# Patient Record
Sex: MALE | Race: WHITE | Employment: OTHER | URBAN - METROPOLITAN AREA
[De-identification: names, ages, dates, MRNs, and addresses within clinical notes are randomized per-mention and may not be internally consistent; named-entity substitution may affect disease eponyms.]

---

## 2019-04-22 DIAGNOSIS — M25.521 RIGHT ELBOW PAIN: Primary | ICD-10-CM

## 2019-12-17 ENCOUNTER — ANESTHESIA (OUTPATIENT)
Dept: ENDOSCOPY | Age: 59
End: 2019-12-17
Payer: COMMERCIAL

## 2019-12-17 ENCOUNTER — HOSPITAL ENCOUNTER (OUTPATIENT)
Age: 59
Setting detail: OUTPATIENT SURGERY
Discharge: HOME | End: 2019-12-17
Attending: SURGERY | Admitting: SURGERY
Payer: COMMERCIAL

## 2019-12-17 ENCOUNTER — ANESTHESIA EVENT (OUTPATIENT)
Dept: ENDOSCOPY | Age: 59
End: 2019-12-17
Payer: COMMERCIAL

## 2019-12-17 VITALS — OXYGEN SATURATION: 95 % | SYSTOLIC BLOOD PRESSURE: 147 MMHG | DIASTOLIC BLOOD PRESSURE: 82 MMHG

## 2019-12-17 VITALS
HEIGHT: 69 IN | RESPIRATION RATE: 12 BRPM | SYSTOLIC BLOOD PRESSURE: 165 MMHG | OXYGEN SATURATION: 94 % | DIASTOLIC BLOOD PRESSURE: 102 MMHG | WEIGHT: 197 LBS | HEART RATE: 86 BPM | TEMPERATURE: 98.9 F | BODY MASS INDEX: 29.18 KG/M2

## 2019-12-17 DIAGNOSIS — K21.00 GASTROESOPHAGEAL REFLUX DISEASE WITH ESOPHAGITIS: ICD-10-CM

## 2019-12-17 DIAGNOSIS — K31.84 GASTROPARESIS: Primary | ICD-10-CM

## 2019-12-17 PROCEDURE — 2709999900 HC NON-CHARGEABLE SUPPLY: Performed by: SURGERY

## 2019-12-17 PROCEDURE — 2500000003 HC RX 250 WO HCPCS: Performed by: NURSE ANESTHETIST, CERTIFIED REGISTERED

## 2019-12-17 PROCEDURE — 6370000000 HC RX 637 (ALT 250 FOR IP): Performed by: NURSE ANESTHETIST, CERTIFIED REGISTERED

## 2019-12-17 PROCEDURE — 6360000002 HC RX W HCPCS: Performed by: NURSE ANESTHETIST, CERTIFIED REGISTERED

## 2019-12-17 PROCEDURE — 7100000000 HC PACU RECOVERY - FIRST 15 MIN: Performed by: SURGERY

## 2019-12-17 PROCEDURE — 88305 TISSUE EXAM BY PATHOLOGIST: CPT

## 2019-12-17 PROCEDURE — 88312 SPECIAL STAINS GROUP 1: CPT

## 2019-12-17 PROCEDURE — 2580000003 HC RX 258: Performed by: SURGERY

## 2019-12-17 PROCEDURE — 3609012400 HC EGD TRANSORAL BIOPSY SINGLE/MULTIPLE: Performed by: SURGERY

## 2019-12-17 PROCEDURE — 3700000001 HC ADD 15 MINUTES (ANESTHESIA): Performed by: SURGERY

## 2019-12-17 PROCEDURE — 7100000001 HC PACU RECOVERY - ADDTL 15 MIN: Performed by: SURGERY

## 2019-12-17 PROCEDURE — 3700000000 HC ANESTHESIA ATTENDED CARE: Performed by: SURGERY

## 2019-12-17 PROCEDURE — 6370000000 HC RX 637 (ALT 250 FOR IP): Performed by: SURGERY

## 2019-12-17 PROCEDURE — 2500000003 HC RX 250 WO HCPCS

## 2019-12-17 RX ORDER — LIDOCAINE HYDROCHLORIDE 10 MG/ML
INJECTION, SOLUTION EPIDURAL; INFILTRATION; INTRACAUDAL; PERINEURAL PRN
Status: DISCONTINUED | OUTPATIENT
Start: 2019-12-17 | End: 2019-12-17 | Stop reason: SDUPTHER

## 2019-12-17 RX ORDER — LIDOCAINE HYDROCHLORIDE 20 MG/ML
15 SOLUTION OROPHARYNGEAL ONCE
Status: COMPLETED | OUTPATIENT
Start: 2019-12-17 | End: 2019-12-17

## 2019-12-17 RX ORDER — METOPROLOL SUCCINATE 50 MG/1
50 TABLET, EXTENDED RELEASE ORAL DAILY
Status: ON HOLD | COMMUNITY
End: 2021-10-16 | Stop reason: HOSPADM

## 2019-12-17 RX ORDER — VALSARTAN 320 MG/1
320 TABLET ORAL DAILY
COMMUNITY
End: 2020-02-23 | Stop reason: SDUPTHER

## 2019-12-17 RX ORDER — PROPOFOL 10 MG/ML
INJECTION, EMULSION INTRAVENOUS PRN
Status: DISCONTINUED | OUTPATIENT
Start: 2019-12-17 | End: 2019-12-17 | Stop reason: SDUPTHER

## 2019-12-17 RX ORDER — DIPHENHYDRAMINE HYDROCHLORIDE 50 MG/ML
12.5 INJECTION INTRAMUSCULAR; INTRAVENOUS
Status: DISCONTINUED | OUTPATIENT
Start: 2019-12-17 | End: 2019-12-17 | Stop reason: HOSPADM

## 2019-12-17 RX ORDER — ONDANSETRON 2 MG/ML
4 INJECTION INTRAMUSCULAR; INTRAVENOUS
Status: DISCONTINUED | OUTPATIENT
Start: 2019-12-17 | End: 2019-12-17 | Stop reason: HOSPADM

## 2019-12-17 RX ORDER — OXYCODONE HYDROCHLORIDE AND ACETAMINOPHEN 5; 325 MG/1; MG/1
1 TABLET ORAL PRN
Status: DISCONTINUED | OUTPATIENT
Start: 2019-12-17 | End: 2019-12-17 | Stop reason: HOSPADM

## 2019-12-17 RX ORDER — OXYCODONE HYDROCHLORIDE AND ACETAMINOPHEN 5; 325 MG/1; MG/1
2 TABLET ORAL PRN
Status: DISCONTINUED | OUTPATIENT
Start: 2019-12-17 | End: 2019-12-17 | Stop reason: HOSPADM

## 2019-12-17 RX ORDER — SUCRALFATE 1 G/1
1 TABLET ORAL 4 TIMES DAILY
Qty: 240 TABLET | Refills: 0 | OUTPATIENT
Start: 2019-12-17 | End: 2021-03-10 | Stop reason: ALTCHOICE

## 2019-12-17 RX ORDER — SODIUM CHLORIDE, SODIUM LACTATE, POTASSIUM CHLORIDE, CALCIUM CHLORIDE 600; 310; 30; 20 MG/100ML; MG/100ML; MG/100ML; MG/100ML
INJECTION, SOLUTION INTRAVENOUS CONTINUOUS
Status: DISCONTINUED | OUTPATIENT
Start: 2019-12-17 | End: 2019-12-17 | Stop reason: HOSPADM

## 2019-12-17 RX ORDER — LABETALOL 20 MG/4 ML (5 MG/ML) INTRAVENOUS SYRINGE
5 EVERY 10 MIN PRN
Status: DISCONTINUED | OUTPATIENT
Start: 2019-12-17 | End: 2019-12-17 | Stop reason: HOSPADM

## 2019-12-17 RX ORDER — ONDANSETRON 2 MG/ML
4 INJECTION INTRAMUSCULAR; INTRAVENOUS ONCE
Status: DISCONTINUED | OUTPATIENT
Start: 2019-12-17 | End: 2019-12-17 | Stop reason: HOSPADM

## 2019-12-17 RX ORDER — OXYMETAZOLINE HYDROCHLORIDE 0.05 G/100ML
SPRAY NASAL PRN
Status: DISCONTINUED | OUTPATIENT
Start: 2019-12-17 | End: 2019-12-17 | Stop reason: SDUPTHER

## 2019-12-17 RX ORDER — ESOMEPRAZOLE MAGNESIUM 40 MG/1
40 CAPSULE, DELAYED RELEASE ORAL 2 TIMES DAILY
Qty: 120 CAPSULE | Refills: 0 | OUTPATIENT
Start: 2019-12-17 | End: 2021-07-23

## 2019-12-17 RX ADMIN — LIDOCAINE HYDROCHLORIDE 50 MG: 10 INJECTION, SOLUTION EPIDURAL; INFILTRATION; INTRACAUDAL at 07:35

## 2019-12-17 RX ADMIN — LIDOCAINE HYDROCHLORIDE 15 ML: 20 SOLUTION ORAL; TOPICAL at 06:49

## 2019-12-17 RX ADMIN — SODIUM CHLORIDE, POTASSIUM CHLORIDE, SODIUM LACTATE AND CALCIUM CHLORIDE: 600; 310; 30; 20 INJECTION, SOLUTION INTRAVENOUS at 06:49

## 2019-12-17 RX ADMIN — OXYMETAZOLINE HYDROCHLORIDE 2 SPRAY: 5 SPRAY NASAL at 07:20

## 2019-12-17 RX ADMIN — PROPOFOL 220 MG: 10 INJECTION, EMULSION INTRAVENOUS at 07:35

## 2019-12-17 RX ADMIN — FAMOTIDINE 20 MG: 10 INJECTION, SOLUTION INTRAVENOUS at 06:56

## 2019-12-17 ASSESSMENT — PULMONARY FUNCTION TESTS
PIF_VALUE: 0
PIF_VALUE: 1
PIF_VALUE: 0
PIF_VALUE: 1
PIF_VALUE: 0

## 2019-12-17 ASSESSMENT — PAIN - FUNCTIONAL ASSESSMENT: PAIN_FUNCTIONAL_ASSESSMENT: 0-10

## 2019-12-18 LAB — SURGICAL PATHOLOGY REPORT: NORMAL

## 2020-02-23 ENCOUNTER — OFFICE VISIT (OUTPATIENT)
Dept: FAMILY MEDICINE CLINIC | Age: 60
End: 2020-02-23
Payer: COMMERCIAL

## 2020-02-23 VITALS — DIASTOLIC BLOOD PRESSURE: 95 MMHG | HEART RATE: 77 BPM | TEMPERATURE: 98.6 F | SYSTOLIC BLOOD PRESSURE: 149 MMHG

## 2020-02-23 PROCEDURE — 99214 OFFICE O/P EST MOD 30 MIN: CPT | Performed by: PHYSICIAN ASSISTANT

## 2020-02-23 RX ORDER — AZITHROMYCIN 250 MG/1
TABLET, FILM COATED ORAL
Qty: 8 TABLET | Refills: 0 | Status: SHIPPED | OUTPATIENT
Start: 2020-02-23 | End: 2020-02-23

## 2020-02-23 RX ORDER — VALSARTAN 320 MG/1
320 TABLET ORAL DAILY
Qty: 14 TABLET | Refills: 0 | Status: SHIPPED | OUTPATIENT
Start: 2020-02-23 | End: 2021-03-10 | Stop reason: ALTCHOICE

## 2020-02-23 RX ORDER — AZITHROMYCIN 250 MG/1
TABLET, FILM COATED ORAL
Qty: 8 TABLET | Refills: 0 | Status: SHIPPED | OUTPATIENT
Start: 2020-02-23 | End: 2020-03-01

## 2020-02-23 ASSESSMENT — ENCOUNTER SYMPTOMS
DIARRHEA: 0
WHEEZING: 1
CONSTIPATION: 0
COUGH: 1
VOMITING: 0
NAUSEA: 0
SHORTNESS OF BREATH: 1
SORE THROAT: 0
SINUS COMPLAINT: 1
RHINORRHEA: 0
SINUS PRESSURE: 1

## 2020-02-23 NOTE — PROGRESS NOTES
[address] [facility] WALK-IN  [facility] [address] [place] [address]  Dept: [phone]  Dept Fax: [phone]    Tano Sanders    Date of patient's visit: 2/23/2020  Patient's Name:  Nikolai Pinedo YOB: 1960            Patient Care Team:  Matthew Saravia as PCP - General (Family Medicine)  ================================================================    REASON FOR VISIT/CHIEF COMPLAINT:  Cough (started a couple hours ago, hurts in chest when coughing. ) and Other (drainage, stuffy/runny nose - sinus pressure/pain, ears plugged)      HISTORY OF PRESENTING ILLNESS:  Nikolai Pinedo is a 63 y.o. is seen at the walk-in clinic today for sinus infection. History was obtained from: patient. Cough   This is a new problem. The current episode started in the past 7 days. The cough is productive of sputum (green). Associated symptoms include chest pain (d/t coughing), nasal congestion, shortness of breath and wheezing. Pertinent negatives include no chills, fever, rhinorrhea or sore throat. Sinus Problem   This is a new problem. The current episode started in the past 7 days. There has been no fever. Associated symptoms include congestion (nasal and chest), coughing, shortness of breath and sinus pressure. Pertinent negatives include no chills or sore throat. Past treatments include spray decongestants. The treatment provided mild relief. Patient states \"Z-Isiah in the past that improved symptoms\". Discussed sinusitis, medications in depth with patient, instructed patient continue take OTC nasal spray and decongestant as needed, informed patient he will be prescribed antibiotic, instructed patient to come back into the walk-in clinic or follow-up with PCP if symptoms do not improve or resolve after finishing antibiotic, patient voiced understanding. Patient blood pressure elevated in office, \"out of medication for a while\".   Discussed elevated blood pressure and risk in depth with patient, informed patient he will be prescribed 14-day supply, instructed patient to follow-up with PCP for any additional refills, patient voiced understanding. Vital signs stable in office. Labs reviewed. Health maintenance reviewed, instructed patient to follow-up with PCP for any health maintenance concerns. Medications reviewed, prescribed Z-Isiah 250 mg for 7 days, refill valsartan 320 mg. HPI    Patient Active Problem List   Diagnosis    Heartburn       Health Maintenance Due   Topic Date Due    Potassium monitoring  1960    Creatinine monitoring  1960    Hepatitis C screen  1960    DTaP/Tdap/Td vaccine (1 - Tdap) 08/10/1972    HIV screen  08/10/1976    Lipid screen  08/10/2001    Diabetes screen  08/10/2001    Shingles Vaccine (1 of 2) 08/10/2011    Colon cancer screen colonoscopy  08/10/2011    Flu vaccine (1) 09/01/2019       Allergies   Allergen Reactions    Pcn [Penicillins]          Current Outpatient Medications   Medication Sig Dispense Refill    valsartan (DIOVAN) 320 MG tablet Take 1 tablet by mouth daily for 14 days 14 tablet 0    azithromycin (ZITHROMAX) 250 MG tablet 500mg on day 1 followed by 250mg on days 2 - 7 8 tablet 0    metoprolol succinate (TOPROL XL) 50 MG extended release tablet Take 50 mg by mouth daily      esomeprazole (NEXIUM) 40 MG delayed release capsule Take 1 capsule by mouth 2 times daily 120 capsule 0    sucralfate (CARAFATE) 1 GM tablet Take 1 tablet by mouth 4 times daily 240 tablet 0    ibuprofen (ADVIL;MOTRIN) 600 MG tablet Take 600 mg by mouth every 6 hours as needed.  lidocaine (LIDODERM) 5 % Place 1 patch onto the skin every 24 hours. 12 hours on, 12 hours off. 20 patch 0     No current facility-administered medications for this visit.         Social History     Tobacco Use    Smoking status: Former Smoker     Packs/day: 1.00     Years: 20.00     Pack years: 20.00 Types: Cigarettes     Last attempt to quit: 3/15/2008     Years since quittin.9    Smokeless tobacco: Never Used   Substance Use Topics    Alcohol use: Yes     Comment: socially    Drug use: No       Family History   Problem Relation Age of Onset    Cancer Father         prostrate    Diabetes Father     Cancer Paternal Uncle         colon        REVIEW OFSYSTEMS:  Review of Systems   Constitutional: Negative for chills and fever. HENT: Positive for congestion (nasal and chest) and sinus pressure. Negative for rhinorrhea and sore throat. Respiratory: Positive for cough, shortness of breath and wheezing. Cardiovascular: Positive for chest pain (d/t coughing). Gastrointestinal: Negative for constipation, diarrhea, nausea and vomiting. Genitourinary: Negative for dysuria, frequency and urgency. PHYSICAL EXAM:  Vitals:    20 1446 20 1510   BP: (!) 153/93 (!) 149/95   Site: Left Upper Arm Right Upper Arm   Position: Sitting Sitting   Cuff Size: Large Adult Large Adult   Pulse: 77 77   Temp: 98.6 °F (37 °C)      BP Readings from Last 3 Encounters:   20 (!) 149/95   19 (!) 165/102   19 (!) 147/82        Physical Exam  Vitals signs reviewed. Constitutional:       General: He is not in acute distress. Appearance: Normal appearance. He is well-developed, well-groomed and overweight. He is not ill-appearing or toxic-appearing. HENT:      Head: Normocephalic and atraumatic. Right Ear: External ear normal. There is impacted cerumen. Left Ear: External ear normal. There is impacted cerumen. Nose: Nasal tenderness, congestion and rhinorrhea present. No mucosal edema. Right Sinus: Maxillary sinus tenderness and frontal sinus tenderness present. Left Sinus: No maxillary sinus tenderness or frontal sinus tenderness. Mouth/Throat:      Lips: Pink. Mouth: Mucous membranes are moist.      Pharynx: Uvula midline. No oropharyngeal exudate.

## 2020-02-23 NOTE — PATIENT INSTRUCTIONS
· Continue use nasal spray and Mucinex as needed for congestion  · Follow up with PCP for blood pressure medication

## 2020-03-22 ENCOUNTER — OFFICE VISIT (OUTPATIENT)
Dept: FAMILY MEDICINE CLINIC | Age: 60
End: 2020-03-22
Payer: COMMERCIAL

## 2020-03-22 VITALS — DIASTOLIC BLOOD PRESSURE: 88 MMHG | HEART RATE: 86 BPM | TEMPERATURE: 98.9 F | SYSTOLIC BLOOD PRESSURE: 140 MMHG

## 2020-03-22 PROCEDURE — 69210 REMOVE IMPACTED EAR WAX UNI: CPT | Performed by: FAMILY MEDICINE

## 2020-03-22 PROCEDURE — 99213 OFFICE O/P EST LOW 20 MIN: CPT | Performed by: FAMILY MEDICINE

## 2020-03-22 ASSESSMENT — ENCOUNTER SYMPTOMS
SORE THROAT: 0
ABDOMINAL PAIN: 0
SINUS PAIN: 0
WHEEZING: 0
RHINORRHEA: 0
NAUSEA: 0
COUGH: 0
DIARRHEA: 0
SHORTNESS OF BREATH: 0
VOMITING: 0
SINUS PRESSURE: 0

## 2020-03-22 NOTE — PROGRESS NOTES
[address] WALK-IN  [address]  [address] [facility]  Dept: [phone]  Dept Fax: [phone]    Maricarmen Bland is a 63 y.o. male who presents today for his medical conditions/complaintsas noted below. Maricarmen Bland is c/o of Other (ears plugged, started yesterday)        HPI:     Ear Fullness    There is pain in both ears. This is a new problem. The current episode started yesterday. The problem occurs constantly. The problem has been unchanged. There has been no fever. The pain is mild (pressure). Associated symptoms include hearing loss. Pertinent negatives include no abdominal pain, coughing, diarrhea, ear discharge, rash, rhinorrhea, sore throat or vomiting. Treatments tried: OTC ear drops. The treatment provided no relief. There is no history of a chronic ear infection or a tympanostomy tube.    NO sick contacts    Past Medical History:   Diagnosis Date    Heartburn     Hypertension     Sleep apnea     Past medical history reviewed and pertinent positives/negatives in the HPI    Past Surgical History:   Procedure Laterality Date    SHOULDER SURGERY      SPINE SURGERY      cervical and lumbar    UPPER GASTROINTESTINAL ENDOSCOPY N/A 2019    EGD BIOPSY performed by Gerald Sifuentes [facility] at [facility]       Family History   Problem Relation Age of Onset    Cancer Father         prostrate    Diabetes Father     Cancer Paternal Uncle         colon       Social History     Tobacco Use    Smoking status: Former Smoker     Packs/day: 1.00     Years: 20.00     Pack years: 20.00     Types: Cigarettes     Last attempt to quit: 3/15/2008     Years since quittin.0    Smokeless tobacco: Never Used   Substance Use Topics    Alcohol use: Yes     Comment: socially      Current Outpatient Medications   Medication Sig Dispense Refill    neomycin-polymyxin-hydrocortisone (CORTISPORIN) 3.5-65940-2 otic solution Place 4 drops into the right ear 3

## 2020-03-22 NOTE — PATIENT INSTRUCTIONS
loss of hearing.    Watch closely for changes in your health, and be sure to contact your doctor if:    · You have pain or reduced hearing after 1 week of home treatment.     · You have any new symptoms, such as nausea or balance problems. Where can you learn more? Go to [email or web address]://[email or web address] org and sign in to your [employer] account. Enter Q779 in the [facility] box to learn more about \"Earwax Blockage: Care Instructions. \"     If you do not have an account, please click on the \"Sign Up Now\" link. Current as of: June 26, 2019Content Version: 12.4  © 4694-3381 [employer], Incorporated. Care instructions adapted under license by [facility] ([facility]). If you have questions about a medical condition or this instruction, always ask your healthcare professional. [address] any warranty or liability for your use of this information. 1.) Irrigation of both ears done today. Use prescription ear drop as prescribed for irritation of the right ear. May use debrox ear drops, olive oil or 50/50 mix of water and hydrogen peroxide weekly as needed to decrease wax in ears. May use q-tips to clean the outside of the ear. Never put q-tips inside the ear canal  2. )If symptoms worsen or do not improve please follow-up with PCP or return to clinic

## 2020-10-16 ENCOUNTER — HOSPITAL ENCOUNTER (OUTPATIENT)
Age: 60
Setting detail: SPECIMEN
Discharge: HOME | End: 2020-10-16
Payer: COMMERCIAL

## 2020-10-16 LAB
CHOLESTEROL/HDL RATIO: 6.4
CHOLESTEROL: 172 MG/DL
ESTIMATED AVERAGE GLUCOSE: 108 MG/DL
GLUCOSE BLD-MCNC: 103 MG/DL (ref 70–99)
HBA1C MFR BLD: 5.4 % (ref 4–6)
HDLC SERPL-MCNC: 27 MG/DL
LDL CHOLESTEROL: 115 MG/DL (ref 0–130)
PROSTATE SPECIFIC ANTIGEN: 3.53 UG/L
TRIGL SERPL-MCNC: 148 MG/DL
VLDLC SERPL CALC-MCNC: ABNORMAL MG/DL (ref 1–30)

## 2021-01-18 ENCOUNTER — HOSPITAL ENCOUNTER (OUTPATIENT)
Dept: GENERAL RADIOLOGY | Age: 61
Discharge: HOME | End: 2021-01-20
Payer: COMMERCIAL

## 2021-01-18 ENCOUNTER — HOSPITAL ENCOUNTER (OUTPATIENT)
Age: 61
Discharge: HOME | End: 2021-01-20
Payer: COMMERCIAL

## 2021-01-18 DIAGNOSIS — S59.901A INJURY OF RIGHT ELBOW, INITIAL ENCOUNTER: ICD-10-CM

## 2021-01-18 PROCEDURE — 73080 X-RAY EXAM OF ELBOW: CPT

## 2021-01-26 ENCOUNTER — OFFICE VISIT (OUTPATIENT)
Dept: ORTHOPEDIC SURGERY | Age: 61
End: 2021-01-26
Payer: COMMERCIAL

## 2021-01-26 DIAGNOSIS — M17.11 PRIMARY OSTEOARTHRITIS OF RIGHT KNEE: ICD-10-CM

## 2021-01-26 DIAGNOSIS — M70.21 OLECRANON BURSITIS OF RIGHT ELBOW: Primary | ICD-10-CM

## 2021-01-26 DIAGNOSIS — S46.311A RUPTURE OF RIGHT TRICEPS TENDON, INITIAL ENCOUNTER: ICD-10-CM

## 2021-01-26 PROCEDURE — 99203 OFFICE O/P NEW LOW 30 MIN: CPT | Performed by: PHYSICIAN ASSISTANT

## 2021-01-26 PROCEDURE — 20610 DRAIN/INJ JOINT/BURSA W/O US: CPT | Performed by: PHYSICIAN ASSISTANT

## 2021-01-26 RX ORDER — LIDOCAINE HYDROCHLORIDE 10 MG/ML
4 INJECTION, SOLUTION INFILTRATION; PERINEURAL ONCE
Status: DISCONTINUED | OUTPATIENT
Start: 2021-01-26 | End: 2021-02-23 | Stop reason: HOSPADM

## 2021-01-26 RX ORDER — LIDOCAINE HYDROCHLORIDE 10 MG/ML
2 INJECTION, SOLUTION INFILTRATION; PERINEURAL ONCE
Status: COMPLETED | OUTPATIENT
Start: 2021-01-26 | End: 2021-01-26

## 2021-01-26 RX ORDER — LIDOCAINE HYDROCHLORIDE 10 MG/ML
4 INJECTION, SOLUTION INFILTRATION; PERINEURAL ONCE
Status: COMPLETED | OUTPATIENT
Start: 2021-01-26 | End: 2021-01-26

## 2021-01-26 RX ORDER — BETAMETHASONE SODIUM PHOSPHATE AND BETAMETHASONE ACETATE 3; 3 MG/ML; MG/ML
12 INJECTION, SUSPENSION INTRA-ARTICULAR; INTRALESIONAL; INTRAMUSCULAR; SOFT TISSUE ONCE
Status: COMPLETED | OUTPATIENT
Start: 2021-01-26 | End: 2021-01-26

## 2021-01-26 RX ADMIN — LIDOCAINE HYDROCHLORIDE 2 ML: 10 INJECTION, SOLUTION INFILTRATION; PERINEURAL at 14:17

## 2021-01-26 RX ADMIN — BETAMETHASONE SODIUM PHOSPHATE AND BETAMETHASONE ACETATE 12 MG: 3; 3 INJECTION, SUSPENSION INTRA-ARTICULAR; INTRALESIONAL; INTRAMUSCULAR; SOFT TISSUE at 14:14

## 2021-01-26 RX ADMIN — LIDOCAINE HYDROCHLORIDE 4 ML: 10 INJECTION, SOLUTION INFILTRATION; PERINEURAL at 14:15

## 2021-01-26 NOTE — PROGRESS NOTES
[facility], [address] [place] [address], [address] [place] [address], [address] [place] [address]           Dept Phone: [phone]           Dept Fax:  [phone] [facility], [place]          Dept Phone: [phone]           Dept Fax:  [phone]      Chief Compliant:  Chief Complaint   Patient presents with    New Patient     right elbow and knee pain         History of Present Illness: This is a 62 y.o. male who presents to the clinic today for evaluation of right elbow and right knee pain. Right Elbow Pain and swelling:  Patient states he was skiing in [place] last week and on his final day of the trip on 1/14/2021 he fell in which he landed directly on his right elbow. Patient had x-rays on 1/18/2021 which demonstrated moderate degenerative changes and soft tissue swelling however no evidence of acute fracture was seen. He was then given a referral for orthopedics for further evaluation. Patient with significant swelling to the posterior and medial elbow. Pain is aggravated by terminal extension and pushing off albeit mildly improved since last week. Patient states he continues have significant swelling however this is mildly improved as well. He denies any obvious deformity, joint warmth, redness, fever or chills. Patient is an avid weightlifter and states he has not been able to return to the gym since this injury due to the pain with lifting as well as tricep extension. Right knee pain, chronic:  States he has chronic pain to this right knee and has had it for approximately 4 years. Patient underwent a right knee arthroscopy partial medial meniscectomy in November 2017 by Dr. Kimmie Agrawal. Patient states unfortunately has continued to have pain of this right knee off and on over the last 3 years.   Pain is most severe to the anterior and medial aspect of the right knee and is aggravated by activity. Pain does seem to be relieved by rest.  Patient has not had any recent injections or physical therapy on this right knee. Patient denies any trauma of this right knee states he had pain far before the fall and does not believe he hit this knee during the fall while skiing. Past History:    Current Outpatient Medications:     valsartan (DIOVAN) 320 MG tablet, Take 1 tablet by mouth daily for 14 days, Disp: 14 tablet, Rfl: 0    metoprolol succinate (TOPROL XL) 50 MG extended release tablet, Take 50 mg by mouth daily, Disp: , Rfl:     esomeprazole (NEXIUM) 40 MG delayed release capsule, Take 1 capsule by mouth 2 times daily, Disp: 120 capsule, Rfl: 0    sucralfate (CARAFATE) 1 GM tablet, Take 1 tablet by mouth 4 times daily, Disp: 240 tablet, Rfl: 0    ibuprofen (ADVIL;MOTRIN) 600 MG tablet, Take 600 mg by mouth every 6 hours as needed. , Disp: , Rfl:     lidocaine (LIDODERM) 5 %, Place 1 patch onto the skin every 24 hours.  12 hours on, 12 hours off., Disp: 20 patch, Rfl: 0  Allergies   Allergen Reactions    Pcn [Penicillins]      Social History     Socioeconomic History    Marital status: Single     Spouse name: Not on file    Number of children: Not on file    Years of education: Not on file    Highest education level: Not on file   Occupational History    Not on file   Social Needs    Financial resource strain: Not on file    Food insecurity     Worry: Not on file     Inability: Not on file    Transportation needs     Medical: Not on file     Non-medical: Not on file   Tobacco Use    Smoking status: Former Smoker     Packs/day: 1.00     Years: 20.00     Pack years: 20.00     Types: Cigarettes     Quit date: 3/15/2008     Years since quittin.8    Smokeless tobacco: Never Used   Substance and Sexual Activity    Alcohol use: Yes     Comment: socially    Drug use: No    Sexual activity: Not on file   Lifestyle    Physical activity     Days per week: Not on file     Minutes per session: Not on file    Stress: Not on file   Relationships    Social connections     Talks on phone: Not on file     Gets together: Not on file     Attends Mu-ism service: Not on file     Active member of club or organization: Not on file     Attends meetings of clubs or organizations: Not on file     Relationship status: Not on file    Intimate partner violence     Fear of current or ex partner: Not on file     Emotionally abused: Not on file     Physically abused: Not on file     Forced sexual activity: Not on file   Other Topics Concern    Not on file   Social History Narrative    Not on file     Past Medical History:   Diagnosis Date    Heartburn     Hypertension     Sleep apnea      Past Surgical History:   Procedure Laterality Date    SHOULDER SURGERY      SPINE SURGERY      cervical and lumbar    UPPER GASTROINTESTINAL ENDOSCOPY N/A 12/17/2019    EGD BIOPSY performed by Cyril Nguyen MD at [facility]     Family History   Problem Relation Age of Onset    Cancer Father         prostrate    Diabetes Father     Cancer Paternal Uncle         colon        Review of Systems   Constitutional: Negative for fever, chills, sweats. Eyes: Negative for changes in vision, or pain. HENT: Negative for ear ache, epistaxis, or sore throat. Respiratory/Cardio: Negative for Chest pain, palpitations, SOB, or cough. Gastrointestinal: Negative for abdominal pain, N/V/D. Genitourinary: Negative for dysuria, frequency, urgency, or hematuria. Neurological: Negative for headache, numbness, or weakness. Integumentary: Negative for rash, itching, laceration, or abrasion. Musculoskeletal: Positive for New Patient (right elbow and knee pain )       Physical Exam:  Constitutional: Patient is oriented to person, place, and time. Patient appears well-developed and well nourished.    HENT: Negative otherwise noted  Head: Normocephalic and Atraumatic  Nose: Normal  Eyes: Conjunctivae and EOM are normal  Neck: Normal range of motion Neck supple. Respiratory/Cardio: Effort normal. No respiratory distress. Musculoskeletal:    right Elbow    Tenderness:  Patient with severe tenderness to the distal triceps tendon. There is palpable swelling/mass to the triceps tendon that is TTP. No tenderness to the medial lateral epicondyle. No tenderness to the olecranon itself. Swelling:  Significant swelling of the olecranon bursa. Moderate swelling over the proximal forearm as well as the medial epicondyle. Range of Motion:      Flexion: 120     Extension: 10     Pronation: 70     Supination: 70        Strength      Elbow Extension 3/5     Elbow Flexion 5/5      5/5     Pronation 5/5     Supination 5/5       Varus Stress Test Negative   Valgus Stress Test Negative   Tinels Cubital Tunnel Negative     Right Knee:     Skin: warm and dry, no rash or erythema  Vasculature: 2+ pedal pulses bilaterally  Neuro: Sensation grossly intact to light touch diffusely  Alignment: Normal  Tenderness: Mild tenderness to the medial joint line. ROM: (Degrees)       A P       Extension  0        Flexion   120 125       Crepitation  Yes       Muscle strength:         Flexion   5      Extension  5      SLR   5        Extensor lag   n          Special testing:  y    Pain with deep knee flexion     n    Patellar grind       n    Patellar apprehension      y    Patellar glide         n    Lachman       n    Anterior drawer      n    Pivot shift       n    Posterior drawer      n    Dial test       n    Posterolateral drawer      n    Posterior Sag       n    MCL        n    LCL          mild    Medial joint line tenderness     n    Lateral joint line tenderness     n    Appley's             Neurological: Patient is alert and oriented to person, place, and time. Normal strenght. No sensory deficit.   Skin: Skin is warm and dry  Psychiatric: Behavior is normal. Thought content normal.  Nursing note and vitals reviewed. Labs and Imaging:     XR taken today:  No results found. Previous Imagin2021 x-ray right elbow  FINDINGS:   The joint spaces are maintained.  Normal alignment.  Somewhat prominent   degenerative changes at the articulating margins of the distal humerus and   olecranon process.  There is narrowing of the space between the lateral   epicondyle and the radial head.       No evidence of fracture or cortical irregularity.  No evidence to suggest   joint effusion.           Impression   Degenerative changes in the bones of the right elbow.  No acute process is   identified. 2017 Xr right knee  Findings:  No fracture, dislocation, or destructive osseous process is observed. No acute process. There is lateral subluxation of patella with narrowing of the lateral patellofemoral joint. Impression:   No acute abnormality. Discussed possibility of updating x-rays of this right knee today however patient declined. Orders Placed This Encounter   Procedures    MRI ELBOW RIGHT WO CONTRAST     Standing Status:   Future     Standing Expiration Date:   2022 - DRAIN/INJECT LARGE JOINT BURSA       Assessment and Plan:  1. Olecranon bursitis of right elbow    2. Rupture of right triceps tendon, initial encounter    3. Primary osteoarthritis of right knee          PLAN:  This is a 62 y.o. male who presents to the clinic today for evaluation of right olecranon bursitis and chronic right knee pain. Right olecranon bursitis  1. Patient with evidence of significant olecranon bursitis. Due to pain with extension as well as palpable swelling and tenderness to the triceps tendon there is question of partial tearing of the triceps tendon as well  2. Underwent aspiration of olecranon bursa as outlined below. Compression wrap is placed over the elbow after the procedure and instructed wear this for 24 hours.   Patient is also educated on lifestyle modifications avoid including prolonged flexion and prolonged pressure on this elbow. 3.  Due to concern over triceps tearing recommend an MRI of the right elbow to rule out triceps rupture  4. RTC after MRI    Chronic right knee pain  1. Celestone injection given as outlined below  2. RTC PRN        An informed verbal consent for the procedure was obtained and risks including, but not limited to: allergy to medications, injection, bleeding, stiffness of joint, recurrence of symptoms, loss of function, swelling, drainage, irrigation, need for surgery and pseudo-septic inflammation, were explained to the patient. Also, discussed was the potential for further injections, irrigation and debridement and surgery. Alternate means of treatment have also been discussed with the patient. Procedure Note: Right knee Celestone Injection   Following an appropriate discussion with the patient regarding the risks and benefits of the procedure he consented to proceed. his right knee was prepped using betadine solution and alcohol swab. Using aseptic technique and through a lateral joint line approach, his right knee was injected superficially with 4 cc of 1% lidocaine without epinephrine and subsequently with 2 cc of 6 mg/mL Celestone into the right knee. A band aid was applied to the injection site. he tolerated the injection with no immediate adverse reactions. Procedure Note: Right Olecranon bursa aspiration without Injection   Following an appropriate discussion with the patient regarding the risks and benefits of the procedure he consented to proceed. his right elbow was prepped using betadine solution and alcohol swab. Using aseptic technique and through a posterior approach, his right elbow was injected superficially with 2 cc of 1% lidocaine without epinephrine and subsequently an 18-gauge needle was inserted and approximately 45 cc of bloody aspirate is obtained. This is nonpurulent in appearance.   A band aid was applied to the injection site. he tolerated the injection with no immediate adverse reactions. Please note that this chart was generated using voice recognition Dragon dictation software. Although every effort was made to ensure the accuracy of this automated transcription, some errors in transcription may have occurred.

## 2021-02-01 ENCOUNTER — OFFICE VISIT (OUTPATIENT)
Dept: GASTROENTEROLOGY | Age: 61
End: 2021-02-01
Payer: COMMERCIAL

## 2021-02-01 ENCOUNTER — TELEPHONE (OUTPATIENT)
Dept: GASTROENTEROLOGY | Age: 61
End: 2021-02-01

## 2021-02-01 VITALS
BODY MASS INDEX: 29.62 KG/M2 | SYSTOLIC BLOOD PRESSURE: 132 MMHG | HEART RATE: 83 BPM | RESPIRATION RATE: 18 BRPM | DIASTOLIC BLOOD PRESSURE: 85 MMHG | WEIGHT: 200 LBS | HEIGHT: 69 IN

## 2021-02-01 DIAGNOSIS — R10.11 RUQ PAIN: ICD-10-CM

## 2021-02-01 DIAGNOSIS — Z12.11 SCREEN FOR COLON CANCER: ICD-10-CM

## 2021-02-01 DIAGNOSIS — R12 HEARTBURN: Primary | ICD-10-CM

## 2021-02-01 PROCEDURE — APPSS45 APP SPLIT SHARED TIME 31-45 MINUTES: Performed by: NURSE PRACTITIONER

## 2021-02-01 PROCEDURE — 99214 OFFICE O/P EST MOD 30 MIN: CPT | Performed by: INTERNAL MEDICINE

## 2021-02-01 RX ORDER — PANTOPRAZOLE SODIUM 40 MG/1
40 TABLET, DELAYED RELEASE ORAL
Qty: 90 TABLET | Refills: 1 | Status: SHIPPED | OUTPATIENT
Start: 2021-02-01 | End: 2021-07-23

## 2021-02-01 RX ORDER — SODIUM, POTASSIUM,MAG SULFATES 17.5-3.13G
SOLUTION, RECONSTITUTED, ORAL ORAL
Qty: 1 BOTTLE | Refills: 0 | Status: ON HOLD | OUTPATIENT
Start: 2021-02-01 | End: 2021-07-13

## 2021-02-01 ASSESSMENT — ENCOUNTER SYMPTOMS
ABDOMINAL DISTENTION: 1
VOMITING: 1
RESPIRATORY NEGATIVE: 1
RECTAL PAIN: 0
ANAL BLEEDING: 0
CONSTIPATION: 0
BLOOD IN STOOL: 0
DIARRHEA: 0
NAUSEA: 1
EYES NEGATIVE: 1
ALLERGIC/IMMUNOLOGIC NEGATIVE: 1
SORE THROAT: 1
ABDOMINAL PAIN: 1

## 2021-02-01 NOTE — PROGRESS NOTES
Reason for Referral:   [address]  [address],  [address]      HISTORY OF PRESENT ILLNESS:   Chief Complaint   Patient presents with    New Patient     Patient referred for heartburn. He notes lately it has been so bad he has to make himself throw up to feel better. Patient on Nexium and carafate. Patient notes having a previous gallbladder attack. When he vomits it's all bile. He notes a greenish yellow color. Patient notes he gets very nauseous. Patient notes occasional abd pain.  Other     Patient notes sometimes his stool is very hard and dark in color. Patient notes having a colon/egd with Dr. Lolita Hernandez long time ago. New patient being seen for persistent heartburns. Patient states that he has been having persistent heartburns since he was 24. Reports frequent nighttime awakenings d/t heartburns. Has nausea with occasional vomiting. Typically bilious. Denies snoring. No extra esophageal manifestations of GERD. Denies epigastric pains  Has associated bloating. He had an EGD in 2019 with Dr Lolita Hernandez that revealed sliding hiatal hernia, reflux esophagitis possible Nair's esophagus. Also noted to have large amount of retained food in the stomach. No gastric outlet obstruction seen. Biopsies of the EG junction revealed acute and chronic inflammation, acute esophagitis. No intestinal metaplasia or dysplasia was identified. Following this patient was to have upper GI as an outpatient however this was not done. Patient also reports intermittent right upper quadrant pain. No recent episodes in the last 2 to 3 months. Patient states that that pain typically comes and goes lasting on average 1-2 hours at a time. Has occasional constipation. Denies any melena, hematochezia. No significant abdominal pain, cramping. Smokes cigar nearly daily. Does not smoke cigarettes. No weight loss.     Has HTN  No lung, heart issues. Denies NSAID use. No current blood thinners. Past Medical,Family, and Social History reviewed and does contribute to the patient presentingcondition. Patient's PMH/PSH,SH,PSYCH Hx, MEDs, ALLERGIES, and ROS were all reviewed and updated in the appropriate sections. PAST MEDICAL HISTORY:  Past Medical History:   Diagnosis Date    Heartburn     Hypertension     Sleep apnea        Past Surgical History:   Procedure Laterality Date    SHOULDER SURGERY      SPINE SURGERY      cervical and lumbar    UPPER GASTROINTESTINAL ENDOSCOPY N/A 12/17/2019    EGD BIOPSY performed by Shilpa Hartman MD at [address]:    Current Outpatient Medications:     pantoprazole (PROTONIX) 40 MG tablet, Take 1 tablet by mouth every morning (before breakfast), Disp: 90 tablet, Rfl: 1    metoprolol succinate (TOPROL XL) 50 MG extended release tablet, Take 50 mg by mouth daily, Disp: , Rfl:     ibuprofen (ADVIL;MOTRIN) 600 MG tablet, Take 600 mg by mouth every 6 hours as needed. , Disp: , Rfl:     lidocaine (LIDODERM) 5 %, Place 1 patch onto the skin every 24 hours.  12 hours on, 12 hours off., Disp: 20 patch, Rfl: 0    Na Sulfate-K Sulfate-Mg Sulf 17.5-3.13-1.6 GM/177ML SOLN, Please follow instructions given by office, Disp: 1 Bottle, Rfl: 0    valsartan (DIOVAN) 320 MG tablet, Take 1 tablet by mouth daily for 14 days, Disp: 14 tablet, Rfl: 0    esomeprazole (NEXIUM) 40 MG delayed release capsule, Take 1 capsule by mouth 2 times daily, Disp: 120 capsule, Rfl: 0    sucralfate (CARAFATE) 1 GM tablet, Take 1 tablet by mouth 4 times daily, Disp: 240 tablet, Rfl: 0    ALLERGIES:   Allergies   Allergen Reactions    Pcn [Penicillins]        FAMILY HISTORY:       Problem Relation Age of Onset    Cancer Father         prostrate    Diabetes Father     Cancer Paternal Uncle         colon         SOCIAL HISTORY:   Social History     Socioeconomic History    Marital status: Single     Spouse name: Not on file    Number of children: Not on file    Years of education: Not on file    Highest education level: Not on file   Occupational History    Not on file   Social Needs    Financial resource strain: Not on file    Food insecurity     Worry: Not on file     Inability: Not on file    Transportation needs     Medical: Not on file     Non-medical: Not on file   Tobacco Use    Smoking status: Former Smoker     Packs/day: 1.00     Years: 20.00     Pack years: 20.00     Types: Cigarettes     Quit date: 3/15/2008     Years since quittin.8    Smokeless tobacco: Never Used   Substance and Sexual Activity    Alcohol use: Yes     Comment: socially    Drug use: No    Sexual activity: Not on file   Lifestyle    Physical activity     Days per week: Not on file     Minutes per session: Not on file    Stress: Not on file   Relationships    Social connections     Talks on phone: Not on file     Gets together: Not on file     Attends Muslim service: Not on file     Active member of club or organization: Not on file     Attends meetings of clubs or organizations: Not on file     Relationship status: Not on file    Intimate partner violence     Fear of current or ex partner: Not on file     Emotionally abused: Not on file     Physically abused: Not on file     Forced sexual activity: Not on file   Other Topics Concern    Not on file   Social History Narrative    Not on file       REVIEW OF SYSTEMS:       Review of Systems   Constitutional: Positive for appetite change and fatigue. HENT: Positive for sore throat. Eyes: Negative. Respiratory: Negative. Cardiovascular: Negative. Gastrointestinal: Positive for abdominal distention, abdominal pain, nausea and vomiting. Negative for anal bleeding, blood in stool, constipation, diarrhea and rectal pain. Endocrine: Negative. Genitourinary: Negative. Musculoskeletal: Negative. Skin: Negative. Allergic/Immunologic: Negative. Neurological: Negative. Hematological: Negative. Psychiatric/Behavioral: Negative. PHYSICAL EXAMINATION: Vital signs reviewed per the nursing documentation. /85   Pulse 83   Resp 18   Ht 5' 9\" (1.753 m)   Wt 200 lb (90.7 kg)   BMI 29.53 kg/m²   Body mass index is 29.53 kg/m². Physical Exam  Vitals signs and nursing note reviewed. Constitutional:       General: He is not in acute distress. Appearance: He is well-developed. HENT:      Head: Normocephalic and atraumatic. Mouth/Throat:      Pharynx: No oropharyngeal exudate. Eyes:      General: No scleral icterus. Conjunctiva/sclera: Conjunctivae normal.      Pupils: Pupils are equal, round, and reactive to light. Neck:      Musculoskeletal: Normal range of motion and neck supple. Thyroid: No thyromegaly. Trachea: No tracheal deviation. Cardiovascular:      Rate and Rhythm: Normal rate and regular rhythm. Heart sounds: Normal heart sounds. No murmur. Pulmonary:      Effort: Pulmonary effort is normal. No respiratory distress. Breath sounds: Normal breath sounds. No wheezing or rales. Abdominal:      General: Bowel sounds are normal. There is no distension. Palpations: Abdomen is soft. There is no hepatomegaly or mass. Tenderness: There is no abdominal tenderness. There is no guarding or rebound. Hernia: No hernia is present. Lymphadenopathy:      Cervical: No cervical adenopathy. Skin:     General: Skin is warm and dry. Findings: No erythema or rash. Neurological:      Mental Status: He is alert and oriented to person, place, and time. Cranial Nerves: No cranial nerve deficit. Psychiatric:         Thought Content:  Thought content normal.         LABORATORY DATA: Reviewed  No results found for: WBC, HGB, HCT, MCV, PLT, NA, K, CL, CO2, BUN, CREATININE, LABPROT, LABALBU, GGT, BILITOT, ALKPHOS, AST, ALT, INR      No results found for: RBC, HGB, MCV, MCH, MCHC, RDW, MPV, BASOPCT, LYMPHSABS, MONOSABS, NEUTROABS, EOSABS, BASOSABS      DIAGNOSTIC TESTING:     Xr Elbow Right (min 3 Views)    Result Date: 1/18/2021  EXAMINATION: THREE XRAY VIEWS OF THE RIGHT ELBOW 1/18/2021 3:25 pm COMPARISON: None. HISTORY: ORDERING SYSTEM PROVIDED HISTORY: Injury of right elbow, initial encounter TECHNOLOGIST PROVIDED HISTORY: Reason for Exam: C/o right elbow pain distal humerus and proximal radius/ulna. Sts skiing injury x 4 days ago, weakness to right arm. Acuity: Acute Type of Exam: Initial Mechanism of Injury: skiing injury x 4 days ago Relevant Medical/Surgical History: denies FINDINGS: The joint spaces are maintained. Normal alignment. Somewhat prominent degenerative changes at the articulating margins of the distal humerus and olecranon process. There is narrowing of the space between the lateral epicondyle and the radial head. No evidence of fracture or cortical irregularity. No evidence to suggest joint effusion. Degenerative changes in the bones of the right elbow. No acute process is identified. Assessment:  1. Heartburn    2. Screen for colon cancer    3. RUQ pain        Plan: At present patient appears stable. Will plan on EGD to evaluate LES, HH  Will also need screening colonoscopy at that time. Also encourage RUQ US to rule out gallbladder pathology. Patient has significant heartburns. To stop smoking, start protonix daily, follow antireflux measures. The Endoscopic procedure was explained to the patient in detail  The prep and NPO were explained  All the Risks, Benefits, and Alternatives were explained  Risk of Bleeding, Perforation and Cardio Respiratory risks were explained  his questions were answered  The procedure has been scheduled with the  in the office  Patient was asked to give us a call for any questions  The patient has verbalized understanding and agreement to this plan.      The patient was counseled at length about the risks of tyrel Covid-19 during their perioperative period and any recovery window from their procedure. The patient was made aware that tyrel Covid-19  may worsen their prognosis for recovering from their procedure  and lend to a higher morbidity and/or mortality risk. All material risks, benefits, and reasonable alternatives including postponing the procedure were discussed. The patient does wish to proceed with the procedure at this time. Spent 30 minutes providing patient education and counseling. Thank you for allowing me to participate in the care of Mr. Jerri Barger. For any further questions please do not hesitate to contact me. Note is dictated utilizing voice recognition software. Unfortunately this leads to occasional typographical errors. Please contact our office if you have any questions. I have reviewed and agree with the MA/AGNESN ROS. Above history discussed with the patient and examined. Patient does have chronic heartburn. In the past patient had abnormal EGD findings. Discussed with the patient regarding antireflux measures and to continue PPI therapy. She may need EGD and this is arranged. Also will obtain ultrasound of the gallbladder.                 Hank Pastrana MD, willian [facility]  Board Certified in Gastroenterology and [address] [place] [address] Gastroenterology  Office #: [phone]

## 2021-02-01 NOTE — TELEPHONE ENCOUNTER
Patient called office to verify location of appointment and advised at the [facility] office. Writer thanked and call ended.

## 2021-02-04 ENCOUNTER — TELEPHONE (OUTPATIENT)
Dept: GASTROENTEROLOGY | Age: 61
End: 2021-02-04

## 2021-02-04 ENCOUNTER — HOSPITAL ENCOUNTER (OUTPATIENT)
Dept: LAB | Age: 61
Setting detail: SPECIMEN
Discharge: HOME | End: 2021-02-04
Payer: COMMERCIAL

## 2021-02-04 DIAGNOSIS — Z01.818 PREOP TESTING: Primary | ICD-10-CM

## 2021-02-04 NOTE — TELEPHONE ENCOUNTER
Lou Chilango called the office to cancel his procedure on Monday 2/8/21 states he is selling his house and the deal is supposed to go through today so he will be packing all weekend and won't be able to prep. Pt will call once things settle down.   [place] notified

## 2021-02-04 NOTE — TELEPHONE ENCOUNTER
Called and spoke to pt to confirm covid test today and 2/8/21 procedure. Pt confirmed both dates and times.

## 2021-02-08 ENCOUNTER — HOSPITAL ENCOUNTER (OUTPATIENT)
Dept: MRI IMAGING | Facility: CLINIC | Age: 61
Discharge: HOME | End: 2021-02-10
Payer: COMMERCIAL

## 2021-02-08 DIAGNOSIS — S46.311A RUPTURE OF RIGHT TRICEPS TENDON, INITIAL ENCOUNTER: ICD-10-CM

## 2021-02-08 PROCEDURE — 73221 MRI JOINT UPR EXTREM W/O DYE: CPT

## 2021-02-10 ENCOUNTER — OFFICE VISIT (OUTPATIENT)
Dept: ORTHOPEDIC SURGERY | Age: 61
End: 2021-02-10
Payer: COMMERCIAL

## 2021-02-10 VITALS — RESPIRATION RATE: 16 BRPM | HEIGHT: 70 IN | WEIGHT: 183 LBS | BODY MASS INDEX: 26.2 KG/M2

## 2021-02-10 DIAGNOSIS — S46.311A RUPTURE OF RIGHT TRICEPS TENDON, INITIAL ENCOUNTER: Primary | ICD-10-CM

## 2021-02-10 PROCEDURE — 99214 OFFICE O/P EST MOD 30 MIN: CPT | Performed by: ORTHOPAEDIC SURGERY

## 2021-02-10 NOTE — LETTER
2/10/2021    Luis Manuel Albright  [address]  [facility],  [facility] [address] [facility] [address] [place] [address]    RE: Abhinav Scruggs    Dear Dr. Rosario Schultz,    Thank you for allowing me to participate in the care of Mr. Marsha Colbert. I had the opportunity to evaluate the patient on 2/10/2021. Attached you will find my evaluation and recommendations. Thanks again for the confidence you have expressed in me by allowing my participation in the care of your patient. I will keep you apprised of further developments in the patients treatment course as it progresses. If I can be of further assistance in any fashion, please feel free to contact me at your convenience.     Sincerely,        Karli Desai  Shoulder and Elbow Surgery

## 2021-02-11 ENCOUNTER — TELEPHONE (OUTPATIENT)
Dept: ORTHOPEDIC SURGERY | Age: 61
End: 2021-02-11

## 2021-02-11 ENCOUNTER — HOSPITAL ENCOUNTER (OUTPATIENT)
Dept: PREADMISSION TESTING | Age: 61
Discharge: HOME | End: 2021-02-15
Payer: COMMERCIAL

## 2021-02-11 VITALS
HEART RATE: 85 BPM | TEMPERATURE: 97.5 F | SYSTOLIC BLOOD PRESSURE: 142 MMHG | OXYGEN SATURATION: 100 % | RESPIRATION RATE: 16 BRPM | DIASTOLIC BLOOD PRESSURE: 98 MMHG | WEIGHT: 199 LBS | BODY MASS INDEX: 28.55 KG/M2

## 2021-02-11 DIAGNOSIS — S46.311A RUPTURE OF RIGHT TRICEPS TENDON, INITIAL ENCOUNTER: ICD-10-CM

## 2021-02-11 LAB
ANION GAP SERPL CALCULATED.3IONS-SCNC: 12 MMOL/L (ref 9–17)
BUN BLDV-MCNC: 17 MG/DL (ref 6–20)
BUN/CREAT BLD: NORMAL (ref 9–20)
CALCIUM SERPL-MCNC: 9 MG/DL (ref 8.6–10.4)
CHLORIDE BLD-SCNC: 103 MMOL/L (ref 98–107)
CO2: 26 MMOL/L (ref 20–31)
CREAT SERPL-MCNC: 0.82 MG/DL (ref 0.7–1.2)
EKG ATRIAL RATE: 79 BPM
EKG P AXIS: 10 DEGREES
EKG P-R INTERVAL: 192 MS
EKG Q-T INTERVAL: 348 MS
EKG QRS DURATION: 104 MS
EKG QTC CALCULATION (BAZETT): 399 MS
EKG R AXIS: 35 DEGREES
EKG T AXIS: 53 DEGREES
EKG VENTRICULAR RATE: 79 BPM
GFR AFRICAN AMERICAN: >60 ML/MIN
GFR NON-AFRICAN AMERICAN: >60 ML/MIN
GFR SERPL CREATININE-BSD FRML MDRD: NORMAL ML/MIN/{1.73_M2}
GFR SERPL CREATININE-BSD FRML MDRD: NORMAL ML/MIN/{1.73_M2}
GLUCOSE BLD-MCNC: 90 MG/DL (ref 70–99)
HCT VFR BLD CALC: 54 % (ref 40.7–50.3)
HEMOGLOBIN: 17.8 G/DL (ref 13–17)
MCH RBC QN AUTO: 31.9 PG (ref 25.2–33.5)
MCHC RBC AUTO-ENTMCNC: 33 G/DL (ref 28.4–34.8)
MCV RBC AUTO: 96.8 FL (ref 82.6–102.9)
NRBC AUTOMATED: 0 PER 100 WBC
PDW BLD-RTO: 12.4 % (ref 11.8–14.4)
PLATELET # BLD: 220 K/UL (ref 138–453)
PMV BLD AUTO: 9.7 FL (ref 8.1–13.5)
POTASSIUM SERPL-SCNC: 4.5 MMOL/L (ref 3.7–5.3)
RBC # BLD: 5.58 M/UL (ref 4.21–5.77)
SODIUM BLD-SCNC: 141 MMOL/L (ref 135–144)
WBC # BLD: 5.2 K/UL (ref 3.5–11.3)

## 2021-02-11 PROCEDURE — 85027 COMPLETE CBC AUTOMATED: CPT

## 2021-02-11 PROCEDURE — 36415 COLL VENOUS BLD VENIPUNCTURE: CPT

## 2021-02-11 PROCEDURE — 93005 ELECTROCARDIOGRAM TRACING: CPT | Performed by: ANESTHESIOLOGY

## 2021-02-11 PROCEDURE — 80048 BASIC METABOLIC PNL TOTAL CA: CPT

## 2021-02-11 NOTE — TELEPHONE ENCOUNTER
Attempted to call patient with MRI results. No answer unable to leave message. ----- Message from Reinaldo Avitia sent at 2/8/2021  4:10 PM EST -----  Tearing of triceps tendon. Large amount of swelling. Please schedule follow up with Dr. Redmond Severin to discuss treatment options. Please have patient avoid any lifting over a few pounds with that right arm until evaluated.

## 2021-02-11 NOTE — PROGRESS NOTES
Orthopedic Elbow Encounter Note     Chief complaint: Right elbow pain    HPI: Alicia Sterling is a 62 y.o. right-hand dominant male who presents for evaluation of his right elbow. Out a month ago on 1/14/2021 he indicates that he was skiing and fell hitting his right elbow as he did. He felt a painful snap in the shoulder with swelling. He was seen by Laura Nelson PA-C who drained his elbow and ordered an MRI study which demonstrated him to have triceps tendon rupture. Consequently he was referred to my clinic for further evaluation and treatment. At this time he states that he really is not in any significant pain but does describe some weakness. Also denies having any associated numbness or tingling. Previous treatment:    NSAIDs: None    Injections: None    Physical therapy: No    Surgeries: None    Review of Systems:     Constitution: no fever or chills   Pain level: 0/10  Musculoskeletal: As noted in the HPI   Neurologic: no neurologic symptoms    Past Medical Hx, Past Surgical Hx, Medications, Allergies, Social Hx: These were all reviewed. Please refer to Electronic Medical Records for details. Physical Exam:     Resp 16   Ht 5' 10\" (1.778 m)   Wt 183 lb (83 kg)   BMI 26.26 kg/m²    General Appearance: alert, well appearing, and in no distress  Mental Status: alert, oriented to person, place, and time  Gait: normal    Elbows:    Skin: warm and dry, no rash or erythema. Moderate swelling over the posterior aspect of the right elbow. Palpable defect in the right triceps tendon.   Vasculature: 2+ radial pulses bilaterally  Sensation: Intact to light touch in radial, ulnar, and median nerve distributions bilaterally    ROM: (Degrees)    Right   A  Left   A     Flexion   120  Flexion   130   Extension  0  Extension  0    Pronation  70  Pronaton  70   Supination  70  Supination  70     Crepitation  No  Crepitation  No    Muscle

## 2021-02-12 ENCOUNTER — HOSPITAL ENCOUNTER (OUTPATIENT)
Dept: LAB | Age: 61
Setting detail: SPECIMEN
Discharge: HOME | End: 2021-02-12
Payer: COMMERCIAL

## 2021-02-12 DIAGNOSIS — Z01.818 PREOP TESTING: Primary | ICD-10-CM

## 2021-02-12 PROCEDURE — U0005 INFEC AGEN DETEC AMPLI PROBE: HCPCS

## 2021-02-12 PROCEDURE — U0003 INFECTIOUS AGENT DETECTION BY NUCLEIC ACID (DNA OR RNA); SEVERE ACUTE RESPIRATORY SYNDROME CORONAVIRUS 2 (SARS-COV-2) (CORONAVIRUS DISEASE [COVID-19]), AMPLIFIED PROBE TECHNIQUE, MAKING USE OF HIGH THROUGHPUT TECHNOLOGIES AS DESCRIBED BY CMS-2020-01-R: HCPCS

## 2021-02-13 LAB
SARS-COV-2, RAPID: NORMAL
SARS-COV-2: NORMAL
SARS-COV-2: NOT DETECTED
SOURCE: NORMAL

## 2021-02-15 RX ORDER — SODIUM CHLORIDE 0.9 % (FLUSH) 0.9 %
10 SYRINGE (ML) INJECTION PRN
Status: CANCELLED | OUTPATIENT
Start: 2021-02-15

## 2021-02-15 RX ORDER — SODIUM CHLORIDE 0.9 % (FLUSH) 0.9 %
10 SYRINGE (ML) INJECTION EVERY 12 HOURS SCHEDULED
Status: CANCELLED | OUTPATIENT
Start: 2021-02-15

## 2021-02-15 RX ORDER — ACETAMINOPHEN 325 MG/1
1000 TABLET ORAL ONCE
Status: CANCELLED | OUTPATIENT
Start: 2021-02-15 | End: 2021-02-15

## 2021-02-19 ENCOUNTER — HOSPITAL ENCOUNTER (OUTPATIENT)
Dept: LAB | Age: 61
Setting detail: SPECIMEN
Discharge: HOME | End: 2021-02-19
Payer: COMMERCIAL

## 2021-02-19 DIAGNOSIS — Z01.818 PRE-OP TESTING: Primary | ICD-10-CM

## 2021-02-19 PROCEDURE — U0005 INFEC AGEN DETEC AMPLI PROBE: HCPCS

## 2021-02-19 PROCEDURE — U0003 INFECTIOUS AGENT DETECTION BY NUCLEIC ACID (DNA OR RNA); SEVERE ACUTE RESPIRATORY SYNDROME CORONAVIRUS 2 (SARS-COV-2) (CORONAVIRUS DISEASE [COVID-19]), AMPLIFIED PROBE TECHNIQUE, MAKING USE OF HIGH THROUGHPUT TECHNOLOGIES AS DESCRIBED BY CMS-2020-01-R: HCPCS

## 2021-02-20 LAB
SARS-COV-2: NORMAL
SARS-COV-2: NOT DETECTED
SOURCE: NORMAL

## 2021-02-22 ENCOUNTER — ANESTHESIA EVENT (OUTPATIENT)
Dept: OPERATING ROOM | Age: 61
End: 2021-02-22
Payer: COMMERCIAL

## 2021-02-23 ENCOUNTER — ANESTHESIA (OUTPATIENT)
Dept: OPERATING ROOM | Age: 61
End: 2021-02-23
Payer: COMMERCIAL

## 2021-02-23 ENCOUNTER — HOSPITAL ENCOUNTER (OUTPATIENT)
Age: 61
Setting detail: OUTPATIENT SURGERY
Discharge: HOME | End: 2021-02-23
Attending: ORTHOPAEDIC SURGERY | Admitting: ORTHOPAEDIC SURGERY
Payer: COMMERCIAL

## 2021-02-23 VITALS
BODY MASS INDEX: 28.2 KG/M2 | HEART RATE: 77 BPM | DIASTOLIC BLOOD PRESSURE: 83 MMHG | TEMPERATURE: 97.1 F | OXYGEN SATURATION: 95 % | WEIGHT: 197 LBS | HEIGHT: 70 IN | SYSTOLIC BLOOD PRESSURE: 133 MMHG | RESPIRATION RATE: 20 BRPM

## 2021-02-23 VITALS — DIASTOLIC BLOOD PRESSURE: 68 MMHG | SYSTOLIC BLOOD PRESSURE: 119 MMHG | TEMPERATURE: 96.6 F | OXYGEN SATURATION: 95 %

## 2021-02-23 DIAGNOSIS — S46.311A RUPTURE OF RIGHT TRICEPS TENDON, INITIAL ENCOUNTER: Primary | ICD-10-CM

## 2021-02-23 PROCEDURE — 7100000001 HC PACU RECOVERY - ADDTL 15 MIN: Performed by: ORTHOPAEDIC SURGERY

## 2021-02-23 PROCEDURE — 6370000000 HC RX 637 (ALT 250 FOR IP)

## 2021-02-23 PROCEDURE — 2500000003 HC RX 250 WO HCPCS: Performed by: ORTHOPAEDIC SURGERY

## 2021-02-23 PROCEDURE — 7100000011 HC PHASE II RECOVERY - ADDTL 15 MIN: Performed by: ORTHOPAEDIC SURGERY

## 2021-02-23 PROCEDURE — 6360000002 HC RX W HCPCS

## 2021-02-23 PROCEDURE — 6360000002 HC RX W HCPCS: Performed by: NURSE ANESTHETIST, CERTIFIED REGISTERED

## 2021-02-23 PROCEDURE — 2500000003 HC RX 250 WO HCPCS: Performed by: NURSE ANESTHETIST, CERTIFIED REGISTERED

## 2021-02-23 PROCEDURE — 3600000014 HC SURGERY LEVEL 4 ADDTL 15MIN: Performed by: ORTHOPAEDIC SURGERY

## 2021-02-23 PROCEDURE — 3700000000 HC ANESTHESIA ATTENDED CARE: Performed by: ORTHOPAEDIC SURGERY

## 2021-02-23 PROCEDURE — 2709999900 HC NON-CHARGEABLE SUPPLY: Performed by: ORTHOPAEDIC SURGERY

## 2021-02-23 PROCEDURE — 7100000000 HC PACU RECOVERY - FIRST 15 MIN: Performed by: ORTHOPAEDIC SURGERY

## 2021-02-23 PROCEDURE — 88304 TISSUE EXAM BY PATHOLOGIST: CPT

## 2021-02-23 PROCEDURE — 24342 REPAIR OF RUPTURED TENDON: CPT | Performed by: ORTHOPAEDIC SURGERY

## 2021-02-23 PROCEDURE — 2580000003 HC RX 258: Performed by: ANESTHESIOLOGY

## 2021-02-23 PROCEDURE — 6360000002 HC RX W HCPCS: Performed by: ORTHOPAEDIC SURGERY

## 2021-02-23 PROCEDURE — 7100000010 HC PHASE II RECOVERY - FIRST 15 MIN: Performed by: ORTHOPAEDIC SURGERY

## 2021-02-23 PROCEDURE — 3700000001 HC ADD 15 MINUTES (ANESTHESIA): Performed by: ORTHOPAEDIC SURGERY

## 2021-02-23 PROCEDURE — 3600000004 HC SURGERY LEVEL 4 BASE: Performed by: ORTHOPAEDIC SURGERY

## 2021-02-23 PROCEDURE — 2720000010 HC SURG SUPPLY STERILE: Performed by: ORTHOPAEDIC SURGERY

## 2021-02-23 RX ORDER — PROPOFOL 10 MG/ML
INJECTION, EMULSION INTRAVENOUS PRN
Status: DISCONTINUED | OUTPATIENT
Start: 2021-02-23 | End: 2021-02-23 | Stop reason: SDUPTHER

## 2021-02-23 RX ORDER — FENTANYL CITRATE 50 UG/ML
INJECTION, SOLUTION INTRAMUSCULAR; INTRAVENOUS PRN
Status: DISCONTINUED | OUTPATIENT
Start: 2021-02-23 | End: 2021-02-23 | Stop reason: SDUPTHER

## 2021-02-23 RX ORDER — BUPIVACAINE HYDROCHLORIDE AND EPINEPHRINE 5; 5 MG/ML; UG/ML
INJECTION, SOLUTION EPIDURAL; INTRACAUDAL; PERINEURAL PRN
Status: DISCONTINUED | OUTPATIENT
Start: 2021-02-23 | End: 2021-02-23 | Stop reason: ALTCHOICE

## 2021-02-23 RX ORDER — ACETAMINOPHEN 500 MG
1000 TABLET ORAL ONCE
Status: COMPLETED | OUTPATIENT
Start: 2021-02-23 | End: 2021-02-23

## 2021-02-23 RX ORDER — SODIUM CHLORIDE 0.9 % (FLUSH) 0.9 %
10 SYRINGE (ML) INJECTION PRN
Status: DISCONTINUED | OUTPATIENT
Start: 2021-02-23 | End: 2021-02-23 | Stop reason: HOSPADM

## 2021-02-23 RX ORDER — DEXAMETHASONE SODIUM PHOSPHATE 10 MG/ML
10 INJECTION, SOLUTION INTRAMUSCULAR; INTRAVENOUS ONCE
Status: DISCONTINUED | OUTPATIENT
Start: 2021-02-23 | End: 2021-02-23 | Stop reason: HOSPADM

## 2021-02-23 RX ORDER — HYDRALAZINE HYDROCHLORIDE 20 MG/ML
5 INJECTION INTRAMUSCULAR; INTRAVENOUS EVERY 10 MIN PRN
Status: DISCONTINUED | OUTPATIENT
Start: 2021-02-23 | End: 2021-02-23 | Stop reason: HOSPADM

## 2021-02-23 RX ORDER — MIDAZOLAM HYDROCHLORIDE 1 MG/ML
INJECTION INTRAMUSCULAR; INTRAVENOUS PRN
Status: DISCONTINUED | OUTPATIENT
Start: 2021-02-23 | End: 2021-02-23 | Stop reason: SDUPTHER

## 2021-02-23 RX ORDER — ONDANSETRON 2 MG/ML
4 INJECTION INTRAMUSCULAR; INTRAVENOUS
Status: DISCONTINUED | OUTPATIENT
Start: 2021-02-23 | End: 2021-02-23 | Stop reason: HOSPADM

## 2021-02-23 RX ORDER — ONDANSETRON 2 MG/ML
INJECTION INTRAMUSCULAR; INTRAVENOUS PRN
Status: DISCONTINUED | OUTPATIENT
Start: 2021-02-23 | End: 2021-02-23 | Stop reason: SDUPTHER

## 2021-02-23 RX ORDER — HYDROCODONE BITARTRATE AND ACETAMINOPHEN 5; 325 MG/1; MG/1
TABLET ORAL
Status: COMPLETED
Start: 2021-02-23 | End: 2021-02-23

## 2021-02-23 RX ORDER — MEPERIDINE HYDROCHLORIDE 50 MG/ML
12.5 INJECTION INTRAMUSCULAR; INTRAVENOUS; SUBCUTANEOUS EVERY 5 MIN PRN
Status: DISCONTINUED | OUTPATIENT
Start: 2021-02-23 | End: 2021-02-23 | Stop reason: HOSPADM

## 2021-02-23 RX ORDER — LIDOCAINE HYDROCHLORIDE 10 MG/ML
INJECTION, SOLUTION INFILTRATION; PERINEURAL PRN
Status: DISCONTINUED | OUTPATIENT
Start: 2021-02-23 | End: 2021-02-23 | Stop reason: SDUPTHER

## 2021-02-23 RX ORDER — HYDROCODONE BITARTRATE AND ACETAMINOPHEN 5; 325 MG/1; MG/1
1 TABLET ORAL PRN
Status: COMPLETED | OUTPATIENT
Start: 2021-02-23 | End: 2021-02-23

## 2021-02-23 RX ORDER — ROCURONIUM BROMIDE 10 MG/ML
INJECTION, SOLUTION INTRAVENOUS PRN
Status: DISCONTINUED | OUTPATIENT
Start: 2021-02-23 | End: 2021-02-23 | Stop reason: SDUPTHER

## 2021-02-23 RX ORDER — SODIUM CHLORIDE 0.9 % (FLUSH) 0.9 %
10 SYRINGE (ML) INJECTION EVERY 12 HOURS SCHEDULED
Status: DISCONTINUED | OUTPATIENT
Start: 2021-02-23 | End: 2021-02-23 | Stop reason: HOSPADM

## 2021-02-23 RX ORDER — NEOSTIGMINE METHYLSULFATE 5 MG/5 ML
SYRINGE (ML) INTRAVENOUS PRN
Status: DISCONTINUED | OUTPATIENT
Start: 2021-02-23 | End: 2021-02-23 | Stop reason: SDUPTHER

## 2021-02-23 RX ORDER — SODIUM CHLORIDE 0.9 % (FLUSH) 0.9 %
10 SYRINGE (ML) INJECTION EVERY 12 HOURS SCHEDULED
Status: DISCONTINUED | OUTPATIENT
Start: 2021-02-23 | End: 2021-02-23 | Stop reason: SDUPTHER

## 2021-02-23 RX ORDER — GLYCOPYRROLATE 1 MG/5 ML
SYRINGE (ML) INTRAVENOUS PRN
Status: DISCONTINUED | OUTPATIENT
Start: 2021-02-23 | End: 2021-02-23 | Stop reason: SDUPTHER

## 2021-02-23 RX ORDER — LIDOCAINE HYDROCHLORIDE 10 MG/ML
1 INJECTION, SOLUTION EPIDURAL; INFILTRATION; INTRACAUDAL; PERINEURAL
Status: DISCONTINUED | OUTPATIENT
Start: 2021-02-23 | End: 2021-02-23 | Stop reason: HOSPADM

## 2021-02-23 RX ORDER — PROMETHAZINE HYDROCHLORIDE 25 MG/ML
6.25 INJECTION, SOLUTION INTRAMUSCULAR; INTRAVENOUS
Status: DISCONTINUED | OUTPATIENT
Start: 2021-02-23 | End: 2021-02-23 | Stop reason: HOSPADM

## 2021-02-23 RX ORDER — HYDROCODONE BITARTRATE AND ACETAMINOPHEN 5; 325 MG/1; MG/1
2 TABLET ORAL PRN
Status: COMPLETED | OUTPATIENT
Start: 2021-02-23 | End: 2021-02-23

## 2021-02-23 RX ORDER — DIPHENHYDRAMINE HYDROCHLORIDE 50 MG/ML
12.5 INJECTION INTRAMUSCULAR; INTRAVENOUS
Status: DISCONTINUED | OUTPATIENT
Start: 2021-02-23 | End: 2021-02-23 | Stop reason: HOSPADM

## 2021-02-23 RX ORDER — SODIUM CHLORIDE 0.9 % (FLUSH) 0.9 %
10 SYRINGE (ML) INJECTION PRN
Status: DISCONTINUED | OUTPATIENT
Start: 2021-02-23 | End: 2021-02-23 | Stop reason: SDUPTHER

## 2021-02-23 RX ORDER — DEXAMETHASONE SODIUM PHOSPHATE 10 MG/ML
INJECTION, SOLUTION INTRAMUSCULAR; INTRAVENOUS PRN
Status: DISCONTINUED | OUTPATIENT
Start: 2021-02-23 | End: 2021-02-23 | Stop reason: SDUPTHER

## 2021-02-23 RX ORDER — FENTANYL CITRATE 50 UG/ML
25 INJECTION, SOLUTION INTRAMUSCULAR; INTRAVENOUS EVERY 5 MIN PRN
Status: DISCONTINUED | OUTPATIENT
Start: 2021-02-23 | End: 2021-02-23 | Stop reason: HOSPADM

## 2021-02-23 RX ORDER — BUPIVACAINE HYDROCHLORIDE AND EPINEPHRINE 5; 5 MG/ML; UG/ML
INJECTION, SOLUTION PERINEURAL
Status: DISCONTINUED
Start: 2021-02-23 | End: 2021-02-23 | Stop reason: HOSPADM

## 2021-02-23 RX ORDER — KETOROLAC TROMETHAMINE 30 MG/ML
INJECTION, SOLUTION INTRAMUSCULAR; INTRAVENOUS PRN
Status: DISCONTINUED | OUTPATIENT
Start: 2021-02-23 | End: 2021-02-23 | Stop reason: SDUPTHER

## 2021-02-23 RX ORDER — ACETAMINOPHEN 500 MG
TABLET ORAL
Status: COMPLETED
Start: 2021-02-23 | End: 2021-02-23

## 2021-02-23 RX ORDER — SODIUM CHLORIDE, SODIUM LACTATE, POTASSIUM CHLORIDE, CALCIUM CHLORIDE 600; 310; 30; 20 MG/100ML; MG/100ML; MG/100ML; MG/100ML
INJECTION, SOLUTION INTRAVENOUS CONTINUOUS
Status: DISCONTINUED | OUTPATIENT
Start: 2021-02-23 | End: 2021-02-23 | Stop reason: HOSPADM

## 2021-02-23 RX ORDER — MORPHINE SULFATE 1 MG/ML
1 INJECTION, SOLUTION EPIDURAL; INTRATHECAL; INTRAVENOUS EVERY 5 MIN PRN
Status: DISCONTINUED | OUTPATIENT
Start: 2021-02-23 | End: 2021-02-23 | Stop reason: HOSPADM

## 2021-02-23 RX ORDER — HYDROCODONE BITARTRATE AND ACETAMINOPHEN 5; 325 MG/1; MG/1
1 TABLET ORAL EVERY 4 HOURS PRN
Qty: 42 TABLET | Refills: 0 | Status: SHIPPED | OUTPATIENT
Start: 2021-02-23 | End: 2021-03-02

## 2021-02-23 RX ADMIN — FENTANYL CITRATE 50 MCG: 50 INJECTION, SOLUTION INTRAMUSCULAR; INTRAVENOUS at 10:49

## 2021-02-23 RX ADMIN — SODIUM CHLORIDE, POTASSIUM CHLORIDE, SODIUM LACTATE AND CALCIUM CHLORIDE: 600; 310; 30; 20 INJECTION, SOLUTION INTRAVENOUS at 10:09

## 2021-02-23 RX ADMIN — SODIUM CHLORIDE, POTASSIUM CHLORIDE, SODIUM LACTATE AND CALCIUM CHLORIDE: 600; 310; 30; 20 INJECTION, SOLUTION INTRAVENOUS at 08:24

## 2021-02-23 RX ADMIN — Medication 0.2 MG: at 10:46

## 2021-02-23 RX ADMIN — ONDANSETRON 4 MG: 2 INJECTION INTRAMUSCULAR; INTRAVENOUS at 09:28

## 2021-02-23 RX ADMIN — ROCURONIUM BROMIDE 50 MG: 10 INJECTION INTRAVENOUS at 08:29

## 2021-02-23 RX ADMIN — Medication 1 MG: at 10:46

## 2021-02-23 RX ADMIN — Medication 0.5 MG: at 11:07

## 2021-02-23 RX ADMIN — KETOROLAC TROMETHAMINE 30 MG: 30 INJECTION, SOLUTION INTRAMUSCULAR at 10:08

## 2021-02-23 RX ADMIN — Medication 1000 MG: at 08:09

## 2021-02-23 RX ADMIN — HYDROCODONE BITARTRATE AND ACETAMINOPHEN 2 TABLET: 5; 325 TABLET ORAL at 11:21

## 2021-02-23 RX ADMIN — MIDAZOLAM HYDROCHLORIDE 2 MG: 1 INJECTION, SOLUTION INTRAMUSCULAR; INTRAVENOUS at 08:28

## 2021-02-23 RX ADMIN — Medication 2 MG: at 10:26

## 2021-02-23 RX ADMIN — LIDOCAINE HYDROCHLORIDE 50 MG: 10 INJECTION, SOLUTION INFILTRATION; PERINEURAL at 08:29

## 2021-02-23 RX ADMIN — PROPOFOL 200 MG: 10 INJECTION, EMULSION INTRAVENOUS at 08:29

## 2021-02-23 RX ADMIN — HYDROMORPHONE HYDROCHLORIDE 0.5 MG: 1 INJECTION, SOLUTION INTRAMUSCULAR; INTRAVENOUS; SUBCUTANEOUS at 11:07

## 2021-02-23 RX ADMIN — CEFAZOLIN 2 G: 10 INJECTION, POWDER, FOR SOLUTION INTRAVENOUS at 08:39

## 2021-02-23 RX ADMIN — FENTANYL CITRATE 100 MCG: 50 INJECTION, SOLUTION INTRAMUSCULAR; INTRAVENOUS at 08:31

## 2021-02-23 RX ADMIN — DEXAMETHASONE SODIUM PHOSPHATE 10 MG: 10 INJECTION, SOLUTION INTRAMUSCULAR; INTRAVENOUS at 08:47

## 2021-02-23 RX ADMIN — FENTANYL CITRATE 100 MCG: 50 INJECTION, SOLUTION INTRAMUSCULAR; INTRAVENOUS at 08:29

## 2021-02-23 RX ADMIN — ACETAMINOPHEN 1000 MG: 500 TABLET ORAL at 08:09

## 2021-02-23 RX ADMIN — Medication 0.4 MG: at 10:28

## 2021-02-23 ASSESSMENT — PULMONARY FUNCTION TESTS
PIF_VALUE: 1
PIF_VALUE: 23
PIF_VALUE: 23
PIF_VALUE: 24
PIF_VALUE: 22
PIF_VALUE: 0
PIF_VALUE: 21
PIF_VALUE: 18
PIF_VALUE: 1
PIF_VALUE: 21
PIF_VALUE: 23
PIF_VALUE: 22
PIF_VALUE: 21
PIF_VALUE: 24
PIF_VALUE: 20
PIF_VALUE: 23
PIF_VALUE: 21
PIF_VALUE: 23
PIF_VALUE: 22
PIF_VALUE: 23
PIF_VALUE: 16
PIF_VALUE: 20
PIF_VALUE: 23
PIF_VALUE: 21
PIF_VALUE: 23
PIF_VALUE: 23
PIF_VALUE: 19
PIF_VALUE: 20
PIF_VALUE: 20
PIF_VALUE: 23
PIF_VALUE: 18
PIF_VALUE: 22
PIF_VALUE: 20
PIF_VALUE: 21
PIF_VALUE: 23
PIF_VALUE: 23
PIF_VALUE: 24
PIF_VALUE: 23
PIF_VALUE: 6
PIF_VALUE: 24
PIF_VALUE: 19
PIF_VALUE: 16
PIF_VALUE: 23
PIF_VALUE: 20
PIF_VALUE: 24
PIF_VALUE: 18
PIF_VALUE: 0
PIF_VALUE: 33
PIF_VALUE: 23
PIF_VALUE: 20
PIF_VALUE: 22
PIF_VALUE: 21
PIF_VALUE: 23
PIF_VALUE: 20
PIF_VALUE: 21
PIF_VALUE: 18
PIF_VALUE: 21
PIF_VALUE: 23
PIF_VALUE: 20
PIF_VALUE: 23
PIF_VALUE: 0
PIF_VALUE: 2
PIF_VALUE: 24
PIF_VALUE: 24
PIF_VALUE: 18
PIF_VALUE: 23
PIF_VALUE: 23
PIF_VALUE: 24
PIF_VALUE: 21
PIF_VALUE: 24
PIF_VALUE: 20
PIF_VALUE: 21
PIF_VALUE: 23
PIF_VALUE: 24
PIF_VALUE: 21
PIF_VALUE: 21

## 2021-02-23 ASSESSMENT — PAIN - FUNCTIONAL ASSESSMENT: PAIN_FUNCTIONAL_ASSESSMENT: 0-10

## 2021-02-23 ASSESSMENT — PAIN SCALES - GENERAL
PAINLEVEL_OUTOF10: 10
PAINLEVEL_OUTOF10: 10
PAINLEVEL_OUTOF10: 8

## 2021-02-23 NOTE — ANESTHESIA PRE PROCEDURE
Department of Anesthesiology  Preprocedure Note       Name:  Micaela Sales   Age:  62 y.o.  :  1960                                          MRN:  [id number]         Date:  2021      Surgeon: Abhishek Pacheco):  Ashu Blackburn MD    Procedure: Procedure(s):  TRICEPS TENDON REPAIR - RIGHT ELBOW WITH ARTHREX AND BIOMET ACHILLES ALLOGRAFT    Medications prior to admission:   Prior to Admission medications    Medication Sig Start Date End Date Taking? Authorizing Provider   pantoprazole (PROTONIX) 40 MG tablet Take 1 tablet by mouth every morning (before breakfast) 21  Yes RU Cid - NP   metoprolol succinate (TOPROL XL) 50 MG extended release tablet Take 50 mg by mouth daily   Yes Historical Provider, MD   ibuprofen (ADVIL;MOTRIN) 600 MG tablet Take 600 mg by mouth every 6 hours as needed.    Yes Historical Provider, MD   Na Sulfate-K Sulfate-Mg Sulf 17.5-3.13-1.6 GM/177ML SOLN Please follow instructions given by office 21   Lake Wallace MD   valsartan (DIOVAN) 320 MG tablet Take 1 tablet by mouth daily for 14 days 20  Rekha Gibson PA-C   esomeprazole (NEXIUM) 40 MG delayed release capsule Take 1 capsule by mouth 2 times daily 19  Virgie Cornelius MD   sucralfate (CARAFATE) 1 GM tablet Take 1 tablet by mouth 4 times daily 19  Virgie Cornelius MD       Current medications:    Current Facility-Administered Medications   Medication Dose Route Frequency Provider Last Rate Last Admin    lactated ringers infusion   Intravenous Continuous Carito Patel MD        sodium chloride flush 0.9 % injection 10 mL  10 mL Intravenous 2 times per day Carito Patel MD        sodium chloride flush 0.9 % injection 10 mL  10 mL Intravenous PRN Carito Patel MD        lidocaine PF 1 % injection 1 mL  1 mL Intradermal Once PRN Carito Patel MD        acetaminophen (TYLENOL) tablet 1,000 mg  1,000 mg Oral Once MD Jc Willis ceFAZolin (ANCEF) 2000 mg in dextrose 5 % 50 mL IVPB  2,000 mg Intravenous On Call to Gerarda Lesches, MD        dexamethasone (PF) (DECADRON) injection 10 mg  10 mg Intravenous Once Veneda Done, MD        sodium chloride flush 0.9 % injection 10 mL  10 mL Intravenous 2 times per day Veneda Done, MD        sodium chloride flush 0.9 % injection 10 mL  10 mL Intravenous PRN Veneda Done, MD           Allergies:     Allergies   Allergen Reactions    Pcn [Penicillins] Hives       Problem List:    Patient Active Problem List   Diagnosis Code    Heartburn R12    Rupture of right triceps tendon S46.311A       Past Medical History:        Diagnosis Date    Heartburn     Hypertension     Sleep apnea        Past Surgical History:        Procedure Laterality Date    APPENDECTOMY      BACK SURGERY      cervical/ lumbar    CARDIAC CATHETERIZATION      [facility]/ no stents    COLONOSCOPY      ENDOSCOPY, COLON, DIAGNOSTIC      HERNIA REPAIR      SHOULDER SURGERY      SPINE SURGERY      cervical and lumbar    UPPER GASTROINTESTINAL ENDOSCOPY N/A 2019    EGD BIOPSY performed by Criselda Thakur MD at [address] [place] [address] History:    Social History     Tobacco Use    Smoking status: Light Tobacco Smoker     Packs/day: 1.00     Years: 20.00     Pack years: 20.00     Types: Cigars     Last attempt to quit: 3/15/2008     Years since quittin.9    Smokeless tobacco: Never Used    Tobacco comment: no cigarettes for 20 years   Substance Use Topics    Alcohol use: Yes     Comment: socially                                Ready to quit: Not Answered  Counseling given: Not Answered  Comment: no cigarettes for 20 years      Vital Signs (Current):   Vitals:    21 0742   BP: (!) 136/96   Pulse: 73   Resp: 18   Temp: 96.4 °F (35.8 °C)   TempSrc: Temporal   SpO2: 96%   Weight: 197 lb (89.4 kg)   Height: 5' 10\" (1.778 m)                                              BP Readings from Last 3 Encounters: change,           Endo/Other:                      ROS comment: RIGHT ELBOW TRICEPS RUPTURE Abdominal:       Abdomen: soft. Vascular: negative vascular ROS. Anesthesia Plan      general     ASA 2       Induction: intravenous. MIPS: Postoperative opioids intended and Prophylactic antiemetics administered. Anesthetic plan and risks discussed with patient. Plan discussed with CRNA.                   Drew Artis MD   2/23/2021

## 2021-02-23 NOTE — H&P
father. Review of Systems   History obtained from the patient. REVIEW OF SYSTEMS:   Constitution: negative for fever, chills, weight loss or malaise   Musculoskeletal: As noted in the HPI   Neurologic: As noted in the HPI    Physical Exam    General Appearance: alert, well appearing, and in no distress  Mental Status: alert, oriented to person, place, and time  Right elbow with posterior swelling. Skin intact. 2+ radial pulse    Heart: RRR  Lungs: CTA bilaterally    Assessment and Plan  Marcella Manzo is a 62 y.o. old male with a right triceps tendon rupture. The surgical site was confirmed by the patient and me. The risks, benefits, expected outcome, and alternative to the recommended procedure have been discussed with the patient. Patient understands and wants to proceed with the procedure.

## 2021-02-23 NOTE — OP NOTE
OPERATIVE REPORT    Date of Surgery: 2/23/2021    Pre-operative Diagnosis: Right Triceps Tendon Rupture    Post-operative Diagnosis: Right Triceps Tendon Rupture    Procedure: Right Triceps tendon repair    Surgeon(s): Dixon Lindsay MD    Assistant(s): Kusum Keith DO (PGY 4)    Anesthesia: General    Fluids: See anesthesia record    Urine output: See anesthesia record    Estimated blood loss: 50 mL    Findings: Tear of the lateral head of the triceps with significant retraction    Specimen: Olecranon bursa    Tourniquet time: 55 minutes    Implants: Arthrex #5 FiberWire    Surgical Indications:  Kimberly Sierra is a 62 y.o. old male who presented with with an acute rupture of his right triceps tendon following a fall skiing. Following a discussion with the patient regarding both non-operative and operative treatment options, they consented to proceed with right triceps tendon repair. He came to this decision after demonstrating an understanding of our discussion regarding details of the procedure, risks and benefits, expected outcome, and postoperative course. Operative technique: Following appropriate identification of the patient and his operative extremity, consent was reviewed with the patient and His operative extremity was signed. He was wheeled to the operating room where he finished a course of pre-operative antibiotic prophylaxis by way of 2 g of IV Ancef. The anesthesia service administered a general anesthetic and secured his airway using an endotracheal tube. All bony prominences were appropriately padded and the patient was secured to the operative table in a lateral decubitus position. An axillary roll was placed. The patient's operative extremity was prepped and draped in a standard sterile fashion and a time out was performed during which the correct patient, operative extremity, and procedure were verified.      The patient's right upper extremity was exsanguinated using an Esmarch and the pneumatic tourniquet inflated up to 250 mmHg. A curvilinear incision over the posterior aspect of the patient's elbow was made around the olecranon. Sharp dissection was carried through skin and the olecranon bursa was encountered where there was a fair amount of straw-colored fluid. The bursa itself had a mottled appearance. This was excised and sent to pathology. The triceps tear was identified. It was an incomplete tear of the triceps as only the lateral head insertion appeared to be involved. The tendon end was freshened up resecting all devitalized tissue. The tendon was able to be reapproximated to the olecranon with the elbow in full extension. Grasping [place] stitches were placed in the end of the tendon using #5 FiberWire's x2. The olecranon was exposed and the bone freshened up using a rongeur to a bleeding surface. 2 oblique tunnels and straight longitudinal tunnel through the olecranon was drilled using a 2-0 drill. The sutures on either end of the tendon were passed through the oblique tunnels and the sutures in the center of the tendon was passed through the central longitudinal  hole. With the triceps tendon reduced to the surface of the olecranon by holding the elbow in extension tension was placed through the sutures which were then tied over the posterior cortex of the olecranon. The knots were buried. I then performed a side-to-side repair of the rent on either side of the tendon proximally using #2 FiberWire. The repair was stable with the elbow bent to approximately 30 degrees. The tourniquet was deflated after a total time of 55 minutes and hemostasis achieved using electrocautery. The surgical site was irrigated with copious amounts of sterile saline and the incision infiltrated with approximately 20 cc of 0.5% Marcaine with epinephrine. Layered closure of the incision was performed using 3-0 Vicryl and 3-0 Prolene.   There was a buttonhole adjacent to the incision distally created while the olecranon bursa was being excised and this was repaired with a simple side-to-side stitch. Sterile dressings were then applied using Steri-Strips, 4 x 4 gauze, and Kerlix. The patient's elbow was held in extension and a volar well-padded splint was applied. The patient was awoken, extubated, transferred to his bed and wheeled to recovery in stable condition.     Complications: None    Post-operative Condition: Stable

## 2021-02-24 LAB — SURGICAL PATHOLOGY REPORT: NORMAL

## 2021-02-24 NOTE — PROGRESS NOTES
CLINICAL PHARMACY NOTE: MEDS TO [address] Select Patient?: No  Total # of Prescriptions Filled: 1   The following medications were delivered to the patient:  · Norco 5-325  Total # of Interventions Completed: 0  Time Spent (min): 0    Additional Documentation:

## 2021-03-05 ENCOUNTER — TELEPHONE (OUTPATIENT)
Dept: ORTHOPEDIC SURGERY | Age: 61
End: 2021-03-05

## 2021-03-05 NOTE — TELEPHONE ENCOUNTER
Spoke with patient about patient medication refill policy due to it being Friday. Patient has a follow up appointment Monday 3/8/21. He will speak to  then.

## 2021-03-08 ENCOUNTER — OFFICE VISIT (OUTPATIENT)
Dept: ORTHOPEDIC SURGERY | Age: 61
End: 2021-03-08

## 2021-03-08 VITALS — RESPIRATION RATE: 16 BRPM | WEIGHT: 189.3 LBS | TEMPERATURE: 97.6 F | HEIGHT: 70 IN | BODY MASS INDEX: 27.1 KG/M2

## 2021-03-08 DIAGNOSIS — S46.311D RUPTURE OF RIGHT TRICEPS TENDON, SUBSEQUENT ENCOUNTER: Primary | ICD-10-CM

## 2021-03-08 PROCEDURE — 99024 POSTOP FOLLOW-UP VISIT: CPT | Performed by: ORTHOPAEDIC SURGERY

## 2021-03-10 ENCOUNTER — HOSPITAL ENCOUNTER (OUTPATIENT)
Age: 61
Discharge: HOME | End: 2021-03-10
Payer: COMMERCIAL

## 2021-03-10 ENCOUNTER — TELEPHONE (OUTPATIENT)
Dept: ONCOLOGY | Age: 61
End: 2021-03-10

## 2021-03-10 ENCOUNTER — INITIAL CONSULT (OUTPATIENT)
Dept: ONCOLOGY | Age: 61
End: 2021-03-10
Payer: COMMERCIAL

## 2021-03-10 VITALS
DIASTOLIC BLOOD PRESSURE: 86 MMHG | BODY MASS INDEX: 29.11 KG/M2 | WEIGHT: 203.3 LBS | SYSTOLIC BLOOD PRESSURE: 145 MMHG | TEMPERATURE: 97.5 F | HEART RATE: 79 BPM | HEIGHT: 70 IN

## 2021-03-10 DIAGNOSIS — R79.89 ABNORMAL CBC: ICD-10-CM

## 2021-03-10 DIAGNOSIS — R39.89 URINE TROUBLES: ICD-10-CM

## 2021-03-10 DIAGNOSIS — D75.1 POLYCYTHEMIA: Primary | ICD-10-CM

## 2021-03-10 DIAGNOSIS — D75.1 POLYCYTHEMIA: ICD-10-CM

## 2021-03-10 DIAGNOSIS — F17.200 TOBACCO DEPENDENCE: ICD-10-CM

## 2021-03-10 LAB
ABSOLUTE EOS #: 0.16 K/UL (ref 0–0.44)
ABSOLUTE IMMATURE GRANULOCYTE: <0.03 K/UL (ref 0–0.3)
ABSOLUTE LYMPH #: 0.85 K/UL (ref 1.1–3.7)
ABSOLUTE MONO #: 0.65 K/UL (ref 0.1–1.2)
ABSOLUTE RETIC #: 0.1 M/UL (ref 0.03–0.08)
BASOPHILS # BLD: 1 % (ref 0–2)
BASOPHILS ABSOLUTE: 0.07 K/UL (ref 0–0.2)
DIFFERENTIAL TYPE: ABNORMAL
EOSINOPHILS RELATIVE PERCENT: 2 % (ref 1–4)
HCT VFR BLD CALC: 54.7 % (ref 40.7–50.3)
HEMOGLOBIN: 17.9 G/DL (ref 13–17)
IMMATURE GRANULOCYTES: 0 %
IMMATURE RETIC FRACT: 18.1 % (ref 2.7–18.3)
LYMPHOCYTES # BLD: 12 % (ref 24–43)
MCH RBC QN AUTO: 31.9 PG (ref 25.2–33.5)
MCHC RBC AUTO-ENTMCNC: 32.7 G/DL (ref 28.4–34.8)
MCV RBC AUTO: 97.3 FL (ref 82.6–102.9)
MONOCYTES # BLD: 9 % (ref 3–12)
NRBC AUTOMATED: 0 PER 100 WBC
PDW BLD-RTO: 13.8 % (ref 11.8–14.4)
PLATELET # BLD: ABNORMAL K/UL (ref 138–453)
PLATELET ESTIMATE: ABNORMAL
PLATELET, FLUORESCENCE: 292 K/UL (ref 138–453)
PLATELET, IMMATURE FRACTION: 1.4 % (ref 1.1–10.3)
PMV BLD AUTO: ABNORMAL FL (ref 8.1–13.5)
PROSTATE SPECIFIC ANTIGEN: 4.16 UG/L
RBC # BLD: 5.62 M/UL (ref 4.21–5.77)
RBC # BLD: ABNORMAL 10*6/UL
RETIC %: 1.7 % (ref 0.5–1.9)
RETIC HEMOGLOBIN: 36.7 PG (ref 28.2–35.7)
SEG NEUTROPHILS: 75 % (ref 36–65)
SEGMENTED NEUTROPHILS ABSOLUTE COUNT: 5.3 K/UL (ref 1.5–8.1)
TESTOSTERONE TOTAL: >1500 NG/DL (ref 220–1000)
WBC # BLD: 7.1 K/UL (ref 3.5–11.3)
WBC # BLD: ABNORMAL 10*3/UL

## 2021-03-10 PROCEDURE — 85025 COMPLETE CBC W/AUTO DIFF WBC: CPT

## 2021-03-10 PROCEDURE — 81270 JAK2 GENE: CPT

## 2021-03-10 PROCEDURE — 85055 RETICULATED PLATELET ASSAY: CPT

## 2021-03-10 PROCEDURE — 99204 OFFICE O/P NEW MOD 45 MIN: CPT | Performed by: INTERNAL MEDICINE

## 2021-03-10 PROCEDURE — 85045 AUTOMATED RETICULOCYTE COUNT: CPT

## 2021-03-10 PROCEDURE — 82668 ASSAY OF ERYTHROPOIETIN: CPT

## 2021-03-10 PROCEDURE — 99202 OFFICE O/P NEW SF 15 MIN: CPT | Performed by: INTERNAL MEDICINE

## 2021-03-10 PROCEDURE — 84153 ASSAY OF PSA TOTAL: CPT

## 2021-03-10 PROCEDURE — 84403 ASSAY OF TOTAL TESTOSTERONE: CPT

## 2021-03-10 PROCEDURE — 36415 COLL VENOUS BLD VENIPUNCTURE: CPT

## 2021-03-10 NOTE — PROGRESS NOTES
Jesica Fang                                                                                                                  3/10/2021  MRN:   [id number]  YOB: 1960  PCP:                           Krzysztof Morgan  Referring Physician: No ref. provider found  Treating Physician Name: Aly Chaudhari MD      Reason for consultation:  Chief Complaint   Patient presents with    Consultation     Abnormal CBC   Patient presents to the clinic for further evaluation of elevated hemoglobin    Current problems:  Polycythemia   Elevated HCT    Active and recent treatments:  Work-up in progress    Summary of Case/History:    Jesica Fang a 62 y.o.male is a patient with polycythemia. He reports he underwent routine lab work which showed elevated hemoglobin with elevated HCT. He has history of low EF, he has undergone 2 cardiac cath procedures and both were negative. He is very active with regular vigorous exercise, he does self medicate with testosterone supplements. He had recent surgery on right arm from ski injury and has some residual pain as it continues to heal. He smokes cigars periodically and plans to stop. Patient denies any headaches. Denies any itching. Denies any vision changes. Denies fatigue. Denies inability to concentrate.     Past Medical History:   Past Medical History:   Diagnosis Date    Heartburn     Hypertension     Sleep apnea        Past Surgical History:     Past Surgical History:   Procedure Laterality Date    APPENDECTOMY      ARM SURGERY Right 02/23/2021    TRICEPS TENDON REPAIR - RIGHT ELBOW WITH ARTHREX AND BIOMET ACHILLES ALLOGRAFT    BACK SURGERY      cervical/ lumbar    [facility]    COLONOSCOPY      ENDOSCOPY, COLON, DIAGNOSTIC      HERNIA REPAIR      MUSCLE REPAIR Right 2/23/2021    TRICEPS TENDON REPAIR - RIGHT ELBOW performed by Dana Mckeon MD at [address]  cervical and lumbar    UPPER GASTROINTESTINAL ENDOSCOPY N/A 2019    EGD BIOPSY performed by Kaylee Hernandez MD at [facility]       Patient Family Social History:     Social History     Socioeconomic History    Marital status: Single     Spouse name: None    Number of children: None    Years of education: None    Highest education level: None   Occupational History    None   Social Needs    Financial resource strain: None    Food insecurity     Worry: None     Inability: None    Transportation needs     Medical: None     Non-medical: None   Tobacco Use    Smoking status: Light Tobacco Smoker     Packs/day: 1.00     Years: 20.00     Pack years: 20.00     Types: Cigars     Last attempt to quit: 3/15/2008     Years since quittin.9    Smokeless tobacco: Never Used    Tobacco comment: no cigarettes for 20 years   Substance and Sexual Activity    Alcohol use: Yes     Comment: socially    Drug use: Yes     Types: Marijuana     Comment: sometimes nightly/ oral or smokes    Sexual activity: None   Lifestyle    Physical activity     Days per week: None     Minutes per session: None    Stress: None   Relationships    Social connections     Talks on phone: None     Gets together: None     Attends Sikh service: None     Active member of club or organization: None     Attends meetings of clubs or organizations: None     Relationship status: None    Intimate partner violence     Fear of current or ex partner: None     Emotionally abused: None     Physically abused: None     Forced sexual activity: None   Other Topics Concern    None   Social History Narrative    None     Family History   Problem Relation Age of Onset    Cancer Father         prostrate    Diabetes Father     Cancer Paternal Uncle         colon     Current Medications:     Current Outpatient Medications   Medication Sig Dispense Refill    pantoprazole (PROTONIX) 40 MG tablet Take 1 tablet by mouth every morning (before breakfast) 90 tablet 1    metoprolol succinate (TOPROL XL) 50 MG extended release tablet Take 50 mg by mouth daily      esomeprazole (NEXIUM) 40 MG delayed release capsule Take 1 capsule by mouth 2 times daily 120 capsule 0    Na Sulfate-K Sulfate-Mg Sulf 17.5-3.13-1.6 GM/177ML SOLN Please follow instructions given by office (Patient not taking: Reported on 3/10/2021) 1 Bottle 0     No current facility-administered medications for this visit. Allergies:   Pcn [penicillins]    Review of Systems:    Constitutional: No fever or chills. No night sweats, no weight loss   Eyes: No eye discharge, double vision, or eye pain   HEENT: negative for sore mouth, sore throat, hoarseness and voice change   Respiratory: negative for cough , sputum, dyspnea, wheezing, hemoptysis, chest pain   Cardiovascular: negative for chest pain, dyspnea, palpitations, orthopnea, PND   Gastrointestinal: negative for nausea, vomiting, diarrhea, constipation, abdominal pain, Dysphagia, hematemesis and hematochezia   Genitourinary: negative for frequency, dysuria, nocturia, urinary incontinence, and hematuria   Integument: negative for rash, skin lesions, bruises.    Hematologic/Lymphatic: negative for easy bruising, bleeding, lymphadenopathy, or petechiae   Endocrine: negative for heat or cold intolerance,weight changes, change in bowel habits and hair loss   Musculoskeletal: negative for myalgias, arthralgias, pain, joint swelling,and bone pain   Neurological: negative for headaches, dizziness, seizures, weakness, numbness        Physical Exam:    Vitals: BP (!) 145/86   Pulse 79   Temp 97.5 °F (36.4 °C) (Oral)   Ht 5' 10\" (1.778 m)   Wt 203 lb 4.8 oz (92.2 kg)   BMI 29.17 kg/m²   General appearance - well appearing, no in pain or distress  Mental status - AAO X3  Eyes - pupils equal and reactive, extraocular eye movements intact  Mouth - mucous membranes moist, pharynx normal without lesions  Neck - supple, no significant adenopathy  Lymphatics - no palpable lymphadenopathy, no hepatosplenomegaly  Chest - clear to auscultation, no wheezes, rales or rhonchi, symmetric air entry  Heart - normal rate, regular rhythm, normal S1, S2, no murmurs  Abdomen - soft, nontender, nondistended, no masses or organomegaly  Neurological - alert, oriented, normal speech, no focal findings or movement disorder noted  Extremities - peripheral pulses normal, no pedal edema, no clubbing or cyanosis  Skin - normal coloration and turgor, no rashes, no suspicious skin lesions noted       DATA:        Lab Results   Component Value Date    WBC 5.2 02/11/2021    HGB 17.8 (H) 02/11/2021    HCT 54.0 (H) 02/11/2021    MCV 96.8 02/11/2021     02/11/2021     No results found for: IRON, TIBC, FERRITIN        Chemistry        Component Value Date/Time     02/11/2021 1055    K 4.5 02/11/2021 1055     02/11/2021 1055    CO2 26 02/11/2021 1055    BUN 17 02/11/2021 1055    CREATININE 0.82 02/11/2021 1055        Component Value Date/Time    CALCIUM 9.0 02/11/2021 1055          -- Diagnosis --   SOFT TISSUE, RIGHT OLECRANON BURSA, REPAIR:             -  SKELETAL MUSCLE AND SYNOVIAL TISSUE WITH GRANULATION   TISSUE, RECENT HEMORRHAGE AND FIBRIN DEPOSITION, CONSISTENT WITH   HISTORY OF TRAUMA. Impression:  Polycythemia   Hypertension  Self-medication with testosterone  PTSD  Tobacco use      Plan: We reivewed his recent lab work which shows polycythemia and elevated hematocrit. I also reviewed records from outside facility. Patient has elevated hematocrit and hemoglobin going as far back as 2017. We discussed differential diagnosis of polycythemia which includes primary bone marrow disorder as well as secondary etiologies such as smoking diuretics high altitude kidney tumors COPD as well as anabolic steroids.   Based on patient presentation and history I suspect patient elevated hematocrit and hemoglobin is secondary to use of testosterone supplements. However primary bone marrow disorder cannot be completely ruled out. I will proceed with further testing for myeloproliferative disorder with JAK2 testing as well as erythropoietin level. I will also check testosterone level. Patient was also counseled on tobacco cessation. We also discussed the role of phlebotomy for polycythemia. Discussed that with secondary polycythemia symptoms of hyperviscosity usually start at a hematocrit around 60. Patient is going to [place] towards the end of the month and I will see him back once he is back. Patient had multiple questions which answered to the best my ability. Lindsey King          I spent more than minutes 60 examining, evaluating, reviewing data, counseling the patient and coordinating care. Greater than 50% of time was spent face-to-face with the patient this note is created with the assistance of a speech recognition program.  While intending to generate a document that actually reflects the content of the visit, the document can still have some errors including those of syntax and sound a like substitutions which may escape proof reading. It such instances, actual meaning can be extrapolated by contextual diversion.

## 2021-03-10 NOTE — TELEPHONE ENCOUNTER
AVS from 3/10/21     rv in 5-6  weeks   Labs today     rv scheduled for 4/14/21 @ 8:15am  Labs drawn today    Pt was given AVS and appt schedule

## 2021-03-11 LAB — SURGICAL PATHOLOGY REPORT: NORMAL

## 2021-03-12 LAB
ERYTHROPOIETIN: 20 MU/ML (ref 4–27)
PATHOLOGIST REVIEW: NORMAL

## 2021-03-16 LAB — V617F MUTATION, QNT: 0 %

## 2021-03-16 NOTE — PROGRESS NOTES
Procedure: Right triceps tendon rupture  Date of procedure: 2/23/2021    HPI: Mr. Orlin Esparza is a 69-year-old approximately 2 weeks status post the aforementioned procedure. Indicates that he is doing well having minimal pain which he rates as a 1-2/10. He comes in today not wearing his splint indicating that he took off the splint because it was pinching him at the level of the wrist.  Its not clear at this time as to when this was done. He denies having any fevers, chills, sweats or any constitutional symptoms. Physical examination:  Evaluation of patient's right elbow and upper extremity demonstrates his incision to be clean, dry, intact without wound dehiscence or drainage. Moderate swelling over the posterior aspect of the elbow. No induration. Sensation is grossly intact light touch in all dermatomes and he has a 2+ radial pulse with brisk cap refill in his fingers. He can actively flex and extend, abduct and adductor all of his fingers. Impression and plan: Mr. Orlin Esparza is a 69-year-old male approximately 2 weeks status post a right triceps tendon rupture repair. This was a partial thickness triceps tendon rupture. As noted above he did take his splint off early. I did have a discussion with Mr. Orlin Esparza today educating him about the importance of limiting his activity to protect the repair site and avoid rerupture. To this and he was placed in a hinged elbow brace allowing for a few degrees of flexion and instructed him on how to gradually increase the amount of flexion allowed on a weekly basis so that in 4 weeks time he is at about 90 degrees of flexion. His sutures were taken out and Steri-Strips and a clean dressing were applied. He may now get his incision wet in the shower but continue on with daily dressing changes. I will see him back for reevaluation in a month but he was encouraged to return or call earlier with questions under concerns.

## 2021-04-14 ENCOUNTER — VIRTUAL VISIT (OUTPATIENT)
Dept: ONCOLOGY | Age: 61
End: 2021-04-14
Payer: COMMERCIAL

## 2021-04-14 DIAGNOSIS — F17.200 TOBACCO DEPENDENCE: ICD-10-CM

## 2021-04-14 DIAGNOSIS — R79.89 ELEVATED TESTOSTERONE LEVEL: ICD-10-CM

## 2021-04-14 DIAGNOSIS — R39.89 URINE TROUBLES: ICD-10-CM

## 2021-04-14 DIAGNOSIS — D75.1 POLYCYTHEMIA: Primary | ICD-10-CM

## 2021-04-14 PROCEDURE — 99214 OFFICE O/P EST MOD 30 MIN: CPT | Performed by: INTERNAL MEDICINE

## 2021-04-14 PROCEDURE — VIRTUALHLTH VIRTUAL HEALTH SAME DAY: Performed by: INTERNAL MEDICINE

## 2021-04-14 NOTE — PROGRESS NOTES
Brigitte Brothers                                                                                                                  4/14/2021  MRN:   [id number]  YOB: 1960  PCP:                           Yisel Almeida  Referring Physician: No ref. provider found  Treating Physician Name: Bibiana Baca MD      Reason for visit:  Chief Complaint   Patient presents with    Follow-up     review status of disease    Discuss Labs   Reviewed results of lab work-up    Current problems:  Polycythemia   Elevated testosterone, iatrogenic  Tobacco dependence    Active and recent treatments:  Polycythemia, secondary    Summary of Case/History:    Brigitte Brothers a 62 y.o.male is a patient with polycythemia. He reports he underwent routine lab work which showed elevated hemoglobin with elevated HCT. He has history of low EF, he has undergone 2 cardiac cath procedures and both were negative. He is very active with regular vigorous exercise, he does self medicate with testosterone supplements. He had recent surgery on right arm from ski injury and has some residual pain as it continues to heal. He smokes cigars periodically and plans to stop. Patient denies any headaches. Denies any itching. Denies any vision changes. Denies fatigue. Denies inability to concentrate. Interim History:    Patient presents to clinic via virtual visit due to ongoing coronavirus pandemic. Patient denies any new complaint or interval event. He is still recovering from his arm injury. Denies hospitalization ER visit. Red cell continues to be elevated. Testosterone level came back significantly elevated. Patient is having difficulty urinating and is planning to undergo Evaluation by urology. During this visit patient's allergy, social, medical, surgical history and medications were reviewed and updated.     Past Medical History:   Past Medical History:   Diagnosis Date    Heartburn     Hypertension     Sleep apnea Past Surgical History:     Past Surgical History:   Procedure Laterality Date    APPENDECTOMY      ARM SURGERY Right 2021    TRICEPS TENDON REPAIR - RIGHT ELBOW WITH ARTHREX AND BIOMET ACHILLES ALLOGRAFT    BACK SURGERY      cervical/ lumbar    CARDIAC [facility]HETERIZATION  2017    [facility]/ no stents    COLONOSCOPY      ENDOSCOPY, COLON, DIAGNOSTIC      HERNIA REPAIR      MUSCLE REPAIR Right 2021    TRICEPS TENDON REPAIR - RIGHT ELBOW performed by Patricio Castro MD at [address] [place] [address]      cervical and lumbar    UPPER GASTROINTESTINAL ENDOSCOPY N/A 2019    EGD BIOPSY performed by Manish Schulz MD at [facility]       Patient Family Social History:     Social History     Socioeconomic History    Marital status: Single     Spouse name: Not on file    Number of children: Not on file    Years of education: Not on file    Highest education level: Not on file   Occupational History    Not on file   Social Needs    Financial resource strain: Not on file    Food insecurity     Worry: Not on file     Inability: Not on file    Transportation needs     Medical: Not on file     Non-medical: Not on file   Tobacco Use    Smoking status: Light Tobacco Smoker     Packs/day: 1.00     Years: 20.00     Pack years: 20.00     Types: Cigars     Last attempt to quit: 3/15/2008     Years since quittin.0    Smokeless tobacco: Never Used    Tobacco comment: no cigarettes for 20 years   Substance and Sexual Activity    Alcohol use: Yes     Comment: socially    Drug use: Yes     Types: Marijuana     Comment: sometimes nightly/ oral or smokes    Sexual activity: Not on file   Lifestyle    Physical activity     Days per week: Not on file     Minutes per session: Not on file    Stress: Not on file   Relationships    Social connections     Talks on phone: Not on file     Gets together: Not on file     Attends Hinduism service: Not on file     Active member of club or organization: Not on file     Attends meetings of clubs or organizations: Not on file     Relationship status: Not on file    Intimate partner violence     Fear of current or ex partner: Not on file     Emotionally abused: Not on file     Physically abused: Not on file     Forced sexual activity: Not on file   Other Topics Concern    Not on file   Social History Narrative    Not on file     Family History   Problem Relation Age of Onset    Cancer Father         prostrate    Diabetes Father     Cancer Paternal Uncle         colon     Current Medications:     Current Outpatient Medications   Medication Sig Dispense Refill    pantoprazole (PROTONIX) 40 MG tablet Take 1 tablet by mouth every morning (before breakfast) 90 tablet 1    Na Sulfate-K Sulfate-Mg Sulf 17.5-3.13-1.6 GM/177ML SOLN Please follow instructions given by office 1 Bottle 0    metoprolol succinate (TOPROL XL) 50 MG extended release tablet Take 50 mg by mouth daily      esomeprazole (NEXIUM) 40 MG delayed release capsule Take 1 capsule by mouth 2 times daily 120 capsule 0     No current facility-administered medications for this visit. Allergies:   Pcn [penicillins]    Review of Systems:    Constitutional: No fever or chills. No night sweats, no weight loss   Eyes: No eye discharge, double vision, or eye pain   HEENT: negative for sore mouth, sore throat, hoarseness and voice change   Respiratory: negative for cough , sputum, dyspnea, wheezing, hemoptysis, chest pain   Cardiovascular: negative for chest pain, dyspnea, palpitations, orthopnea, PND   Gastrointestinal: negative for nausea, vomiting, diarrhea, constipation, abdominal pain, Dysphagia, hematemesis and hematochezia   Genitourinary: negative for frequency, dysuria, nocturia, urinary incontinence, and hematuria   Integument: negative for rash, skin lesions, bruises.    Hematologic/Lymphatic: negative for easy bruising, bleeding, lymphadenopathy, or petechiae Endocrine: negative for heat or cold intolerance,weight changes, change in bowel habits and hair loss   Musculoskeletal: negative for myalgias, arthralgias, pain, joint swelling,and bone pain   Neurological: negative for headaches, dizziness, seizures, weakness, numbness          PHYSICAL EXAMINATION:    Vital Signs: (As obtained by patient/caregiver or practitioner observation)    Blood pressure-  Heart rate-    Respiratory rate-    Temperature-  Pulse oximetry-     Constitutional: [x] Appears well-developed and well-nourished [x] No apparent distress      [] Abnormal-   Mental status  [x] Alert and awake  [x] Oriented to person/place/time [x]Able to follow commands      Eyes:  EOM    [x]  Normal  [] Abnormal-  Sclera  [x]  Normal  [] Abnormal -         Discharge [x]  None visible  [] Abnormal -    HENT:   [x] Normocephalic, atraumatic. [] Abnormal   [x] Mouth/Throat: Mucous membranes are moist.     External Ears [x] Normal  [] Abnormal-     Neck: [x] No visualized mass     Pulmonary/Chest: [x] Respiratory effort normal.  [x] No visualized signs of difficulty breathing or respiratory distress        [] Abnormal-      Musculoskeletal:   [x] Normal gait with no signs of ataxia         [x] Normal range of motion of neck        [] Abnormal-       Neurological:        [x] No Facial Asymmetry (Cranial nerve 7 motor function) (limited exam to video visit)          [x] No gaze palsy        [] Abnormal-         Skin:        [x] No significant exanthematous lesions or discoloration noted on facial skin         [] Abnormal-            Psychiatric:       [x] Normal Affect [x] No Hallucinations        [] Abnormal-     Other pertinent observable physical exam findings-     Due to this being a TeleHealth encounter, evaluation of the following organ systems is limited: Vitals/Constitutional/EENT/Resp/CV/GI//MS/Neuro/Skin/Heme-Lymph-Imm.         DATA:        Lab Results   Component Value Date    WBC 7.1 03/10/2021    HGB 17.9 (H) 03/10/2021    HCT 54.7 (H) 03/10/2021    MCV 97.3 03/10/2021    PLT See Reflexed IPF Result 03/10/2021     No results found for: IRON, TIBC, FERRITIN        Chemistry        Component Value Date/Time     02/11/2021 1055    K 4.5 02/11/2021 1055     02/11/2021 1055    CO2 26 02/11/2021 1055    BUN 17 02/11/2021 1055    CREATININE 0.82 02/11/2021 1055        Component Value Date/Time    CALCIUM 9.0 02/11/2021 1055          -- Diagnosis --   SOFT TISSUE, RIGHT OLECRANON BURSA, REPAIR:             -  SKELETAL MUSCLE AND SYNOVIAL TISSUE WITH GRANULATION   TISSUE, RECENT HEMORRHAGE AND FIBRIN DEPOSITION, CONSISTENT WITH   HISTORY OF TRAUMA. EXAMINATION:   THREE XRAY VIEWS OF THE RIGHT ELBOW       1/18/2021 3:25 pm       COMPARISON:   None.       HISTORY:   ORDERING SYSTEM PROVIDED HISTORY: Injury of right elbow, initial encounter   TECHNOLOGIST PROVIDED HISTORY:   Reason for Exam: C/o right elbow pain distal humerus and proximal   radius/ulna. Sts skiing injury x 4 days ago, weakness to right arm. Acuity: Acute   Type of Exam: Initial   Mechanism of Injury: skiing injury x 4 days ago   Relevant Medical/Surgical History: denies       FINDINGS:   The joint spaces are maintained.  Normal alignment.  Somewhat prominent   degenerative changes at the articulating margins of the distal humerus and   olecranon process.  There is narrowing of the space between the lateral   epicondyle and the radial head.       No evidence of fracture or cortical irregularity.  No evidence to suggest   joint effusion.           Impression   Degenerative changes in the bones of the right elbow.  No acute process is   identified.                   Impression:  Polycythemia, secondary  Hypertension  Elevated testosterone, iatrogenic  PTSD  Tobacco use      Plan:  Personally reviewed results of lab work-up and other relevant clinical data  Patient JAK2 mutation is negative. Erythropoietin is within range.   I believe patient's polycythemia secondary nature as opposed to primary bone marrow disorder  Patient testosterone is significantly elevated above 1500. I believe this is iatrogenic as patient is self treating with testosterone. I reviewed the results with the patient and encouraged him to decrease the dose  Patient counseled tobacco cessation  Patient encouraged to follow-up with urologist for management of BPH. PSA is elevated. Patient planning to undergo biopsy  Discussed that with secondary polycythemia symptoms of hyperviscosity usually start at a hematocrit around 60. We will recheck CBC in 6 months. Patient had multiple questions which answered to the best my ability. Sofia Faust          This note is created with the assistance of a speech recognition program.  While intending to generate a document that actually reflects the content of the visit, the document can still have some errors including those of syntax and sound a like substitutions which may escape proof reading. It such instances, actual meaning can be extrapolated by contextual diversion.

## 2021-04-15 ENCOUNTER — TELEPHONE (OUTPATIENT)
Dept: ONCOLOGY | Age: 61
End: 2021-04-15

## 2021-04-15 NOTE — TELEPHONE ENCOUNTER
AVS from 4/14/21     rv in 6 months with labs prior     rv scheduled for 10/13/21 @ 4pm as a virtual visit  Pt will have labs ( cbc,testosterone) one week prior    Mailed pt lab orders, AVS and appt schedule

## 2021-04-29 ENCOUNTER — TELEPHONE (OUTPATIENT)
Dept: GASTROENTEROLOGY | Age: 61
End: 2021-04-29

## 2021-04-29 NOTE — TELEPHONE ENCOUNTER
Pt LVM to r/s colon/egd that was previously cancelled. Pt can be reached at [phone]. His [employer] phone number is [phone].

## 2021-05-03 ENCOUNTER — HOSPITAL ENCOUNTER (OUTPATIENT)
Dept: ULTRASOUND IMAGING | Age: 61
Discharge: HOME | End: 2021-05-05
Payer: COMMERCIAL

## 2021-05-03 DIAGNOSIS — R10.11 RUQ PAIN: ICD-10-CM

## 2021-05-03 PROCEDURE — 76705 ECHO EXAM OF ABDOMEN: CPT

## 2021-05-20 ENCOUNTER — HOSPITAL ENCOUNTER (EMERGENCY)
Age: 61
Discharge: HOME | End: 2021-05-20
Attending: EMERGENCY MEDICINE
Payer: COMMERCIAL

## 2021-05-20 VITALS
HEIGHT: 70 IN | DIASTOLIC BLOOD PRESSURE: 80 MMHG | SYSTOLIC BLOOD PRESSURE: 130 MMHG | TEMPERATURE: 98.1 F | WEIGHT: 195 LBS | HEART RATE: 84 BPM | OXYGEN SATURATION: 97 % | BODY MASS INDEX: 27.92 KG/M2 | RESPIRATION RATE: 16 BRPM

## 2021-05-20 DIAGNOSIS — T14.8XXA INFECTION OF WOUND HEMATOMA: Primary | ICD-10-CM

## 2021-05-20 DIAGNOSIS — L08.9 INFECTION OF WOUND HEMATOMA: Primary | ICD-10-CM

## 2021-05-20 LAB
ABSOLUTE EOS #: 0.2 K/UL (ref 0–0.4)
ABSOLUTE IMMATURE GRANULOCYTE: ABNORMAL K/UL (ref 0–0.3)
ABSOLUTE LYMPH #: 0.9 K/UL (ref 1–4.8)
ABSOLUTE MONO #: 0.6 K/UL (ref 0.1–1.2)
BASOPHILS # BLD: 1 % (ref 0–2)
BASOPHILS ABSOLUTE: 0.1 K/UL (ref 0–0.2)
DIFFERENTIAL TYPE: ABNORMAL
EOSINOPHILS RELATIVE PERCENT: 2 % (ref 1–4)
HCT VFR BLD CALC: 54.5 % (ref 41–53)
HEMOGLOBIN: 18.2 G/DL (ref 13.5–17.5)
IMMATURE GRANULOCYTES: ABNORMAL %
LYMPHOCYTES # BLD: 12 % (ref 24–44)
MCH RBC QN AUTO: 31.4 PG (ref 26–34)
MCHC RBC AUTO-ENTMCNC: 33.5 G/DL (ref 31–37)
MCV RBC AUTO: 94 FL (ref 80–100)
MONOCYTES # BLD: 8 % (ref 2–11)
NRBC AUTOMATED: ABNORMAL PER 100 WBC
PDW BLD-RTO: 13.8 % (ref 12.5–15.4)
PLATELET # BLD: 292 K/UL (ref 140–450)
PLATELET ESTIMATE: ABNORMAL
PMV BLD AUTO: 7.3 FL (ref 6–12)
RBC # BLD: 5.8 M/UL (ref 4.5–5.9)
RBC # BLD: ABNORMAL 10*6/UL
SEG NEUTROPHILS: 77 % (ref 36–66)
SEGMENTED NEUTROPHILS ABSOLUTE COUNT: 6.2 K/UL (ref 1.8–7.7)
WBC # BLD: 8.1 K/UL (ref 3.5–11)
WBC # BLD: ABNORMAL 10*3/UL

## 2021-05-20 PROCEDURE — 99283 EMERGENCY DEPT VISIT LOW MDM: CPT

## 2021-05-20 PROCEDURE — 6370000000 HC RX 637 (ALT 250 FOR IP): Performed by: PHYSICIAN ASSISTANT

## 2021-05-20 PROCEDURE — 85025 COMPLETE CBC W/AUTO DIFF WBC: CPT

## 2021-05-20 PROCEDURE — 36415 COLL VENOUS BLD VENIPUNCTURE: CPT

## 2021-05-20 RX ORDER — GINSENG 100 MG
CAPSULE ORAL ONCE
Status: COMPLETED | OUTPATIENT
Start: 2021-05-20 | End: 2021-05-20

## 2021-05-20 RX ORDER — DOXYCYCLINE 100 MG/1
100 TABLET ORAL 2 TIMES DAILY
Qty: 13 TABLET | Refills: 0 | Status: SHIPPED | OUTPATIENT
Start: 2021-05-20 | End: 2021-05-27

## 2021-05-20 RX ORDER — DOXYCYCLINE HYCLATE 100 MG
100 TABLET ORAL ONCE
Status: COMPLETED | OUTPATIENT
Start: 2021-05-20 | End: 2021-05-20

## 2021-05-20 RX ADMIN — DOXYCYCLINE HYCLATE 100 MG: 100 TABLET, COATED ORAL at 15:58

## 2021-05-20 RX ADMIN — BACITRACIN: 500 OINTMENT TOPICAL at 15:58

## 2021-05-20 NOTE — ED PROVIDER NOTES
Attending Supervisory Note/Shared Visit   I have personally performed a face to face diagnostic evaluation on this patient. I have reviewed the mid-levels findings and agree.         (Please note that portions of this note were completed with a voice recognition program.  Efforts were made to edit the dictations but occasionally words are mis-transcribed.)    Sharla Weems MD  Attending Emergency Physician        Sharla Weems MD  05/20/21 3044

## 2021-05-20 NOTE — ED NOTES
Patient provided with discharge instructions, prescriptions, and follow up information. Verbalized understanding. IV discontinued and dry dressing in place. A&OX3. Steady gait noted at discharge. Wheelchair declined by patient.       Arvin Albright RN  05/20/21 9241

## 2021-05-20 NOTE — ED PROVIDER NOTES
[facility] ED  [address] [facility] [address] [place] [address]  Phone: [phone]  Fax: Vanesa Chen 9160      Pt Name: Clemente Dhillon  MRN: [id number]  [place] 1960  Date of evaluation: 5/20/21      CHIEF COMPLAINT:  Chief Complaint   Patient presents with    Arm Pain     surgery 6 weeks ago for tendon repair, drainage and swelling the last 3 days       HISTORY OF PRESENT ILLNESS    Clemente Dhillon is a 62 y.o. male who presents for a  WOUND EVALUATION:    Location/Symptom:   Drainage and swelling at site of tricep tendon repair 3 months ago. Timing/Onset:   3 months  Context/Setting:   Patient here for evaluation of persistent drainage from right posterior arm/elbow where he had had surgery 3 months ago. Patient states he said purulence drainage from this area since surgery with Dr. Ethan Eisenmenger. Patient had one postop follow-up at 4 weeks and he states he has not uncommon appearing the next week or so. Patient states it is odorous discharge. He states he has had some feeling of some recent fatigue but otherwise no fever no chills no myalgias. Patient denies any focal weakness or true numbness of this right arm. Patient states that he is \"not been a good patient\" alluding to the fact that he has been more active during this healing timeframe than he was should have been. He has no other complaints. Nursing Notes were reviewed. REVIEW OF SYSTEMS       Constitutional:   Per HPI  Eyes: No visual changes. Neck: No neck pain. Respiratory: Denies recent shortness of breath. Cardiac:  Denies recent chest pain. GI:  No nausea. No vomiting. Denies abdominal pain/diarrhea. Musculoskeletal: Denies focal weakness. Neurologic:  Denies headache or focal weakness. Skin:   Per HPI    Negative in 10 essential Systems except as mentioned above and in the HPI.       PAST MEDICAL HISTORY   PMH:  has a past medical history of Heartburn, Hypertension, and Sleep apnea.  Surgical History:  has a past surgical history that includes shoulder surgery; Spine surgery; Upper gastrointestinal endoscopy (N/A, 12/17/2019); Cardiac catheterization (2017); hernia repair; Colonoscopy; Appendectomy; back surgery; Endoscopy, colon, diagnostic; Arm Surgery (Right, 02/23/2021); and Muscle Repair (Right, 2/23/2021). Social History:  reports that he has been smoking cigars. He has a 20.00 pack-year smoking history. He has never used smokeless tobacco. He reports current alcohol use. He reports current drug use. Drug: Marijuana. Family History: None  Psychiatric History: None    Allergies:is allergic to pcn [penicillins]. PHYSICAL EXAM     INITIAL VITALS: /80   Pulse 84   Temp 98.1 °F (36.7 °C) (Oral)   Resp 16   Ht 5' 10\" (1.778 m)   Wt 88.5 kg (195 lb)   SpO2 97%   BMI 27.98 kg/m²     Constitutional:  Well developed   Eyes:  Pupils equal   HENT:  Atraumatic, external ears normal, nose normal  Respiratory:  Clear to auscultation bilaterally with good air exchange, no W/R/R  Cardiovascular:  RRR with normal S1 and S2  Musculoskeletal:    Full ROM of bilat UE/LE, affected area  Integument:   Well-healed surgical scar of the left posterior elbow extending to distal humerus and proximal forearm areas. Inferior area of this surgical incision is open with some fluctuance and weeping of venous blood. Proximal to this is another area of fluctuance but some underlying complexion of hematoma. I am unable to express any purulence or significant blood from this lower area. Patient has full range of motion of this elbow. There is no warmth/erythema to this area indicative of significant infection.   Neurologic:  Alert & oriented x 3, no focal deficits noted       DIAGNOSTIC RESULTS     EKG: All EKG's are interpreted by the Emergency Department Physician who either signs or Co-signs this chart in the absence of a cardiologist.  Not indicated    RADIOLOGY:   Reviewed the radiologist:  No orders to display     Not indicated      LABS:  Labs Reviewed   CBC WITH AUTO DIFFERENTIAL - Abnormal; Notable for the following components:       Result Value    Hemoglobin 18.2 (*)     Hematocrit 54.5 (*)     Seg Neutrophils 77 (*)     Lymphocytes 12 (*)     Absolute Lymph # 0.90 (*)     All other components within normal limits     Not indicated    EMERGENCY DEPARTMENT COURSE:     1507  Patient is 3 months postop from a tricep tendon repair. Patient states he has had persistence of purulent drainage from this area since that time. On my exam it looks as though this is likely some sort of traumatic hematoma. 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 I will get some basic labs and likely will try to aspirate to assess the fluid    Procedure Note:  Cleaned area with Betadine. 18g needle aspiration of both sites of fluctuance. Aspirated 1-2mm of blood with some mild purulence from proximal area, able to decompress well. Distal site was able to aspirate/explore with 18g needs at site of expression. Primarily blood with some thickened purulence able to be expressed but very small amount. Cleaned around areas again with Betadyne. Nurse to dressed with gauze/bacitracin. 1557  Doxy here and for home. Directed him to f/u with [address] [place] [address] office for re-evaluation as soon as able. CBC wnl. This appears to be some slightly infected hematoma, or reactionary hematoma overlying mild infection. All localized and more hematoma than abscess. He very well could have been too active and stressed area causing hematoma. This is appropriate for outpt f/u. Pt directed to keep area clean and protected as hematoma thinned out overlying surgical scar. Orders Placed This Encounter   Medications    doxycycline hyclate (VIBRA-TABS) tablet 100 mg     Order Specific Question:   Antimicrobial Indications     Answer:    Other     Order Specific Question: Other Abx Indication     Answer:   surgical site abscess    doxycycline monohydrate (ADOXA) 100 MG tablet     Sig: Take 1 tablet by mouth 2 times daily for 7 days     Dispense:  13 tablet     Refill:  0    bacitracin ointment       CONSULTS:  None      FINAL IMPRESSION      1. Infection of wound hematoma          DISPOSITION/PLAN:  DISPOSITION Decision To Discharge 05/20/2021 03:49:06 PM        PATIENT REFERRED TO:  Douglas Emery MD  [address] [place] [id number] [phone]    Schedule an appointment as soon as possible for a visit   for re-evaluation of your symptoms    [facility] ED  [address] [place] [address]  [phone]  Go to   increased redness/swelling/bleeding/pain or pus.       DISCHARGE MEDICATIONS:  New Prescriptions    DOXYCYCLINE MONOHYDRATE (ADOXA) 100 MG TABLET    Take 1 tablet by mouth 2 times daily for 7 days       (Please note that portions of this note were completed with a voice recognition program.  Efforts were made to edit the dictations but occasionally words are mis-transcribed.)    NATHANIEL Louie PA-C  05/20/21 1600

## 2021-06-10 ENCOUNTER — OFFICE VISIT (OUTPATIENT)
Dept: ORTHOPEDIC SURGERY | Age: 61
End: 2021-06-10
Payer: COMMERCIAL

## 2021-06-10 VITALS — WEIGHT: 195 LBS | HEIGHT: 70 IN | BODY MASS INDEX: 27.92 KG/M2 | RESPIRATION RATE: 18 BRPM

## 2021-06-10 DIAGNOSIS — M25.561 CHRONIC PAIN OF RIGHT KNEE: Primary | ICD-10-CM

## 2021-06-10 DIAGNOSIS — G89.29 CHRONIC PAIN OF RIGHT KNEE: Primary | ICD-10-CM

## 2021-06-10 DIAGNOSIS — T81.89XA DRAINING POSTOPERATIVE WOUND, INITIAL ENCOUNTER: ICD-10-CM

## 2021-06-10 PROCEDURE — 20610 DRAIN/INJ JOINT/BURSA W/O US: CPT | Performed by: PHYSICIAN ASSISTANT

## 2021-06-10 PROCEDURE — 99214 OFFICE O/P EST MOD 30 MIN: CPT | Performed by: PHYSICIAN ASSISTANT

## 2021-06-10 RX ORDER — DOXYCYCLINE HYCLATE 100 MG
100 TABLET ORAL 2 TIMES DAILY
Qty: 14 TABLET | Refills: 0 | Status: SHIPPED | OUTPATIENT
Start: 2021-06-10 | End: 2021-06-17

## 2021-06-10 RX ORDER — LIDOCAINE HYDROCHLORIDE 10 MG/ML
4 INJECTION, SOLUTION INFILTRATION; PERINEURAL ONCE
Status: COMPLETED | OUTPATIENT
Start: 2021-06-10 | End: 2021-06-10

## 2021-06-10 RX ORDER — TRIAMCINOLONE ACETONIDE 40 MG/ML
40 INJECTION, SUSPENSION INTRA-ARTICULAR; INTRAMUSCULAR ONCE
Status: COMPLETED | OUTPATIENT
Start: 2021-06-10 | End: 2021-06-10

## 2021-06-10 RX ADMIN — LIDOCAINE HYDROCHLORIDE 4 ML: 10 INJECTION, SOLUTION INFILTRATION; PERINEURAL at 10:53

## 2021-06-10 RX ADMIN — TRIAMCINOLONE ACETONIDE 40 MG: 40 INJECTION, SUSPENSION INTRA-ARTICULAR; INTRAMUSCULAR at 10:54

## 2021-06-10 RX ADMIN — LIDOCAINE HYDROCHLORIDE 4 ML: 10 INJECTION, SOLUTION INFILTRATION; PERINEURAL at 10:52

## 2021-06-10 NOTE — PROGRESS NOTES
[facility]56 [facility], [facility], [address] [place] [address]               [place], Griselda Reyes 82.           Dept Phone: [phone]           Dept Fax:  [phone] [facility]           Nancy Wasserman          Dept Phone: [phone]           Dept Fax:  [phone]      Chief Compliant:  Chief Complaint   Patient presents with    Knee Pain     right        History of Present Illness:  Titus Lyle returns today. This is a 62 y.o. male who presents to the clinic today for follow up of right knee pain. Patient was eval by myself in January. Right knee pain underwent a corticosteroid injection as he is found to have some mild degenerative changes. Patient reports significant relief of pain with a corticosteroid injection lasting about 2 to 3 months. Over the last several months his pain however has gradual return associated with significant swelling. Pain is aggravated by prolonged walking weightbearing. Patient is an avid weightlifter and it is aggravated by certain exercise that require repetitive knee flexion. No new injury or trauma. No knee joint warmth, redness, fever or chills. Right elbow.:  Patient is status post right triceps repair by Dr. Geovanna Cleaning. Unfortunately he has noticed drainage coming from the right elbow operative incision. He denies any injury or trauma however it is noted that at last postoperative visit patient discontinued the brace on his own and he missed a postop appointment in April with Dr. Geovanna Cleaning. Patient noticed this drainage so was evaluated in the ED on 5/20/2021 where the wound was assessed and patient was started on doxycycline. He states he has completed the antibiotic at this time and notes that he did have improvement however since completing that approximately 6 days ago some of the draining has worsened.       Review of Systems Constitutional: Negative for fever, chills, sweats, recent illness, or recent injury. Neurological: Negative for headaches, numbness, or weakness. Integumentary: Negative for rash, itching, ecchymosis, abrasions, or laceration. Musculoskeletal: Positive for Knee Pain (right)       Physical Exam:  Constitutional: Patient is oriented to person, place, and time. Patient appears well-developed and well nourished. Musculoskeletal:    Right Knee:     Skin: warm and dry, no rash or erythema  Vasculature: 2+ pedal pulses bilaterally  Neuro: Sensation grossly intact to light touch diffusely  Alignment: Normal  Tenderness: Medial joint line. No tenderness to quad/patellar tendon, pes anserine bursa or posterior knee. Effusion: large    ROM: (Degrees)       A P       Extension  -5        Flexion   115        Crepitation  Yes       Muscle strength:         Flexion   5      Extension  5      SLR   5        Extensor lag   y          Special testing:  y    Pain with deep knee flexion     y    Patellar grind       n    Patellar apprehension      n    Patellar glide         n    Lachman       n    Anterior drawer      n    Pivot shift       n    Posterior drawer      n    Dial test       n    Posterolateral drawer      n    Posterior Sag       n    MCL        n    LCL          y    Medial joint line tenderness     n    Lateral joint line tenderness     n    Appley's           Right elbow:  Posterior incision demonstrates pinpoint areas with small amount of nonpurulent drainage 1 is located at the most proximal portion of the wound and one is in the mid wound these both measure less than 5 mm in diameter. There is no surrounding erythema of the elbow. Patient is able to tolerate gentle range of motion. He denies any paresthesias. Neurological: Patient is alert and oriented to person, place, and time. Normal strenght. No sensory deficit.   Skin: Skin is warm and dry  Psychiatric: Behavior is normal. Thought content discussed with the patient. Administrations This Visit     lidocaine 1 % injection 4 mL     Admin Date  06/10/2021  10:52 Action  Given Dose  4 mL Route  Intra-articular Site  Knee Right Administered By  Alejandro Mata LPN    Ordering Provider: NIEVES Ferreira    . [facility]: [id number]    Lot#: [id number]    : Eleni Ruano    Patient Supplied?: No           Admin Date  06/10/2021  10:53 Action  Given Dose  4 mL Route  Intra-articular Site  Knee Right Administered By  Alejandro Mata LPN    Ordering Provider: Reinaldo Ferreira    . [facility] 47: [id number]    Lot#: [id number]    : HIKMA    Patient Supplied?: No          triamcinolone acetonide (KENALOG-40) injection 40 mg     Admin Date  06/10/2021  10:54 Action  Given Dose  40 mg Route  Intra-articular Site  Knee Right Administered By  Alejandro Mata LPN    Ordering Provider: NIEVES Ferreira    . [facility] 47: [id number]    Lot#: [id number]    : [employer] [employer] (PRIMARY CARE)    Patient Supplied?: No                Following an appropriate discussion with the patient regarding the risks and benefits of the procedure he consented to proceed. his right knee was prepped using betadine solution and alcohol swab. Using aseptic technique and through a lateral joint line approach, his right knee was injected superficially with 4 cc of 1% lidocaine without epinephrine and subsequently with 1 cc of 40 mg/Ml Kenalog into the knee joint. A band aid was applied to the injection site. he tolerated the injection with no immediate adverse reactions. Please note that this chart was generated using voice recognition Dragon dictation software. Although every effort was made to ensure the accuracy of this automated transcription, some errors in transcription may have occurred.

## 2021-06-16 ENCOUNTER — OFFICE VISIT (OUTPATIENT)
Dept: ORTHOPEDIC SURGERY | Age: 61
End: 2021-06-16
Payer: COMMERCIAL

## 2021-06-16 VITALS — BODY MASS INDEX: 28.63 KG/M2 | WEIGHT: 200 LBS | HEIGHT: 70 IN

## 2021-06-16 DIAGNOSIS — L02.413 ABSCESS OF RIGHT ELBOW: Primary | ICD-10-CM

## 2021-06-16 PROCEDURE — 99213 OFFICE O/P EST LOW 20 MIN: CPT | Performed by: ORTHOPAEDIC SURGERY

## 2021-06-16 RX ORDER — CEPHALEXIN 500 MG/1
500 CAPSULE ORAL 4 TIMES DAILY
Qty: 40 CAPSULE | Refills: 0 | Status: SHIPPED | OUTPATIENT
Start: 2021-06-16 | End: 2021-06-26

## 2021-06-16 NOTE — PROGRESS NOTES
Procedure: Right triceps tendon rupture  Date of procedure: 2/23/2021    HPI / Chief Norma Huizar is a 62 y.o. male who is close to 4 months status post the aforementioned procedure. He was seen for his 2-week postop visit and then he failed to show up for the 6-week visit and has not followed up ever since. He indicates that he has been dealing with some drainage from his surgical site for a couple of months now. He went to the emergency department approximately 3 weeks ago and was then seen by Shelly Rodarte PA-C on 6/10/2021. At that time he was placed on a 7-day course of doxycycline which he states that he is still taking. He denies having any fevers, chills, sweats or any constitutional symptoms. He does have some intermittent pain over the posterior aspect of the elbow. Past Medical History  Rajeev Mendez  has a past medical history of Heartburn, Hypertension, and Sleep apnea. Past Surgical History  Rajeev Mendez  has a past surgical history that includes shoulder surgery; Spine surgery; Upper gastrointestinal endoscopy (N/A, 12/17/2019); Cardiac catheterization (2017); hernia repair; Colonoscopy; Appendectomy; back surgery; Endoscopy, colon, diagnostic; Arm Surgery (Right, 02/23/2021); and Muscle Repair (Right, 2/23/2021).     Current Medications  Current Outpatient Medications   Medication Sig Dispense Refill    cephALEXin (KEFLEX) 500 MG capsule Take 1 capsule by mouth 4 times daily for 10 days 40 capsule 0    doxycycline hyclate (VIBRA-TABS) 100 MG tablet Take 1 tablet by mouth 2 times daily for 7 days 14 tablet 0    pantoprazole (PROTONIX) 40 MG tablet Take 1 tablet by mouth every morning (before breakfast) 90 tablet 1    Na Sulfate-K Sulfate-Mg Sulf 17.5-3.13-1.6 GM/177ML SOLN Please follow instructions given by office 1 Bottle 0    metoprolol succinate (TOPROL XL) 50 MG extended release tablet Take 50 mg by mouth daily      esomeprazole (NEXIUM) 40 MG delayed release capsule Take 1 capsule by mouth 2 times daily 120 capsule 0     No current facility-administered medications for this visit. Allergies  Allergies have been reviewed. Luis Carlos Romero is allergic to pcn [penicillins]. Social History  Elder RUIZ  reports that he has been smoking cigars. He has a 20.00 pack-year smoking history. He has never used smokeless tobacco. He reports current alcohol use. He reports current drug use. Drug: Marijuana. Family History  Elder RUIZ's family history includes Cancer in his father and paternal uncle; Diabetes in his father. Review of Systems   History obtained from the patient. REVIEW OF SYSTEMS:   Constitution: negative for fever, chills, weight loss or malaise   Musculoskeletal: As noted in the HPI   Neurologic: As noted in the HPI    Physical Exam  Ht 5' 10\" (1.778 m)   Wt 200 lb (90.7 kg)   BMI 28.70 kg/m²    General Appearance: alert, well appearing, and in no distress  Mental Status: alert, oriented to person, place, and time  Evaluation of the patient's right elbow and upper extremity demonstrates 2 punctate areas along the middle and proximal end of his posterior elbow incision that is not healed with some purulent drainage. There is mild surrounding erythema. No areas of fluctuance or induration noted. He has full elbow extension with flexion to 135 degrees as well as pronation of 80 degrees and supination of 75 degrees. Sensation is grossly intact light touch in all dermatomes and he has a 2+ radial pulse with brisk capillary refill in his fingers. Assessment and Plan  Tonya Morataya is a 62 y.o. old male with what appears to be a right elbow surgical site infection. I had a discussion with the patient today regarding this. I did emphasize the need for recommended follow-up in order to monitor appropriate progress in healing postsurgically. At this time were going to proceed with an additional work-up by way of CBC with differential, ESR and CRP.   I also recommended and ordered an MRI study of his elbow to assess for the extent of his infection. Finally he was placed on a 10-day course of Keflex to begin right away. I like to see him back as soon as he has the MRI done to review and discuss the results and proceed with additional treatment recommendations which may include surgery. This note is created with the assistance of a speech recognition program.  While intending to generate adocument that actually reflects the content of the visit, the document can still have some errors including those of syntax and sound a like substitutions which may escape proof reading. It such instances, actual meaningcan be extrapolated by contextual diversion.

## 2021-06-22 ENCOUNTER — HOSPITAL ENCOUNTER (OUTPATIENT)
Age: 61
Setting detail: SPECIMEN
Discharge: HOME | End: 2021-06-22
Payer: COMMERCIAL

## 2021-06-22 DIAGNOSIS — L02.413 ABSCESS OF RIGHT ELBOW: ICD-10-CM

## 2021-06-22 LAB
ABSOLUTE EOS #: 0.1 K/UL (ref 0–0.44)
ABSOLUTE IMMATURE GRANULOCYTE: <0.03 K/UL (ref 0–0.3)
ABSOLUTE LYMPH #: 1.03 K/UL (ref 1.1–3.7)
ABSOLUTE MONO #: 0.56 K/UL (ref 0.1–1.2)
BASOPHILS # BLD: 1 % (ref 0–2)
BASOPHILS ABSOLUTE: 0.05 K/UL (ref 0–0.2)
C-REACTIVE PROTEIN: <3 MG/L (ref 0–5)
DIFFERENTIAL TYPE: ABNORMAL
EOSINOPHILS RELATIVE PERCENT: 2 % (ref 1–4)
ESTIMATED AVERAGE GLUCOSE: 117 MG/DL
HBA1C MFR BLD: 5.7 % (ref 4–6)
HCT VFR BLD CALC: 52.3 % (ref 40.7–50.3)
HEMOGLOBIN: 17.6 G/DL (ref 13–17)
IMMATURE GRANULOCYTES: 0 %
LYMPHOCYTES # BLD: 20 % (ref 24–43)
MCH RBC QN AUTO: 31 PG (ref 25.2–33.5)
MCHC RBC AUTO-ENTMCNC: 33.7 G/DL (ref 28.4–34.8)
MCV RBC AUTO: 92.2 FL (ref 82.6–102.9)
MONOCYTES # BLD: 11 % (ref 3–12)
NRBC AUTOMATED: 0 PER 100 WBC
PDW BLD-RTO: 12.7 % (ref 11.8–14.4)
PLATELET # BLD: 274 K/UL (ref 138–453)
PLATELET ESTIMATE: ABNORMAL
PMV BLD AUTO: 9.7 FL (ref 8.1–13.5)
RBC # BLD: 5.67 M/UL (ref 4.21–5.77)
RBC # BLD: ABNORMAL 10*6/UL
SEDIMENTATION RATE, ERYTHROCYTE: 4 MM (ref 0–20)
SEG NEUTROPHILS: 66 % (ref 36–65)
SEGMENTED NEUTROPHILS ABSOLUTE COUNT: 3.33 K/UL (ref 1.5–8.1)
WBC # BLD: 5.1 K/UL (ref 3.5–11.3)
WBC # BLD: ABNORMAL 10*3/UL

## 2021-06-28 ENCOUNTER — HOSPITAL ENCOUNTER (OUTPATIENT)
Dept: MRI IMAGING | Facility: CLINIC | Age: 61
Discharge: HOME | End: 2021-06-30
Payer: COMMERCIAL

## 2021-06-28 ENCOUNTER — TELEPHONE (OUTPATIENT)
Dept: ORTHOPEDIC SURGERY | Age: 61
End: 2021-06-28

## 2021-06-28 DIAGNOSIS — L02.413 ABSCESS OF RIGHT ELBOW: ICD-10-CM

## 2021-06-28 PROCEDURE — 73221 MRI JOINT UPR EXTREM W/O DYE: CPT

## 2021-06-28 NOTE — TELEPHONE ENCOUNTER
Ramy aragon called to verify that we have received patients right elbow MRI. I told them that we do have access to the prelim. Results.

## 2021-07-07 ENCOUNTER — TELEPHONE (OUTPATIENT)
Dept: ORTHOPEDIC SURGERY | Age: 61
End: 2021-07-07

## 2021-07-07 NOTE — TELEPHONE ENCOUNTER
Patient calling for MRI Right Elbow:    Impression   1. Susceptibility artifact along the distal triceps tendon which could be   related to soft tissue gas versus prior triceps tendon repair.  Surgical   defect in the olecranon from prior triceps tendon repair.  Marrow edema in   the olecranon process which is nonspecific.  Differential considerations   include stress related marrow edema versus infection/osteomyelitis depending   upon the clinical setting.  Nonspecific fluid in the olecranon bursa which   could represent phlegmonous change/cellulitis. 2. Mild ulnohumeral chondromalacia.  Severe radiohumeral chondromalacia. Degenerative changes of the radiohumeral and ulnohumeral joints as detailed   above. 3. Small joint effusion. 4. Intermediate grade partial tearing at the origin of the common flexor   tendon. 5. Intermediate grade partial tearing at the origin of the common extensor   tendon. I looked at last progress note and Dr. Yumiko Sheikh wanted the patient seen as soon as MRI was completed. Checking with Dr. Yumiko Sheikh and clinical staff to see when to bring patient in.

## 2021-07-09 ENCOUNTER — OFFICE VISIT (OUTPATIENT)
Dept: ORTHOPEDIC SURGERY | Age: 61
End: 2021-07-09
Payer: COMMERCIAL

## 2021-07-09 ENCOUNTER — HOSPITAL ENCOUNTER (OUTPATIENT)
Dept: LAB | Age: 61
Setting detail: SPECIMEN
Discharge: HOME | End: 2021-07-09
Payer: COMMERCIAL

## 2021-07-09 VITALS — WEIGHT: 200 LBS | BODY MASS INDEX: 28.63 KG/M2 | HEIGHT: 70 IN

## 2021-07-09 DIAGNOSIS — L02.413 ABSCESS OF RIGHT ELBOW: Primary | ICD-10-CM

## 2021-07-09 PROCEDURE — U0005 INFEC AGEN DETEC AMPLI PROBE: HCPCS

## 2021-07-09 PROCEDURE — 99213 OFFICE O/P EST LOW 20 MIN: CPT | Performed by: ORTHOPAEDIC SURGERY

## 2021-07-09 PROCEDURE — U0003 INFECTIOUS AGENT DETECTION BY NUCLEIC ACID (DNA OR RNA); SEVERE ACUTE RESPIRATORY SYNDROME CORONAVIRUS 2 (SARS-COV-2) (CORONAVIRUS DISEASE [COVID-19]), AMPLIFIED PROBE TECHNIQUE, MAKING USE OF HIGH THROUGHPUT TECHNOLOGIES AS DESCRIBED BY CMS-2020-01-R: HCPCS

## 2021-07-09 NOTE — PROGRESS NOTES
HPI: Mr. Juanito Guzmán is a 64-year-old here today to review the results of his right elbow MRI. This was completed on 6/28/2021. I did review the images with the patient today and it demonstrates some edema in the subcutaneous tissues posteriorly associated with a small fluid collection just deep to this and posterior to the triceps. There is increased signal on T2-weighted images within the olecranon. Its not clear as to whether or not this represents postsurgical changes given his recent surgery or the development of osteomyelitis. I also reviewed his lab work from 6/22/2021. ESR was within normal limits at 4 and CRP was also within normal limits at less than 3. White blood cells were within normal limits at 5.1. I had a discussion with the patient today with regards to all of his results. Certainly the MRI findings and clinical presentation is concerning for the presence of a small abscess. He does have punctate openings along his incisional scar where he is draining from. Again his lab work is all normal however clinically I would say he has an infection here and the concern is he may be at the beginning of an osteomyelitis involving the olecranon. I had a discussion with the patient today regarding this. At this time I would recommend formal irrigation and debridement of the site obtaining intraoperative cultures from soft tissue and bone with the initiation of antibiotics  under the consultation of the infectious disease service. We discussed in detail what surgery would entail in terms of the procedure, risks and benefits of surgery, expected outcome risk of persistent infection and postoperative recovery course. Risks as discussed included but are not limited to, pain, stiffness, neurovascular injury, wound healing problems, and risk of anesthesia. He demonstrated good understanding of our discussion and would like to proceed. All questions were answered. We will schedule him for surgery in timely fashion.  I have spent 25 minutes face-to-face with the patient more than 50% of this time was spent counseling and coordinating care as outlined above.

## 2021-07-10 LAB
SARS-COV-2: NORMAL
SARS-COV-2: NOT DETECTED
SOURCE: NORMAL

## 2021-07-12 ENCOUNTER — HOSPITAL ENCOUNTER (OUTPATIENT)
Dept: PREADMISSION TESTING | Age: 61
Setting detail: SURGERY ADMIT
Discharge: HOME | DRG: 857 | End: 2021-07-16
Payer: COMMERCIAL

## 2021-07-12 ENCOUNTER — ANESTHESIA EVENT (OUTPATIENT)
Dept: OPERATING ROOM | Age: 61
DRG: 857 | End: 2021-07-12
Payer: COMMERCIAL

## 2021-07-12 VITALS — WEIGHT: 195 LBS | BODY MASS INDEX: 27.92 KG/M2 | HEIGHT: 70 IN

## 2021-07-12 RX ORDER — SODIUM CHLORIDE 9 MG/ML
25 INJECTION, SOLUTION INTRAVENOUS PRN
Status: CANCELLED | OUTPATIENT
Start: 2021-07-12

## 2021-07-12 RX ORDER — SODIUM CHLORIDE 0.9 % (FLUSH) 0.9 %
10 SYRINGE (ML) INJECTION EVERY 12 HOURS SCHEDULED
Status: CANCELLED | OUTPATIENT
Start: 2021-07-12

## 2021-07-12 RX ORDER — SODIUM CHLORIDE 0.9 % (FLUSH) 0.9 %
10 SYRINGE (ML) INJECTION PRN
Status: CANCELLED | OUTPATIENT
Start: 2021-07-12

## 2021-07-12 RX ORDER — ACETAMINOPHEN 325 MG/1
1000 TABLET ORAL ONCE
Status: CANCELLED | OUTPATIENT
Start: 2021-07-12 | End: 2021-07-12

## 2021-07-12 NOTE — PROGRESS NOTES
Pre-op Instructions For Patient Being Admitted After Surgery    Medication Instructions:  · Please stop herbs and any supplements now (includes vitamins and minerals). · Please contact your surgeon and prescribing physician for pre-op instructions for any blood thinners. · If you have inhalers/aerosol treatments at home, please use them the morning of your surgery and bring the inhalers with you to the hospital.    · Please take the following medications the morning of your surgery with a sip of water:    Metoprolol, esopmeprazole, pantoprazole    Surgery Instructions:  1. After midnight before surgery:  Do not eat or drink anything, including water, mints, gum, and hard candy. You may brush your teeth without swallowing. No smoking, chewing tobacco, or street drugs. 2. Please shower or bathe before surgery. If you were given Surgical Scrub Chlorhexidine Gluconate Liquid (CHG), please shower the night before and the morning of your surgery following the detailed instructions you received during your pre-admission visit. (Patient relates Dr. Jesus Rob gave patient surgical scrub in office)    3. Please do not wear any cologne, lotion, powder, deodorant, jewelry, piercings, perfume, makeup, nail polish, hair accessories, or hair spray on the day of surgery. Wear loose comfortable clothing. 4. Leave your valuables at home. Bring a storage case for any glasses/contacts. The Day of Surgery:  · Arrive at [facility] Surgery Entrance at the time directed by your surgeon and check in at the desk. · If you have a living will or healthcare power of , please bring a copy. · You will be taken to the pre-op holding area where you will be prepared for surgery. A physical assessment will be performed by a nurse practitioner or house officer.   Your IV will be started and you will meet your anesthesiologist.    · When you go to surgery, your family will be directed to the surgical waiting room, where the doctor should speak with them after your surgery. · You will be in the recovery room after surgery and taken to your room when you are stable. · Please leave your suitcase in the car. Have your family member take it to your room after you are out of recovery. · If you use a Bi-PAP or C-PAP machine, please bring it with you and leave it in the car until you are in your room. Above instructions reviewed with patient via phone during PAT phone interview. Patient verbalizes understanding of above.

## 2021-07-13 ENCOUNTER — HOSPITAL ENCOUNTER (INPATIENT)
Age: 61
LOS: 2 days | Discharge: HOME | DRG: 857 | End: 2021-07-15
Attending: ORTHOPAEDIC SURGERY | Admitting: ORTHOPAEDIC SURGERY
Payer: COMMERCIAL

## 2021-07-13 ENCOUNTER — ANESTHESIA (OUTPATIENT)
Dept: OPERATING ROOM | Age: 61
DRG: 857 | End: 2021-07-13
Payer: COMMERCIAL

## 2021-07-13 VITALS — SYSTOLIC BLOOD PRESSURE: 117 MMHG | TEMPERATURE: 96.6 F | DIASTOLIC BLOOD PRESSURE: 74 MMHG | OXYGEN SATURATION: 98 %

## 2021-07-13 DIAGNOSIS — L02.413 ABSCESS OF RIGHT ELBOW: Primary | ICD-10-CM

## 2021-07-13 LAB
CREAT SERPL-MCNC: 0.73 MG/DL (ref 0.7–1.2)
GFR AFRICAN AMERICAN: >60 ML/MIN
GFR NON-AFRICAN AMERICAN: >60 ML/MIN
GFR SERPL CREATININE-BSD FRML MDRD: NORMAL ML/MIN/{1.73_M2}
GFR SERPL CREATININE-BSD FRML MDRD: NORMAL ML/MIN/{1.73_M2}

## 2021-07-13 PROCEDURE — 87076 CULTURE ANAEROBE IDENT EACH: CPT

## 2021-07-13 PROCEDURE — 36415 COLL VENOUS BLD VENIPUNCTURE: CPT

## 2021-07-13 PROCEDURE — 82565 ASSAY OF CREATININE: CPT

## 2021-07-13 PROCEDURE — 0J9G0ZZ DRAINAGE OF RIGHT LOWER ARM SUBCUTANEOUS TISSUE AND FASCIA, OPEN APPROACH: ICD-10-PCS | Performed by: ANESTHESIOLOGY

## 2021-07-13 PROCEDURE — 6370000000 HC RX 637 (ALT 250 FOR IP): Performed by: ORTHOPAEDIC SURGERY

## 2021-07-13 PROCEDURE — 3600000002 HC SURGERY LEVEL 2 BASE: Performed by: ORTHOPAEDIC SURGERY

## 2021-07-13 PROCEDURE — 3600000012 HC SURGERY LEVEL 2 ADDTL 15MIN: Performed by: ORTHOPAEDIC SURGERY

## 2021-07-13 PROCEDURE — 3700000001 HC ADD 15 MINUTES (ANESTHESIA): Performed by: ORTHOPAEDIC SURGERY

## 2021-07-13 PROCEDURE — 2500000003 HC RX 250 WO HCPCS

## 2021-07-13 PROCEDURE — 23930 I&D UPR A/E DP ABSC/HMTMA: CPT | Performed by: ORTHOPAEDIC SURGERY

## 2021-07-13 PROCEDURE — 7100000000 HC PACU RECOVERY - FIRST 15 MIN: Performed by: ORTHOPAEDIC SURGERY

## 2021-07-13 PROCEDURE — 87070 CULTURE OTHR SPECIMN AEROBIC: CPT

## 2021-07-13 PROCEDURE — 6360000002 HC RX W HCPCS: Performed by: ORTHOPAEDIC SURGERY

## 2021-07-13 PROCEDURE — 6360000002 HC RX W HCPCS

## 2021-07-13 PROCEDURE — 0KB70ZZ EXCISION OF RIGHT UPPER ARM MUSCLE, OPEN APPROACH: ICD-10-PCS | Performed by: ANESTHESIOLOGY

## 2021-07-13 PROCEDURE — 2709999900 HC NON-CHARGEABLE SUPPLY: Performed by: ORTHOPAEDIC SURGERY

## 2021-07-13 PROCEDURE — 3700000000 HC ANESTHESIA ATTENDED CARE: Performed by: ORTHOPAEDIC SURGERY

## 2021-07-13 PROCEDURE — 87075 CULTR BACTERIA EXCEPT BLOOD: CPT

## 2021-07-13 PROCEDURE — 87205 SMEAR GRAM STAIN: CPT

## 2021-07-13 PROCEDURE — 1200000000 HC SEMI PRIVATE

## 2021-07-13 PROCEDURE — 87176 TISSUE HOMOGENIZATION CULTR: CPT

## 2021-07-13 PROCEDURE — 0PBK0ZX EXCISION OF RIGHT ULNA, OPEN APPROACH, DIAGNOSTIC: ICD-10-PCS | Performed by: ANESTHESIOLOGY

## 2021-07-13 PROCEDURE — 6360000002 HC RX W HCPCS: Performed by: ANESTHESIOLOGY

## 2021-07-13 PROCEDURE — 2580000003 HC RX 258: Performed by: ANESTHESIOLOGY

## 2021-07-13 PROCEDURE — 2580000003 HC RX 258: Performed by: ORTHOPAEDIC SURGERY

## 2021-07-13 PROCEDURE — 7100000001 HC PACU RECOVERY - ADDTL 15 MIN: Performed by: ORTHOPAEDIC SURGERY

## 2021-07-13 RX ORDER — FENTANYL CITRATE 50 UG/ML
INJECTION, SOLUTION INTRAMUSCULAR; INTRAVENOUS PRN
Status: DISCONTINUED | OUTPATIENT
Start: 2021-07-13 | End: 2021-07-13 | Stop reason: SDUPTHER

## 2021-07-13 RX ORDER — LABETALOL HYDROCHLORIDE 5 MG/ML
5 INJECTION, SOLUTION INTRAVENOUS EVERY 10 MIN PRN
Status: DISCONTINUED | OUTPATIENT
Start: 2021-07-13 | End: 2021-07-13

## 2021-07-13 RX ORDER — PROPOFOL 10 MG/ML
INJECTION, EMULSION INTRAVENOUS PRN
Status: DISCONTINUED | OUTPATIENT
Start: 2021-07-13 | End: 2021-07-13 | Stop reason: SDUPTHER

## 2021-07-13 RX ORDER — ACETAMINOPHEN 500 MG
1000 TABLET ORAL EVERY 6 HOURS
Status: DISCONTINUED | OUTPATIENT
Start: 2021-07-13 | End: 2021-07-15 | Stop reason: HOSPADM

## 2021-07-13 RX ORDER — SODIUM CHLORIDE 0.9 % (FLUSH) 0.9 %
5-40 SYRINGE (ML) INJECTION PRN
Status: DISCONTINUED | OUTPATIENT
Start: 2021-07-13 | End: 2021-07-15 | Stop reason: HOSPADM

## 2021-07-13 RX ORDER — POLYETHYLENE GLYCOL 3350 17 G/17G
17 POWDER, FOR SOLUTION ORAL DAILY PRN
Status: DISCONTINUED | OUTPATIENT
Start: 2021-07-13 | End: 2021-07-15 | Stop reason: HOSPADM

## 2021-07-13 RX ORDER — OXYCODONE HYDROCHLORIDE 5 MG/1
5 TABLET ORAL EVERY 4 HOURS PRN
Status: DISCONTINUED | OUTPATIENT
Start: 2021-07-13 | End: 2021-07-15 | Stop reason: HOSPADM

## 2021-07-13 RX ORDER — SODIUM CHLORIDE 0.9 % (FLUSH) 0.9 %
10 SYRINGE (ML) INJECTION PRN
Status: DISCONTINUED | OUTPATIENT
Start: 2021-07-13 | End: 2021-07-13

## 2021-07-13 RX ORDER — DEXAMETHASONE SODIUM PHOSPHATE 10 MG/ML
10 INJECTION, SOLUTION INTRAMUSCULAR; INTRAVENOUS ONCE
Status: COMPLETED | OUTPATIENT
Start: 2021-07-13 | End: 2021-07-13

## 2021-07-13 RX ORDER — IRBESARTAN 300 MG/1
300 TABLET ORAL NIGHTLY
Status: ON HOLD | COMMUNITY
End: 2021-10-16 | Stop reason: HOSPADM

## 2021-07-13 RX ORDER — SODIUM CHLORIDE 0.9 % (FLUSH) 0.9 %
10 SYRINGE (ML) INJECTION EVERY 12 HOURS SCHEDULED
Status: DISCONTINUED | OUTPATIENT
Start: 2021-07-13 | End: 2021-07-13

## 2021-07-13 RX ORDER — ONDANSETRON 2 MG/ML
INJECTION INTRAMUSCULAR; INTRAVENOUS PRN
Status: DISCONTINUED | OUTPATIENT
Start: 2021-07-13 | End: 2021-07-13 | Stop reason: SDUPTHER

## 2021-07-13 RX ORDER — BUSPIRONE HYDROCHLORIDE 10 MG/1
10 TABLET ORAL 3 TIMES DAILY
COMMUNITY
End: 2021-07-23

## 2021-07-13 RX ORDER — ONDANSETRON 4 MG/1
4 TABLET, ORALLY DISINTEGRATING ORAL EVERY 8 HOURS PRN
Status: DISCONTINUED | OUTPATIENT
Start: 2021-07-13 | End: 2021-07-15 | Stop reason: HOSPADM

## 2021-07-13 RX ORDER — OXYCODONE HYDROCHLORIDE AND ACETAMINOPHEN 5; 325 MG/1; MG/1
2 TABLET ORAL PRN
Status: DISCONTINUED | OUTPATIENT
Start: 2021-07-13 | End: 2021-07-13

## 2021-07-13 RX ORDER — METOPROLOL SUCCINATE 50 MG/1
50 TABLET, EXTENDED RELEASE ORAL DAILY
Status: DISCONTINUED | OUTPATIENT
Start: 2021-07-14 | End: 2021-07-15 | Stop reason: HOSPADM

## 2021-07-13 RX ORDER — PANTOPRAZOLE SODIUM 40 MG/1
40 TABLET, DELAYED RELEASE ORAL
Status: DISCONTINUED | OUTPATIENT
Start: 2021-07-13 | End: 2021-07-15 | Stop reason: HOSPADM

## 2021-07-13 RX ORDER — MIDAZOLAM HYDROCHLORIDE 1 MG/ML
INJECTION INTRAMUSCULAR; INTRAVENOUS PRN
Status: DISCONTINUED | OUTPATIENT
Start: 2021-07-13 | End: 2021-07-13 | Stop reason: SDUPTHER

## 2021-07-13 RX ORDER — OXYCODONE HYDROCHLORIDE AND ACETAMINOPHEN 5; 325 MG/1; MG/1
1 TABLET ORAL PRN
Status: DISCONTINUED | OUTPATIENT
Start: 2021-07-13 | End: 2021-07-13

## 2021-07-13 RX ORDER — LOSARTAN POTASSIUM 100 MG/1
100 TABLET ORAL DAILY
Status: DISCONTINUED | OUTPATIENT
Start: 2021-07-13 | End: 2021-07-15 | Stop reason: HOSPADM

## 2021-07-13 RX ORDER — ONDANSETRON 2 MG/ML
4 INJECTION INTRAMUSCULAR; INTRAVENOUS EVERY 6 HOURS PRN
Status: DISCONTINUED | OUTPATIENT
Start: 2021-07-13 | End: 2021-07-15 | Stop reason: HOSPADM

## 2021-07-13 RX ORDER — KETOROLAC TROMETHAMINE 30 MG/ML
INJECTION, SOLUTION INTRAMUSCULAR; INTRAVENOUS PRN
Status: DISCONTINUED | OUTPATIENT
Start: 2021-07-13 | End: 2021-07-13 | Stop reason: SDUPTHER

## 2021-07-13 RX ORDER — KETOROLAC TROMETHAMINE 30 MG/ML
30 INJECTION, SOLUTION INTRAMUSCULAR; INTRAVENOUS EVERY 8 HOURS
Status: DISPENSED | OUTPATIENT
Start: 2021-07-13 | End: 2021-07-14

## 2021-07-13 RX ORDER — LIDOCAINE HYDROCHLORIDE 10 MG/ML
1 INJECTION, SOLUTION EPIDURAL; INFILTRATION; INTRACAUDAL; PERINEURAL
Status: DISCONTINUED | OUTPATIENT
Start: 2021-07-13 | End: 2021-07-13

## 2021-07-13 RX ORDER — DIPHENHYDRAMINE HYDROCHLORIDE 50 MG/ML
12.5 INJECTION INTRAMUSCULAR; INTRAVENOUS
Status: DISCONTINUED | OUTPATIENT
Start: 2021-07-13 | End: 2021-07-13

## 2021-07-13 RX ORDER — LIDOCAINE HYDROCHLORIDE 10 MG/ML
INJECTION, SOLUTION EPIDURAL; INFILTRATION; INTRACAUDAL; PERINEURAL PRN
Status: DISCONTINUED | OUTPATIENT
Start: 2021-07-13 | End: 2021-07-13 | Stop reason: SDUPTHER

## 2021-07-13 RX ORDER — SODIUM CHLORIDE, SODIUM LACTATE, POTASSIUM CHLORIDE, CALCIUM CHLORIDE 600; 310; 30; 20 MG/100ML; MG/100ML; MG/100ML; MG/100ML
INJECTION, SOLUTION INTRAVENOUS CONTINUOUS
Status: DISCONTINUED | OUTPATIENT
Start: 2021-07-13 | End: 2021-07-13

## 2021-07-13 RX ORDER — ACETAMINOPHEN 500 MG
1000 TABLET ORAL ONCE
Status: COMPLETED | OUTPATIENT
Start: 2021-07-13 | End: 2021-07-13

## 2021-07-13 RX ORDER — CEFEPIME HYDROCHLORIDE 1 G/1
1000 INJECTION, POWDER, FOR SOLUTION INTRAMUSCULAR; INTRAVENOUS EVERY 12 HOURS
Status: DISCONTINUED | OUTPATIENT
Start: 2021-07-13 | End: 2021-07-13 | Stop reason: CLARIF

## 2021-07-13 RX ORDER — SODIUM CHLORIDE 9 MG/ML
INJECTION, SOLUTION INTRAVENOUS CONTINUOUS
Status: DISCONTINUED | OUTPATIENT
Start: 2021-07-13 | End: 2021-07-15 | Stop reason: HOSPADM

## 2021-07-13 RX ORDER — SODIUM CHLORIDE 0.9 % (FLUSH) 0.9 %
5-40 SYRINGE (ML) INJECTION EVERY 12 HOURS SCHEDULED
Status: DISCONTINUED | OUTPATIENT
Start: 2021-07-13 | End: 2021-07-15 | Stop reason: HOSPADM

## 2021-07-13 RX ORDER — ONDANSETRON 2 MG/ML
4 INJECTION INTRAMUSCULAR; INTRAVENOUS
Status: DISCONTINUED | OUTPATIENT
Start: 2021-07-13 | End: 2021-07-13

## 2021-07-13 RX ORDER — SODIUM CHLORIDE 9 MG/ML
25 INJECTION, SOLUTION INTRAVENOUS PRN
Status: DISCONTINUED | OUTPATIENT
Start: 2021-07-13 | End: 2021-07-13

## 2021-07-13 RX ORDER — SODIUM CHLORIDE 9 MG/ML
25 INJECTION, SOLUTION INTRAVENOUS PRN
Status: DISCONTINUED | OUTPATIENT
Start: 2021-07-13 | End: 2021-07-15 | Stop reason: HOSPADM

## 2021-07-13 RX ADMIN — SODIUM CHLORIDE: 9 INJECTION, SOLUTION INTRAVENOUS at 15:39

## 2021-07-13 RX ADMIN — VANCOMYCIN HYDROCHLORIDE 2250 MG: 1.25 INJECTION, POWDER, LYOPHILIZED, FOR SOLUTION INTRAVENOUS at 15:32

## 2021-07-13 RX ADMIN — FENTANYL CITRATE 50 MCG: 50 INJECTION, SOLUTION INTRAMUSCULAR; INTRAVENOUS at 10:34

## 2021-07-13 RX ADMIN — PROPOFOL 150 MG: 10 INJECTION, EMULSION INTRAVENOUS at 10:34

## 2021-07-13 RX ADMIN — ACETAMINOPHEN 1000 MG: 500 TABLET ORAL at 09:34

## 2021-07-13 RX ADMIN — OXYCODONE HYDROCHLORIDE 5 MG: 5 TABLET ORAL at 15:48

## 2021-07-13 RX ADMIN — ACETAMINOPHEN 1000 MG: 500 TABLET, FILM COATED ORAL at 15:47

## 2021-07-13 RX ADMIN — PANTOPRAZOLE SODIUM 40 MG: 40 TABLET, DELAYED RELEASE ORAL at 15:48

## 2021-07-13 RX ADMIN — ONDANSETRON 4 MG: 2 INJECTION, SOLUTION INTRAMUSCULAR; INTRAVENOUS at 10:48

## 2021-07-13 RX ADMIN — SODIUM CHLORIDE, POTASSIUM CHLORIDE, SODIUM LACTATE AND CALCIUM CHLORIDE: 600; 310; 30; 20 INJECTION, SOLUTION INTRAVENOUS at 09:30

## 2021-07-13 RX ADMIN — CEFEPIME HYDROCHLORIDE 1000 MG: 1 INJECTION, POWDER, FOR SOLUTION INTRAMUSCULAR; INTRAVENOUS at 19:55

## 2021-07-13 RX ADMIN — FENTANYL CITRATE 25 MCG: 50 INJECTION, SOLUTION INTRAMUSCULAR; INTRAVENOUS at 10:59

## 2021-07-13 RX ADMIN — HYDROMORPHONE HYDROCHLORIDE 0.5 MG: 1 INJECTION, SOLUTION INTRAMUSCULAR; INTRAVENOUS; SUBCUTANEOUS at 12:10

## 2021-07-13 RX ADMIN — CEFAZOLIN SODIUM 2000 MG: 10 INJECTION, POWDER, FOR SOLUTION INTRAVENOUS at 11:13

## 2021-07-13 RX ADMIN — OXYCODONE HYDROCHLORIDE 5 MG: 5 TABLET ORAL at 22:16

## 2021-07-13 RX ADMIN — KETOROLAC TROMETHAMINE 30 MG: 30 INJECTION, SOLUTION INTRAMUSCULAR; INTRAVENOUS at 11:31

## 2021-07-13 RX ADMIN — MIDAZOLAM 2 MG: 1 INJECTION INTRAMUSCULAR; INTRAVENOUS at 10:29

## 2021-07-13 RX ADMIN — SODIUM CHLORIDE, PRESERVATIVE FREE 10 ML: 5 INJECTION INTRAVENOUS at 19:55

## 2021-07-13 RX ADMIN — FENTANYL CITRATE 25 MCG: 50 INJECTION, SOLUTION INTRAMUSCULAR; INTRAVENOUS at 11:02

## 2021-07-13 RX ADMIN — DEXAMETHASONE SODIUM PHOSPHATE 10 MG: 10 INJECTION, SOLUTION INTRAMUSCULAR; INTRAVENOUS at 10:42

## 2021-07-13 RX ADMIN — LIDOCAINE HYDROCHLORIDE 50 MG: 10 INJECTION, SOLUTION EPIDURAL; INFILTRATION; INTRACAUDAL; PERINEURAL at 10:34

## 2021-07-13 RX ADMIN — HYDROMORPHONE HYDROCHLORIDE 0.5 MG: 1 INJECTION, SOLUTION INTRAMUSCULAR; INTRAVENOUS; SUBCUTANEOUS at 12:28

## 2021-07-13 ASSESSMENT — PULMONARY FUNCTION TESTS
PIF_VALUE: 0
PIF_VALUE: 3
PIF_VALUE: 2
PIF_VALUE: 3
PIF_VALUE: 4
PIF_VALUE: 2
PIF_VALUE: 4
PIF_VALUE: 2
PIF_VALUE: 3
PIF_VALUE: 2
PIF_VALUE: 1
PIF_VALUE: 2
PIF_VALUE: 2
PIF_VALUE: 1
PIF_VALUE: 4
PIF_VALUE: 2
PIF_VALUE: 3
PIF_VALUE: 3
PIF_VALUE: 2
PIF_VALUE: 3
PIF_VALUE: 2
PIF_VALUE: 3
PIF_VALUE: 23
PIF_VALUE: 3
PIF_VALUE: 4
PIF_VALUE: 4
PIF_VALUE: 2
PIF_VALUE: 3
PIF_VALUE: 2
PIF_VALUE: 3
PIF_VALUE: 2
PIF_VALUE: 1
PIF_VALUE: 2
PIF_VALUE: 1
PIF_VALUE: 3
PIF_VALUE: 3
PIF_VALUE: 2
PIF_VALUE: 3
PIF_VALUE: 3
PIF_VALUE: 2
PIF_VALUE: 13
PIF_VALUE: 3
PIF_VALUE: 4
PIF_VALUE: 2
PIF_VALUE: 3
PIF_VALUE: 2
PIF_VALUE: 2
PIF_VALUE: 3
PIF_VALUE: 1
PIF_VALUE: 1
PIF_VALUE: 2
PIF_VALUE: 2
PIF_VALUE: 3
PIF_VALUE: 2
PIF_VALUE: 3
PIF_VALUE: 2
PIF_VALUE: 4
PIF_VALUE: 2
PIF_VALUE: 3
PIF_VALUE: 2
PIF_VALUE: 3
PIF_VALUE: 2
PIF_VALUE: 3
PIF_VALUE: 2
PIF_VALUE: 2
PIF_VALUE: 3
PIF_VALUE: 3

## 2021-07-13 ASSESSMENT — PAIN SCALES - GENERAL
PAINLEVEL_OUTOF10: 3
PAINLEVEL_OUTOF10: 0
PAINLEVEL_OUTOF10: 7
PAINLEVEL_OUTOF10: 8
PAINLEVEL_OUTOF10: 8
PAINLEVEL_OUTOF10: 10
PAINLEVEL_OUTOF10: 0

## 2021-07-13 ASSESSMENT — PAIN DESCRIPTION - PAIN TYPE
TYPE: SURGICAL PAIN
TYPE: SURGICAL PAIN

## 2021-07-13 ASSESSMENT — PAIN DESCRIPTION - LOCATION
LOCATION: ELBOW
LOCATION: ELBOW

## 2021-07-13 ASSESSMENT — PAIN DESCRIPTION - ORIENTATION
ORIENTATION: RIGHT
ORIENTATION: RIGHT

## 2021-07-13 ASSESSMENT — LIFESTYLE VARIABLES: SMOKING_STATUS: 1

## 2021-07-13 ASSESSMENT — PAIN - FUNCTIONAL ASSESSMENT: PAIN_FUNCTIONAL_ASSESSMENT: 0-10

## 2021-07-13 NOTE — OP NOTE
OPERATIVE REPORT    Date of Surgery: 7/13/2021    Pre-operative Diagnosis: Right elbow abscess    Post-operative Diagnosis: Right elbow abscess    Procedure: Right elbow irrigation and debridement    Surgeon(s): Tiana Jhaveri MD    Assistant(s): Ping Fernandez DO (PGY 3)    Anesthesia: General    Fluids: See anesthesia record    Urine output: See anesthesia record    Estimated blood loss: Minimal    Findings: Small amount of subcutaneous purulence with mucinoid appearing tissue tracking along FiberWire suture within the triceps muscle    Specimen: Triceps swab cultures and bone biopsies from the olecranon for cultures      Implants: None    Surgical Indications:  Cheikh Pelaez is a 62 y.o. old male who presented months after an open right triceps tendon repair with some drainage from his incision. An MRI showed some edema and a small fluid collection concerning for the presence of an abscess with bone edema representing postsurgical changes versus osteomyelitis. Following a discussion with the patient regarding both non-operative and operative treatment options, they consented to proceed with a right elbow irrigation and debridement. He came to this decision after demonstrating an understanding of our discussion regarding details of the procedure, risks and benefits, expected outcome, and postoperative course. Operative technique: Following appropriate identification of the patient and his operative extremity, consent was reviewed with the patient and His operative extremity was signed. He was wheeled to the operating room where the anesthesia service administered a general anesthetic and secured his airway using an LMA. All bony prominences were appropriately padded and the patient was secured to the operative table in a supine position.   A well-padded pneumatic tourniquet was applied to the proximal aspect of the patient's right upper extremity the patient's operative extremity was prepped and draped in a standard sterile fashion and a time out was performed during which the correct patient, operative extremity, and procedure were verified. The pneumatic tourniquet was inflated up to 250 mmHg. Using his previous incisional scar sharp incision was made and carried through the subcutaneous tissues. I ellipsed the draining sections of the incisional scar proximally. Very minimal amounts of purulence were noted at the site subcutaneously. I followed the path of those draining wounds and encountered some FiberWire suture was then the triceps muscle. I made a split within the triceps muscle proximally and extended the split distally to the insertion at the olecranon. Swab cultures were obtained when mucinoid type tissue was encountered. I removed the FiberWire sutures and thoroughly debrided the mucinoid tissue. No pockets of fluid were encountered. Following the split down to the triceps incision on the olecranon there was a small void or defect in the bone at the tip of the olecranon with some mucinoid tissue encountered here. I thoroughly debrided this and took some bone samples and sent them off for cultures as well. Once I had obtained my cultures 2 g of IV Ancef was administered. I thoroughly debriding the surgical site and proceeded to irrigate with copious amounts of sterile saline. The tourniquet was then deflated and hemostasis achieved using electrocautery. The split in the triceps was loosely reapproximated using 3-0 PDS sutures. The patient's incision was then closed using interrupted 3-0 nylon sutures. Sterile dressings were applied using Xeroform, 4 x 4 gauze and Tegaderm. An Ace bandage was applied and was placed in a sling. He was awoken, extubated, transferred to his bed, and wheeled to recovery in stable condition.     Complications: None    Post-operative Condition: Stable

## 2021-07-13 NOTE — H&P
HISTORY and Celi [address] [id number]       NAME:  Mirtha Anton  MRN: [id number]   YOB: 1960   Date: 7/13/2021   Age: 62 y.o. Gender: male     H&P Update Note    H&P from 06/16/2021 reviewed and updated. Patient examined. INTERVAL HISTORY:     Patient is feeling well today, denies any fever/chills, chest pain, shortness of breath. No interval changes. Patient will be having:  ELBOW INCISION AND DRAINAGE - Right  Right arm with ace bandage. Clean, dry, no drainage noted. No interval changes to past medical history, social history, family history. Review of systems as stated above and otherwise negative. patient reports that his pain has been more positional and to the touch.    patient has hx of PONV. Patient has been NPO since midnight. No blood thinners in the past 7 days. PHYSICAL EXAM:     Vitals :   See vital signs in RN flow sheet. Patient is alert and oriented, in no distress. Heart rate and rhythm are regular. Lungs clear to auscultation bilaterally. Abdomen is soft, non tender. No pedal edema. No interval changes. I concur with the findings. DAYSI AMADOR, APRN - CNP on 7/13/2021 at 8:49 AM    Office Visit  6/16/2021  Carroll Jacob MD  Orthopedic Surgery  Abscess of right elbow  Dx  Elbow Pain ; Referred by Wendie Hollingsworth  Reason for Visit   Progress Notes  Celio Skaggs MD (Physician) [facility]  Expand AllCollapse All  []Expand All by Default  Procedure: Right triceps tendon rupture  Date of procedure: 2/23/2021     HPI / Chief Wilbert Story is a 62 y.o. male who is close to 4 months status post the aforementioned procedure. He was seen for his 2-week postop visit and then he failed to show up for the 6-week visit and has not followed up ever since. He indicates that he has been dealing with some drainage from his surgical site for a couple of months now.   He went to the emergency department approximately 3 weeks ago and was then seen by Merced Santana PA-C on 6/10/2021. At that time he was placed on a 7-day course of doxycycline which he states that he is still taking. He denies having any fevers, chills, sweats or any constitutional symptoms. He does have some intermittent pain over the posterior aspect of the elbow.     Past Medical History  To Ferreira  has a past medical history of Heartburn, Hypertension, and Sleep apnea.     Past Surgical History  To Ferreira  has a past surgical history that includes shoulder surgery; Spine surgery; Upper gastrointestinal endoscopy (N/A, 12/17/2019); Cardiac catheterization (2017); hernia repair; Colonoscopy; Appendectomy; back surgery; Endoscopy, colon, diagnostic; Arm Surgery (Right, 02/23/2021); and Muscle Repair (Right, 2/23/2021).    Current Medications  Current Facility-Administered Medications          Current Outpatient Medications   Medication Sig Dispense Refill    cephALEXin (KEFLEX) 500 MG capsule Take 1 capsule by mouth 4 times daily for 10 days 40 capsule 0    doxycycline hyclate (VIBRA-TABS) 100 MG tablet Take 1 tablet by mouth 2 times daily for 7 days 14 tablet 0    pantoprazole (PROTONIX) 40 MG tablet Take 1 tablet by mouth every morning (before breakfast) 90 tablet 1    Na Sulfate-K Sulfate-Mg Sulf 17.5-3.13-1.6 GM/177ML SOLN Please follow instructions given by office 1 Bottle 0    metoprolol succinate (TOPROL XL) 50 MG extended release tablet Take 50 mg by mouth daily        esomeprazole (NEXIUM) 40 MG delayed release capsule Take 1 capsule by mouth 2 times daily 120 capsule 0      No current facility-administered medications for this visit.            Allergies  Allergies have been reviewed. To Ferreira is allergic to pcn [penicillins].     Social History  To Ferreira  reports that he has been smoking cigars. He has a 20.00 pack-year smoking history. He has never used smokeless tobacco. He reports current alcohol use.  He reports current drug use. Drug: Marijuana.     Family History  Varinder RUIZ's family history includes Cancer in his father and paternal uncle; Diabetes in his father.        Review of Systems   History obtained from the patient. REVIEW OF SYSTEMS:   Constitution: negative for fever, chills, weight loss or malaise   Musculoskeletal: As noted in the HPI   Neurologic: As noted in the HPI     Physical Exam  Ht 5' 10\" (1.778 m)   Wt 200 lb (90.7 kg)   BMI 28.70 kg/m²    General Appearance: alert, well appearing, and in no distress  Mental Status: alert, oriented to person, place, and time  Evaluation of the patient's right elbow and upper extremity demonstrates 2 punctate areas along the middle and proximal end of his posterior elbow incision that is not healed with some purulent drainage. There is mild surrounding erythema. No areas of fluctuance or induration noted. He has full elbow extension with flexion to 135 degrees as well as pronation of 80 degrees and supination of 75 degrees. Sensation is grossly intact light touch in all dermatomes and he has a 2+ radial pulse with brisk capillary refill in his fingers.     Assessment and Plan  Karyn Lr is a 62 y.o. old male with what appears to be a right elbow surgical site infection. I had a discussion with the patient today regarding this. I did emphasize the need for recommended follow-up in order to monitor appropriate progress in healing postsurgically. At this time were going to proceed with an additional work-up by way of CBC with differential, ESR and CRP. I also recommended and ordered an MRI study of his elbow to assess for the extent of his infection. Finally he was placed on a 10-day course of Keflex to begin right away.   I like to see him back as soon as he has the MRI done to review and discuss the results and proceed with additional treatment recommendations which may include surgery.     This note is created with the assistance of a speech recognition program.  While intending to generate adocument that actually reflects the content of the visit, the document can still have some errors including those of syntax and sound a like substitutions which may escape proof reading.  It such instances, actual meaningcan be extrapolated by contextual diversion.         Instructions     Patient declined After Visit Summary  Additional Documentation    Vitals:  Ht 5' 10\" (1.778 m)   Wt 200 lb (90.7 kg)   BMI 28.70 kg/m²   BSA 2.12 m²   Pain Sc  10 - Worst pain ever   Flowsheets:  Calorie Assessment      Encounter Info:  Billing Info,   Allergies,   Detailed Report,   History,   Medications,   Questionnaires      Travel Screening   Screenings since 06/15/21 0000  07/12/21 1413      In the last month, have you been in contact with someone who was confirmed or suspected to have [address] / COVID-19? No / Codey Aquino RN   Have you had a COVID-19 viral test in the last 14 days? Yes - Negative result Waldemar Rodas RN   Do you have any of the following new or worsening symptoms? None of these Waldemar Rodas RN   Have you traveled internationally or domestically in the last month? Yes Waldemar Rodas RN          07/09/21 0756      In the last month, have you been in contact with someone who was confirmed or suspected to have Coronavirus / COVID-19? No / Geraldine Hernandez   Have you had a COVID-19 viral test in the last 14 days? No Bethany Chuck   Do you have any of the following new or worsening symptoms? None of these Bethany Chuck   Have you traveled internationally or domestically in the last month? No Bethany Woods          06/16/21 1142      In the last month, have you been in contact with someone who was confirmed or suspected to have Coronavirus / COVID-19? No / Geraldine Hernandez   Have you had a COVID-19 viral test in the last 14 days? No Bethany Chuck   Do you have any of the following new or worsening symptoms?   None of these [facility] Edu June   Have you traveled internationally or domestically in the last month? No Lydia Aggarwal   Travel History   Travel since 06/13/21   No documented travel since 06/13/21   Chart Review Routing History    No routing history on file.   Encounter Status    Signed by Briseida Vieyra MD on 6/16/21 at 13:33   BestPractice Advisories    Click to view BestPractice Advisory history   Encounter Messages    Read Composed From To  Subject   N 6/11/2021  6:09 AM Mychart, Processor Alice Close  Upcoming appointment at [address] [place] [address] on 6/16/21   N 6/10/2021 10:43 AM Mychart, Processor Alice Close  Appointment Scheduled   Orders Placed       CBC With Auto Differential     Sedimentation Rate     C-reactive protein     MRI ELBOW RIGHT WO CONTRAST     All Encounter Results   Outpatient Medications at End of Encounter as of 6/16/2021    cephALEXin (KEFLEX) 500 MG capsule (Taking) Take 1 capsule by mouth 4 times daily for 10 days   doxycycline hyclate (VIBRA-TABS) 100 MG tablet Take 1 tablet by mouth 2 times daily for 7 days   pantoprazole (PROTONIX) 40 MG tablet Take 1 tablet by mouth every morning (before breakfast)   Na Sulfate-K Sulfate-Mg Sulf 17.5-3.13-1.6 GM/177ML SOLN Please follow instructions given by office   metoprolol succinate (TOPROL XL) 50 MG extended release tablet Take 50 mg by mouth daily   esomeprazole (NEXIUM) 40 MG delayed release capsule Take 1 capsule by mouth 2 times daily   Medication Changes       Cephalexin 500 mg Oral 4 TIMES DAILY     Medication List   Visit Diagnoses       Abscess of right elbow     Problem List

## 2021-07-13 NOTE — ANESTHESIA POSTPROCEDURE EVALUATION
Department of Anesthesiology  Postprocedure Note    Patient: Colette Ramsey  MRN: [id number]  YOB: 1960  Date of evaluation: 7/13/2021  Time:  1:40 PM     Procedure Summary     Date: 07/13/21 Room / Location: [address] [place] [address] Eduardo Seth 01 / [facility]: KRISTY HOOD    Anesthesia Start: 0344 Anesthesia Stop: 5476    Procedure: RIGHT ELBOW IRRIGATION AND DEBRIDEMENT (Right Elbow) Diagnosis: (RIGHT ELBOW ABSCESS)    Surgeons: Sulaiman Wall MD Responsible Provider: Germaine Mosqueda MD    Anesthesia Type: general ASA Status: 2          Anesthesia Type: general    Vivien Phase I: Vivien Score: 9    Vivien Phase II:      Last vitals: Reviewed and per EMR flowsheets.        Anesthesia Post Evaluation    Comments: POST- ANESTHESIA EVALUATION       Pt Name: Colette Ramsey  MRN: [id number]  YOB: 1960  Date of evaluation: 7/13/2021  Time:  1:04 PM      /85   Pulse 69   Temp 97.7 °F (36.5 °C)   Resp 16   Ht 5' 10\" (1.778 m)   Wt 195 lb (88.5 kg)   SpO2 94%   BMI 27.98 kg/m²      Consciousness Level  Awake  Cardiopulmonary Status  Stable  Pain Adequately Treated YES  Nausea / Vomiting  NO  Adequate Hydration  YES  Anesthesia Related Complications NONE      Electronically signed by Germaine Mosqueda MD on 7/13/2021 at 1:40 PM

## 2021-07-13 NOTE — BRIEF OP NOTE
Brief Postoperative Note      Patient: Trudi Green  YOB: 1960  MRN: [id number]    Date of Procedure: 7/13/2021    Pre-Op Diagnosis: RIGHT ELBOW ABSCESS    Post-Op Diagnosis: Same       Procedure(s):  ELBOW INCISION AND DRAINAGE    Surgeon(s):  Clare Garcia MD    Assistant:  Resident: Luzmaria Tao DO    Anesthesia: General    Estimated Blood Loss (mL): Minimal    Complications: None    Specimens:   ID Type Source Tests Collected by Time Destination   1 : RIGHT ELBOW WOUND CULTURE Swab Arm CULTURE, ANAEROBIC AND AEROBIC Clare Garcia MD 7/13/2021 1106    2 : RIGHT ELBOW TRICEP TISSUE FOR CULTURE Tissue Arm CULTURE, TISSUE Clare Garcia MD 7/13/2021 1133    3 : RIGHT ELBOW OLECRANON BONE FOR CULTURE Bone Arm CULTURE, ANAEROBIC AND AEROBIC Clare Garcia MD 7/13/2021 1134        Implants:  * No implants in log *      Drains: * No LDAs found *    Findings: See operative report    Electronically signed by Clare Garcia MD on 7/13/2021 at 11:42 AM

## 2021-07-13 NOTE — PROGRESS NOTES
Pharmacy Note  Vancomycin Consult    Nguyen Andrew is a 62 y.o. male started on Vancomycin for infected elbo; consult received from Dr. Sarah Appiah to manage therapy. Also receiving the following antibiotics: cefepime. Patient Active Problem List   Diagnosis    Heartburn    Rupture of right triceps tendon    Abscess of right elbow     Allergies:  Pcn [penicillins]     Temp max: 98 F    No results for input(s): BUN, CREATININE, WBC in the last 72 hours. Intake/Output Summary (Last 24 hours) at 7/13/2021 1349  Last data filed at 7/13/2021 1300  Gross per 24 hour   Intake 780 ml   Output 25 ml   Net 755 ml     Culture Date      Source                       Results  See micro    Ht Readings from Last 1 Encounters:   07/13/21 5' 10\" (1.778 m)        Wt Readings from Last 1 Encounters:   07/13/21 195 lb (88.5 kg)       Body mass index is 27.98 kg/m². CrCl cannot be calculated (Patient's most recent lab result is older than the maximum 10 days allowed. ). using most recent srcr of  ~0.85, crcl =~ 96. Goal Trough Level: 15-20 mcg/mL    Assessment/Plan:  Will initiate Vancomycin with a one time loading dose of 2250 mg x 1, followed by 1250 mg IV every 12 hours. Timing of trough level will be determined based on culture results, renal function, and clinical response. Thank you for the consult. Will continue to follow. Rebecca Barger. Ph.  7/13/2021  1:51 PM

## 2021-07-13 NOTE — ANESTHESIA PRE PROCEDURE
Department of Anesthesiology  Preprocedure Note       Name:  Hector Elliott   Age:  62 y.o.  :  1960                                          MRN:  [id number]         Date:  2021      Surgeon: Julian Vaughn):  Maryhelen Bosworth, MD    Procedure: Procedure(s):  ELBOW INCISION AND DRAINAGE    Medications prior to admission:   Prior to Admission medications    Medication Sig Start Date End Date Taking?  Authorizing Provider   irbesartan (AVAPRO) 300 MG tablet Take 300 mg by mouth nightly   Yes Historical Provider, MD   busPIRone (BUSPAR) 10 MG tablet Take 10 mg by mouth 3 times daily   Yes Historical Provider, MD   pantoprazole (PROTONIX) 40 MG tablet Take 1 tablet by mouth every morning (before breakfast) 21  Yes Erskin Must, APRN - NP   metoprolol succinate (TOPROL XL) 50 MG extended release tablet Take 50 mg by mouth daily    Historical Provider, MD   esomeprazole (NEXIUM) 40 MG delayed release capsule Take 1 capsule by mouth 2 times daily 19  Vin Stockton MD       Current medications:    Current Facility-Administered Medications   Medication Dose Route Frequency Provider Last Rate Last Admin    lactated ringers infusion   Intravenous Continuous Mariposa Puga  mL/hr at 21 0930 New Bag at 21 0930    sodium chloride flush 0.9 % injection 10 mL  10 mL Intravenous 2 times per day Mariposa Puga MD        sodium chloride flush 0.9 % injection 10 mL  10 mL Intravenous PRN Mariposa Puga MD        0.9 % sodium chloride infusion  25 mL Intravenous PRN Mariposa Puga MD        lidocaine PF 1 % injection 1 mL  1 mL Intradermal Once PRN Mariposa Puga MD        0.9 % sodium chloride infusion  25 mL Intravenous PRN Maryhelen Bosworth, MD        ceFAZolin (ANCEF) 2000 mg in dextrose 5 % 50 mL IVPB  2,000 mg Intravenous On Call to Bunny Reyes MD        dexamethasone (PF) (DECADRON) injection 10 mg  10 mg Intravenous Once Maryhelen Bosworth, MD        sodium chloride flush 0.9 % injection 10 mL  10 mL Intravenous 2 times per day Vitor Estes MD        sodium chloride flush 0.9 % injection 10 mL  10 mL Intravenous PRN Vitor Estes MD           Allergies:     Allergies   Allergen Reactions    Pcn [Penicillins] Hives       Problem List:    Patient Active Problem List   Diagnosis Code    Heartburn R12    Rupture of right triceps tendon S46.311A       Past Medical History:        Diagnosis Date    Abscess of bursa of right elbow     Heartburn     Hypertension     PONV (postoperative nausea and vomiting)     Sleep apnea     no machine       Past Surgical History:        Procedure Laterality Date    APPENDECTOMY      ARM SURGERY Right 2021    TRICEPS TENDON REPAIR - RIGHT ELBOW WITH ARTHREX AND BIOMET ACHILLES ALLOGRAFT    BACK SURGERY      cervical/ lumbar    [facility]AC [facility]IZATION  2017    [facility]/ no stents    COLONOSCOPY      ENDOSCOPY, COLON, DIAGNOSTIC      HERNIA REPAIR      MUSCLE REPAIR Right 2021    TRICEPS TENDON REPAIR - RIGHT ELBOW performed [facility] Vitor Estes MD at [facility]      cervical and lumbar    UPPER GASTROINTESTINAL ENDOSCOPY N/A 2019    EGD BIOPSY performed by Sudha Medeiros MD at [address] [place] [place] [address] History:    Social History     Tobacco Use    Smoking status: Light Tobacco Smoker     Packs/day: 1.00     Years: 20.00     Pack years: 20.00     Types: Cigars     Last attempt to quit: 3/15/2008     Years since quittin.3    Smokeless tobacco: Never Used    Tobacco comment: no cigarettes for 20 years   Substance Use Topics    Alcohol use: Yes     Comment: socially                                Ready to quit: Not Answered  Counseling given: Not Answered  Comment: no cigarettes for 20 years      Vital Signs (Current):   Vitals:    21 0904   BP: (!) 141/88   Pulse: 69   Resp: 16   Temp: 97.3 °F (36.3 °C)   TempSrc: Infrared   SpO2: 97%   Weight: 195 lb (88.5 kg)   Height: 5' 10\" (1.778 m)                                              BP Readings from Last 3 Encounters:   07/13/21 (!) 141/88   05/20/21 130/80   03/10/21 (!) 145/86       NPO Status: Time of last liquid consumption: 2359                        Time of last solid consumption: 2359                        Date of last liquid consumption: 07/12/21                        Date of last solid food consumption: 07/12/21    BMI:   Wt Readings from Last 3 Encounters:   07/13/21 195 lb (88.5 kg)   07/12/21 195 lb (88.5 kg)   07/09/21 200 lb (90.7 kg)     Body mass index is 27.98 kg/m². CBC:   Lab Results   Component Value Date    WBC 5.1 06/22/2021    RBC 5.67 06/22/2021    HGB 17.6 06/22/2021    HCT 52.3 06/22/2021    MCV 92.2 06/22/2021    RDW 12.7 06/22/2021     06/22/2021       CMP:   Lab Results   Component Value Date     02/11/2021    K 4.5 02/11/2021     02/11/2021    CO2 26 02/11/2021    BUN 17 02/11/2021    CREATININE 0.82 02/11/2021    GFRAA >60 02/11/2021    LABGLOM >60 02/11/2021    GLUCOSE 90 02/11/2021    CALCIUM 9.0 02/11/2021       POC Tests: No results for input(s): POCGLU, POCNA, POCK, POCCL, POCBUN, POCHEMO, POCHCT in the last 72 hours. Coags: No results found for: PROTIME, INR, APTT    HCG (If Applicable): No results found for: PREGTESTUR, PREGSERUM, HCG, HCGQUANT     ABGs: No results found for: PHART, PO2ART, QAE0NFI, EOR7WDC, BEART, G4BCKTDF     Type & Screen (If Applicable):  No results found for: LABABO, LABRH    Drug/Infectious Status (If Applicable):  No results found for: HIV, HEPCAB    COVID-19 Screening (If Applicable):   Lab Results   Component Value Date    COVID19 Not Detected 07/09/2021           Anesthesia Evaluation  Patient summary reviewed and Nursing notes reviewed   history of anesthetic complications: PONV.   Airway: Mallampati: II  TM distance: >3 FB   Neck ROM: full  Mouth opening: > = 3 FB Dental: normal exam         Pulmonary:normal exam    (+) sleep apnea:  current smoker                           Cardiovascular:    (+) hypertension:,         Rhythm: regular  Rate: normal           Beta Blocker:  Dose within 24 Hrs         Neuro/Psych:   Negative Neuro/Psych ROS              GI/Hepatic/Renal:   (+) GERD:,           Endo/Other:                      ROS comment: Rupture of right triceps tendon s/p surgery - now with abscess Abdominal:             Vascular: negative vascular ROS. Other Findings:             Anesthesia Plan      general     ASA 2       Induction: intravenous. MIPS: Postoperative opioids intended and Prophylactic antiemetics administered. Anesthetic plan and risks discussed with patient. Plan discussed with CRNA.                   Shahana Vail MD   7/13/2021

## 2021-07-14 LAB
CREAT SERPL-MCNC: 0.92 MG/DL (ref 0.7–1.2)
GFR AFRICAN AMERICAN: >60 ML/MIN
GFR NON-AFRICAN AMERICAN: >60 ML/MIN
GFR SERPL CREATININE-BSD FRML MDRD: NORMAL ML/MIN/{1.73_M2}
GFR SERPL CREATININE-BSD FRML MDRD: NORMAL ML/MIN/{1.73_M2}

## 2021-07-14 PROCEDURE — 82565 ASSAY OF CREATININE: CPT

## 2021-07-14 PROCEDURE — 1200000000 HC SEMI PRIVATE

## 2021-07-14 PROCEDURE — 6370000000 HC RX 637 (ALT 250 FOR IP): Performed by: ORTHOPAEDIC SURGERY

## 2021-07-14 PROCEDURE — 2580000003 HC RX 258: Performed by: ORTHOPAEDIC SURGERY

## 2021-07-14 PROCEDURE — 99254 IP/OBS CNSLTJ NEW/EST MOD 60: CPT | Performed by: INTERNAL MEDICINE

## 2021-07-14 PROCEDURE — 6360000002 HC RX W HCPCS: Performed by: ORTHOPAEDIC SURGERY

## 2021-07-14 PROCEDURE — 36415 COLL VENOUS BLD VENIPUNCTURE: CPT

## 2021-07-14 RX ORDER — ZOLPIDEM TARTRATE 5 MG/1
5 TABLET ORAL NIGHTLY PRN
Status: DISCONTINUED | OUTPATIENT
Start: 2021-07-14 | End: 2021-07-15 | Stop reason: HOSPADM

## 2021-07-14 RX ADMIN — LOSARTAN POTASSIUM 100 MG: 100 TABLET, FILM COATED ORAL at 13:23

## 2021-07-14 RX ADMIN — ACETAMINOPHEN 1000 MG: 500 TABLET, FILM COATED ORAL at 02:07

## 2021-07-14 RX ADMIN — ZOLPIDEM TARTRATE 5 MG: 5 TABLET ORAL at 21:22

## 2021-07-14 RX ADMIN — ACETAMINOPHEN 1000 MG: 500 TABLET, FILM COATED ORAL at 08:03

## 2021-07-14 RX ADMIN — CEFEPIME HYDROCHLORIDE 1000 MG: 1 INJECTION, POWDER, FOR SOLUTION INTRAMUSCULAR; INTRAVENOUS at 08:03

## 2021-07-14 RX ADMIN — OXYCODONE HYDROCHLORIDE 5 MG: 5 TABLET ORAL at 15:23

## 2021-07-14 RX ADMIN — OXYCODONE HYDROCHLORIDE 5 MG: 5 TABLET ORAL at 21:22

## 2021-07-14 RX ADMIN — ACETAMINOPHEN 1000 MG: 500 TABLET, FILM COATED ORAL at 13:28

## 2021-07-14 RX ADMIN — OXYCODONE HYDROCHLORIDE 5 MG: 5 TABLET ORAL at 02:36

## 2021-07-14 RX ADMIN — VANCOMYCIN HYDROCHLORIDE 1250 MG: 1.25 INJECTION, POWDER, LYOPHILIZED, FOR SOLUTION INTRAVENOUS at 04:22

## 2021-07-14 RX ADMIN — METOPROLOL SUCCINATE 50 MG: 50 TABLET, EXTENDED RELEASE ORAL at 08:03

## 2021-07-14 RX ADMIN — PANTOPRAZOLE SODIUM 40 MG: 40 TABLET, DELAYED RELEASE ORAL at 15:23

## 2021-07-14 RX ADMIN — CEFEPIME HYDROCHLORIDE 1000 MG: 1 INJECTION, POWDER, FOR SOLUTION INTRAMUSCULAR; INTRAVENOUS at 21:22

## 2021-07-14 RX ADMIN — PANTOPRAZOLE SODIUM 40 MG: 40 TABLET, DELAYED RELEASE ORAL at 06:44

## 2021-07-14 RX ADMIN — KETOROLAC TROMETHAMINE 30 MG: 30 INJECTION, SOLUTION INTRAMUSCULAR at 06:44

## 2021-07-14 RX ADMIN — ACETAMINOPHEN 1000 MG: 500 TABLET, FILM COATED ORAL at 21:22

## 2021-07-14 RX ADMIN — VANCOMYCIN HYDROCHLORIDE 1250 MG: 1.25 INJECTION, POWDER, LYOPHILIZED, FOR SOLUTION INTRAVENOUS at 16:20

## 2021-07-14 ASSESSMENT — PAIN SCALES - GENERAL
PAINLEVEL_OUTOF10: 8
PAINLEVEL_OUTOF10: 8
PAINLEVEL_OUTOF10: 5
PAINLEVEL_OUTOF10: 5
PAINLEVEL_OUTOF10: 2
PAINLEVEL_OUTOF10: 7
PAINLEVEL_OUTOF10: 8
PAINLEVEL_OUTOF10: 8
PAINLEVEL_OUTOF10: 4
PAINLEVEL_OUTOF10: 3
PAINLEVEL_OUTOF10: 8
PAINLEVEL_OUTOF10: 8

## 2021-07-14 ASSESSMENT — PAIN DESCRIPTION - ORIENTATION: ORIENTATION: RIGHT

## 2021-07-14 ASSESSMENT — PAIN DESCRIPTION - LOCATION: LOCATION: ELBOW

## 2021-07-14 ASSESSMENT — PAIN DESCRIPTION - PAIN TYPE: TYPE: SURGICAL PAIN

## 2021-07-14 NOTE — CARE COORDINATION
[facility] Encounter Date/Time: 2021 [place] Account: [de-identified]    MRN: [id number]    Patient: Megan Ireland    Contact Serial #: [id number]      ENCOUNTER          Patient Class: I Private Enc? No Unit RM BD: [address]    Hospital Service: Med/Surg   Encounter DX: Abscess of right elbow [*   ADM Provider: Nolvia Ramírez MD   Procedure: SD DRAIN SKIN ABSCESS SI*   ATT Provider: Nolvia Ramírez MD   REF Provider:        Admission DX: Abscess of right elbow and DX codes: L02.413      PATIENT  Name: Megan Ireland : 1960 (62 yrs)   Address: [facility] Sex: Male   City: [address] [place] [address]         Marital Status: Single   Employer: SELF EMPLOYED         Judaism: Other   Primary Care Provider: Christa Gonzalez         Primary Phone: [phone]   EMERGENCY CONTACT   Contact Name Legal Guardian? Relationship to Patient Home Phone Work Phone   1. Elaine Kumar      Other  Child [phone]              GUARANTOR            Guarantor: Megan Ireland     : 1960   Address: [address] [place] [address] Sex: [place]     [place] [address]     Relation to Patient: Self       Home Phone: [phone]   Guarantor ID: [id number]       Work Phone: [phone]   Guarantor Employer: SELF EMPLOYED         Status: SELF EMPL*      COVERAGE        PRIMARY INSURANCE   Payor: [employer] Plan: [employer] JERMAIEN - EXC*   Payor Address[facility] [place] [address]       Group Number: [id number] Insurance Type: INDEMNITY   Subscriber Name: Marilyn Pal : 1960   Subscriber ID: [id number] Pat.  Rel. to Sub: Self   SECONDARY INSURANCE   Payor:   Plan:     Payor Address:  ,           Group Number:   Insurance Type:     Subscriber Name:   Subscriber :

## 2021-07-14 NOTE — CARE COORDINATION
CASE MANAGEMENT NOTE:    Admission Date:  7/13/2021 Hector Elliott is a 62 y.o.  male    Admitted for : Abscess of right elbow [L02.413]    Met with:  Patient    PCP:  Dr[address] Caretha Goldmann:  [address]      Is patient alert and oriented at time of discussion:  Yes    Current Residence/ Living Arrangements:  independently at home             Current Services PTA:  No    Does patient go to outpatient dialysis: No  If yes, location and chair time: N/A    Is patient agreeable to VNS: Yes    Freedom of choice provided:  Yes    List of [address] provided: Yes    VNS chosen:  No - Wants to talk with GF Kristal in regards to this and then will give us choice    DME:  none    Home Oxygen: No    Nebulizer: No    CPAP/BIPAP: No    Supplier: N/A    Potential Assistance Needed: Yes    SNF needed: No    Freedom of choice and list provided: NA    Pharmacy:  [facility]       Does Patient want to use MEDS to BEDS? No    Is patient currently receiving oral anticoagulation therapy? No    Is the Patient an [facility] with Readmission Risk Score greater than 14%? No  If yes, pt needs a follow up appointment made within 7 days. Family Members/Caregivers that pt would like involved in their care:    Yes    If yes, list name here:  [address]    Transportation Provider:  Patient             Discharge Plan:  7/14: [employer] - From 2-story home with [place]. States he is independent and drives. DME - none. Open to VNS if needs dressing changes/IV antibiotics. Referral sent to Sherri. ID consult - Currently on IV vanco and IV cefepime. POD #1 right elbow I&D - Had tendon repair months ago.  MAURIZIO NEEDS SIGNED/COMPLETED. Marie Jules                 Electronically signed by: Valerie Aguilera RN on 7/14/2021 at 2:09 PM

## 2021-07-14 NOTE — PLAN OF CARE
Problem: Pain:  Goal: Pain level will decrease  Description: Pain level will decrease  7/14/2021 1642 by Kamron Bryan RN  Outcome: Ongoing  Note: Pt medicated with pain medication prn. Assessed all pain characteristics including level, type, location, frequency, and onset. Non-pharmacologic interventions offered to pt as well. Pt states pain is tolerable at this time. Will continue to monitor.     7/14/2021 0428 by Sue Espinosa RN  Outcome: Ongoing  Goal: Control of acute pain  Description: Control of acute pain  7/14/2021 0428 by Sue Espinosa RN  Outcome: Ongoing  Goal: Control of chronic pain  Description: Control of chronic pain  7/14/2021 0428 by Sue Espinosa RN  Outcome: Ongoing     Problem: Skin Integrity:  Goal: Demonstration of wound healing without infection will improve  Description: Demonstration of wound healing without infection will improve  7/14/2021 1642 by Kamron Bryan RN  Outcome: Ongoing  7/14/2021 0428 by Sue Espinosa RN  Outcome: Ongoing  Goal: Complications related to intravenous access or infusion will be avoided or minimized  Description: Complications related to intravenous access or infusion will be avoided or minimized  7/14/2021 0428 by Sue Espinosa RN  Outcome: Ongoing

## 2021-07-14 NOTE — FLOWSHEET NOTE
Patient admitted to room 2043 per cart from PACU on a cart. Patient SO at bedside. Orders reviewed with the patient and SO.  patient encouraged to keep the ace wrap on and the sling in place.

## 2021-07-14 NOTE — FLOWSHEET NOTE
Patient had a book about [facility] on his bed, which prompted much discussion of patient's reyes and reyes practices. He appreciated the opportunity for discussion and spiritual support.      07/14/21 1503   Encounter Summary   Services provided to: Patient   Referral/Consult From: [address] [place] [address] Family members;Friends/neighbors;Spouse   Continue Visiting   (7-14-21)   Complexity of Encounter Moderate   Length of Encounter 30 minutes   Spiritual Assessment Completed Yes   Spiritual/Buddhism   Type Spiritual support   Assessment Calm; Approachable   Intervention Active listening;Explored feelings, thoughts, concerns;Explored coping resources; Scripture;Sustaining presence/ Ministry of presence; Discussed belief system/Zoroastrianism practices/reyes;Discussed relationship with God;Discussed meaning/purpose;Discussed illness/injury and it's impact   Outcome Expressed gratitude;Engaged in conversation;Expressed feelings/needs/concerns;Coping;Receptive

## 2021-07-14 NOTE — DISCHARGE INSTR - COC
Continuity of Care Form    Patient Name: Yaima Marie   :  1960  MRN:  [id number]    Admit date:  2021  Discharge date:  ***    Code Status Order: Full Code   Advance Directives:   Advance Care Flowsheet Documentation     Date/Time Healthcare Directive Type of Healthcare Directive Copy in [address] Agent's Name Healthcare Agent's Phone Number    [phone]   declined information at this time   Antonio            Admitting Physician:  Mike Jaquez MD  PCP: Anderson Corado    Discharging Nurse: [facility] Unit/Room#: 3820/7466-27  Discharging Unit Phone Number: ***    Emergency Contact:   Extended Emergency Contact Information  Primary Emergency Contact: [facility] Phone: [phone]  Mobile Phone: [phone]  Relation: Other  Secondary Emergency Contact: Luz Chinrahul, [address] [place] [address] Phone: [phone]  Relation: Child    Past Surgical History:  Past Surgical History:   Procedure Laterality Date    APPENDECTOMY      ARM SURGERY Right 2021    TRICEPS TENDON REPAIR - RIGHT ELBOW WITH [address] AND BIOMET ACHILLES ALLOGRAFT    ARM SURGERY Right 2021    RIGHT ELBOW IRRIGATION AND DEBRIDEMENT performed by Mike Jaquez MD at [facility]      cervical/ lumbar    C[facility]IAC [facility]TERIZATION  2017    [facility]/ no stents    COLONOSCOPY      ENDOSCOPY, COLON, DIAGNOSTIC      HERNIA REPAIR      MUSCLE REPAIR Right 2021    TRICEPS TENDON REPAIR - RIGHT ELBOW performed by Mike Jaquez MD at [address]       cervical and lumbar    UPPER GASTROINTESTINAL ENDOSCOPY N/A 2019    EGD BIOPSY performed by Celia Silva MD at [facility]       Immunization History: There is no immunization history on file for this patient.     Active Problems:  Patient Active Problem List   Diagnosis Code    Heartburn R12    Rupture of right triceps tendon S46.311A    Abscess of right elbow L02.413       Isolation/Infection:   Isolation          No Isolation        Patient Infection Status     Infection Onset Added Last Indicated Last Indicated By Review Planned Expiration Resolved Resolved By    None active    Resolved    COVID-19 Rule Out 21 COVID-19 (Ordered)   07/10/21 Rule-Out Test Resulted    COVID-19 Rule Out 21 COVID-19 (Ordered)   21 Rule-Out Test Resulted    COVID-19 Rule Out 21 COVID-19 (Ordered)   21 Rule-Out Test Resulted          Nurse Assessment:  Last Vital Signs: /87   Pulse 78   Temp 98.2 °F (36.8 °C) (Oral)   Resp 16   Ht 5' 10\" (1.778 m)   Wt 195 lb (88.5 kg)   SpO2 96%   BMI 27.98 kg/m²     Last documented pain score (0-10 scale): Pain Level: 5  Last Weight:   Wt Readings from Last 1 Encounters:   21 195 lb (88.5 kg)     Mental Status:  {IP PT MENTAL STATUS:}    IV Access:  { MAURIZIO IV ACCESS:578415042}    Nursing Mobility/ADLs:  Walking   {P DME FRFY:932686624}  Transfer  {P DME ZHYX:732254694}  Bathing  {CHP DME LPSK:202235128}  Dressing  {P DME XGHV:754546374}  Toileting  {[facility] DME SKZL:391907625}  Feeding  {[facility] DME BHUR:793871596}  Med Admin  {P DME ONIV:274955950}  Med Delivery   { MAURIZIO MED Delivery:949898445}    Wound Care Documentation and Therapy:        Elimination:  Continence:   · Bowel: {YES / PD:25847}  · Bladder: {YES / YA:17629}  Urinary Catheter: {Urinary Catheter:887869038}   Colostomy/Ileostomy/Ileal Conduit: {YES / D}       Date of Last BM: ***    Intake/Output Summary (Last 24 hours) at 2021 1624  Last data filed at 2021 0711  Gross per 24 hour   Intake 900 ml   Output 600 ml   Net 300 ml     I/O last 3 completed shifts:   In: 900 [I.V.:900]  Out: 600 [Urine:600]    Safety Concerns:     [address] Safety Concerns:269240071}    Impairments/Disabilities:      [address] Impairments/Disabilities:233212675}    Nutrition Therapy:  Current Nutrition Therapy:   [address] MAURIZIO Diet List:576647976}    Routes of Feeding: {CHP DME Other Feedings:440441969}  Liquids: {Slp liquid thickness:48854}  Daily Fluid Restriction: {CHP DME Yes amt example:971014612}  Last Modified Barium Swallow with Video (Video Swallowing Test): {Done Not Done KLLI:789751689}    Treatments at the Time of Hospital Discharge:   Respiratory Treatments: ***  Oxygen Therapy:  {Therapy; copd oxygen:81426}  Ventilator:    { CC Vent AEBV:337598390}    Rehab Therapies: N/A  Weight Bearing Status/Restrictions: [address] REYES Weight Bearin}  Other Medical Equipment (for information only, NOT a DME order):  {EQUIPMENT:451112974}  Other Treatments: Skilled Nursing Assessment, Medication education and Monitoring    Patient's personal belongings (please select all that are sent with patient):  {CHP DME Belongings:816188848}    RN SIGNATURE:  {Esignature:473685074}    CASE MANAGEMENT/SOCIAL WORK SECTION    Inpatient Status Date: 2021    Readmission Risk Assessment Score:  Readmission Risk              Risk of Unplanned Readmission:  8           Discharging to Facility/ [facility] [address] Zac Varma, [place]  Phone:  [phone]  Fax:  [phone]    [place]  [address] [address]  P: [phone]  F: [phone]      / signature: Electronically signed by Bhumika Marie RN on 21 at 4:24 PM EDT    PHYSICIAN SECTION    Prognosis: {Prognosis:0098315967}    Condition at Discharge: [address] Patient Condition:185226366}    Rehab Potential (if transferring to Rehab): {Prognosis:8384614791}    Recommended Labs or Other Treatments After Discharge: ***    Physician Certification: I certify the above information and transfer of Yaima Marie  is necessary for the continuing treatment of the diagnosis listed and that he requires {Admit to Appropriate Level of Care:77240} for {GREATER/LESS:321804823} 30 days. Update Admission H&P: {CHP DME Changes in ARUYL:932518502}    PHYSICIAN SIGNATURE:  {Esignature:103465206}

## 2021-07-14 NOTE — CONSULTS
in acute distress. HENT:      Head: Normocephalic and atraumatic. Right Ear: External ear normal.      Left Ear: External ear normal.      Mouth/Throat:      Pharynx: Oropharynx is clear. No posterior oropharyngeal erythema. Eyes:      General: No scleral icterus. Conjunctiva/sclera: Conjunctivae normal.   Cardiovascular:      Rate and Rhythm: Normal rate and regular rhythm. Heart sounds: No murmur heard. Pulmonary:      Effort: Pulmonary effort is normal.      Breath sounds: Normal breath sounds. No wheezing. Abdominal:      General: There is no distension. Palpations: Abdomen is soft. Musculoskeletal:      Cervical back: Neck supple. No rigidity. Comments: Right lateral elbow surgical incision intact, mild surrounding erythema, no fluctuation, no crepitance, no drainage. Skin:     General: Skin is warm. Coloration: Skin is not jaundiced. Neurological:      General: No focal deficit present. Mental Status: He is alert and oriented to person, place, and time.          Past Medical History:     Past Medical History:   Diagnosis Date    Abscess of bursa of right elbow     Heartburn     Hypertension     PONV (postoperative nausea and vomiting)     Sleep apnea     no machine       Past Surgical  History:     Past Surgical History:   Procedure Laterality Date    APPENDECTOMY      ARM SURGERY Right 02/23/2021    TRICEPS TENDON REPAIR - RIGHT ELBOW WITH ARTHREX AND BIOMET ACHILLES ALLOGRAFT    ARM SURGERY Right 7/13/2021    RIGHT ELBOW IRRIGATION AND DEBRIDEMENT performed by Cuauhtemoc Ellison MD at [facility]      cervical/ lumbar    CARDIAC CATHETERIZATION  2017    [facility]/ no stents    COLONOSCOPY      ENDOSCOPY, COLON, DIAGNOSTIC      HERNIA REPAIR      MUSCLE REPAIR Right 2/23/2021    TRICEPS TENDON REPAIR - RIGHT ELBOW performed by Cuauhtemoc Ellison MD at [facility]      cervical and lumbar    UPPER GASTROINTESTINAL ENDOSCOPY N/A 2019    EGD BIOPSY performed by Joesph Campos MD at [facility] END[facility]       Medications:      losartan  100 mg Oral Daily    metoprolol succinate  50 mg Oral Daily    pantoprazole  40 mg Oral BID AC    sodium chloride flush  5-40 mL Intravenous 2 times per day    acetaminophen  1,000 mg Oral Q6H    ketorolac  30 mg Intravenous Q8H    vancomycin (VANCOCIN) intermittent dosing (placeholder)   Other RX Placeholder    vancomycin (VANCOCIN) 1250 mg in D5W 250 mL IVPB (ADDAVIAL)  1,250 mg Intravenous Q12H    cefepime  1,000 mg Intravenous Q12H       Social History:     Social History     Socioeconomic History    Marital status: Single     Spouse name: Not on file    Number of children: Not on file    Years of education: Not on file    Highest education level: Not on file   Occupational History    Not on file   Tobacco Use    Smoking status: Light Tobacco Smoker     Packs/day: 1.00     Years: 20.00     Pack years: 20.00     Types: Cigars     Last attempt to quit: 3/15/2008     Years since quittin.3    Smokeless tobacco: Never Used    Tobacco comment: no cigarettes for 20 years   Vaping Use    Vaping Use: Never used   Substance and Sexual Activity    Alcohol use: Yes     Comment: socially    Drug use: Yes     Types: Marijuana     Comment: sometimes nightly/ oral or smokes    Sexual activity: Not on file   Other Topics Concern    Not on file   Social History Narrative    Not on file     Social Determinants of Health     Financial Resource Strain:     Difficulty of Paying Living Expenses:    Food Insecurity:     Worried About Running Out of Food in the Last Year:     Ran Out of Food in the Last Year:    Transportation Needs:     Lack of Transportation (Medical):      Lack of Transportation (Non-Medical):    Physical Activity:     Days of Exercise per Week:     Minutes of Exercise per Session:    Stress:     Feeling of Stress :    Social Connections:     Frequency of Communication with Friends and Family:     Frequency of Social Gatherings with Friends and Family:     Attends Church Services:     Active Member of Clubs or Organizations:     Attends Club or Organization Meetings:     Marital Status:    Intimate Partner Violence:     Fear of Current or Ex-Partner:     Emotionally Abused:     Physically Abused:     Sexually Abused:        Family History:     Family History   Problem Relation Age of Onset    Cancer Father         prostrate    Diabetes Father     Cancer Paternal Uncle         colon      Medical Decision Making:   I have independently reviewed/ordered the following labs:    CBC with Differential: No results for input(s): WBC, HGB, HCT, PLT, SEGSPCT, BANDSPCT, LYMPHOPCT, MONOPCT, EOSPCT in the last 72 hours. BMP:  Recent Labs     07/13/21  1405 07/14/21  0608   CREATININE 0.73 0.92     Hepatic Function Panel: No results for input(s): PROT, LABALBU, BILIDIR, IBILI, BILITOT, ALKPHOS, ALT, AST in the last 72 hours. No results for input(s): RPR in the last 72 hours. No results for input(s): HIV in the last 72 hours. No results for input(s): BC in the last 72 hours. Lab Results   Component Value Date    CREATININE 0.92 07/14/2021    GLUCOSE 90 02/11/2021       Detailed results: Thank you for allowing us to participate in the care of this patient. Please call with questions. This note is created with the assistance of a speech recognition program.  While intending to generate adocument that actually reflects the content of the visit, the document can still have some errors including those of syntax and sound a like substitutions which may escape proof reading. It such instances, actual meaningcan be extrapolated by contextual diversion.     Ben Nolan MD  Office: [phone]  Perfect serve / office [phone]

## 2021-07-15 VITALS
OXYGEN SATURATION: 97 % | SYSTOLIC BLOOD PRESSURE: 160 MMHG | HEART RATE: 61 BPM | TEMPERATURE: 97.7 F | RESPIRATION RATE: 18 BRPM | DIASTOLIC BLOOD PRESSURE: 94 MMHG | BODY MASS INDEX: 27.92 KG/M2 | HEIGHT: 70 IN | WEIGHT: 195 LBS

## 2021-07-15 LAB
C-REACTIVE PROTEIN: <3 MG/L (ref 0–5)
CREAT SERPL-MCNC: 0.88 MG/DL (ref 0.7–1.2)
GFR AFRICAN AMERICAN: >60 ML/MIN
GFR NON-AFRICAN AMERICAN: >60 ML/MIN
GFR SERPL CREATININE-BSD FRML MDRD: NORMAL ML/MIN/{1.73_M2}
GFR SERPL CREATININE-BSD FRML MDRD: NORMAL ML/MIN/{1.73_M2}
SEDIMENTATION RATE, ERYTHROCYTE: 5 MM (ref 0–20)
VANCOMYCIN TROUGH DATE LAST DOSE: ABNORMAL
VANCOMYCIN TROUGH DOSE AMOUNT: 1250
VANCOMYCIN TROUGH TIME LAST DOSE: 1620
VANCOMYCIN TROUGH: 7.8 UG/ML (ref 10–20)

## 2021-07-15 PROCEDURE — 6370000000 HC RX 637 (ALT 250 FOR IP): Performed by: ORTHOPAEDIC SURGERY

## 2021-07-15 PROCEDURE — 2580000003 HC RX 258: Performed by: ORTHOPAEDIC SURGERY

## 2021-07-15 PROCEDURE — 86140 C-REACTIVE PROTEIN: CPT

## 2021-07-15 PROCEDURE — 36415 COLL VENOUS BLD VENIPUNCTURE: CPT

## 2021-07-15 PROCEDURE — 82565 ASSAY OF CREATININE: CPT

## 2021-07-15 PROCEDURE — 85652 RBC SED RATE AUTOMATED: CPT

## 2021-07-15 PROCEDURE — 99232 SBSQ HOSP IP/OBS MODERATE 35: CPT | Performed by: INTERNAL MEDICINE

## 2021-07-15 PROCEDURE — 6360000002 HC RX W HCPCS: Performed by: ORTHOPAEDIC SURGERY

## 2021-07-15 PROCEDURE — 80202 ASSAY OF VANCOMYCIN: CPT

## 2021-07-15 RX ORDER — CEFUROXIME AXETIL 500 MG/1
500 TABLET ORAL 2 TIMES DAILY
Qty: 24 TABLET | Refills: 0 | Status: SHIPPED | OUTPATIENT
Start: 2021-07-15 | End: 2021-07-27

## 2021-07-15 RX ORDER — HYDROCODONE BITARTRATE AND ACETAMINOPHEN 5; 325 MG/1; MG/1
1 TABLET ORAL EVERY 4 HOURS PRN
Qty: 30 TABLET | Refills: 0 | Status: SHIPPED | OUTPATIENT
Start: 2021-07-15 | End: 2021-07-20

## 2021-07-15 RX ORDER — DOXYCYCLINE HYCLATE 100 MG
100 TABLET ORAL 2 TIMES DAILY
Qty: 24 TABLET | Refills: 0 | Status: SHIPPED | OUTPATIENT
Start: 2021-07-15 | End: 2021-07-27

## 2021-07-15 RX ADMIN — ACETAMINOPHEN 1000 MG: 500 TABLET, FILM COATED ORAL at 03:49

## 2021-07-15 RX ADMIN — LOSARTAN POTASSIUM 100 MG: 100 TABLET, FILM COATED ORAL at 08:32

## 2021-07-15 RX ADMIN — PANTOPRAZOLE SODIUM 40 MG: 40 TABLET, DELAYED RELEASE ORAL at 03:49

## 2021-07-15 RX ADMIN — METOPROLOL SUCCINATE 50 MG: 50 TABLET, EXTENDED RELEASE ORAL at 05:46

## 2021-07-15 RX ADMIN — ACETAMINOPHEN 1000 MG: 500 TABLET, FILM COATED ORAL at 08:31

## 2021-07-15 RX ADMIN — SODIUM CHLORIDE, PRESERVATIVE FREE 10 ML: 5 INJECTION INTRAVENOUS at 08:33

## 2021-07-15 RX ADMIN — OXYCODONE HYDROCHLORIDE 5 MG: 5 TABLET ORAL at 03:48

## 2021-07-15 RX ADMIN — CEFEPIME HYDROCHLORIDE 1000 MG: 1 INJECTION, POWDER, FOR SOLUTION INTRAMUSCULAR; INTRAVENOUS at 08:32

## 2021-07-15 RX ADMIN — VANCOMYCIN HYDROCHLORIDE 1250 MG: 1.25 INJECTION, POWDER, LYOPHILIZED, FOR SOLUTION INTRAVENOUS at 04:11

## 2021-07-15 ASSESSMENT — PAIN SCALES - GENERAL
PAINLEVEL_OUTOF10: 4
PAINLEVEL_OUTOF10: 5
PAINLEVEL_OUTOF10: 8

## 2021-07-15 NOTE — PLAN OF CARE
Problem: Pain:  Goal: Pain level will decrease  Description: Pain level will decrease  7/15/2021 1146 by Aristeo Patten RN  Outcome: Completed  7/15/2021 0148 by Lissette Huynh RN  Outcome: Ongoing  Goal: Control of acute pain  Description: Control of acute pain  7/15/2021 1146 by Aristeo Patten RN  Outcome: Completed  7/15/2021 0148 by Lissette Huynh RN  Outcome: Ongoing  Goal: Control of chronic pain  Description: Control of chronic pain  7/15/2021 1146 by Aristeo Patten RN  Outcome: Completed  7/15/2021 0148 by Lissette Huynh RN  Outcome: Ongoing     Problem: Skin Integrity:  Goal: Demonstration of wound healing without infection will improve  Description: Demonstration of wound healing without infection will improve  7/15/2021 1146 by Aristeo Patten RN  Outcome: Completed  7/15/2021 0148 by Lissette Huynh RN  Outcome: Ongoing  Goal: Complications related to intravenous access or infusion will be avoided or minimized  Description: Complications related to intravenous access or infusion will be avoided or minimized  7/15/2021 1146 by Aristeo Patten RN  Outcome: Completed  7/15/2021 0148 by Lissette Huynh RN  Outcome: Ongoing     Problem: Falls - Risk of:  Goal: Will remain free from falls  Description: Will remain free from falls  7/15/2021 1146 by Aristeo Patten RN  Outcome: Completed  7/15/2021 0148 by Lissette Huynh RN  Outcome: Ongoing  Goal: Absence of physical injury  Description: Absence of physical injury  7/15/2021 1146 by Aristeo Patten RN  Outcome: Completed  7/15/2021 0148 by Lissette Huynh RN  Outcome: Ongoing

## 2021-07-15 NOTE — PROGRESS NOTES
Pharmacy Vancomycin Consult     Vancomycin Day: 3  Current Dosing: vancomycin 1250 mg every 12 hours   Current indication: abscess rt elbow     Temp max:  98.5    Recent Labs     07/13/21  1405 07/14/21  0608   CREATININE 0.73 0.92       Intake/Output Summary (Last 24 hours) at 7/15/2021 0303  Last data filed at 7/14/2021 6953  Gross per 24 hour   Intake 900 ml   Output 600 ml   Net 300 ml     Culture Date      Source                       Results  See micro     Ht Readings from Last 1 Encounters:   07/13/21 5' 10\" (1.778 m)        Wt Readings from Last 1 Encounters:   07/13/21 195 lb (88.5 kg)       Body mass index is 27.98 kg/m². Estimated Creatinine Clearance: 97 mL/min (based on SCr of 0.92 mg/dL).     Trough: 7.8 at 0238 on 7/15/2021    Assessment/Plan:  Change vancomycin to 1250 mg every 8 hours     Jaylin Acharya RPh     -7/15/2021 at 3:13 AM

## 2021-07-15 NOTE — PROGRESS NOTES
Discharge teaching and instructions for diagnosis of right elbow abscess completed with patient using teachback method. AVS reviewed. Printed prescriptions given to patient. Patient voiced understanding regarding prescriptions, follow up appointments, and care of self at home.  Discharged ambulatory to  home with support per family

## 2021-07-15 NOTE — CARE COORDINATION
DISCHARGE PLANNING NOTE:    Dr. Miracle Tong here. Pt can be discharged home today on PO atb's. Ceftin 500mg BID until 7/27/21 and Doxy 100mg BID until 7/27/21. Both e-scribed to [address] [place] [address] per pt's request under Dr. Miracle Tong per her verbal order. Dr. Miracle Tong would like pt to follow up 7/27. Appt made for 0945. Spoke with pt regarding the above. He is alert and oriented x4. He does not feel he will need VNS anymore. Called [facility] and cancelled referral. Also notified Daisy from [facility] of this. POD#2 right elbow I&D. Will continue to follow for additional discharge needs.     Electronically signed by Josh Banda RN on 7/15/2021 at 10:54 AM

## 2021-07-15 NOTE — PROGRESS NOTES
CLINICAL PHARMACY NOTE: MEDS TO BEDS    Total # of Prescriptions Filled: 2   The following medications were delivered to the patient:  · Ceftin and Doxycycline    Additional Documentation:  Napoleon Orozco rx sent into [facility]

## 2021-07-15 NOTE — PROGRESS NOTES
[facility] -   Infectious diseases evaluation  admission date 7/13/2021    reason for consultation:   Postoperative abscess    Impression :   Current:  · Right elbow abscess status post incision and drainage 7/13/2021  · Hypertension  · Sleep apnea  · Penicillin allergy      Recommendations   · Discontinue IV vancomycin and cefepime   · P.o. Ceftin 500 mg twice a day and doxycycline 100 mg twice a day through 7/27/2021  · Follow intraoperative cultures and adjust antibiotics as needed. · Sed rate and C-reactive protein normal  · Follow-up in 1 to 2 weeks  · The patient may be discharged from infectious disease point of view. · Discussed with discharge planner. History of Present Illness:   Initial history:  Clarice Jordan is a 62y.o.-year-old male presented to hospital with pain to the posterior aspect of his elbow associated with drainage from surgical site for 2 months, pain intermittent, moderate, aggravated by movement, no alleviating factors. MRI showed small fluid collections concerning with an abscess with bone edema suggestive of postoperative changes versus osteomyelitis. He had surgical debridement with bone biopsies done . He was treated with oral doxycycline on 6/10/2021 with no improvement. The patient had right triceps tendon rupture status post open repair around 4 months ago.  6/22/2021 sedimentation rate was 4, C-reactive protein less than 3  He admits to using marijuana but no IV drug abuse. Interval changes  7/15/2021   He is feeling better today, postoperative pain under control, denied fever or chills, denied nausea or vomiting.   Patient Vitals for the past 8 hrs:   BP Temp Temp src Pulse Resp SpO2   07/15/21 0831 (!) 160/94   61     07/15/21 0731 (!) 160/96 97.7 °F (36.5 °C) Oral 72 18 97 %           I have personally reviewed the past medical history, past surgical history, medications, social history, and family history, and I haveupdated the database accordingly. Allergies:   Pcn [penicillins]     Review of Systems:     Review of Systems  As per history of present illness, other than above 12 system review was negative  Physical Examination :       Physical Exam  Constitutional:       General: He is not in acute distress. HENT:      Head: Normocephalic and atraumatic. Right Ear: External ear normal.      Left Ear: External ear normal.      Mouth/Throat:      Pharynx: Oropharynx is clear. No posterior oropharyngeal erythema. Eyes:      General: No scleral icterus. Conjunctiva/sclera: Conjunctivae normal.   Cardiovascular:      Rate and Rhythm: Normal rate and regular rhythm. Heart sounds: No murmur heard. Pulmonary:      Effort: Pulmonary effort is normal.      Breath sounds: Normal breath sounds. No wheezing. Abdominal:      General: There is no distension. Palpations: Abdomen is soft. Musculoskeletal:      Cervical back: Neck supple. No rigidity. Comments: Right elbow dressing. Skin:     General: Skin is warm. Coloration: Skin is not jaundiced. Neurological:      General: No focal deficit present. Mental Status: He is alert and oriented to person, place, and time.          Past Medical History:     Past Medical History:   Diagnosis Date    Abscess of bursa of right elbow     Heartburn     Hypertension     PONV (postoperative nausea and vomiting)     Sleep apnea     no machine       Past Surgical  History:     Past Surgical History:   Procedure Laterality Date    APPENDECTOMY      ARM SURGERY Right 02/23/2021    TRICEPS TENDON REPAIR - RIGHT ELBOW WITH ARTHREX AND BIOMET ACHILLES ALLOGRAFT    ARM SURGERY Right 7/13/2021    RIGHT ELBOW IRRIGATION AND DEBRIDEMENT performed by Yaya Lozano MD at [address] [place] [address]      cervical/ lumbar    CARDIAC CATHETERIZATION  2017    [facility]/ no stents    COLONOSCOPY      ENDOSCOPY, COLON, DIAGNOSTIC      HERNIA REPAIR      MUSCLE REPAIR Right 2021    TRICEPS TENDON REPAIR - RIGHT ELBOW performed by Jt Jacobs MD at [address]       cervical and lumbar    UPPER GASTROINTESTINAL ENDOSCOPY N/A 2019    EGD BIOPSY performed by Tahmina Morales MD at [facility]       Medications:      vancomycin (VANCOCIN) 1250 mg in D5W 250 mL IVPB (ADDAVIAL)  1,250 mg Intravenous Q8H    losartan  100 mg Oral Daily    metoprolol succinate  50 mg Oral Daily    pantoprazole  40 mg Oral BID AC    sodium chloride flush  5-40 mL Intravenous 2 times per day    acetaminophen  1,000 mg Oral Q6H    vancomycin (VANCOCIN) intermittent dosing (placeholder)   Other RX Placeholder    cefepime  1,000 mg Intravenous Q12H       Social History:     Social History     Socioeconomic History    Marital status: Single     Spouse name: Not on file    Number of children: Not on file    Years of education: Not on file    Highest education level: Not on file   Occupational History    Not on file   Tobacco Use    Smoking status: Light Tobacco Smoker     Packs/day: 1.00     Years: 20.00     Pack years: 20.00     Types: Cigars     Last attempt to quit: 3/15/2008     Years since quittin.3    Smokeless tobacco: Never Used    Tobacco comment: no cigarettes for 20 years   Vaping Use    Vaping Use: Never used   Substance and Sexual Activity    Alcohol use: Yes     Comment: socially    Drug use: Yes     Types: Marijuana     Comment: sometimes nightly/ oral or smokes    Sexual activity: Not on file   Other Topics Concern    Not on file   Social History Narrative    Not on file     Social Determinants of Health     Financial Resource Strain:     Difficulty of Paying Living Expenses:    Food Insecurity:     Worried About Running Out of Food in the Last Year:     Ran Out of Food in the Last Year:    Transportation Needs:     Lack of Transportation (Medical):      Lack of Transportation (Non-Medical): [phone]  Perfect serve / office [phone]

## 2021-07-16 NOTE — PROGRESS NOTES
Physician Progress Note      PATIENT:               Donna White  CSN #:                  [id number]  :                       1960  ADMIT DATE:       2021 8:42 AM  DISCH DATE:        7/15/2021 1:49 PM  RESPONDING  PROVIDER #:        Leslye Haynes [id number] MD          QUERY TEXT:    Patient admitted with right elbow surgical wound infection. Per Op note dated   21 documentation of debridement. To accurately reflect the procedure   performed please document if debridement was excisional or nonexcisional and   the deepest depth of tissue removed as down to and including: The medical record reflects the following:  Risk Factors: right elbow abscess s/p surgical repair of triceps tendon   rupture in 2021  Clinical Indicators: Per  OR note:  \"I made a split within the triceps   muscle proximally and extended the split distally to the insertion at the   olecranon. .. I removed the FiberWire sutures and thoroughly debrided the   mucinoid tissue. .. Following the split down to the triceps incision on the   olecranon there was a small void or defect in the bone at the tip of the   olecranon with some mucinoid tissue encountered here. I thoroughly debrided   this and took some bone samples  Treatment: ID consult, debridement and irrigation on , IV   antibiotics--Cefepime and Vancomycin  Options provided:  -- Nonexcisional debridement of subcutaneous tissue/fascia  -- Excisional debridement of subcutaneous tissue/fascia  -- Nonexcisional debridement of muscle  -- Excisional debridement of muscle  -- Nonexcisional debridement of bone  -- Excisional debridement of bone  -- Other - I will add my own diagnosis  -- Disagree - Not applicable / Not valid  -- Disagree - Clinically unable to determine / Unknown  -- Refer to Clinical Documentation Reviewer    PROVIDER RESPONSE TEXT:    Excisional debridement of muscle of right elbow was performed during procedure   on 2021.     Query created by: Svetlana Justin Fernando Reese on 7/14/2021 3:47 PM      Electronically signed by:  Terry Solorio MD 7/16/2021 9:26 AM

## 2021-07-18 LAB
CULTURE: ABNORMAL
DIRECT EXAM: ABNORMAL
DIRECT EXAM: ABNORMAL
Lab: ABNORMAL
SPECIMEN DESCRIPTION: ABNORMAL

## 2021-07-19 LAB
CULTURE: ABNORMAL
DIRECT EXAM: ABNORMAL
Lab: ABNORMAL
Lab: ABNORMAL
SPECIMEN DESCRIPTION: ABNORMAL
SPECIMEN DESCRIPTION: ABNORMAL

## 2021-07-22 ENCOUNTER — TELEPHONE (OUTPATIENT)
Dept: GASTROENTEROLOGY | Age: 61
End: 2021-07-22

## 2021-07-22 NOTE — TELEPHONE ENCOUNTER
Pt called asking for a call back to geovanny procedure. All I see is the one from Feb of 2021.  PC made to clarify but Mailbox was full

## 2021-07-23 ENCOUNTER — APPOINTMENT (OUTPATIENT)
Dept: CT IMAGING | Age: 61
End: 2021-07-23
Payer: COMMERCIAL

## 2021-07-23 ENCOUNTER — APPOINTMENT (OUTPATIENT)
Dept: ULTRASOUND IMAGING | Age: 61
End: 2021-07-23
Payer: COMMERCIAL

## 2021-07-23 ENCOUNTER — HOSPITAL ENCOUNTER (OUTPATIENT)
Age: 61
Setting detail: OBSERVATION
Discharge: HOME HEALTH | End: 2021-07-24
Attending: EMERGENCY MEDICINE | Admitting: INTERNAL MEDICINE
Payer: COMMERCIAL

## 2021-07-23 DIAGNOSIS — R07.9 CHEST PAIN, UNSPECIFIED TYPE: Primary | ICD-10-CM

## 2021-07-23 DIAGNOSIS — R10.13 EPIGASTRIC PAIN: ICD-10-CM

## 2021-07-23 DIAGNOSIS — K29.70 GASTRITIS WITHOUT BLEEDING, UNSPECIFIED CHRONICITY, UNSPECIFIED GASTRITIS TYPE: ICD-10-CM

## 2021-07-23 PROBLEM — I10 ESSENTIAL HYPERTENSION: Status: ACTIVE | Noted: 2021-07-23

## 2021-07-23 LAB
ABSOLUTE EOS #: 0.1 K/UL (ref 0–0.4)
ABSOLUTE IMMATURE GRANULOCYTE: ABNORMAL K/UL (ref 0–0.3)
ABSOLUTE LYMPH #: 0.9 K/UL (ref 1–4.8)
ABSOLUTE MONO #: 0.9 K/UL (ref 0.1–1.3)
ALBUMIN SERPL-MCNC: 4.5 G/DL (ref 3.5–5.2)
ALBUMIN/GLOBULIN RATIO: ABNORMAL (ref 1–2.5)
ALP BLD-CCNC: 51 U/L (ref 40–129)
ALT SERPL-CCNC: 40 U/L (ref 5–41)
ANION GAP SERPL CALCULATED.3IONS-SCNC: 11 MMOL/L (ref 9–17)
AST SERPL-CCNC: 26 U/L
BASOPHILS # BLD: 1 % (ref 0–2)
BASOPHILS ABSOLUTE: 0.1 K/UL (ref 0–0.2)
BILIRUB SERPL-MCNC: 0.59 MG/DL (ref 0.3–1.2)
BUN BLDV-MCNC: 17 MG/DL (ref 6–20)
BUN/CREAT BLD: ABNORMAL (ref 9–20)
CALCIUM SERPL-MCNC: 11 MG/DL (ref 8.6–10.4)
CHLORIDE BLD-SCNC: 101 MMOL/L (ref 98–107)
CO2: 24 MMOL/L (ref 20–31)
CREAT SERPL-MCNC: 0.92 MG/DL (ref 0.7–1.2)
DIFFERENTIAL TYPE: ABNORMAL
EOSINOPHILS RELATIVE PERCENT: 1 % (ref 0–4)
GFR AFRICAN AMERICAN: >60 ML/MIN
GFR NON-AFRICAN AMERICAN: >60 ML/MIN
GFR SERPL CREATININE-BSD FRML MDRD: ABNORMAL ML/MIN/{1.73_M2}
GFR SERPL CREATININE-BSD FRML MDRD: ABNORMAL ML/MIN/{1.73_M2}
GLUCOSE BLD-MCNC: 106 MG/DL (ref 70–99)
HCT VFR BLD CALC: 55.6 % (ref 41–53)
HEMOGLOBIN: 18.8 G/DL (ref 13.5–17.5)
IMMATURE GRANULOCYTES: ABNORMAL %
LACTIC ACID, WHOLE BLOOD: NORMAL MMOL/L (ref 0.7–2.1)
LACTIC ACID: 0.9 MMOL/L (ref 0.5–2.2)
LIPASE: 57 U/L (ref 13–60)
LYMPHOCYTES # BLD: 11 % (ref 24–44)
MAGNESIUM: 2 MG/DL (ref 1.6–2.6)
MCH RBC QN AUTO: 31.7 PG (ref 26–34)
MCHC RBC AUTO-ENTMCNC: 33.8 G/DL (ref 31–37)
MCV RBC AUTO: 93.7 FL (ref 80–100)
MONOCYTES # BLD: 11 % (ref 1–7)
NRBC AUTOMATED: ABNORMAL PER 100 WBC
PDW BLD-RTO: 15.7 % (ref 11.5–14.9)
PLATELET # BLD: 270 K/UL (ref 150–450)
PLATELET ESTIMATE: ABNORMAL
PMV BLD AUTO: 7 FL (ref 6–12)
POTASSIUM SERPL-SCNC: 4.4 MMOL/L (ref 3.7–5.3)
RBC # BLD: 5.93 M/UL (ref 4.5–5.9)
RBC # BLD: ABNORMAL 10*6/UL
SEG NEUTROPHILS: 76 % (ref 36–66)
SEGMENTED NEUTROPHILS ABSOLUTE COUNT: 6.2 K/UL (ref 1.3–9.1)
SODIUM BLD-SCNC: 136 MMOL/L (ref 135–144)
TOTAL PROTEIN: 7 G/DL (ref 6.4–8.3)
TROPONIN INTERP: NORMAL
TROPONIN INTERP: NORMAL
TROPONIN T: NORMAL NG/ML
TROPONIN T: NORMAL NG/ML
TROPONIN, HIGH SENSITIVITY: 12 NG/L (ref 0–22)
TROPONIN, HIGH SENSITIVITY: 13 NG/L (ref 0–22)
WBC # BLD: 8.1 K/UL (ref 3.5–11)
WBC # BLD: ABNORMAL 10*3/UL

## 2021-07-23 PROCEDURE — 85025 COMPLETE CBC W/AUTO DIFF WBC: CPT

## 2021-07-23 PROCEDURE — 83605 ASSAY OF LACTIC ACID: CPT

## 2021-07-23 PROCEDURE — 6360000002 HC RX W HCPCS: Performed by: EMERGENCY MEDICINE

## 2021-07-23 PROCEDURE — 93005 ELECTROCARDIOGRAM TRACING: CPT | Performed by: EMERGENCY MEDICINE

## 2021-07-23 PROCEDURE — 83735 ASSAY OF MAGNESIUM: CPT

## 2021-07-23 PROCEDURE — 99284 EMERGENCY DEPT VISIT MOD MDM: CPT

## 2021-07-23 PROCEDURE — 6360000004 HC RX CONTRAST MEDICATION: Performed by: EMERGENCY MEDICINE

## 2021-07-23 PROCEDURE — 74177 CT ABD & PELVIS W/CONTRAST: CPT

## 2021-07-23 PROCEDURE — 84484 ASSAY OF TROPONIN QUANT: CPT

## 2021-07-23 PROCEDURE — 6370000000 HC RX 637 (ALT 250 FOR IP): Performed by: EMERGENCY MEDICINE

## 2021-07-23 PROCEDURE — 96375 TX/PRO/DX INJ NEW DRUG ADDON: CPT

## 2021-07-23 PROCEDURE — 99220 PR INITIAL OBSERVATION CARE/DAY 70 MINUTES: CPT | Performed by: INTERNAL MEDICINE

## 2021-07-23 PROCEDURE — 76705 ECHO EXAM OF ABDOMEN: CPT

## 2021-07-23 PROCEDURE — 6370000000 HC RX 637 (ALT 250 FOR IP)

## 2021-07-23 PROCEDURE — 36415 COLL VENOUS BLD VENIPUNCTURE: CPT

## 2021-07-23 PROCEDURE — 2500000003 HC RX 250 WO HCPCS: Performed by: EMERGENCY MEDICINE

## 2021-07-23 PROCEDURE — 96374 THER/PROPH/DIAG INJ IV PUSH: CPT

## 2021-07-23 PROCEDURE — G0378 HOSPITAL OBSERVATION PER HR: HCPCS

## 2021-07-23 PROCEDURE — 83690 ASSAY OF LIPASE: CPT

## 2021-07-23 PROCEDURE — 96361 HYDRATE IV INFUSION ADD-ON: CPT

## 2021-07-23 PROCEDURE — 87338 HPYLORI STOOL AG IA: CPT

## 2021-07-23 PROCEDURE — 2580000003 HC RX 258: Performed by: EMERGENCY MEDICINE

## 2021-07-23 PROCEDURE — 80053 COMPREHEN METABOLIC PANEL: CPT

## 2021-07-23 RX ORDER — PANTOPRAZOLE SODIUM 40 MG/1
40 TABLET, DELAYED RELEASE ORAL ONCE
Status: COMPLETED | OUTPATIENT
Start: 2021-07-23 | End: 2021-07-24

## 2021-07-23 RX ORDER — SPIRONOLACTONE 50 MG/1
50 TABLET, FILM COATED ORAL DAILY
COMMUNITY
End: 2022-04-04 | Stop reason: SDUPTHER

## 2021-07-23 RX ORDER — ONDANSETRON 2 MG/ML
4 INJECTION INTRAMUSCULAR; INTRAVENOUS EVERY 6 HOURS PRN
Status: DISCONTINUED | OUTPATIENT
Start: 2021-07-23 | End: 2021-07-24 | Stop reason: HOSPADM

## 2021-07-23 RX ORDER — LANOLIN ALCOHOL/MO/W.PET/CERES
3 CREAM (GRAM) TOPICAL NIGHTLY PRN
Status: DISCONTINUED | OUTPATIENT
Start: 2021-07-23 | End: 2021-07-24 | Stop reason: HOSPADM

## 2021-07-23 RX ORDER — POTASSIUM CHLORIDE 20 MEQ/1
40 TABLET, EXTENDED RELEASE ORAL PRN
Status: DISCONTINUED | OUTPATIENT
Start: 2021-07-23 | End: 2021-07-24 | Stop reason: HOSPADM

## 2021-07-23 RX ORDER — PANTOPRAZOLE SODIUM 40 MG/1
40 TABLET, DELAYED RELEASE ORAL
Status: DISCONTINUED | OUTPATIENT
Start: 2021-07-24 | End: 2021-07-24 | Stop reason: HOSPADM

## 2021-07-23 RX ORDER — ACETAMINOPHEN 325 MG/1
650 TABLET ORAL EVERY 6 HOURS PRN
Status: DISCONTINUED | OUTPATIENT
Start: 2021-07-23 | End: 2021-07-24 | Stop reason: HOSPADM

## 2021-07-23 RX ORDER — OXYCODONE HYDROCHLORIDE AND ACETAMINOPHEN 5; 325 MG/1; MG/1
1 TABLET ORAL EVERY 4 HOURS PRN
Status: DISCONTINUED | OUTPATIENT
Start: 2021-07-23 | End: 2021-07-24 | Stop reason: HOSPADM

## 2021-07-23 RX ORDER — 0.9 % SODIUM CHLORIDE 0.9 %
80 INTRAVENOUS SOLUTION INTRAVENOUS ONCE
Status: COMPLETED | OUTPATIENT
Start: 2021-07-23 | End: 2021-07-23

## 2021-07-23 RX ORDER — SODIUM CHLORIDE 0.9 % (FLUSH) 0.9 %
5-40 SYRINGE (ML) INJECTION PRN
Status: DISCONTINUED | OUTPATIENT
Start: 2021-07-23 | End: 2021-07-24 | Stop reason: HOSPADM

## 2021-07-23 RX ORDER — BUSPIRONE HYDROCHLORIDE 10 MG/1
10 TABLET ORAL 3 TIMES DAILY
Status: DISCONTINUED | OUTPATIENT
Start: 2021-07-23 | End: 2021-07-24 | Stop reason: HOSPADM

## 2021-07-23 RX ORDER — DOXYCYCLINE 100 MG/1
100 CAPSULE ORAL 2 TIMES DAILY
Status: DISCONTINUED | OUTPATIENT
Start: 2021-07-23 | End: 2021-07-24 | Stop reason: HOSPADM

## 2021-07-23 RX ORDER — ONDANSETRON 2 MG/ML
8 INJECTION INTRAMUSCULAR; INTRAVENOUS ONCE
Status: COMPLETED | OUTPATIENT
Start: 2021-07-23 | End: 2021-07-23

## 2021-07-23 RX ORDER — 0.9 % SODIUM CHLORIDE 0.9 %
1000 INTRAVENOUS SOLUTION INTRAVENOUS ONCE
Status: COMPLETED | OUTPATIENT
Start: 2021-07-23 | End: 2021-07-23

## 2021-07-23 RX ORDER — ASPIRIN 325 MG
325 TABLET ORAL ONCE
Status: COMPLETED | OUTPATIENT
Start: 2021-07-23 | End: 2021-07-23

## 2021-07-23 RX ORDER — CEFUROXIME AXETIL 250 MG/1
500 TABLET ORAL 2 TIMES DAILY
Status: DISCONTINUED | OUTPATIENT
Start: 2021-07-23 | End: 2021-07-24 | Stop reason: HOSPADM

## 2021-07-23 RX ORDER — ACETAMINOPHEN 650 MG/1
650 SUPPOSITORY RECTAL EVERY 6 HOURS PRN
Status: DISCONTINUED | OUTPATIENT
Start: 2021-07-23 | End: 2021-07-24 | Stop reason: HOSPADM

## 2021-07-23 RX ORDER — SODIUM CHLORIDE 0.9 % (FLUSH) 0.9 %
5-40 SYRINGE (ML) INJECTION EVERY 12 HOURS SCHEDULED
Status: DISCONTINUED | OUTPATIENT
Start: 2021-07-23 | End: 2021-07-24 | Stop reason: HOSPADM

## 2021-07-23 RX ORDER — POTASSIUM CHLORIDE 7.45 MG/ML
10 INJECTION INTRAVENOUS PRN
Status: DISCONTINUED | OUTPATIENT
Start: 2021-07-23 | End: 2021-07-24 | Stop reason: HOSPADM

## 2021-07-23 RX ORDER — POLYETHYLENE GLYCOL 3350 17 G/17G
17 POWDER, FOR SOLUTION ORAL DAILY PRN
Status: DISCONTINUED | OUTPATIENT
Start: 2021-07-23 | End: 2021-07-24 | Stop reason: HOSPADM

## 2021-07-23 RX ORDER — SODIUM CHLORIDE 0.9 % (FLUSH) 0.9 %
10 SYRINGE (ML) INJECTION PRN
Status: DISCONTINUED | OUTPATIENT
Start: 2021-07-23 | End: 2021-07-24 | Stop reason: HOSPADM

## 2021-07-23 RX ORDER — ONDANSETRON 4 MG/1
4 TABLET, ORALLY DISINTEGRATING ORAL EVERY 8 HOURS PRN
Status: DISCONTINUED | OUTPATIENT
Start: 2021-07-23 | End: 2021-07-24 | Stop reason: HOSPADM

## 2021-07-23 RX ORDER — LOSARTAN POTASSIUM 25 MG/1
25 TABLET ORAL DAILY
Status: DISCONTINUED | OUTPATIENT
Start: 2021-07-23 | End: 2021-07-24 | Stop reason: HOSPADM

## 2021-07-23 RX ORDER — SODIUM CHLORIDE 9 MG/ML
25 INJECTION, SOLUTION INTRAVENOUS PRN
Status: DISCONTINUED | OUTPATIENT
Start: 2021-07-23 | End: 2021-07-24 | Stop reason: HOSPADM

## 2021-07-23 RX ADMIN — SODIUM CHLORIDE 1000 ML: 9 INJECTION, SOLUTION INTRAVENOUS at 13:31

## 2021-07-23 RX ADMIN — IOPAMIDOL 75 ML: 755 INJECTION, SOLUTION INTRAVENOUS at 13:49

## 2021-07-23 RX ADMIN — OXYCODONE HYDROCHLORIDE AND ACETAMINOPHEN 1 TABLET: 5; 325 TABLET ORAL at 18:35

## 2021-07-23 RX ADMIN — SODIUM CHLORIDE, PRESERVATIVE FREE 10 ML: 5 INJECTION INTRAVENOUS at 13:49

## 2021-07-23 RX ADMIN — ONDANSETRON 8 MG: 2 INJECTION INTRAMUSCULAR; INTRAVENOUS at 13:30

## 2021-07-23 RX ADMIN — SODIUM CHLORIDE 80 ML: 9 INJECTION, SOLUTION INTRAVENOUS at 13:49

## 2021-07-23 RX ADMIN — HYDROMORPHONE HYDROCHLORIDE 1 MG: 1 INJECTION, SOLUTION INTRAMUSCULAR; INTRAVENOUS; SUBCUTANEOUS at 13:31

## 2021-07-23 RX ADMIN — FAMOTIDINE 20 MG: 10 INJECTION, SOLUTION INTRAVENOUS at 13:30

## 2021-07-23 RX ADMIN — ASPIRIN 325 MG ORAL TABLET 325 MG: 325 PILL ORAL at 15:02

## 2021-07-23 ASSESSMENT — ENCOUNTER SYMPTOMS
DIARRHEA: 1
EYE PAIN: 0
VOMITING: 0
ABDOMINAL PAIN: 1
SHORTNESS OF BREATH: 0
BLOOD IN STOOL: 0
ABDOMINAL DISTENTION: 0
SINUS PRESSURE: 0
VOMITING: 1
CHEST TIGHTNESS: 0
EYE REDNESS: 0
DIARRHEA: 0
SINUS PAIN: 0
CONSTIPATION: 0
NAUSEA: 1
COUGH: 0

## 2021-07-23 ASSESSMENT — PAIN SCALES - GENERAL
PAINLEVEL_OUTOF10: 7
PAINLEVEL_OUTOF10: 9

## 2021-07-23 ASSESSMENT — HEART SCORE: ECG: 1

## 2021-07-23 NOTE — PROGRESS NOTES
Medication History completed:    New medications: spironolactone    Medications discontinued: pantoprazole, esomeprazole, buspirone    Changes to dosing: none    Stated allergies: As listed    Other pertinent information: Medications confirmed with [facility]. The patient started 12 day courses of cefuroxime and doxycycline on 7/15/21.     Thank you,  Francine Gary, PharmD, BCPS  [phone]

## 2021-07-23 NOTE — ED NOTES
Bed: A  Expected date:   Expected time:   Means of arrival:   Comments:     Daryle Coy, RN  07/23/21 0724

## 2021-07-23 NOTE — H&P
[facility]     HISTORY AND PHYSICAL EXAMINATION            Date:   7/23/2021  Patient name:  David Spring  Date of admission:  7/23/2021 12:27 PM  MRN:   [id number]  Account:  [de-identified]  YOB: 1960  PCP:    Evie Durham  Room:   08/08  Code Status:    Prior    Chief Complaint:     Chief Complaint   Patient presents with    Chest Pain    Diarrhea    Heartburn    Abdominal Pain       History Obtained From:     patient, electronic medical record    History of Present Illness: The patient is a 62 y.o. Non- / non  male with history of sleep apnea, hypertension, heartburn who presents with worsening heartburn and chest pain over the last 3 days. Patient was recently admitted to [facility] on 7/13/2021 for right elbow abscess status post triceps tendon repair. He was discharged on cefuroxime, doxycycline. Patient had EGD/colonoscopy scheduled with GI this past February, had to reschedule due to weather. Scopes have not been performed as of yet. Patient states that he has had heartburn to some degree since the age of 24. He has periodic worsening of the symptoms, but usually uses Mylanta to alleviate symptoms. Current episode is composed of burning epigastric pain that radiates up the neck and has not been alleviated by omeprazole, pantoprazole, famotidine, Mylanta. He also experiences episodes of nausea when his heartburn intensifies. Patient ingests a mixture of baking soda and water to induce vomiting, after which he reports the nausea and heartburn are somewhat relieved. Patient states that he is unable to eat most foods without some burning sensation in his throat. Patient denies any hematemesis or blood in stool. Patient has history of cigar smoking, with approximately 30 pack years history.  Alcohol consumption of 5-6 mixed drinks per week. He also uses cannabis daily. In the ED, patient was afebrile, hypertensive 143/86. Glucose 106, troponin 13/12, liver panel unremarkable, WBC 8. Additional work-up included:     EKG normal sinus with possible age-indeterminate inferior/anterior infarcts   Gallbladder ultrasound unremarkable   CT abdomen and pelvis showed colonic diverticulosis without diverticulitis    Patient was given 1 L normal saline bolus, one-time dose aspirin 325 mg, and one-time dose famotidine 20 mg IV. In addition, patient received one-time dose Zofran 8 mg and one-time dose Dilaudid 1 mg. Past Medical History:     Past Medical History:   Diagnosis Date    Abscess of bursa of right elbow     Heartburn     Hypertension     PONV (postoperative nausea and vomiting)     Sleep apnea     no machine        Past SurgicalHistory:     Past Surgical History:   Procedure Laterality Date    APPENDECTOMY      ARM SURGERY Right 02/23/2021    TRICEPS TENDON REPAIR - RIGHT ELBOW WITH ARTHREX AND BIOMET ACHILLES ALLOGRAFT    ARM SURGERY Right 7/13/2021    RIGHT ELBOW IRRIGATION AND DEBRIDEMENT performed by Cuauhtemoc Ellison MD at [facility]      cervical/ lumbar    CARDIAC CATHETERIZATION  2017    [facility]/ no stents    COLONOSCOPY      ENDOSCOPY, COLON, DIAGNOSTIC      HERNIA REPAIR      MUSCLE REPAIR Right 2/23/2021    TRICEPS TENDON REPAIR - RIGHT ELBOW performed by Cuauhtemoc Ellison MD at [facility]      cervical and lumbar    UPPER GASTROINTESTINAL ENDOSCOPY N/A 12/17/2019    EGD BIOPSY performed by Lauri Carty MD at [facility]        Medications Prior to Admission:     Prior to Admission medications    Medication Sig Start Date End Date Taking?  Authorizing Provider   spironolactone (ALDACTONE) 50 MG tablet Take 50 mg by mouth daily   Yes Historical Provider, MD   cefUROXime (CEFTIN) 500 MG tablet Take 1 tablet by mouth 2 times daily for 12 days 7/15/21 7/27/21 Yes Ben Nolan MD   doxycycline hyclate (VIBRA-TABS) 100 MG tablet Take 1 tablet by mouth 2 times daily for 12 days 7/15/21 7/27/21 Yes Ben Nolan MD   irbesartan (AVAPRO) 300 MG tablet Take 300 mg by mouth nightly   Yes Historical Provider, MD   metoprolol succinate (TOPROL XL) 50 MG extended release tablet Take 50 mg by mouth daily   Yes Historical Provider, MD        Allergies:     Pcn [penicillins]    Social History:     Tobacco:    reports that he has been smoking cigars. He has a 20.00 pack-year smoking history. He has never used smokeless tobacco.  Alcohol:      reports current alcohol use. Drug Use:  reports current drug use. Drug: Marijuana. Family History:     Family History   Problem Relation Age of Onset    Cancer Father         prostrate    Diabetes Father     Cancer Paternal Uncle         colon       Review of Systems:     Review of Systems   Constitutional: Negative for chills and fever. HENT: Negative for sinus pressure and sinus pain. Eyes: Negative for pain and redness. Respiratory: Negative for cough, chest tightness and shortness of breath. Cardiovascular: Negative for chest pain and palpitations. Gastrointestinal: Positive for abdominal pain, nausea and vomiting. Negative for abdominal distention, blood in stool, constipation and diarrhea. Genitourinary: Negative for difficulty urinating and dysuria. Neurological: Negative for seizures, speech difficulty, light-headedness and headaches. Psychiatric/Behavioral: Negative for agitation and behavioral problems. Physical Exam:   BP (!) 143/86   Pulse 81   Temp 98.2 °F (36.8 °C)   Resp 18   Ht 5' 10\" (1.778 m)   SpO2 99%   BMI 27.98 kg/m²   Temp (24hrs), Av.2 °F (36.8 °C), Min:98.2 °F (36.8 °C), Max:98.2 °F (36.8 °C)    No results for input(s): POCGLU in the last 72 hours.     Intake/Output Summary (Last 24 hours) at 2021 6834  Last data filed at 2021 2213  Gross per 6. 20 1.3 - 9.1 k/uL    Absolute Lymph # 0.90 (L) 1.0 - 4.8 k/uL    Absolute Mono # 0.90 0.1 - 1.3 k/uL    Absolute Eos # 0.10 0.0 - 0.4 k/uL    Basophils Absolute 0.10 0.0 - 0.2 k/uL    Absolute Immature Granulocyte NOT REPORTED 0.00 - 0.30 k/uL    WBC Morphology NOT REPORTED     RBC Morphology NOT REPORTED     Platelet Estimate NOT REPORTED    Comprehensive Metabolic Panel    Collection Time: 07/23/21 12:51 PM   Result Value Ref Range    Glucose 106 (H) 70 - 99 mg/dL    BUN 17 6 - 20 mg/dL    CREATININE 0.92 0.70 - 1.20 mg/dL    Bun/Cre Ratio NOT REPORTED 9 - 20    Calcium 11.0 (H) 8.6 - 10.4 mg/dL    Sodium 136 135 - 144 mmol/L    Potassium 4.4 3.7 - 5.3 mmol/L    Chloride 101 98 - 107 mmol/L    CO2 24 20 - 31 mmol/L    Anion Gap 11 9 - 17 mmol/L    Alkaline Phosphatase 51 40 - 129 U/L    ALT 40 5 - 41 U/L    AST 26 <40 U/L    Total Bilirubin 0.59 0.3 - 1.2 mg/dL    Total Protein 7.0 6.4 - 8.3 g/dL    Albumin 4.5 3.5 - 5.2 g/dL    Albumin/Globulin Ratio NOT REPORTED 1.0 - 2.5    GFR Non-African American >60 >60 mL/min    GFR African American >60 >60 mL/min    GFR Comment          GFR Staging NOT REPORTED    Lipase    Collection Time: 07/23/21 12:51 PM   Result Value Ref Range    Lipase 57 13 - 60 U/L   Magnesium    Collection Time: 07/23/21 12:51 PM   Result Value Ref Range    Magnesium 2.0 1.6 - 2.6 mg/dL   Troponin    Collection Time: 07/23/21 12:51 PM   Result Value Ref Range    Troponin, High Sensitivity 12 0 - 22 ng/L    Troponin T NOT REPORTED <0.03 ng/mL    Troponin Interp NOT REPORTED    EKG 12 Lead    Collection Time: 07/23/21  1:27 PM   Result Value Ref Range    Ventricular Rate 75 BPM    Atrial Rate 75 BPM    P-R Interval 176 ms    QRS Duration 102 ms    Q-T Interval 350 ms    QTc Calculation (Bazett) 390 ms    P Axis 4 degrees    R Axis 0 degrees    T Axis 44 degrees   Troponin    Collection Time: 07/23/21  3:15 PM   Result Value Ref Range    Troponin, High Sensitivity 13 0 - 22 ng/L    Troponin T NOT REPORTED <0.03 ng/mL    Troponin Interp NOT REPORTED        Imaging/Diagnostics:  CT ABDOMEN PELVIS W IV CONTRAST Additional Contrast? None    Result Date: 7/23/2021  EXAMINATION: CT OF THE ABDOMEN AND PELVIS WITH CONTRAST 7/23/2021 1:29 pm TECHNIQUE: CT of the abdomen and pelvis was performed with the administration of intravenous contrast. Multiplanar reformatted images are provided for review. Dose modulation, iterative reconstruction, and/or weight based adjustment of the mA/kV was utilized to reduce the radiation dose to as low as reasonably achievable. COMPARISON: None. HISTORY: ORDERING SYSTEM PROVIDED HISTORY: RUQ pain TECHNOLOGIST PROVIDED HISTORY: RUQ pain Decision Support Exception - unselect if not a suspected or confirmed emergency medical condition->Emergency Medical Condition (MA) Reason for Exam: CHEST PAIN, ABDPAIN, HEARTBURN FINDINGS: Lower Chest: Minimal dependent atelectasis. Mild cardiomegaly. No effusion. Organs: Liver, gallbladder, spleen, pancreas, kidneys and adrenals demonstrate no acute abnormality. Small nonobstructing intrarenal stone bilaterally. Simple right renal cysts. Additional subcentimeter hypo attenuate foci of the kidneys bilaterally are too small to definitively characterize but also most likely represents simple cysts. GI/Bowel: Status post appendectomy. No abnormal bowel dilatation or wall thickening. Left-sided predominant colonic diverticulosis Pelvis: Urinary bladder within normal limits. Peritoneum/Retroperitoneum: No free fluid. No retroperitoneal adenopathy. Bones/Soft Tissues: No acute abnormality identified. No aggressive osseous lesions. Right unilateral L4-5 posterior spinal fusion at L4 laminectomy. Hardware is intact. No acute intra-abdominal pathology identified. Small nonobstructing intrarenal stone bilaterally. Colonic diverticulosis without evidence of acute diverticulitis.      US GALLBLADDER RUQ    Result Date: 7/23/2021  EXAMINATION: RIGHT partial tearing at the origin of the common flexor tendon. MEDIAL COLLATERAL LIGAMENT:  The ulnar collateral ligament, including the anterior and posterior bands, is intact and unremarkable in appearance. LATERAL COLLATERAL LIGAMENT:  The lateral collateral ligaments including the lateral ulnar collateral ligament are intact and unremarkable in appearance. MUSCLES / TENDONS: Evidence of triceps tendon repair with high-grade partial tearing of the distal triceps tendon insertion. Susceptibility artifact is seen at the proximal olecranon on image 11, series 12 and image 16, series 8. This is consistent with prior triceps tendon anchoring at the olecranon. Distal biceps tendon and brachialis tendons are seen in their expected locations without evidence of tearing. ULNAR NERVE:  The ulnar nerve is normally located in the ulnar sulcus and is unremarkable in appearance. JOINT SPACES: Small joint effusion. No intra-articular loose body is evident. 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 Moderate degenerative changes of the radiohumeral joint. Mild degenerative changes of the ulnohumeral joint. BONE MARROW: Osseous alignment is normal. No acute fracture or dislocation. Nonspecific marrow edema within the olecranon process. Surgical defect in the olecranon from prior triceps tendon repair. No marginal erosions. Bone marrow signal intensity within the remaining visualized osseous structures otherwise grossly unremarkable. SOFT TISSUES: Susceptibility artifact is seen in the posterior elbow at the level of the distal triceps tendon which could be related to soft tissue gas versus artifact from surgery. Fluid and edema in the subcutaneous fat of the posterior elbow in the region of the olecranon bursa. 1. Susceptibility artifact along the distal triceps tendon which could be related to soft tissue gas versus prior triceps tendon repair.   Surgical defect in the olecranon from prior triceps tendon repair. Marrow edema in the olecranon process which is nonspecific. Differential considerations include stress related marrow edema versus infection/osteomyelitis depending upon the clinical setting. Nonspecific fluid in the olecranon bursa which could represent phlegmonous change/cellulitis. 2. Mild ulnohumeral chondromalacia. Severe radiohumeral chondromalacia. Degenerative changes of the radiohumeral and ulnohumeral joints as detailed above. 3. Small joint effusion. 4. Intermediate grade partial tearing at the origin of the common flexor tendon. 5. Intermediate grade partial tearing at the origin of the common extensor tendon. The findings were sent to the Radiology Results [address] at 1:49 pm on 6/28/2021to be communicated to a licensed caregiver.        Assessment :      Primary Problem  Heartburn    Active Hospital Problems    Diagnosis Date Noted    Essential hypertension [I10] 07/23/2021    Rupture of right triceps tendon [S46.311A] 02/11/2021    Heartburn [R12]        Plan:     Patient status Admit as inpatient in the  Med/Surge    Epigastric pain presumed secondary to gastroesophageal reflux  - RUQ ultrasound unremarkable  - CT abdomen pelvis showed diverticulosis  - Patient received one-time dose famotidine 20 mg IV and Dilaudid 1 mg IV in ED  - Continuous normal saline at 25 mL/h  - Started Protonix 40 mg daily  - Percocet every 4 hours as needed for pain  - H. pylori antigen ordered  - GI consulted    Right elbow abscess status post triceps tendon repair  - Continue Ceftin 500 mg twice daily  - Continue doxycycline hyclate 100 mg twice daily    Essential hypertension  - Losartan 25 mg daily    Depression  - Continue home buspirone 10 mg 3 times daily    DVT prophylaxis: Lovenox 40 mg daily  GI prophylaxis: Protonix 40 mg daily  Code: Full  Dispo: Home    Consultations:   IP CONSULT TO INTERNAL MEDICINE  IP CONSULT TO GI  IP CONSULT TO SOCIAL WORK    Patient is admitted as

## 2021-07-23 NOTE — ED PROVIDER NOTES
EMERGENCY DEPARTMENT ENCOUNTER    Pt Name: Korey Falcon  MRN: [id number]  [place] 1960  Date of evaluation: 7/23/21  CHIEF COMPLAINT       Chief Complaint   Patient presents with    Chest Pain    Diarrhea    Heartburn    Abdominal Pain     HISTORY OF PRESENT ILLNESS     Abdominal Pain  Pain location:  RUQ  Pain quality: aching    Pain radiates to:  Chest  Pain severity:  Severe  Onset quality:  Gradual  Timing:  Constant  Progression:  Worsening  Chronicity:  Recurrent  Relieved by:  Nothing  Worsened by:  Nothing  Associated symptoms: diarrhea and nausea    Associated symptoms: no vomiting      Burning upper stomach and chest pains  Intermittent for months, getting worse  RUQ pains  REVIEW OF SYSTEMS     Review of Systems   Gastrointestinal: Positive for abdominal pain, diarrhea and nausea. Negative for vomiting. All other systems reviewed and are negative.     PASTMEDICAL HISTORY     Past Medical History:   Diagnosis Date    Abscess of bursa of right elbow     Heartburn     Hypertension     PONV (postoperative nausea and vomiting)     Sleep apnea     no machine     Past Problem List  Patient Active Problem List   Diagnosis Code    Heartburn R12    Rupture of right triceps tendon S46.311A    Abscess of right elbow L02.413     SURGICAL HISTORY       Past Surgical History:   Procedure Laterality Date    APPENDECTOMY      ARM SURGERY Right 02/23/2021    TRICEPS TENDON REPAIR - RIGHT ELBOW WITH ARTHREX AND BIOMET ACHILLES ALLOGRAFT    ARM SURGERY Right 7/13/2021    RIGHT ELBOW IRRIGATION AND DEBRIDEMENT performed by Peg Green MD at [facility]      cervical/ lumbar    [facility]    COLONOSCOPY      ENDOSCOPY, COLON, DIAGNOSTIC      HERNIA REPAIR      MUSCLE REPAIR Right 2/23/2021    TRICEPS TENDON REPAIR - RIGHT ELBOW performed by Peg Green MD at [address] [place] [facility] SPINE SURGERY      cervical and lumbar  UPPER GASTROINTESTINAL ENDOSCOPY N/A 2019    EGD BIOPSY performed by Yolis Wilson MD at [address]       Previous Medications    BUSPIRONE (BUSPAR) 10 MG TABLET    Take 10 mg by mouth 3 times daily    CEFUROXIME (CEFTIN) 500 MG TABLET    Take 1 tablet by mouth 2 times daily for 12 days    DOXYCYCLINE HYCLATE (VIBRA-TABS) 100 MG TABLET    Take 1 tablet by mouth 2 times daily for 12 days    ESOMEPRAZOLE (NEXIUM) 40 MG DELAYED RELEASE CAPSULE    Take 1 capsule by mouth 2 times daily    IRBESARTAN (AVAPRO) 300 MG TABLET    Take 300 mg by mouth nightly    METOPROLOL SUCCINATE (TOPROL XL) 50 MG EXTENDED RELEASE TABLET    Take 50 mg by mouth daily    PANTOPRAZOLE (PROTONIX) 40 MG TABLET    Take 1 tablet by mouth every morning (before breakfast)     ALLERGIES     is allergic to pcn [penicillins]. FAMILY HISTORY     He indicated that his mother is alive. He indicated that his father is alive. He indicated that the status of his paternal uncle is unknown. SOCIAL HISTORY       Social History     Tobacco Use    Smoking status: Light Tobacco Smoker     Packs/day: 1.00     Years: 20.00     Pack years: 20.00     Types: Cigars     Last attempt to quit: 3/15/2008     Years since quittin.3    Smokeless tobacco: Never Used    Tobacco comment: no cigarettes for 20 years   Vaping Use    Vaping Use: Never used   Substance Use Topics    Alcohol use: Yes     Comment: socially    Drug use: Yes     Types: Marijuana     Comment: sometimes nightly/ oral or smokes     PHYSICAL EXAM     INITIAL VITALS: BP (!) 143/86   Pulse 81   Temp 98.2 °F (36.8 °C)   Resp 18   Ht 5' 10\" (1.778 m)   SpO2 99%   BMI 27.98 kg/m²    Physical Exam  Constitutional:       General: He is not in acute distress. Appearance: Normal appearance. He is well-developed. He is not diaphoretic. HENT:      Head: Normocephalic and atraumatic.       Right Ear: External ear normal.      Left Ear: External ear normal. Nose: Nose normal. No congestion. Mouth/Throat:      Mouth: Mucous membranes are moist.      Pharynx: Oropharynx is clear. Eyes:      General:         Right eye: No discharge. Left eye: No discharge. Conjunctiva/sclera: Conjunctivae normal.      Pupils: Pupils are equal, round, and reactive to light. Neck:      Trachea: No tracheal deviation. Cardiovascular:      Rate and Rhythm: Normal rate and regular rhythm. Pulses: Normal pulses. Heart sounds: Normal heart sounds. Pulmonary:      Effort: Pulmonary effort is normal. No respiratory distress. Breath sounds: Normal breath sounds. No stridor. No wheezing or rales. Abdominal:      Palpations: Abdomen is soft. Tenderness: There is abdominal tenderness in the right upper quadrant. There is no guarding or rebound. Musculoskeletal:         General: No tenderness or deformity. Normal range of motion. Cervical back: Normal range of motion and neck supple. Skin:     General: Skin is warm and dry. Capillary Refill: Capillary refill takes less than 2 seconds. Findings: No erythema or rash. Neurological:      General: No focal deficit present. Mental Status: He is alert and oriented to person, place, and time. Cranial Nerves: No cranial nerve deficit. Coordination: Coordination normal.   Psychiatric:         Mood and Affect: Mood normal.         Behavior: Behavior normal.         Thought Content:  Thought content normal.         Judgment: Judgment normal.         MEDICAL DECISION MAKING:   HEART score 4  Calling medicine for admit, and possible GI eval also, may need an EGD  2:19 PM EDT  KEYANNA Ford for admit also  David Kelly given       Procedures    DIAGNOSTIC RESULTS   EKG:All EKG's are interpreted by the Emergency Department Physician who either signs or Co-signs this chart in the absence of a cardiologist.  NSR, nonspecific changes, no acute ischemic changes on ST segments, no change compared to old, normal rate and normal intervals        RADIOLOGY:All plain film, CT, MRI, and formal ultrasound images (except ED bedside ultrasound) are read by the radiologist, see reports below, unless otherwisenoted in MDM or here. CT ABDOMEN PELVIS W IV CONTRAST Additional Contrast? None   Final Result   No acute intra-abdominal pathology identified. Small nonobstructing intrarenal stone bilaterally. Colonic diverticulosis without evidence of acute diverticulitis. US GALLBLADDER RUQ   Final Result   No cholelithiasis or ultrasound evidence cholecystitis; small right renal   cyst and suboptimal visualization pancreas. Otherwise, unremarkable right   upper quadrant ultrasound. LABS: All lab results were reviewed by myself, and all abnormals are listed below.   Labs Reviewed   CBC WITH AUTO DIFFERENTIAL - Abnormal; Notable for the following components:       Result Value    RBC 5.93 (*)     Hemoglobin 18.8 (*)     Hematocrit 55.6 (*)     RDW 15.7 (*)     Seg Neutrophils 76 (*)     Lymphocytes 11 (*)     Monocytes 11 (*)     Absolute Lymph # 0.90 (*)     All other components within normal limits   COMPREHENSIVE METABOLIC PANEL - Abnormal; Notable for the following components:    Glucose 106 (*)     Calcium 11.0 (*)     All other components within normal limits   LIPASE   MAGNESIUM   TROPONIN   TROPONIN       EMERGENCY DEPARTMENTCOURSE:         Vitals:    Vitals:    07/23/21 1154   BP: (!) 143/86   Pulse: 81   Resp: 18   Temp: 98.2 °F (36.8 °C)   SpO2: 99%   Height: 5' 10\" (1.778 m)       The patient was given the following medications while in the emergency department:  Orders Placed This Encounter   Medications    0.9 % sodium chloride bolus    HYDROmorphone (DILAUDID) injection 1 mg    famotidine (PEPCID) injection 20 mg    ondansetron (ZOFRAN) injection 8 mg    sodium chloride flush 0.9 % injection 10 mL    0.9 % sodium chloride bolus    iopamidol (ISOVUE-370) 76 % injection 75 mL    aspirin tablet 325 mg     CONSULTS:  IP CONSULT TO INTERNAL MEDICINE    FINAL IMPRESSION      1. Chest pain, unspecified type          DISPOSITION/PLAN   DISPOSITION        PATIENT REFERRED TO:  No follow-up provider specified.   DISCHARGE MEDICATIONS:  New Prescriptions    No medications on file     Cam King MD  Attending Emergency Physician                    Cam King MD  07/23/21 [id number]

## 2021-07-23 NOTE — ED NOTES
Report given to [facility], RN from ED. Report method in person   The following was reviewed with receiving RN:   Current vital signs:  BP (!) 128/90   Pulse 83   Temp 98.2 °F (36.8 °C)   Resp 11   Ht 5' 10\" (1.778 m)   SpO2 95%   BMI 27.98 kg/m²                MEWS Score: 1     Any medication or safety alerts were reviewed. Any pending diagnostics and notifications were also reviewed, as well as any safety concerns or issues, abnormal labs, abnormal imaging, and abnormal assessment findings. Questions were answered.           Mj Musa RN  07/23/21 9815

## 2021-07-23 NOTE — ED NOTES
Checked on patient. Patient requesting something for pain. This RN informed patient doctors are being consulted for orders.       Melissa Cerda RN  07/23/21 5700

## 2021-07-23 NOTE — Clinical Note
Patient Class: Observation [104]   REQUIRED: Diagnosis: Chest pain [531326]   Estimated Length of Stay: Estimated stay of less than 2 midnights   Admitting Provider: Carla Juarez [[id number]]   Preferred Department: PCU   Telemetry/Cardiac Monitoring Required?: Yes

## 2021-07-23 NOTE — TELEPHONE ENCOUNTER
Pt LVM stating he is not feeling well, Thinks he is having gallbladder attack; he may go to ER. He would like a call back. Called both numbers listed, no answer and mail boxes are full.

## 2021-07-24 VITALS
OXYGEN SATURATION: 99 % | HEIGHT: 70 IN | TEMPERATURE: 98.2 F | HEART RATE: 76 BPM | WEIGHT: 195 LBS | BODY MASS INDEX: 27.92 KG/M2 | DIASTOLIC BLOOD PRESSURE: 84 MMHG | RESPIRATION RATE: 16 BRPM | SYSTOLIC BLOOD PRESSURE: 132 MMHG

## 2021-07-24 LAB
ABSOLUTE EOS #: 0.3 K/UL (ref 0–0.4)
ABSOLUTE IMMATURE GRANULOCYTE: ABNORMAL K/UL (ref 0–0.3)
ABSOLUTE LYMPH #: 1.2 K/UL (ref 1–4.8)
ABSOLUTE MONO #: 0.7 K/UL (ref 0.1–1.3)
ANION GAP SERPL CALCULATED.3IONS-SCNC: 7 MMOL/L (ref 9–17)
BASOPHILS # BLD: 1 % (ref 0–2)
BASOPHILS ABSOLUTE: 0.1 K/UL (ref 0–0.2)
BUN BLDV-MCNC: 19 MG/DL (ref 6–20)
BUN/CREAT BLD: ABNORMAL (ref 9–20)
CALCIUM SERPL-MCNC: 9.3 MG/DL (ref 8.6–10.4)
CHLORIDE BLD-SCNC: 102 MMOL/L (ref 98–107)
CO2: 30 MMOL/L (ref 20–31)
CREAT SERPL-MCNC: 1.06 MG/DL (ref 0.7–1.2)
DIFFERENTIAL TYPE: ABNORMAL
EKG ATRIAL RATE: 75 BPM
EKG P AXIS: 4 DEGREES
EKG P-R INTERVAL: 176 MS
EKG Q-T INTERVAL: 350 MS
EKG QRS DURATION: 102 MS
EKG QTC CALCULATION (BAZETT): 390 MS
EKG R AXIS: 0 DEGREES
EKG T AXIS: 44 DEGREES
EKG VENTRICULAR RATE: 75 BPM
EOSINOPHILS RELATIVE PERCENT: 4 % (ref 0–4)
GFR AFRICAN AMERICAN: >60 ML/MIN
GFR NON-AFRICAN AMERICAN: >60 ML/MIN
GFR SERPL CREATININE-BSD FRML MDRD: ABNORMAL ML/MIN/{1.73_M2}
GFR SERPL CREATININE-BSD FRML MDRD: ABNORMAL ML/MIN/{1.73_M2}
GLUCOSE BLD-MCNC: 104 MG/DL (ref 70–99)
HCT VFR BLD CALC: 51.9 % (ref 41–53)
HEMOGLOBIN: 17.6 G/DL (ref 13.5–17.5)
IMMATURE GRANULOCYTES: ABNORMAL %
LYMPHOCYTES # BLD: 19 % (ref 24–44)
MCH RBC QN AUTO: 32.2 PG (ref 26–34)
MCHC RBC AUTO-ENTMCNC: 33.9 G/DL (ref 31–37)
MCV RBC AUTO: 94.9 FL (ref 80–100)
MONOCYTES # BLD: 11 % (ref 1–7)
NRBC AUTOMATED: ABNORMAL PER 100 WBC
PDW BLD-RTO: 15.9 % (ref 11.5–14.9)
PLATELET # BLD: 272 K/UL (ref 150–450)
PLATELET ESTIMATE: ABNORMAL
PMV BLD AUTO: 6.6 FL (ref 6–12)
POTASSIUM SERPL-SCNC: 5 MMOL/L (ref 3.7–5.3)
RBC # BLD: 5.47 M/UL (ref 4.5–5.9)
RBC # BLD: ABNORMAL 10*6/UL
SEG NEUTROPHILS: 65 % (ref 36–66)
SEGMENTED NEUTROPHILS ABSOLUTE COUNT: 4 K/UL (ref 1.3–9.1)
SODIUM BLD-SCNC: 139 MMOL/L (ref 135–144)
WBC # BLD: 6.2 K/UL (ref 3.5–11)
WBC # BLD: ABNORMAL 10*3/UL

## 2021-07-24 PROCEDURE — 36415 COLL VENOUS BLD VENIPUNCTURE: CPT

## 2021-07-24 PROCEDURE — 96372 THER/PROPH/DIAG INJ SC/IM: CPT

## 2021-07-24 PROCEDURE — 99217 PR OBSERVATION CARE DISCHARGE MANAGEMENT: CPT | Performed by: INTERNAL MEDICINE

## 2021-07-24 PROCEDURE — 6370000000 HC RX 637 (ALT 250 FOR IP)

## 2021-07-24 PROCEDURE — 80048 BASIC METABOLIC PNL TOTAL CA: CPT

## 2021-07-24 PROCEDURE — 6370000000 HC RX 637 (ALT 250 FOR IP): Performed by: EMERGENCY MEDICINE

## 2021-07-24 PROCEDURE — 85025 COMPLETE CBC W/AUTO DIFF WBC: CPT

## 2021-07-24 PROCEDURE — 93010 ELECTROCARDIOGRAM REPORT: CPT | Performed by: INTERNAL MEDICINE

## 2021-07-24 PROCEDURE — 2580000003 HC RX 258

## 2021-07-24 PROCEDURE — 6370000000 HC RX 637 (ALT 250 FOR IP): Performed by: INTERNAL MEDICINE

## 2021-07-24 PROCEDURE — 6360000002 HC RX W HCPCS

## 2021-07-24 PROCEDURE — G0378 HOSPITAL OBSERVATION PER HR: HCPCS

## 2021-07-24 RX ORDER — PANTOPRAZOLE SODIUM 40 MG/1
40 TABLET, DELAYED RELEASE ORAL
Status: CANCELLED | OUTPATIENT
Start: 2021-07-24

## 2021-07-24 RX ORDER — ALPRAZOLAM 0.25 MG/1
0.5 TABLET ORAL ONCE
Status: DISCONTINUED | OUTPATIENT
Start: 2021-07-24 | End: 2021-07-24 | Stop reason: HOSPADM

## 2021-07-24 RX ORDER — PANTOPRAZOLE SODIUM 40 MG/1
40 TABLET, DELAYED RELEASE ORAL 2 TIMES DAILY
Qty: 30 TABLET | Refills: 3 | Status: SHIPPED | OUTPATIENT
Start: 2021-07-24 | End: 2022-01-14

## 2021-07-24 RX ORDER — SPIRONOLACTONE 25 MG/1
50 TABLET ORAL DAILY
Status: CANCELLED | OUTPATIENT
Start: 2021-07-24

## 2021-07-24 RX ADMIN — Medication 3 MG: at 01:33

## 2021-07-24 RX ADMIN — PANTOPRAZOLE SODIUM 40 MG: 40 TABLET, DELAYED RELEASE ORAL at 09:05

## 2021-07-24 RX ADMIN — OXYCODONE HYDROCHLORIDE AND ACETAMINOPHEN 1 TABLET: 5; 325 TABLET ORAL at 10:05

## 2021-07-24 RX ADMIN — Medication 10 ML: at 09:07

## 2021-07-24 RX ADMIN — LOSARTAN POTASSIUM 25 MG: 25 TABLET, FILM COATED ORAL at 01:34

## 2021-07-24 RX ADMIN — PANTOPRAZOLE SODIUM 40 MG: 40 TABLET, DELAYED RELEASE ORAL at 00:03

## 2021-07-24 RX ADMIN — OXYCODONE HYDROCHLORIDE AND ACETAMINOPHEN 1 TABLET: 5; 325 TABLET ORAL at 13:45

## 2021-07-24 RX ADMIN — DOXYCYCLINE 100 MG: 100 CAPSULE ORAL at 09:05

## 2021-07-24 RX ADMIN — DOXYCYCLINE 100 MG: 100 CAPSULE ORAL at 00:04

## 2021-07-24 RX ADMIN — BUSPIRONE HYDROCHLORIDE 10 MG: 10 TABLET ORAL at 00:04

## 2021-07-24 RX ADMIN — BUSPIRONE HYDROCHLORIDE 10 MG: 10 TABLET ORAL at 09:05

## 2021-07-24 RX ADMIN — ENOXAPARIN SODIUM 40 MG: 40 INJECTION SUBCUTANEOUS at 01:33

## 2021-07-24 RX ADMIN — LOSARTAN POTASSIUM 25 MG: 25 TABLET, FILM COATED ORAL at 09:05

## 2021-07-24 RX ADMIN — OXYCODONE HYDROCHLORIDE AND ACETAMINOPHEN 1 TABLET: 5; 325 TABLET ORAL at 00:04

## 2021-07-24 RX ADMIN — CEFUROXIME AXETIL 500 MG: 250 TABLET ORAL at 00:04

## 2021-07-24 RX ADMIN — CEFUROXIME AXETIL 500 MG: 250 TABLET ORAL at 09:05

## 2021-07-24 ASSESSMENT — ENCOUNTER SYMPTOMS
SINUS PRESSURE: 0
NAUSEA: 1
SHORTNESS OF BREATH: 0
COUGH: 0
EYE PAIN: 0
ABDOMINAL DISTENTION: 0
ABDOMINAL PAIN: 0
EYE REDNESS: 0
SINUS PAIN: 0
CHEST TIGHTNESS: 0

## 2021-07-24 ASSESSMENT — PAIN SCALES - GENERAL
PAINLEVEL_OUTOF10: 8
PAINLEVEL_OUTOF10: 9
PAINLEVEL_OUTOF10: 9

## 2021-07-24 NOTE — PROGRESS NOTES
[address] [employer]    PROGRESS NOTE             7/24/2021    8:43 AM    Name:   Colette Ramsey  MRN:     [id number]     Acct:      [de-identified]   Room:   A/A  IP Day:  0  Admit Date:  7/23/2021 12:27 PM    PCP:  Uziel Hackett  Code Status:  Full Code    Subjective:     C/C:   Chief Complaint   Patient presents with    Chest Pain    Diarrhea    Heartburn    Abdominal Pain     Interval History Status: improved. Seen and examined this morning in the emergency department. No acute events overnight, in no acute distress. Patient reports adequate pain control with p.o. Percocet. He is anxious to be in contact with GI. Further chart review revealed previous EGD in 12/2019 which was significant for mass-effect on posterior aspect of esophagus, and leftward esophageal deviation. Follow-up imaging was recommended with CT at the time, but was never pursued by patient. Patient amenable to pursuing EGD as outpatient. Brief History:     The patient is a 62 y.o. Non- / non  male with history of sleep apnea, hypertension, heartburn who presents with worsening heartburn and chest pain over the last 3 days.     Patient was recently admitted to [address] [place] [address] on 7/13/2021 for right elbow abscess status post triceps tendon repair. He was discharged on cefuroxime, doxycycline. Patient had EGD/colonoscopy scheduled with GI this past February, had to reschedule due to weather. Scopes have not been performed as of yet.     Patient states that he has had heartburn to some degree since the age of 24. He has periodic worsening but usually uses Mylanta (magnesium hydroxide) to alleviate symptoms. Current episode is composed of burning epigastric pain that radiates up the neck and has not been alleviated by omeprazole, pantoprazole, famotidine, Mylanta. He also experiences episodes of nausea when his heartburn intensifies.  Patient ingests a mixture of baking soda and water to induce vomiting, after which he reports the nausea and heartburn are somewhat relieved. Patient states that he is unable to eat most foods without some burning sensation in his throat. Patient denies any hematemesis or blood in stool.     Patient has history of cigar smoking, with approximately 30 pack years history. Alcohol consumption of 5-6 mixed drinks per week. He also uses cannabis daily.     In the ED, patient was afebrile, hypertensive 143/86. Glucose 106, troponin 13/12, liver panel unremarkable, WBC 8. Additional work-up included:                 EKG normal sinus with possible age-indeterminate inferior/anterior infarcts              Gallbladder ultrasound unremarkable              CT abdomen and pelvis showed colonic diverticulosis without diverticulitis     Patient was given 1 L normal saline bolus, one-time dose aspirin 325 mg, and one-time dose famotidine 20 mg IV. In addition, patient received one-time dose Zofran 8 mg and one-time dose Dilaudid 1 mg. Review of Systems:     Review of Systems   Constitutional: Negative for chills and fever. HENT: Negative for sinus pressure and sinus pain. Eyes: Negative for pain and redness. Respiratory: Negative for cough, chest tightness and shortness of breath. Cardiovascular: Negative for chest pain and palpitations. Gastrointestinal: Positive for nausea. Negative for abdominal distention and abdominal pain. Genitourinary: Negative for difficulty urinating and dysuria. Neurological: Negative for seizures, speech difficulty, light-headedness and headaches. Psychiatric/Behavioral: Negative for agitation and behavioral problems. Medications: Allergies:     Allergies   Allergen Reactions    Pcn [Penicillins] Hives       Current Meds:   Scheduled Meds:    busPIRone  10 mg Oral TID    pantoprazole  40 mg Oral QAM AC    losartan  25 mg Oral Daily    sodium chloride flush  5-40 mL Intravenous 2 times per day  enoxaparin  40 mg Subcutaneous Daily    doxycycline monohydrate  100 mg Oral BID    cefUROXime  500 mg Oral BID     Continuous Infusions:    sodium chloride       PRN Meds: sodium chloride flush, sodium chloride flush, sodium chloride, ondansetron **OR** ondansetron, polyethylene glycol, acetaminophen **OR** acetaminophen, potassium chloride **OR** potassium alternative oral replacement **OR** potassium chloride, oxyCODONE-acetaminophen, melatonin    Data:     Past Medical History:   has a past medical history of Abscess of bursa of right elbow, Heartburn, Hypertension, PONV (postoperative nausea and vomiting), and Sleep apnea. Social History:   reports that he has been smoking cigars. He has a 20.00 pack-year smoking history. He has never used smokeless tobacco. He reports current alcohol use. He reports current drug use. Drug: Marijuana. Family History:   Family History   Problem Relation Age of Onset   Becky.Patt Cancer Father         prostrate    Diabetes Father     Cancer Paternal Uncle         colon       Vitals:  /77   Pulse 79   Temp 98.2 °F (36.8 °C)   Resp 21   Ht 5' 10\" (1.778 m)   Wt 195 lb (88.5 kg)   SpO2 99%   BMI 27.98 kg/m²   Temp (24hrs), Av.2 °F (36.8 °C), Min:98.2 °F (36.8 °C), Max:98.2 °F (36.8 °C)    No results for input(s): POCGLU in the last 72 hours. I/O(24Hr):     Intake/Output Summary (Last 24 hours) at 2021 0843  Last data filed at 2021 1455  Gross per 24 hour   Intake 1000 ml   Output --   Net 1000 ml       Labs:  [unfilled]    Lab Results   Component Value Date/Time    SPECIAL NOT REPORTED 2021 11:34 AM     Lab Results   Component Value Date/Time    CULTURE NO GROWTH 5 DAYS 2021 11:34 AM       [unfilled]    Radiology:    CT ABDOMEN PELVIS W IV CONTRAST Additional Contrast? None    Result Date: 2021  EXAMINATION: CT OF THE ABDOMEN AND PELVIS WITH CONTRAST 2021 1:29 pm TECHNIQUE: CT of the abdomen and pelvis was performed with the administration of intravenous contrast. Multiplanar reformatted images are provided for review. Dose modulation, iterative reconstruction, and/or weight based adjustment of the mA/kV was utilized to reduce the radiation dose to as low as reasonably achievable. COMPARISON: None. HISTORY: ORDERING SYSTEM PROVIDED HISTORY: RUQ pain TECHNOLOGIST PROVIDED HISTORY: RUQ pain Decision Support Exception - unselect if not a suspected or confirmed emergency medical condition->Emergency Medical Condition (MA) Reason for Exam: CHEST PAIN, ABDPAIN, HEARTBURN FINDINGS: Lower Chest: Minimal dependent atelectasis. Mild cardiomegaly. No effusion. Organs: Liver, gallbladder, spleen, pancreas, kidneys and adrenals demonstrate no acute abnormality. Small nonobstructing intrarenal stone bilaterally. Simple right renal cysts. Additional subcentimeter hypo attenuate foci of the kidneys bilaterally are too small to definitively characterize but also most likely represents simple cysts. GI/Bowel: Status post appendectomy. No abnormal bowel dilatation or wall thickening. Left-sided predominant colonic diverticulosis Pelvis: Urinary bladder within normal limits. Peritoneum/Retroperitoneum: No free fluid. No retroperitoneal adenopathy. Bones/Soft Tissues: No acute abnormality identified. No aggressive osseous lesions. Right unilateral L4-5 posterior spinal fusion at L4 laminectomy. Hardware is intact. No acute intra-abdominal pathology identified. Small nonobstructing intrarenal stone bilaterally. Colonic diverticulosis without evidence of acute diverticulitis. US GALLBLADDER RUQ    Result Date: 7/23/2021  EXAMINATION: RIGHT UPPER QUADRANT ULTRASOUND 7/23/2021 12:58 pm COMPARISON: None.  HISTORY: ORDERING SYSTEM PROVIDED HISTORY: cholecystitis TECHNOLOGIST PROVIDED HISTORY: cholecystitis Acuity: Acute Type of Exam: Initial FINDINGS: LIVER:  The liver demonstrates unremarkable echogenicity without evidence of intrahepatic biliary ductal dilatation. BILIARY SYSTEM:  Gallbladder is unremarkable without evidence of pericholecystic fluid, wall thickening or stones. Negative sonographic Sánchez's sign. Common bile duct is within normal limits measuring 3.8 mm. RIGHT KIDNEY: The right kidney is grossly unremarkable without evidence of hydronephrosis. Right kidney measures 10.4 x 6.5 x 5.7 cm. Cortical thickness 1.7 cm. Right renal cyst identified measuring 1.5 x 1.6 x 1.6 cm. PANCREAS:  Suboptimal visualization of the pancreas sonographically. OTHER: No evidence of right upper quadrant ascites. No cholelithiasis or ultrasound evidence cholecystitis; small right renal cyst and suboptimal visualization pancreas. Otherwise, unremarkable right upper quadrant ultrasound. MRI ELBOW RIGHT WO CONTRAST    Result Date: 6/28/2021  EXAMINATION: MRI OF THE RIGHT ELBOW WITHOUT CONTRAST, 6/28/2021 12:34 pm TECHNIQUE: Multiplanar multisequence MRI of the right elbow was performed without the administration of intravenous contrast. COMPARISON: MR right elbow from 02/08/2021, plain radiographs of the right elbow from 01/18/2021 HISTORY: ORDERING SYSTEM PROVIDED HISTORY: Abscess of right elbow TECHNOLOGIST PROVIDED HISTORY: Reason for Exam: patient c/o right elbow pain and infection since his surgery in FEB 2021 Additional signs and symptoms: patient c/o right elbow pain and infection since his surgery in  52-year-old male with right elbow pain and infection since surgery FINDINGS: MEDIAL EPICONDYLE: Intermediate grade partial tearing at the origin of the common extensor tendon on image 9, series 10. LATERAL EPICONDYLE: Intermediate grade partial tearing at the origin of the common flexor tendon. MEDIAL COLLATERAL LIGAMENT:  The ulnar collateral ligament, including the anterior and posterior bands, is intact and unremarkable in appearance.  LATERAL COLLATERAL LIGAMENT:  The lateral collateral ligaments including the lateral ulnar collateral ligament are intact and unremarkable in appearance. MUSCLES / TENDONS: Evidence of triceps tendon repair with high-grade partial tearing of the distal triceps tendon insertion. Susceptibility artifact is seen at the proximal olecranon on image 11, series 12 and image 16, series 8. This is consistent with prior triceps tendon anchoring at the olecranon. Distal biceps tendon and brachialis tendons are seen in their expected locations without evidence of tearing. ULNAR NERVE:  The ulnar nerve is normally located in the ulnar sulcus and is unremarkable in appearance. JOINT SPACES: Small joint effusion. No intra-articular loose body is evident. 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 Moderate degenerative changes of the radiohumeral joint. Mild degenerative changes of the ulnohumeral joint. BONE MARROW: Osseous alignment is normal. No acute fracture or dislocation. Nonspecific marrow edema within the olecranon process. Surgical defect in the olecranon from prior triceps tendon repair. No marginal erosions. Bone marrow signal intensity within the remaining visualized osseous structures otherwise grossly unremarkable. SOFT TISSUES: Susceptibility artifact is seen in the posterior elbow at the level of the distal triceps tendon which could be related to soft tissue gas versus artifact from surgery. Fluid and edema in the subcutaneous fat of the posterior elbow in the region of the olecranon bursa. 1. Susceptibility artifact along the distal triceps tendon which could be related to soft tissue gas versus prior triceps tendon repair. Surgical defect in the olecranon from prior triceps tendon repair. Marrow edema in the olecranon process which is nonspecific. Differential considerations include stress related marrow edema versus infection/osteomyelitis depending upon the clinical setting. Nonspecific fluid in the olecranon bursa which could represent phlegmonous change/cellulitis.  2. Mild ulnohumeral chondromalacia. Severe radiohumeral chondromalacia. Degenerative changes of the radiohumeral and ulnohumeral joints as detailed above. 3. Small joint effusion. 4. Intermediate grade partial tearing at the origin of the common flexor tendon. 5. Intermediate grade partial tearing at the origin of the common extensor tendon. The findings were sent to the Radiology Results [address] at 1:49 pm on 6/28/2021to be communicated to a licensed caregiver. Physical Examination:        Physical Exam  Constitutional:       Appearance: Normal appearance. HENT:      Head: Normocephalic and atraumatic. Eyes:      Extraocular Movements: Extraocular movements intact. Conjunctiva/sclera: Conjunctivae normal.   Cardiovascular:      Rate and Rhythm: Normal rate and regular rhythm. Heart sounds: Normal heart sounds. Pulmonary:      Effort: Pulmonary effort is normal. No respiratory distress. Breath sounds: Normal breath sounds. Abdominal:      General: Abdomen is flat. Palpations: Abdomen is soft. Musculoskeletal:         General: Normal range of motion. Neurological:      Mental Status: He is alert and oriented to person, place, and time. Mental status is at baseline.    Psychiatric:         Mood and Affect: Mood normal.         Behavior: Behavior normal.       Assessment:        Primary Problem  Gastritis    Active Hospital Problems    Diagnosis Date Noted    Essential hypertension [I10] 07/23/2021    Gastritis [K29.70] 07/23/2021    Rupture of right triceps tendon [S46.311A] 02/11/2021       Plan:        Chronic gastritis with esophageal reflux  - Patient received one-time dose famotidine 20 mg IV and Dilaudid 1 mg IV in ED  - RUQ ultrasound unremarkable  - CT abdomen pelvis showed diverticulosis  - Continuous normal saline at 25 mL/h  - Protonix 40 mg daily  - Percocet every 4 hours as needed  - H. pylori antigen pending  - Patient kept NPO after midnight for potential EGD on 7/24  - Previous EGD performed 12/2019 showed mass effect/ulceration on posterior aspect of the esophagus, leftward esophageal deviation  - GI consulted   EGD to be performed as outpatient     Right elbow abscess status post triceps tendon repair  - Continue Ceftin 500 mg twice daily  - Continue doxycycline hyclate 100 mg twice daily     Essential hypertension  - Losartan 25 mg daily     Depression  - Continue home buspirone 10 mg 3 times daily     DVT prophylaxis: Lovenox 40 mg daily  GI prophylaxis: Protonix 40 mg daily  Code: Full  Dispo: Apollo Alex MD  7/24/2021  8:43 AM     Attending Physician Statement  I have discussed the care of Gabriel Ellison and I have examined the patient myselft and taken ros and hpi , including pertinent history and exam findings,  with the resident. I have reviewed the key elements of all parts of the encounter with the resident. I agree with the assessment, plan and orders as documented by the resident.   49 - gentleman with history of severe GERD admitted with epigastric pain past eating acid reflux history of cardiac cath no significant blockage followed up with a cardiologist imaging abdomen was negative symptoms are severe GERD versus gastritis versus biliary dyskinesia ultrasound showed no cholecystitis patient is eager to be discharged plan is for outpatient HIDA scan and EGD PPI twice a day avoid any caffeine drinks and no meals 3 hours before sleep  Also discussed case with his wife all questions answered to their satisfaction    Electronically signed by Malia Duong MD

## 2021-07-24 NOTE — ED NOTES
Rn called and spoke with Dr Shameka Nelson with GI. Gi not planning egd over the weekend. Patient reports wanting to complete egd if there's nothing else keeping him admitted he'd rather be discharged and complete egd outpatient.       Arvin Gay RN  07/24/21 2826

## 2021-07-24 NOTE — DISCHARGE INSTR - COC
Continuity of Care Form    Patient Name: Yomi Condon   :  1960  MRN:  [id number]    Admit date:  2021  Discharge date:  ***    Code Status Order: Full Code   Advance Directives:     Admitting Physician:  Homer Nixon MD  PCP: Gwendolyn Fulton    Discharging Nurse: [facility] Unit/Room#: A/A  Discharging Unit Phone Number: ***    Emergency Contact:   Extended Emergency Contact Information  Primary Emergency Contact: [facility] Phone: [phone]  Mobile Phone: [phone]  Relation: Other  Secondary Emergency Contact: [facility] Phone: [phone]  Relation: Child    Past Surgical History:  Past Surgical History:   Procedure Laterality Date    APPENDECTOMY      ARM SURGERY Right 2021    TRICEPS TENDON REPAIR - RIGHT ELBOW WITH [address] AND BIOMET ACHILLES ALLOGRAFT    ARM SURGERY Right 2021    RIGHT ELBOW IRRIGATION AND DEBRIDEMENT performed [facility] at [facility]      cervical/ lumbar    CARDIAC CATHETERIZATION  2017    [facility]/ no stents    COLONOSCOPY      ENDOSCOPY, COLON, DIAGNOSTIC      HERNIA REPAIR      MUSCLE REPAIR Right 2021    TRICEPS TENDON REPAIR - RIGHT ELBOW performed by Samuel Piña MD at [address] [place] [address]      cervical and lumbar    UPPER GASTROINTESTINAL ENDOSCOPY N/A 2019    EGD BIOPSY performed by Kate Benito MD at [facility]       Immunization History: There is no immunization history on file for this patient.     Active Problems:  Patient Active Problem List   Diagnosis Code    Rupture of right triceps tendon S46.311A    Abscess of right elbow L02.413    Chest pain R07.9    Essential hypertension I10    Epigastric pain R10.13    Gastritis K29.70       Isolation/Infection:   Isolation          No Isolation        Patient Infection Status     Infection Onset Added Last Indicated Last Indicated By Review Planned Expiration Resolved Resolved By    None active    Resolved    COVID-19 Rule Out 07/09/21 07/09/21 07/09/21 COVID-19 (Ordered)   07/10/21 Rule-Out Test Resulted    COVID-19 Rule Out 02/19/21 02/19/21 02/19/21 COVID-19 (Ordered)   02/20/21 Rule-Out Test Resulted    COVID-19 Rule Out 02/04/21 02/04/21 02/12/21 COVID-19 (Ordered)   02/13/21 Rule-Out Test Resulted          Nurse Assessment:  Last Vital Signs: /84   Pulse 76   Temp 98.2 °F (36.8 °C)   Resp 16   Ht 5' 10\" (1.778 m)   Wt 195 lb (88.5 kg)   SpO2 99%   BMI 27.98 kg/m²     Last documented pain score (0-10 scale): Pain Level: 8  Last Weight:   Wt Readings from Last 1 Encounters:   07/23/21 195 lb (88.5 kg)     Mental Status:  {IP PT MENTAL STATUS:20030}    IV Access:  { MAURIZIO IV ACCESS:033784148}    Nursing Mobility/ADLs:  Walking   {CHP DME GXUO:688974034}  Transfer  {CHP DME WUCA:878567362}  Bathing  {CHP DME BNWI:056798372}  Dressing  {CHP DME PMRF:818898756}  Toileting  {CHP DME YETC:842359495}  Feeding  {CHP DME WPGS:513372097}  Med Admin  {P DME RDMW:282554970}  Med Delivery   { MAURIZIO MED Delivery:445747512}    Wound Care Documentation and Therapy:        Elimination:  Continence:   · Bowel: {YES / UD:37016}  · Bladder: {YES / OR:50758}  Urinary Catheter: {Urinary Catheter:040094051}   Colostomy/Ileostomy/Ileal Conduit: {YES / CO:33194}       Date of Last BM: ***    Intake/Output Summary (Last 24 hours) at 7/24/2021 1412  Last data filed at 7/23/2021 1455  Gross per 24 hour   Intake 1000 ml   Output --   Net 1000 ml     I/O last 3 completed shifts:   In: 1000 [IV Piggyback:1000]  Out: -     Safety Concerns:     [address] MAURIZIO Safety Concerns:278692411}    Impairments/Disabilities:      [address] MAURIZIO Impairments/Disabilities:334500751}    Nutrition Therapy:  Current Nutrition Therapy:   [address] Diet List:219633906}    Routes of Feeding: {CHP DME Other Feedings:904698478}  Liquids: {Slp liquid thickness:24284}  Daily Fluid Restriction: {CHP DME Yes amt NTBMDVI:748438330}  Last Modified Barium Swallow with Video (Video Swallowing Test): {Done Not Done XYJD:206849355}    Treatments at the Time of Hospital Discharge:   Respiratory Treatments: ***  Oxygen Therapy:  {Therapy; copd oxygen:17688}  Ventilator:    {[facility] Vent QLVA:438100445}    Rehab Therapies: {THERAPEUTIC INTERVENTION:4914536083}  Weight Bearing Status/Restrictions: {[facility] Weight Bearin}  Other Medical Equipment (for information only, NOT a DME order):  {EQUIPMENT:818434752}  Other Treatments: ***    Patient's personal belongings (please select all that are sent with patient):  {[facility] DME Belongings:224224400}    RN SIGNATURE:  {Esignature:353794305}    CASE MANAGEMENT/SOCIAL WORK SECTION    Inpatient Status Date: ***    Readmission Risk Assessment Score:  Readmission Risk              Risk of Unplanned Readmission:  0           Discharging to Facility/ Agency   · Name:   · Address:  · Phone:  · Fax:    Dialysis Facility (if applicable)   · Name:  · Address:  · Dialysis Schedule:  · Phone:  · Fax:    / signature: {Esignature:687785275}    PHYSICIAN SECTION    Prognosis: {Prognosis:3288737170}    Condition at Discharge: [address] [place] [address] Patient Condition:422878358}    Rehab Potential (if transferring to Rehab): {Prognosis:1891072591}    Recommended Labs or Other Treatments After Discharge: ***    Physician Certification: I certify the above information and transfer of Nguyen Andrew  is necessary for the continuing treatment of the diagnosis listed and that he requires {Admit to Appropriate Level of Care:98046} for {GREATER/LESS:312468095} 30 days.      Update Admission H&P: {P DME Changes in JCRFT:996929431}    PHYSICIAN SIGNATURE:  {Esignature:078233028}

## 2021-07-24 NOTE — DISCHARGE SUMMARY
reconstruction, and/or weight based adjustment of the mA/kV was utilized to reduce the radiation dose to as low as reasonably achievable. COMPARISON: None. HISTORY: ORDERING SYSTEM PROVIDED HISTORY: RUQ pain TECHNOLOGIST PROVIDED HISTORY: RUQ pain Decision Support Exception - unselect if not a suspected or confirmed emergency medical condition->Emergency Medical Condition (MA) Reason for Exam: CHEST PAIN, ABDPAIN, HEARTBURN FINDINGS: Lower Chest: Minimal dependent atelectasis. Mild cardiomegaly. No effusion. Organs: Liver, gallbladder, spleen, pancreas, kidneys and adrenals demonstrate no acute abnormality. Small nonobstructing intrarenal stone bilaterally. Simple right renal cysts. Additional subcentimeter hypo attenuate foci of the kidneys bilaterally are too small to definitively characterize but also most likely represents simple cysts. GI/Bowel: Status post appendectomy. No abnormal bowel dilatation or wall thickening. Left-sided predominant colonic diverticulosis Pelvis: Urinary bladder within normal limits. Peritoneum/Retroperitoneum: No free fluid. No retroperitoneal adenopathy. Bones/Soft Tissues: No acute abnormality identified. No aggressive osseous lesions. Right unilateral L4-5 posterior spinal fusion at L4 laminectomy. Hardware is intact. No acute intra-abdominal pathology identified. Small nonobstructing intrarenal stone bilaterally. Colonic diverticulosis without evidence of acute diverticulitis. US GALLBLADDER RUQ    Result Date: 7/23/2021  EXAMINATION: RIGHT UPPER QUADRANT ULTRASOUND 7/23/2021 12:58 pm COMPARISON: None. HISTORY: ORDERING SYSTEM PROVIDED HISTORY: cholecystitis TECHNOLOGIST PROVIDED HISTORY: cholecystitis Acuity: Acute Type of Exam: Initial FINDINGS: LIVER:  The liver demonstrates unremarkable echogenicity without evidence of intrahepatic biliary ductal dilatation.  BILIARY SYSTEM:  Gallbladder is unremarkable without evidence of pericholecystic fluid, wall thickening or stones. Negative sonographic Sánchez's sign. Common bile duct is within normal limits measuring 3.8 mm. RIGHT KIDNEY: The right kidney is grossly unremarkable without evidence of hydronephrosis. Right kidney measures 10.4 x 6.5 x 5.7 cm. Cortical thickness 1.7 cm. Right renal cyst identified measuring 1.5 x 1.6 x 1.6 cm. PANCREAS:  Suboptimal visualization of the pancreas sonographically. OTHER: No evidence of right upper quadrant ascites. No cholelithiasis or ultrasound evidence cholecystitis; small right renal cyst and suboptimal visualization pancreas. Otherwise, unremarkable right upper quadrant ultrasound. MRI ELBOW RIGHT WO CONTRAST    Result Date: 6/28/2021  EXAMINATION: MRI OF THE RIGHT ELBOW WITHOUT CONTRAST, 6/28/2021 12:34 pm TECHNIQUE: Multiplanar multisequence MRI of the right elbow was performed without the administration of intravenous contrast. COMPARISON: MR right elbow from 02/08/2021, plain radiographs of the right elbow from 01/18/2021 HISTORY: ORDERING SYSTEM PROVIDED HISTORY: Abscess of right elbow TECHNOLOGIST PROVIDED HISTORY: Reason for Exam: patient c/o right elbow pain and infection since his surgery in FEB 2021 Additional signs and symptoms: patient c/o right elbow pain and infection since his surgery in  81-year-old male with right elbow pain and infection since surgery FINDINGS: MEDIAL EPICONDYLE: Intermediate grade partial tearing at the origin of the common extensor tendon on image 9, series 10. LATERAL EPICONDYLE: Intermediate grade partial tearing at the origin of the common flexor tendon. MEDIAL COLLATERAL LIGAMENT:  The ulnar collateral ligament, including the anterior and posterior bands, is intact and unremarkable in appearance. LATERAL COLLATERAL LIGAMENT:  The lateral collateral ligaments including the lateral ulnar collateral ligament are intact and unremarkable in appearance.  MUSCLES / TENDONS: Evidence of triceps tendon repair with high-grade partial tearing of the distal triceps tendon insertion. Susceptibility artifact is seen at the proximal olecranon on image 11, series 12 and image 16, series 8. This is consistent with prior triceps tendon anchoring at the olecranon. Distal biceps tendon and brachialis tendons are seen in their expected locations without evidence of tearing. ULNAR NERVE:  The ulnar nerve is normally located in the ulnar sulcus and is unremarkable in appearance. JOINT SPACES: Small joint effusion. No intra-articular loose body is evident. 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 Moderate degenerative changes of the radiohumeral joint. Mild degenerative changes of the ulnohumeral joint. BONE MARROW: Osseous alignment is normal. No acute fracture or dislocation. Nonspecific marrow edema within the olecranon process. Surgical defect in the olecranon from prior triceps tendon repair. No marginal erosions. Bone marrow signal intensity within the remaining visualized osseous structures otherwise grossly unremarkable. SOFT TISSUES: Susceptibility artifact is seen in the posterior elbow at the level of the distal triceps tendon which could be related to soft tissue gas versus artifact from surgery. Fluid and edema in the subcutaneous fat of the posterior elbow in the region of the olecranon bursa. 1. Susceptibility artifact along the distal triceps tendon which could be related to soft tissue gas versus prior triceps tendon repair. Surgical defect in the olecranon from prior triceps tendon repair. Marrow edema in the olecranon process which is nonspecific. Differential considerations include stress related marrow edema versus infection/osteomyelitis depending upon the clinical setting. Nonspecific fluid in the olecranon bursa which could represent phlegmonous change/cellulitis. 2. Mild ulnohumeral chondromalacia. Severe radiohumeral chondromalacia.  Degenerative changes of the radiohumeral and ulnohumeral joints as detailed above. 3. Small joint effusion. 4. Intermediate grade partial tearing at the origin of the common flexor tendon. 5. Intermediate grade partial tearing at the origin of the common extensor tendon. The findings were sent to the Radiology Results [address] at 1:49 pm on 6/28/2021to be communicated to a licensed caregiver. Consultations:    Consults:     Final Specialist Recommendations/Findings:   IP CONSULT TO INTERNAL MEDICINE  IP CONSULT TO GI  IP CONSULT TO SOCIAL WORK      The patient was seen and examined on day of discharge and this discharge summary is in conjunction with any daily progress note from day of discharge. Discharge plan:     Disposition: Home    Physician Follow Up:    With me   And out pt hida  And egd with [facility], [address] [facility], [facility]. Brian Hart [address]  [address] [facility] [address]  [phone]    [address] [place] [address] [place] [address]  [phone]             Requiring Further Evaluation/Follow Up POST HOSPITALIZATION/Incidental Findings:    Diet: cardiac diet    Activity: As tolerated    Instructions to Patient:     Discharge Medications:      Medication List      START taking these medications    pantoprazole 40 MG tablet  Commonly known as: PROTONIX  Take 1 tablet by mouth 2 times daily  Replaces: esomeprazole 40 MG delayed release capsule        CONTINUE taking these medications    Avapro 300 MG tablet  Generic drug: irbesartan     cefUROXime 500 MG tablet  Commonly known as: CEFTIN  Take 1 tablet by mouth 2 times daily for 12 days     doxycycline hyclate 100 MG tablet  Commonly known as: VIBRA-TABS  Take 1 tablet by mouth 2 times daily for 12 days     metoprolol succinate 50 MG extended release tablet  Commonly known as: TOPROL XL     spironolactone 50 MG tablet  Commonly known as: ALDACTONE        STOP taking these medications    esomeprazole 40 MG delayed release capsule  Commonly known as:

## 2021-07-24 NOTE — ED NOTES
Pt continuously comes to nurses station to ask about GI consult and whether or not he can eat or drink and if EGD will be performed this weekend.  RN paged gi to verify information     Delmar Oglesby RN  07/24/21 [id number]

## 2021-07-24 NOTE — ED NOTES
Residents at bedside     Mague Goldman, [facility]  07/24/21 [facility], RN  07/24/21 1012

## 2021-07-26 ENCOUNTER — HOSPITAL ENCOUNTER (OUTPATIENT)
Dept: LAB | Age: 61
Setting detail: SPECIMEN
Discharge: HOME | End: 2021-07-26
Payer: COMMERCIAL

## 2021-07-26 ENCOUNTER — OFFICE VISIT (OUTPATIENT)
Dept: ORTHOPEDIC SURGERY | Age: 61
End: 2021-07-26

## 2021-07-26 VITALS — BODY MASS INDEX: 27.92 KG/M2 | WEIGHT: 195 LBS | HEIGHT: 70 IN

## 2021-07-26 DIAGNOSIS — L02.413 ABSCESS OF RIGHT ELBOW: Primary | ICD-10-CM

## 2021-07-26 PROCEDURE — 99024 POSTOP FOLLOW-UP VISIT: CPT | Performed by: ORTHOPAEDIC SURGERY

## 2021-07-26 PROCEDURE — U0005 INFEC AGEN DETEC AMPLI PROBE: HCPCS

## 2021-07-26 PROCEDURE — U0003 INFECTIOUS AGENT DETECTION BY NUCLEIC ACID (DNA OR RNA); SEVERE ACUTE RESPIRATORY SYNDROME CORONAVIRUS 2 (SARS-COV-2) (CORONAVIRUS DISEASE [COVID-19]), AMPLIFIED PROBE TECHNIQUE, MAKING USE OF HIGH THROUGHPUT TECHNOLOGIES AS DESCRIBED BY CMS-2020-01-R: HCPCS

## 2021-07-26 NOTE — TELEPHONE ENCOUNTER
Pt called and lvm requesting to schedule his EGD ASAP. Writer returned call and spoke to pt. Pt very upset and frustrated stating he was in the hospital over the weekend and Dr Rossy Christian was suppose to do the EGD inpatient but then \"took the day off\" and he didn't get it done. Pt wants to get in ASAP.  Pt now scheduled for egd w/Dr Ramirez on 7/30/21 @ 1145am. Covid test today 7/26/21 @ 950am, PAT 7/28/21 @ 145pm. EGD instructions given over the phone and emailed to [email or web address].

## 2021-07-26 NOTE — PROGRESS NOTES
Procedure: Right elbow irrigation and debridement  Date of procedure: 7/13/2021    HPI: Mr. Gordon Costa is a 79-year-old approximately 2 weeks status post right elbow irrigation and debridement and abscess. Soft tissue cultures have come back positive for Anaerococcus prevotii. Bone cultures were without growth. He has been on oral Ceftin and doxycycline for the past 2 weeks. He denies having any fevers, chills, sweats or any constitutional symptoms. Physical examination:  Evaluation of patient's right elbow and upper extremity demonstrates his incision to be clean, dry, intact and healing appropriately. He does have some mild swelling present. Mild erythema present. No induration noted. Sensation is grossly intact light touch in all dermatomes and he has 2+ radial pulse with brisk capillary refill in his fingers. Impression and plan: Mr. Gordon Costa is a 79-year-old approximately 2-week status post a right elbow abscess I&D. He has had some soft tissue bacterial growth as outlined above. He is scheduled to be seen tomorrow by Dr. Cathie Mercedes (infectious disease). He was encouraged to keep up this appointment and we will see if there needs to be a change in his antibiotic treatment due to the bacterial growth. Sutures were taken out today and Steri-Strips and a clean dressing applied. He may now get his incision wet in shower but is to avoid submerging it. I will see him back for reevaluation in 4 weeks but he was encouraged to return or call earlier with questions or concerns.

## 2021-07-26 NOTE — TELEPHONE ENCOUNTER
Pt called in stating he needs to reschedule his EGD. Pt declined to be transferred back to scheduling, adv pt will send a note back requesting someone to call him.

## 2021-07-26 NOTE — DISCHARGE SUMMARY
Discharge Summary    Attending Physician: Alma Delia att. providers found  Admit Date: 7/13/2021  Discharge Date: 7/15/21  Primary Care Physician: Kelly Luo    Admitting Diagnosis:  Right elbow abscess    Discharge Diagnoses:  Same as above    Past Medical History:   Diagnosis Date    Abscess of bursa of right elbow     Heartburn     Hypertension     PONV (postoperative nausea and vomiting)     Sleep apnea     no machine       Procedures Performed and Findings  Procedure(s):  RIGHT ELBOW IRRIGATION AND DEBRIDEMENT     Consultations Obtained  IP CONSULT TO INFECTIOUS DISEASES  PHARMACY TO [address] [facility] [address]  Patient was admitted to [facility] to undergo a right elbow irrigation debridement. he consented to proceed with surgery after demonstrating an understanding of the discussion we had regarding treatment options, risks, and benefits of the proposed procedure, expected outcome, and postoperative course. The above mentioned procedure was performed under a general anesthetic and patient tolerated the procedure well. Once surgery was completed the patient was taken to recovery and transferred to his room in stable condition where he postoperative IV antibiotics. The infectious disease service was consulted. He was switched ultimately to oral antibiotics. His home diet and home medications were resumed. He was able to achieve adequate pain relief with a combination of IV and oral pain medications and eventually just oral analgesics. Being medically stable, on 7/15/2021 the patient was discharged to home with a follow up appointment with Dr. Alston Schilder in 2 weeks.     Discharge Medications       Medication List      START taking these medications    cefUROXime 500 MG tablet  Commonly known as: CEFTIN  Take 1 tablet by mouth 2 times daily for 12 days     doxycycline hyclate 100 MG tablet  Commonly known as: VIBRA-TABS  Take 1 tablet by mouth 2 times daily for 12 days        CONTINUE taking these medications    Avapro 300 MG tablet  Generic drug: irbesartan     metoprolol succinate 50 MG extended release tablet  Commonly known as: TOPROL XL        ASK your doctor about these medications    HYDROcodone-acetaminophen 5-325 MG per tablet  Commonly known as: Norco  Take 1 tablet by mouth every 4 hours as needed for Pain for up to 5 days. Intended supply: 5 days. Take lowest dose possible to manage pain  Ask about: Should I take this medication?            Where to Get Your Medications      These medications were sent to [address] [facility], [address]  [address] [facility], [address] [facility] [address]    Phone: [phone]   · HYDROcodone-acetaminophen 5-325 MG per tablet     These medications were sent to [facility], [address] [facility] [phone] Ariel Ward [phone]  [address]ley [address], [address] [facility] [address]    Phone: [phone]   · cefUROXime 500 MG tablet  · doxycycline hyclate 100 MG tablet          Discharge Disposition  Home    Activity on Discharge  As tolerated    Discharge Instructions  Keep incision clean and dry at all times    Follow-Up Scheduled    Gavin Garcia MD  [address] [facility] [address] [id number] [phone]    In 2 weeks  For suture removal, For wound re-check    Carl Ramirez MD  [address] [facility]  [address]   [address] [address] [phone]    Go on 7/27/2021  [facility] Follow Up @ 9:45am

## 2021-07-27 ENCOUNTER — OFFICE VISIT (OUTPATIENT)
Dept: INFECTIOUS DISEASES | Age: 61
End: 2021-07-27
Payer: COMMERCIAL

## 2021-07-27 VITALS
HEIGHT: 70 IN | BODY MASS INDEX: 28.6 KG/M2 | WEIGHT: 199.8 LBS | RESPIRATION RATE: 20 BRPM | HEART RATE: 73 BPM | SYSTOLIC BLOOD PRESSURE: 141 MMHG | OXYGEN SATURATION: 98 % | DIASTOLIC BLOOD PRESSURE: 82 MMHG | TEMPERATURE: 97.7 F

## 2021-07-27 DIAGNOSIS — L02.413 ABSCESS OF RIGHT ELBOW: Primary | ICD-10-CM

## 2021-07-27 LAB
SARS-COV-2: NORMAL
SARS-COV-2: NOT DETECTED
SOURCE: NORMAL

## 2021-07-27 PROCEDURE — 99214 OFFICE O/P EST MOD 30 MIN: CPT | Performed by: INTERNAL MEDICINE

## 2021-07-27 RX ORDER — CIPROFLOXACIN 500 MG/1
TABLET, FILM COATED ORAL
Status: ON HOLD | COMMUNITY
Start: 2021-07-26 | End: 2021-10-16 | Stop reason: HOSPADM

## 2021-07-27 RX ORDER — CLINDAMYCIN HYDROCHLORIDE 300 MG/1
300 CAPSULE ORAL 3 TIMES DAILY
Qty: 21 CAPSULE | Refills: 0 | Status: SHIPPED | OUTPATIENT
Start: 2021-07-27 | End: 2021-08-03

## 2021-07-27 RX ORDER — HYDROCODONE BITARTRATE AND ACETAMINOPHEN 5; 325 MG/1; MG/1
TABLET ORAL
COMMUNITY
Start: 2021-02-23 | End: 2021-12-07

## 2021-07-27 NOTE — PROGRESS NOTES
Infectious disease Consult Note      Patient: Korey Falcon  : 1960  Acct#:  [de-identified]     Date:  2021    Subjective:       History of Present Illness  Patient is a 62 y.o.  male    Chief Complaint   Patient presents with    Follow-Up from [facility] follow up rt. elbow slight drainage    He was hospitalized 21 through 07/15/21 for right elbow cellulitis/abscess status post debridement on 21. MRI showed small fluid collections concerning with an abscess with bone edema suggestive of postoperative changes versus osteomyelitis. C-reactive protein and sedimentation rate 07/15/21 were normal  He was treated with IV antibiotics during his hospitalization was discharged on oral Ceftin and doxycycline that he tolerated  The patient had right triceps tendon rupture status post open repair around 4 months ago. Interval history 21. He still have small open wound to the mid surgical incision of the right elbow with small amount of bloody drainage, no swelling, no redness, no decreased range of motion. He denied fever but does have chills, denied nausea or vomiting, no diarrhea no other complaints.   Past Medical History:   Diagnosis Date    Abscess of bursa of right elbow     Heartburn     Hypertension     PONV (postoperative nausea and vomiting)     Sleep apnea     no machine      Past Surgical History:   Procedure Laterality Date    APPENDECTOMY      ARM SURGERY Right 2021    TRICEPS TENDON REPAIR - RIGHT ELBOW WITH ARTHREX AND BIOMET ACHILLES ALLOGRAFT    ARM SURGERY Right 2021    RIGHT ELBOW IRRIGATION AND DEBRIDEMENT performed by Peg Green MD at [address] [place] [address] Drive      cervical/ lumbar    [facility]    COLONOSCOPY      ENDOSCOPY, COLON, DIAGNOSTIC      HERNIA REPAIR      MUSCLE REPAIR Right 2021    TRICEPS TENDON REPAIR - RIGHT ELBOW performed by Peg Green MD at [facility] [place]    SHOULDER SURGERY      SPINE SURGERY      cervical and lumbar    UPPER GASTROINTESTINAL ENDOSCOPY N/A 2019    EGD BIOPSY performed by Dagoberto Bright MD at [address] [place] [address]          Admission Meds  Current Outpatient Medications on File Prior to Visit   Medication Sig Dispense Refill    ciprofloxacin (CIPRO) 500 MG tablet       HYDROcodone-acetaminophen (NORCO) 5-325 MG per tablet       pantoprazole (PROTONIX) 40 MG tablet Take 1 tablet by mouth 2 times daily 30 tablet 3    spironolactone (ALDACTONE) 50 MG tablet Take 50 mg by mouth daily      cefUROXime (CEFTIN) 500 MG tablet Take 1 tablet by mouth 2 times daily for 12 days 24 tablet 0    doxycycline hyclate (VIBRA-TABS) 100 MG tablet Take 1 tablet by mouth 2 times daily for 12 days 24 tablet 0    irbesartan (AVAPRO) 300 MG tablet Take 300 mg by mouth nightly      metoprolol succinate (TOPROL XL) 50 MG extended release tablet Take 50 mg by mouth daily       No current facility-administered medications on file prior to visit. Allergies  Allergies   Allergen Reactions    Pcn [Penicillins] Hives        Social   Social History     Tobacco Use    Smoking status: Light Tobacco Smoker     Packs/day: 1.00     Years: 20.00     Pack years: 20.00     Types: Cigars     Last attempt to quit: 3/15/2008     Years since quittin.3    Smokeless tobacco: Never Used    Tobacco comment: no cigarettes for 20 years   Substance Use Topics    Alcohol use: Yes     Comment: socially             Family History   Problem Relation Age of Onset    Cancer Father         prostrate    Diabetes Father     Cancer Paternal Uncle         colon          Review of Systems   Other than above 12 systems reviewed were negative .              Physical Exam  BP (!) 141/82 (Site: Left Wrist, Position: Sitting, Cuff Size: Medium Adult)   Pulse 73   Temp 97.7 °F (36.5 °C) (Temporal)   Resp 20   Ht 5' 10\" (1.778 m)   Wt 199 lb 12.8 oz (90.6 kg)   SpO2 98% Ref Range Status   07/24/2021 30 20 - 31 mmol/L Final   07/23/2021 24 20 - 31 mmol/L Final   02/11/2021 26 20 - 31 mmol/L Final     BUN   Date Value Ref Range Status   07/24/2021 19 6 - 20 mg/dL Final   07/23/2021 17 6 - 20 mg/dL Final   02/11/2021 17 6 - 20 mg/dL Final     CREATININE   Date Value Ref Range Status   07/24/2021 1.06 0.70 - 1.20 mg/dL Final   07/23/2021 0.92 0.70 - 1.20 mg/dL Final   07/15/2021 0.88 0.70 - 1.20 mg/dL Final     AST   Date Value Ref Range Status   07/23/2021 26 <40 U/L Final     ALT   Date Value Ref Range Status   07/23/2021 40 5 - 41 U/L Final     Total Bilirubin   Date Value Ref Range Status   07/23/2021 0.59 0.3 - 1.2 mg/dL Final     Alkaline Phosphatase   Date Value Ref Range Status   07/23/2021 51 40 - 129 U/L Final     Lipase   Date Value Ref Range Status   07/23/2021 57 13 - 60 U/L Final     No results found for: PROTIME, INR  No results found for: PTT  No results found for: OCCULTBLD  No results found for: GLUMET     Imaging Studies:                           All appropriate imaging studies and reports reviewed: Yes  CT ABDOMEN PELVIS W IV CONTRAST Additional Contrast? None    Result Date: 7/23/2021  EXAMINATION: CT OF THE ABDOMEN AND PELVIS WITH CONTRAST 7/23/2021 1:29 pm TECHNIQUE: CT of the abdomen and pelvis was performed with the administration of intravenous contrast. Multiplanar reformatted images are provided for review. Dose modulation, iterative reconstruction, and/or weight based adjustment of the mA/kV was utilized to reduce the radiation dose to as low as reasonably achievable. COMPARISON: None. HISTORY: ORDERING SYSTEM PROVIDED HISTORY: RUQ pain TECHNOLOGIST PROVIDED HISTORY: RUQ pain Decision Support Exception - unselect if not a suspected or confirmed emergency medical condition->Emergency Medical Condition (MA) Reason for Exam: CHEST PAIN, ABDPAIN, HEARTBURN FINDINGS: Lower Chest: Minimal dependent atelectasis. Mild cardiomegaly. No effusion.  Organs: Liver, gallbladder, spleen, pancreas, kidneys and adrenals demonstrate no acute abnormality. Small nonobstructing intrarenal stone bilaterally. Simple right renal cysts. Additional subcentimeter hypo attenuate foci of the kidneys bilaterally are too small to definitively characterize but also most likely represents simple cysts. GI/Bowel: Status post appendectomy. No abnormal bowel dilatation or wall thickening. Left-sided predominant colonic diverticulosis Pelvis: Urinary bladder within normal limits. Peritoneum/Retroperitoneum: No free fluid. No retroperitoneal adenopathy. Bones/Soft Tissues: No acute abnormality identified. No aggressive osseous lesions. Right unilateral L4-5 posterior spinal fusion at L4 laminectomy. Hardware is intact. No acute intra-abdominal pathology identified. Small nonobstructing intrarenal stone bilaterally. Colonic diverticulosis without evidence of acute diverticulitis. US GALLBLADDER RUQ    Result Date: 7/23/2021  EXAMINATION: RIGHT UPPER QUADRANT ULTRASOUND 7/23/2021 12:58 pm COMPARISON: None. HISTORY: ORDERING SYSTEM PROVIDED HISTORY: cholecystitis TECHNOLOGIST PROVIDED HISTORY: cholecystitis Acuity: Acute Type of Exam: Initial FINDINGS: LIVER:  The liver demonstrates unremarkable echogenicity without evidence of intrahepatic biliary ductal dilatation. BILIARY SYSTEM:  Gallbladder is unremarkable without evidence of pericholecystic fluid, wall thickening or stones. Negative sonographic Sánchez's sign. Common bile duct is within normal limits measuring 3.8 mm. RIGHT KIDNEY: The right kidney is grossly unremarkable without evidence of hydronephrosis. Right kidney measures 10.4 x 6.5 x 5.7 cm. Cortical thickness 1.7 cm. Right renal cyst identified measuring 1.5 x 1.6 x 1.6 cm. PANCREAS:  Suboptimal visualization of the pancreas sonographically. OTHER: No evidence of right upper quadrant ascites.      No cholelithiasis or ultrasound evidence cholecystitis; small right renal cyst and suboptimal visualization pancreas. Otherwise, unremarkable right upper quadrant ultrasound. MRI ELBOW RIGHT WO CONTRAST    Result Date: 6/28/2021  EXAMINATION: MRI OF THE RIGHT ELBOW WITHOUT CONTRAST, 6/28/2021 12:34 pm TECHNIQUE: Multiplanar multisequence MRI of the right elbow was performed without the administration of intravenous contrast. COMPARISON: MR right elbow from 02/08/2021, plain radiographs of the right elbow from 01/18/2021 HISTORY: ORDERING SYSTEM PROVIDED HISTORY: Abscess of right elbow TECHNOLOGIST PROVIDED HISTORY: Reason for Exam: patient c/o right elbow pain and infection since his surgery in FEB 2021 Additional signs and symptoms: patient c/o right elbow pain and infection since his surgery in  67-year-old male with right elbow pain and infection since surgery FINDINGS: MEDIAL EPICONDYLE: Intermediate grade partial tearing at the origin of the common extensor tendon on image 9, series 10. LATERAL EPICONDYLE: Intermediate grade partial tearing at the origin of the common flexor tendon. MEDIAL COLLATERAL LIGAMENT:  The ulnar collateral ligament, including the anterior and posterior bands, is intact and unremarkable in appearance. LATERAL COLLATERAL LIGAMENT:  The lateral collateral ligaments including the lateral ulnar collateral ligament are intact and unremarkable in appearance. MUSCLES / TENDONS: Evidence of triceps tendon repair with high-grade partial tearing of the distal triceps tendon insertion. Susceptibility artifact is seen at the proximal olecranon on image 11, series 12 and image 16, series 8. This is consistent with prior triceps tendon anchoring at the olecranon. Distal biceps tendon and brachialis tendons are seen in their expected locations without evidence of tearing. ULNAR NERVE:  The ulnar nerve is normally located in the ulnar sulcus and is unremarkable in appearance. JOINT SPACES: Small joint effusion. No intra-articular loose body is evident.  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 Moderate degenerative changes of the radiohumeral joint. Mild degenerative changes of the ulnohumeral joint. BONE MARROW: Osseous alignment is normal. No acute fracture or dislocation. Nonspecific marrow edema within the olecranon process. Surgical defect in the olecranon from prior triceps tendon repair. No marginal erosions. Bone marrow signal intensity within the remaining visualized osseous structures otherwise grossly unremarkable. SOFT TISSUES: Susceptibility artifact is seen in the posterior elbow at the level of the distal triceps tendon which could be related to soft tissue gas versus artifact from surgery. Fluid and edema in the subcutaneous fat of the posterior elbow in the region of the olecranon bursa. 1. Susceptibility artifact along the distal triceps tendon which could be related to soft tissue gas versus prior triceps tendon repair. Surgical defect in the olecranon from prior triceps tendon repair. Marrow edema in the olecranon process which is nonspecific. Differential considerations include stress related marrow edema versus infection/osteomyelitis depending upon the clinical setting. Nonspecific fluid in the olecranon bursa which could represent phlegmonous change/cellulitis. 2. Mild ulnohumeral chondromalacia. Severe radiohumeral chondromalacia. Degenerative changes of the radiohumeral and ulnohumeral joints as detailed above. 3. Small joint effusion. 4. Intermediate grade partial tearing at the origin of the common flexor tendon. 5. Intermediate grade partial tearing at the origin of the common extensor tendon. The findings were sent to the Radiology Results [address] at 1:49 pm on 6/28/2021to be communicated to a licensed caregiver.                    Assessment:   · Right elbow abscess status post incision and drainage 7/13/2021ANAEROCOCCUS PREVOTII growth on intraoperative culture  · Hypertension  · Sleep apnea  · Penicillin allergy    Recommendations:   P.o. clindamycin 300 mg 3 times daily for 1 week  Follow-up in 2 weeks    Thank you for allowing me to participate in the care of your patient. Please feel free to contact me with any questions or concerns.      Lin Martinez MD

## 2021-07-28 ENCOUNTER — HOSPITAL ENCOUNTER (OUTPATIENT)
Dept: PREADMISSION TESTING | Age: 61
Setting detail: OUTPATIENT SURGERY
Discharge: HOME | End: 2021-08-01
Payer: COMMERCIAL

## 2021-07-28 VITALS — HEIGHT: 70 IN | BODY MASS INDEX: 28.49 KG/M2 | WEIGHT: 199 LBS

## 2021-07-28 NOTE — PROGRESS NOTES

## 2021-07-29 ENCOUNTER — ANESTHESIA EVENT (OUTPATIENT)
Dept: ENDOSCOPY | Age: 61
End: 2021-07-29
Payer: COMMERCIAL

## 2021-07-30 ENCOUNTER — ANESTHESIA (OUTPATIENT)
Dept: ENDOSCOPY | Age: 61
End: 2021-07-30
Payer: COMMERCIAL

## 2021-07-30 ENCOUNTER — HOSPITAL ENCOUNTER (OUTPATIENT)
Age: 61
Setting detail: OUTPATIENT SURGERY
Discharge: HOME | End: 2021-07-30
Attending: INTERNAL MEDICINE | Admitting: INTERNAL MEDICINE
Payer: COMMERCIAL

## 2021-07-30 ENCOUNTER — HOSPITAL ENCOUNTER (OUTPATIENT)
Age: 61
Discharge: HOME | End: 2021-07-30
Attending: INTERNAL MEDICINE
Payer: COMMERCIAL

## 2021-07-30 VITALS
OXYGEN SATURATION: 95 % | RESPIRATION RATE: 18 BRPM | HEIGHT: 70 IN | BODY MASS INDEX: 28.49 KG/M2 | TEMPERATURE: 98.2 F | SYSTOLIC BLOOD PRESSURE: 131 MMHG | DIASTOLIC BLOOD PRESSURE: 73 MMHG | WEIGHT: 199 LBS | HEART RATE: 68 BPM

## 2021-07-30 VITALS — SYSTOLIC BLOOD PRESSURE: 107 MMHG | DIASTOLIC BLOOD PRESSURE: 56 MMHG | OXYGEN SATURATION: 97 %

## 2021-07-30 DIAGNOSIS — K21.9 CHRONIC GERD: ICD-10-CM

## 2021-07-30 DIAGNOSIS — K21.9 CHRONIC GERD: Primary | ICD-10-CM

## 2021-07-30 PROCEDURE — 2580000003 HC RX 258: Performed by: NURSE ANESTHETIST, CERTIFIED REGISTERED

## 2021-07-30 PROCEDURE — 2500000003 HC RX 250 WO HCPCS: Performed by: NURSE ANESTHETIST, CERTIFIED REGISTERED

## 2021-07-30 PROCEDURE — 43239 EGD BIOPSY SINGLE/MULTIPLE: CPT | Performed by: INTERNAL MEDICINE

## 2021-07-30 PROCEDURE — 6360000002 HC RX W HCPCS: Performed by: ANESTHESIOLOGY

## 2021-07-30 PROCEDURE — 88305 TISSUE EXAM BY PATHOLOGIST: CPT

## 2021-07-30 PROCEDURE — 7100000001 HC PACU RECOVERY - ADDTL 15 MIN: Performed by: INTERNAL MEDICINE

## 2021-07-30 PROCEDURE — 6360000002 HC RX W HCPCS: Performed by: NURSE ANESTHETIST, CERTIFIED REGISTERED

## 2021-07-30 PROCEDURE — 82941 ASSAY OF GASTRIN: CPT

## 2021-07-30 PROCEDURE — 3700000000 HC ANESTHESIA ATTENDED CARE: Performed by: INTERNAL MEDICINE

## 2021-07-30 PROCEDURE — 2580000003 HC RX 258: Performed by: ANESTHESIOLOGY

## 2021-07-30 PROCEDURE — 2709999900 HC NON-CHARGEABLE SUPPLY: Performed by: INTERNAL MEDICINE

## 2021-07-30 PROCEDURE — 7100000000 HC PACU RECOVERY - FIRST 15 MIN: Performed by: INTERNAL MEDICINE

## 2021-07-30 PROCEDURE — 3700000001 HC ADD 15 MINUTES (ANESTHESIA): Performed by: INTERNAL MEDICINE

## 2021-07-30 PROCEDURE — 36415 COLL VENOUS BLD VENIPUNCTURE: CPT

## 2021-07-30 PROCEDURE — 3609012400 HC EGD TRANSORAL BIOPSY SINGLE/MULTIPLE: Performed by: INTERNAL MEDICINE

## 2021-07-30 RX ORDER — LIDOCAINE HYDROCHLORIDE 10 MG/ML
INJECTION, SOLUTION EPIDURAL; INFILTRATION; INTRACAUDAL; PERINEURAL PRN
Status: DISCONTINUED | OUTPATIENT
Start: 2021-07-30 | End: 2021-07-30 | Stop reason: SDUPTHER

## 2021-07-30 RX ORDER — PROPOFOL 10 MG/ML
INJECTION, EMULSION INTRAVENOUS CONTINUOUS PRN
Status: DISCONTINUED | OUTPATIENT
Start: 2021-07-30 | End: 2021-07-30 | Stop reason: SDUPTHER

## 2021-07-30 RX ORDER — ONDANSETRON 2 MG/ML
4 INJECTION INTRAMUSCULAR; INTRAVENOUS ONCE
Status: COMPLETED | OUTPATIENT
Start: 2021-07-30 | End: 2021-07-30

## 2021-07-30 RX ORDER — SODIUM CHLORIDE, SODIUM LACTATE, POTASSIUM CHLORIDE, CALCIUM CHLORIDE 600; 310; 30; 20 MG/100ML; MG/100ML; MG/100ML; MG/100ML
INJECTION, SOLUTION INTRAVENOUS CONTINUOUS PRN
Status: DISCONTINUED | OUTPATIENT
Start: 2021-07-30 | End: 2021-07-30 | Stop reason: SDUPTHER

## 2021-07-30 RX ORDER — LIDOCAINE HYDROCHLORIDE 10 MG/ML
1 INJECTION, SOLUTION EPIDURAL; INFILTRATION; INTRACAUDAL; PERINEURAL
Status: DISCONTINUED | OUTPATIENT
Start: 2021-07-30 | End: 2021-07-30 | Stop reason: HOSPADM

## 2021-07-30 RX ORDER — SODIUM CHLORIDE, SODIUM LACTATE, POTASSIUM CHLORIDE, CALCIUM CHLORIDE 600; 310; 30; 20 MG/100ML; MG/100ML; MG/100ML; MG/100ML
INJECTION, SOLUTION INTRAVENOUS CONTINUOUS
Status: DISCONTINUED | OUTPATIENT
Start: 2021-07-30 | End: 2021-07-30 | Stop reason: HOSPADM

## 2021-07-30 RX ADMIN — ONDANSETRON 4 MG: 2 INJECTION INTRAMUSCULAR; INTRAVENOUS at 11:50

## 2021-07-30 RX ADMIN — LIDOCAINE HYDROCHLORIDE 100 MG: 10 INJECTION, SOLUTION EPIDURAL; INFILTRATION; INTRACAUDAL; PERINEURAL at 11:03

## 2021-07-30 RX ADMIN — PROPOFOL 200 MCG/KG/MIN: 10 INJECTION, EMULSION INTRAVENOUS at 11:03

## 2021-07-30 RX ADMIN — SODIUM CHLORIDE, POTASSIUM CHLORIDE, SODIUM LACTATE AND CALCIUM CHLORIDE: 600; 310; 30; 20 INJECTION, SOLUTION INTRAVENOUS at 09:59

## 2021-07-30 RX ADMIN — SODIUM CHLORIDE, POTASSIUM CHLORIDE, SODIUM LACTATE AND CALCIUM CHLORIDE: 600; 310; 30; 20 INJECTION, SOLUTION INTRAVENOUS at 10:59

## 2021-07-30 ASSESSMENT — PULMONARY FUNCTION TESTS
PIF_VALUE: 1

## 2021-07-30 ASSESSMENT — PAIN - FUNCTIONAL ASSESSMENT: PAIN_FUNCTIONAL_ASSESSMENT: 0-10

## 2021-07-30 ASSESSMENT — PAIN SCALES - GENERAL: PAINLEVEL_OUTOF10: 0

## 2021-07-30 ASSESSMENT — ENCOUNTER SYMPTOMS
ABDOMINAL PAIN: 0
SORE THROAT: 0
COUGH: 0
DIARRHEA: 0
WHEEZING: 0
CONSTIPATION: 1
VOMITING: 0
SHORTNESS OF BREATH: 0
NAUSEA: 0

## 2021-07-30 ASSESSMENT — PAIN DESCRIPTION - DESCRIPTORS: DESCRIPTORS: BURNING

## 2021-07-30 NOTE — ANESTHESIA PRE PROCEDURE
solid food consumption: 07/29/21    BMI:   Wt Readings from Last 3 Encounters:   07/30/21 199 lb (90.3 kg)   07/28/21 199 lb (90.3 kg)   07/27/21 199 lb 12.8 oz (90.6 kg)     Body mass index is 28.55 kg/m². CBC:   Lab Results   Component Value Date    WBC 6.2 07/24/2021    RBC 5.47 07/24/2021    HGB 17.6 07/24/2021    HCT 51.9 07/24/2021    MCV 94.9 07/24/2021    RDW 15.9 07/24/2021     07/24/2021       CMP:   Lab Results   Component Value Date     07/24/2021    K 5.0 07/24/2021     07/24/2021    CO2 30 07/24/2021    BUN 19 07/24/2021    CREATININE 1.06 07/24/2021    GFRAA >60 07/24/2021    LABGLOM >60 07/24/2021    GLUCOSE 104 07/24/2021    PROT 7.0 07/23/2021    CALCIUM 9.3 07/24/2021    BILITOT 0.59 07/23/2021    ALKPHOS 51 07/23/2021    AST 26 07/23/2021    ALT 40 07/23/2021       POC Tests: No results for input(s): POCGLU, POCNA, POCK, POCCL, POCBUN, POCHEMO, POCHCT in the last 72 hours. Coags: No results found for: PROTIME, INR, APTT    HCG (If Applicable): No results found for: PREGTESTUR, PREGSERUM, HCG, HCGQUANT     ABGs: No results found for: PHART, PO2ART, XZX4UMS, PFN9EFX, BEART, H1DOUWLB     Type & Screen (If Applicable):  No results found for: LABABO, LABRH    Drug/Infectious Status (If Applicable):  No results found for: HIV, HEPCAB    COVID-19 Screening (If Applicable):   Lab Results   Component Value Date    COVID19 Not Detected 07/26/2021           Anesthesia Evaluation  Patient summary reviewed and Nursing notes reviewed   history of anesthetic complications: PONV.   Airway: Mallampati: III  TM distance: >3 FB   Neck ROM: full  Mouth opening: > = 3 FB Dental: normal exam         Pulmonary:normal exam  breath sounds clear to auscultation  (+) sleep apnea:                             Cardiovascular:    (+) hypertension:, CHF:,       ECG reviewed  Rhythm: regular  Rate: normal                 ROS comment: Non ischemic cardiomyopathy with EF 25% in the past, now improved 45-55%     Neuro/Psych:   Negative Neuro/Psych ROS              GI/Hepatic/Renal:   (+) GERD:,           Endo/Other: Negative Endo/Other ROS                    Abdominal:             Vascular: negative vascular ROS. Other Findings:           Anesthesia Plan      general     ASA 3     (GA with TIVA)  Induction: intravenous. MIPS: Prophylactic antiemetics administered. Anesthetic plan and risks discussed with patient. Plan discussed with CRNA.                 Rufino Baumgarten, MD   7/30/2021

## 2021-07-30 NOTE — ANESTHESIA POSTPROCEDURE EVALUATION
Department of Anesthesiology  Postprocedure Note    Patient: Yaima Marie  MRN: [id number]  YOB: 1960  Date of evaluation: 7/30/2021  Time:  12:51 PM     Procedure Summary     Date: 07/30/21 Room / Location: [address] [place] [address] ENDO 01 / [facility] ENDO    Anesthesia Start: 1059 Anesthesia Stop: 1134    Procedure: EGD BIOPSY OF STOMACH AND ESOPHAGUS (N/A Esophagus) Diagnosis: (HEARTBURN (COVID TEST 7/26))    Surgeons: Yamini Julien MD Responsible Provider: Deedee Garcia MD    Anesthesia Type: general ASA Status: 3          Anesthesia Type: general    Vivien Phase I: Vivien Score: 10    Vivien Phase II:      Last vitals: Reviewed and per EMR flowsheets.        Anesthesia Post Evaluation    Comments: POST- ANESTHESIA EVALUATION       Pt Name: Yaima Marie  MRN: [id number]  YOB: 1960  Date of evaluation: 7/30/2021  Time:  12:51 PM      /73   Pulse 68   Temp 98.2 °F (36.8 °C)   Resp 18   Ht 5' 10\" (1.778 m)   Wt 199 lb (90.3 kg)   SpO2 95%   BMI 28.55 kg/m²      Consciousness Level  Awake  Cardiopulmonary Status  Stable  Pain Adequately Treated YES  Nausea / Vomiting  NO  Adequate Hydration  YES  Anesthesia Related Complications NONE      Electronically signed by Deedee Garcia MD on 7/30/2021 at 12:51 PM

## 2021-07-30 NOTE — H&P
HISTORY and [address] [address] [id number]       NAME:  Yomi Condon  MRN: [id number]   YOB: 1960   Date: 7/30/2021   Age: 62 y.o. Gender: male       COMPLAINT AND PRESENT HISTORY:             Yomi Condon is 62 y.o.  male, undergoing EGD. Pt has had a previous EGD last in 2019. Pt has history of heartburn. Takes Protonix with moderate relief. Pt has been having constipation the last couple weeks. Takes magnesium with good relief. Pt c/o decreased appetite and 7 lb unintentional weight loss over the last two weeks. Denies abdominal pain, nausea, vomiting, diarrhea, hematochezia or melena. Denies family history of esophageal cancer. Pt has family history of colon cancer in his paternal uncle. NPO p MN. Took am medications this am with sip of water. Denies taking any blood thinning medications. Denies chest pain/pressure, palpitations, SOB, recent URI, fever or chills.      PAST MEDICAL HISTORY     Past Medical History:   Diagnosis Date    Abscess of bursa of right elbow     Heartburn     Hypertension     Polycythemia     PONV (postoperative nausea and vomiting)     Sleep apnea     no machine       SURGICAL HISTORY       Past Surgical History:   Procedure Laterality Date    APPENDECTOMY      ARM SURGERY Right 02/23/2021    TRICEPS TENDON REPAIR - RIGHT ELBOW WITH ARTHREX AND BIOMET ACHILLES ALLOGRAFT    ARM SURGERY Right 7/13/2021    RIGHT ELBOW IRRIGATION AND DEBRIDEMENT [facility]      cervical/ lumbar    CARDIAC [facility]HETERIZATION  [facility]    [facility]/ no stents    COLONOSCOPY      ENDOSCOPY, COLON, DIAGNOSTIC      HERNIA REPAIR      MUSCLE REPAIR Right 2/23/2021    TRICEPS TENDON REPAIR - RIGHT ELBOW performed by Samuel Piña MD at [address] [place] [address]      cervical and lumbar    UPPER GASTROINTESTINAL ENDOSCOPY N/A 12/17/2019    EGD BIOPSY performed by Kate Benito MD at [place] [address] FAMILY HISTORY       Family History   Problem Relation Age of Onset    Cancer Father         prostrate    Diabetes Father     Cancer Paternal Uncle         colon       SOCIAL HISTORY       Social History     Socioeconomic History    Marital status: Single     Spouse name: Not on file    Number of children: Not on file    Years of education: Not on file    Highest education level: Not on file   Occupational History    Not on file   Tobacco Use    Smoking status: Light Tobacco Smoker     Packs/day: 1.00     Years: 20.00     Pack years: 20.00     Types: Cigars     Last attempt to quit: 3/15/2008     Years since quittin.3    Smokeless tobacco: Never Used    Tobacco comment: no cigarettes for 20 years   Vaping Use    Vaping Use: Never used   Substance and Sexual Activity    Alcohol use: Yes     Comment: socially    Drug use: Yes     Types: Marijuana     Comment: sometimes nightly/ oral or smokes    Sexual activity: Not on file   Other Topics Concern    Not on file   Social History Narrative    Not on file     Social Determinants of Health     Financial Resource Strain:     Difficulty of Paying Living Expenses:    Food Insecurity:     Worried About Running Out of Food in the Last Year:     Ran Out of Food in the Last Year:    Transportation Needs:     Lack of Transportation (Medical):      Lack of Transportation (Non-Medical):    Physical Activity:     Days of Exercise per Week:     Minutes of Exercise per Session:    Stress:     Feeling of Stress :    Social Connections:     Frequency of Communication with Friends and Family:     Frequency of Social Gatherings with Friends and Family:     Attends Quaker Services:     Active Member of Clubs or Organizations:     Attends Club or Organization Meetings:     Marital Status:    Intimate Partner Violence:     Fear of Current or Ex-Partner:     Emotionally Abused:     Physically Abused:     Sexually Abused:         REVIEW OF SYSTEMS      Allergies   Allergen Reactions    Pcn [Penicillins] Hives       No current facility-administered medications on file prior to encounter. Current Outpatient Medications on File Prior to Encounter   Medication Sig Dispense Refill    pantoprazole (PROTONIX) 40 MG tablet Take 1 tablet by mouth 2 times daily 30 tablet 3    spironolactone (ALDACTONE) 50 MG tablet Take 50 mg by mouth daily      irbesartan (AVAPRO) 300 MG tablet Take 300 mg by mouth nightly      metoprolol succinate (TOPROL XL) 50 MG extended release tablet Take 50 mg by mouth daily     Notation: Above medications are not currently reconciled at time of signing this H&P note, to be reconciled in pre-op per RN. Review of Systems   Constitutional: Positive for chills (occasional). Negative for fever. HENT: Negative for congestion and sore throat. Eyes: Negative for visual disturbance. Respiratory: Negative for cough, shortness of breath and wheezing. Cardiovascular: Negative for chest pain, palpitations and leg swelling. Gastrointestinal: Positive for constipation (occasional). Negative for abdominal pain, diarrhea, nausea and vomiting. Genitourinary: Negative. Musculoskeletal:        Right elbow tenderness   Skin:        Right elbow well healing surgical incision with mild erythema, no drainage or bleeding. Neurological: Negative for dizziness and light-headedness. Psychiatric/Behavioral: Negative. GENERAL PHYSICAL EXAM     Vitals: Review vitals per RN flowsheet. GENERAL APPEARANCE:   Joyce Rudd is 62 y.o.  male, nourished, conscious, alert. Does not appear to be in distress or pain at this time. Physical Exam  Constitutional:       General: He is not in acute distress. Appearance: He is not ill-appearing. HENT:      Head: Normocephalic and atraumatic. Nose: No congestion.       Mouth/Throat:      Mouth: Mucous membranes are moist.      Pharynx: Oropharynx is clear. No oropharyngeal exudate or posterior oropharyngeal erythema. Eyes:      General: No scleral icterus. Conjunctiva/sclera: Conjunctivae normal.   Cardiovascular:      Rate and Rhythm: Normal rate and regular rhythm. Heart sounds: Normal heart sounds. No murmur heard. No friction rub. No gallop. Pulmonary:      Effort: Pulmonary effort is normal. No respiratory distress. Breath sounds: Normal breath sounds. No wheezing, rhonchi or rales. Abdominal:      General: Bowel sounds are normal. There is no distension. Palpations: Abdomen is soft. Tenderness: There is no abdominal tenderness. There is no guarding. Musculoskeletal:         General: No swelling or tenderness. Cervical back: Neck supple. No tenderness. Right lower leg: No edema. Left lower leg: No edema. Skin:     General: Skin is warm and dry. Coloration: Skin is not jaundiced. Neurological:      General: No focal deficit present. Mental Status: He is alert and oriented to person, place, and time.       Gait: Gait normal.   Psychiatric:         Mood and Affect: Mood normal.        PROVISIONAL DIAGNOSES / SURGERY:      HEARTBURN     EGD ESOPHAGOGASTRODUODENOSCOPY    Patient Active Problem List    Diagnosis Date Noted    Chest pain 07/23/2021    Essential hypertension 07/23/2021    Epigastric pain 07/23/2021    Gastritis 07/23/2021    Abscess of right elbow 07/13/2021    Rupture of right triceps tendon 02/11/2021           RU Carney CNP on 7/30/2021 at 9:08 AM

## 2021-07-30 NOTE — OP NOTE
ESOPHAGOGASTRODUODENOSCOPY   ( EGD )  DATE OF PROCEDURE: 7/30/2021     SURGEON: Leonard Whaley MD    ASSISTANT: None    PREOPERATIVE DIAGNOSIS: Patient has history of persistent heartburns. Procedure for prompt evaluate upper GI lesions. POSTOPERATIVE DIAGNOSIS: Peptic esophagitis probably grade 1 to grade 2. No Nair's mucosa seen. No hiatal hernia. OPERATION: Upper GI endoscopy with Biopsy    ANESTHESIA: MAC    ESTIMATED BLOOD LOSS: None    COMPLICATIONS: None. SPECIMENS:  Was Obtained: Biopsies from the esophagus to evaluate esophagitis  Also random biopsies taken from the body of the stomach to evaluate H. pylori  HISTORY: The patient is a 62y.o. year old male with history of above preop diagnosis. I recommended esophagogastroduodenoscopy with possible biopsy and I explained the risk, benefits, expected outcome, and alternatives to the procedure. Risks included but are not limited to bleeding, infection, respiratory distress, hypotension, and perforation of the esophagus, stomach, or duodenum. Patient understands and is in agreement. PROCEDURE: The patient was given IV conscious sedation. The patient's SPO2 remained above 90% throughout the procedure. Cetacaine spray given. Patient placed in left lateral position. Olympus  videogastroscope was inserted orally under vision into the esophagus without difficulty and advanced into the stomach then through the pylorus up to the second part of duodenum. Findings:    Retropharyngeal area was grossly normal appearing    Esophagus: abnormal: In the lower esophagus patient has erosive esophagitis noted. No strictures seen. Squamocolumnar junction is at about 39 cm from the teeth. No hiatal hernia. No signs of Nair's mucosa.     Multiple biopsies taken from the lower esophagus to evaluate esophagitis    Stomach:    Fundus and Cardia Examined in Retroflexed View: normal    Body: normal    Antrum: normal  Multiple random biopsies taken from the body of the stomach to evaluate H. pylori  Duodenum:     Descending: normal    Bulb: normal    While withdrawing the scope the above findings were verified and the scope was removed. The patient has tolerated the procedure without unusual events. Recommendations/Plan:   1. F/U Biopsies  2. F/U In Office as instructed  3.  Discussed with the family                   Electronically signed by Tay Escoto MD  on 7/30/2021 at 11:38 AM

## 2021-08-02 LAB
GASTRIN: 53 PG/ML (ref 0–100)
SURGICAL PATHOLOGY REPORT: NORMAL

## 2021-09-11 ENCOUNTER — HOSPITAL ENCOUNTER (EMERGENCY)
Facility: CLINIC | Age: 61
Discharge: HOME | End: 2021-09-11
Attending: EMERGENCY MEDICINE
Payer: COMMERCIAL

## 2021-09-11 VITALS
RESPIRATION RATE: 18 BRPM | DIASTOLIC BLOOD PRESSURE: 95 MMHG | WEIGHT: 198 LBS | SYSTOLIC BLOOD PRESSURE: 155 MMHG | TEMPERATURE: 98.3 F | OXYGEN SATURATION: 98 % | HEIGHT: 70 IN | HEART RATE: 86 BPM | BODY MASS INDEX: 28.35 KG/M2

## 2021-09-11 DIAGNOSIS — Z20.822 SUSPECTED COVID-19 VIRUS INFECTION: Primary | ICD-10-CM

## 2021-09-11 PROCEDURE — U0005 INFEC AGEN DETEC AMPLI PROBE: HCPCS

## 2021-09-11 PROCEDURE — 96372 THER/PROPH/DIAG INJ SC/IM: CPT

## 2021-09-11 PROCEDURE — 99282 EMERGENCY DEPT VISIT SF MDM: CPT

## 2021-09-11 PROCEDURE — U0003 INFECTIOUS AGENT DETECTION BY NUCLEIC ACID (DNA OR RNA); SEVERE ACUTE RESPIRATORY SYNDROME CORONAVIRUS 2 (SARS-COV-2) (CORONAVIRUS DISEASE [COVID-19]), AMPLIFIED PROBE TECHNIQUE, MAKING USE OF HIGH THROUGHPUT TECHNOLOGIES AS DESCRIBED BY CMS-2020-01-R: HCPCS

## 2021-09-11 PROCEDURE — 6360000002 HC RX W HCPCS: Performed by: EMERGENCY MEDICINE

## 2021-09-11 RX ORDER — DEXAMETHASONE SODIUM PHOSPHATE 10 MG/ML
6 INJECTION, SOLUTION INTRAMUSCULAR; INTRAVENOUS ONCE
Status: COMPLETED | OUTPATIENT
Start: 2021-09-11 | End: 2021-09-11

## 2021-09-11 RX ADMIN — DEXAMETHASONE SODIUM PHOSPHATE 6 MG: 10 INJECTION INTRAMUSCULAR; INTRAVENOUS at 16:24

## 2021-09-11 NOTE — ED PROVIDER NOTES
[facility]  [address] [facility]  Phone: [phone]      Pt Name: Lucy Moya  [id number]  [place] 1960  Date of evaluation: 9/11/2021      CHIEF COMPLAINT       Chief Complaint   Patient presents with    Fatigue    Sweats       HISTORY OF PRESENT ILLNESS   71-year-old male presents to the emergency department today complaining of fatigue episodes of diaphoresis as well as generalized body aches. He does complain of headache. He has been exposed to somebody who likely has Covid. He denies any diarrhea. He does report a little bit of a nonproductive cough. No problems with taste or smell. He has not been vaccinated. There is been no other contemporaneous evaluation or management of the symptoms prior to arrival.    REVIEWOF SYSTEMS     Review of Systems   All other systems reviewed and are negative. PAST MEDICAL HISTORY    has a past medical history of Abscess of bursa of right elbow, Heartburn, Hypertension, Polycythemia, PONV (postoperative nausea and vomiting), and Sleep apnea. SURGICAL HISTORY      has a past surgical history that includes shoulder surgery; Spine surgery; Upper gastrointestinal endoscopy (N/A, 12/17/2019); Cardiac catheterization (2017); hernia repair; Colonoscopy; Appendectomy; back surgery; Endoscopy, colon, diagnostic; Arm Surgery (Right, 02/23/2021); Muscle Repair (Right, 2/23/2021); Arm Surgery (Right, 7/13/2021); and Upper gastrointestinal endoscopy (N/A, 7/30/2021).     [address]       Current Discharge Medication List      CONTINUE these medications which have NOT CHANGED    Details   ciprofloxacin (CIPRO) 500 MG tablet       HYDROcodone-acetaminophen (NORCO) 5-325 MG per tablet       pantoprazole (PROTONIX) 40 MG tablet Take 1 tablet by mouth 2 times daily  Qty: 30 tablet, Refills: 3      spironolactone (ALDACTONE) 50 MG tablet Take 50 mg by mouth daily      irbesartan (AVAPRO) 300 MG tablet Take 300 mg by mouth nightly metoprolol succinate (TOPROL XL) 50 MG extended release tablet Take 50 mg by mouth daily             ALLERGIES     is allergic to pcn [penicillins]. FAMILY HISTORY     He indicated that his mother is alive. He indicated that his father is alive. He indicated that the status of his paternal uncle is unknown.     family history includes Cancer in his father and paternal uncle; Diabetes in his father. SOCIAL HISTORY      reports that he has been smoking cigars. He has a 20.00 pack-year smoking history. He has never used smokeless tobacco. He reports current alcohol use. He reports current drug use. Drug: Marijuana. PHYSICAL EXAM     INITIAL VITALS:  height is 5' 10\" (1.778 m) and weight is 89.8 kg (198 lb). His oral temperature is 98.3 °F (36.8 °C). His blood pressure is 155/95 (abnormal) and his pulse is 86. His respiration is 18 and oxygen saturation is 98%. Physical Exam  Vitals reviewed. Constitutional:       Appearance: He is well-developed. HENT:      Head: Normocephalic and atraumatic. Eyes:      Conjunctiva/sclera: Conjunctivae normal.      Pupils: Pupils are equal, round, and reactive to light. Neck:      Trachea: No tracheal deviation. Cardiovascular:      Rate and Rhythm: Normal rate and regular rhythm. Pulmonary:      Effort: Pulmonary effort is normal.      Breath sounds: Normal breath sounds. Abdominal:      General: Bowel sounds are normal. There is no distension. Palpations: Abdomen is soft. Tenderness: There is no abdominal tenderness. Musculoskeletal:         General: No tenderness. Normal range of motion. Cervical back: Normal range of motion and neck supple. Skin:     General: Skin is warm and dry. Findings: No rash. Neurological:      Mental Status: He is alert and oriented to person, place, and time. Psychiatric:         Behavior: Behavior normal.         Thought Content:  Thought content normal.         Judgment: Judgment normal. MDM:   I am worried for Covid with this patient. He is given a shot of Decadron. He will be tested for Covid. We will call him with results. Quarantine instructions have been given. He is not tachypneic nor hypoxic and therefore I feel that outpatient management is safe and appropriate. The patient was given the following medications:  Orders Placed This Encounter   Medications    dexamethasone (PF) (DECADRON) injection 6 mg          FINAL IMPRESSION      1.  Suspected COVID-19 virus infection          DISPOSITION/PLAN   DISPOSITION Decision To Discharge 09/11/2021 04:16:04 PM      Condition on Disposition  Good    PATIENT REFERRED TO:  Physician of your choice  419 same day  Schedule an appointment as soon as possible for a visit in 2 days        DISCHARGE MEDICATIONS:  Current Discharge Medication List          (Please note that portions of this note werecompleted with a voice recognition program.  Efforts were made to edit the dictations but occasionally words are mis-transcribed.)    Bradford Lofton MD MD, F.A.C.E.P, F.A.A.E.M  Emergency Physician Attending         Bradford Lofton MD  09/11/21 2818

## 2021-09-12 LAB
SARS-COV-2: ABNORMAL
SARS-COV-2: DETECTED
SOURCE: ABNORMAL

## 2021-09-13 ENCOUNTER — CARE COORDINATION (OUTPATIENT)
Dept: CARE COORDINATION | Age: 61
End: 2021-09-13

## 2021-09-17 ENCOUNTER — HOSPITAL ENCOUNTER (EMERGENCY)
Age: 61
Discharge: HOME | End: 2021-09-17
Attending: EMERGENCY MEDICINE
Payer: COMMERCIAL

## 2021-09-17 ENCOUNTER — HOSPITAL ENCOUNTER (OUTPATIENT)
Dept: INFUSION THERAPY | Age: 61
Setting detail: INFUSION SERIES
Discharge: HOME | End: 2021-09-17
Payer: COMMERCIAL

## 2021-09-17 VITALS
HEART RATE: 81 BPM | DIASTOLIC BLOOD PRESSURE: 89 MMHG | OXYGEN SATURATION: 97 % | RESPIRATION RATE: 18 BRPM | SYSTOLIC BLOOD PRESSURE: 141 MMHG | TEMPERATURE: 100.1 F

## 2021-09-17 VITALS
HEIGHT: 70 IN | OXYGEN SATURATION: 97 % | HEART RATE: 87 BPM | TEMPERATURE: 98.1 F | SYSTOLIC BLOOD PRESSURE: 143 MMHG | DIASTOLIC BLOOD PRESSURE: 90 MMHG | RESPIRATION RATE: 16 BRPM | WEIGHT: 183 LBS | BODY MASS INDEX: 26.2 KG/M2

## 2021-09-17 DIAGNOSIS — U07.1 COVID-19: Primary | ICD-10-CM

## 2021-09-17 PROCEDURE — 2580000003 HC RX 258: Performed by: EMERGENCY MEDICINE

## 2021-09-17 PROCEDURE — 6360000002 HC RX W HCPCS: Performed by: EMERGENCY MEDICINE

## 2021-09-17 PROCEDURE — 6370000000 HC RX 637 (ALT 250 FOR IP): Performed by: EMERGENCY MEDICINE

## 2021-09-17 PROCEDURE — 96365 THER/PROPH/DIAG IV INF INIT: CPT

## 2021-09-17 PROCEDURE — 2500000003 HC RX 250 WO HCPCS: Performed by: EMERGENCY MEDICINE

## 2021-09-17 RX ORDER — DIPHENHYDRAMINE HYDROCHLORIDE 50 MG/ML
50 INJECTION INTRAMUSCULAR; INTRAVENOUS
Status: DISCONTINUED | OUTPATIENT
Start: 2021-09-17 | End: 2021-09-17 | Stop reason: HOSPADM

## 2021-09-17 RX ORDER — ONDANSETRON 4 MG/1
4 TABLET, ORALLY DISINTEGRATING ORAL ONCE
Status: COMPLETED | OUTPATIENT
Start: 2021-09-17 | End: 2021-09-17

## 2021-09-17 RX ORDER — METHYLPREDNISOLONE SODIUM SUCCINATE 125 MG/2ML
125 INJECTION, POWDER, LYOPHILIZED, FOR SOLUTION INTRAMUSCULAR; INTRAVENOUS
Status: DISCONTINUED | OUTPATIENT
Start: 2021-09-17 | End: 2021-09-17 | Stop reason: HOSPADM

## 2021-09-17 RX ORDER — SODIUM CHLORIDE 9 MG/ML
20 INJECTION, SOLUTION INTRAVENOUS CONTINUOUS
Status: DISCONTINUED | OUTPATIENT
Start: 2021-09-17 | End: 2021-09-17 | Stop reason: HOSPADM

## 2021-09-17 RX ORDER — METHYLPREDNISOLONE SODIUM SUCCINATE 125 MG/2ML
125 INJECTION, POWDER, LYOPHILIZED, FOR SOLUTION INTRAMUSCULAR; INTRAVENOUS
Status: ACTIVE | OUTPATIENT
Start: 2021-09-17 | End: 2021-09-17

## 2021-09-17 RX ORDER — EPINEPHRINE 1 MG/ML
0.3 INJECTION, SOLUTION, CONCENTRATE INTRAVENOUS PRN
Status: DISCONTINUED | OUTPATIENT
Start: 2021-09-17 | End: 2021-09-18 | Stop reason: HOSPADM

## 2021-09-17 RX ORDER — DIPHENHYDRAMINE HYDROCHLORIDE 50 MG/ML
50 INJECTION INTRAMUSCULAR; INTRAVENOUS
Status: ACTIVE | OUTPATIENT
Start: 2021-09-17 | End: 2021-09-17

## 2021-09-17 RX ORDER — SODIUM CHLORIDE 9 MG/ML
20 INJECTION, SOLUTION INTRAVENOUS CONTINUOUS PRN
Status: DISCONTINUED | OUTPATIENT
Start: 2021-09-17 | End: 2021-09-18 | Stop reason: HOSPADM

## 2021-09-17 RX ORDER — SODIUM CHLORIDE 9 MG/ML
100 INJECTION, SOLUTION INTRAVENOUS CONTINUOUS PRN
Status: DISCONTINUED | OUTPATIENT
Start: 2021-09-17 | End: 2021-09-18 | Stop reason: HOSPADM

## 2021-09-17 RX ORDER — EPINEPHRINE 1 MG/ML
0.3 INJECTION, SOLUTION, CONCENTRATE INTRAVENOUS PRN
Status: DISCONTINUED | OUTPATIENT
Start: 2021-09-17 | End: 2021-09-17 | Stop reason: HOSPADM

## 2021-09-17 RX ADMIN — ONDANSETRON 4 MG: 4 TABLET, ORALLY DISINTEGRATING ORAL at 15:20

## 2021-09-17 RX ADMIN — IMDEVIMAB: 300 INJECTION, SOLUTION, CONCENTRATE INTRAVENOUS at 15:55

## 2021-09-17 RX ADMIN — SODIUM CHLORIDE 100 ML/HR: 9 INJECTION, SOLUTION INTRAVENOUS at 16:04

## 2021-09-17 ASSESSMENT — ENCOUNTER SYMPTOMS
SHORTNESS OF BREATH: 0
EYE PAIN: 0
DIARRHEA: 0
CONSTIPATION: 0
COUGH: 0
CHEST TIGHTNESS: 0
BACK PAIN: 0
ABDOMINAL PAIN: 0
SORE THROAT: 0
VOMITING: 0
NAUSEA: 1

## 2021-09-17 ASSESSMENT — PAIN SCALES - GENERAL: PAINLEVEL_OUTOF10: 9

## 2021-09-17 NOTE — ED PROVIDER NOTES
[address] ED  eMERGENCY dEPARTMENT eNCOUnter    Pt Name: Prudencio Bedoya  MRN: [id number]  [place] 1960  Date of evaluation: 9/17/21  CHIEF COMPLAINT       Chief Complaint   Patient presents with    Positive For Covid-19     HISTORY OF PRESENT ILLNESS   HPI   Patient presenting with covid. Symptoms started on 9/11, tested positive at ED and discharged home. Since then he has nausea, fatigue, myalgias and mild cough. Loss of taste and smell. He is presenting here seeking antibody therapy. REVIEW OF SYSTEMS     Review of Systems   Constitutional: Positive for activity change, appetite change, fatigue and fever. Negative for chills. HENT: Negative for congestion, ear pain and sore throat. Eyes: Negative for pain and visual disturbance. Respiratory: Negative for cough, chest tightness and shortness of breath. Cardiovascular: Negative for chest pain and palpitations. Gastrointestinal: Positive for nausea. Negative for abdominal pain, constipation, diarrhea and vomiting. Genitourinary: Negative for dysuria and testicular pain. Musculoskeletal: Positive for myalgias. Negative for back pain. Skin: Negative for rash and wound. Neurological: Negative for seizures, syncope and headaches.      PASTMEDICAL HISTORY     Past Medical History:   Diagnosis Date    Abscess of bursa of right elbow     Heartburn     Hypertension     Polycythemia     PONV (postoperative nausea and vomiting)     Sleep apnea     no machine     SURGICAL HISTORY       Past Surgical History:   Procedure Laterality Date    APPENDECTOMY      ARM SURGERY Right 02/23/2021    TRICEPS TENDON REPAIR - RIGHT ELBOW WITH ARTHREX AND BIOMET ACHILLES ALLOGRAFT    ARM SURGERY Right 7/13/2021    RIGHT ELBOW IRRIGATION AND DEBRIDEMENT performed by Ginny Waldrop MD at [address] [place] [address]      cervical/ lumbar    [facility]  [facility]    COLONOSCOPY      ENDOSCOPY, COLON, DIAGNOSTIC      HERNIA REPAIR      MUSCLE REPAIR Right 2/23/2021    TRICEPS TENDON REPAIR - RIGHT ELBOW performed by Zach Velázquez MD at [facility]      cervical and lumbar    UPPER GASTROINTESTINAL ENDOSCOPY N/A 12/17/2019    EGD BIOPSY performed by Argenis Spring MD at [facility] 7/30/2021    EGD BIOPSY OF STOMACH AND ESOPHAGUS performed by Leonela Schaeffer MD at [address]       Discharge Medication List as of 9/17/2021  3:38 PM      CONTINUE these medications which have NOT CHANGED    Details   ciprofloxacin (CIPRO) 500 MG tablet Historical Med      HYDROcodone-acetaminophen (NORCO) 5-325 MG per tablet Historical Med      pantoprazole (PROTONIX) 40 MG tablet Take 1 tablet by mouth 2 times daily, Disp-30 tablet, R-3Normal      spironolactone (ALDACTONE) 50 MG tablet Take 50 mg by mouth dailyHistorical Med      irbesartan (AVAPRO) 300 MG tablet Take 300 mg by mouth nightlyHistorical Med      metoprolol succinate (TOPROL XL) 50 MG extended release tablet Take 50 mg by mouth dailyHistorical Med           ALLERGIES     is allergic to pcn [penicillins]. FAMILY HISTORY     He indicated that his mother is alive. He indicated that his father is alive. He indicated that the status of his paternal uncle is unknown. SOCIALHISTORY      reports that he has been smoking cigars. He has a 20.00 pack-year smoking history. He has never used smokeless tobacco. He reports current alcohol use. He reports current drug use. Drug: Marijuana. PHYSICAL EXAM     INITIAL VITALS: BP (!) 143/90   Pulse 87   Temp 98.1 °F (36.7 °C) (Oral)   Resp 16   Ht 5' 10\" (1.778 m)   Wt 183 lb (83 kg)   SpO2 97%   BMI 26.26 kg/m²    Physical Exam  Vitals and nursing note reviewed. Constitutional:       Appearance: He is well-developed. HENT:      Head: Normocephalic and atraumatic.       Right Ear: External ear normal.      Left Ear: External ear normal.   Eyes:      Conjunctiva/sclera: Conjunctivae normal.      Pupils: Pupils are equal, round, and reactive to light. Neck:      Vascular: No JVD. Trachea: No tracheal deviation. Cardiovascular:      Rate and Rhythm: Normal rate and regular rhythm. Heart sounds: Normal heart sounds. Pulmonary:      Effort: Pulmonary effort is normal. No respiratory distress. Breath sounds: Normal breath sounds. No stridor. Abdominal:      General: Bowel sounds are normal. There is no distension. Palpations: Abdomen is soft. Tenderness: There is no abdominal tenderness. Musculoskeletal:         General: No tenderness or deformity. Normal range of motion. Cervical back: Normal range of motion and neck supple. Skin:     General: Skin is warm and dry. Neurological:      Mental Status: He is alert and oriented to person, place, and time. Cranial Nerves: No cranial nerve deficit. Coordination: Coordination normal.         MEDICAL DECISION MAKING:   Assessment:  Leny Enciso is a 62 y.o. male who presents with covid 19 symptoms. His vital signs are stable with no hypoxia. Due to comorbid conditions of reported HTN and reduced cardiac ejection fraction I believe he would benefit from [place] therapy which was been ordered in conjunction with pharmacy. Procedures    DIAGNOSTIC RESULTS   EKG: All EKG's are interpreted by the Emergency Department Physician who either signs or Co-signs this chart inthe absence of a cardiologist.      RADIOLOGY:All plain film, CT, MRI, and formal ultrasound images (except ED bedside ultrasound) are read by the radiologist, see reports below, unless otherwise noted in MDM or here. No orders to display     LABS: All lab results were reviewed by myself, and all abnormals are listed below.   Labs Reviewed - No data to display  EMERGENCY DEPARTMENT COURSE:   Vitals:    Vitals:    09/17/21 1452   BP: (!) 143/90   Pulse: 87   Resp: 16   Temp: 98.1 °F (36.7 °C)   TempSrc: Oral   SpO2: 97%   Weight: 183 lb (83 kg)   Height: 5' 10\" (1.778 m)       The patient was given the following medications while in the emergency department:  Orders Placed This Encounter   Medications    ondansetron (ZOFRAN-ODT) disintegrating tablet 4 mg     CONSULTS:  None    FINAL IMPRESSION      1. COVID-19          DISPOSITION/PLAN   DISPOSITION Decision To Discharge 09/17/2021 03:06:44 PM      PATIENT REFERRED TO:  [facility] ED  [address] [address]  [phone]    If symptoms worsen    DISCHARGE MEDICATIONS:  Discharge Medication List as of 9/17/2021  3:38 PM        Saqib Davis MD  AttendingEmergency Physician                        Garima Varela MD  09/17/21 0360

## 2021-09-17 NOTE — PROGRESS NOTES
Reviewed FDA EUA Fact sheet and After Infusion Information sheet for discharge. Written copies provided to patient. Verbalized understanding. Encouraged PO fluids and rest. DC home with self. CASIRIVIMAB and IMDEVIMAB     You received an infusion of CASIRIVIMAB and IMDEVIMAB authorized for emergency use by the FDA. You should continue to self-isolate and use infection control measures (wear mask, isolate, social distance, avoid sharing personal items, clean and disinfect \"high touch\" surfaces, and frequent handwashing) according to CDC guidelines. Report all changes in your health to your doctors as soon as possible. Symptoms to report to your physician immediately or seek emergency medical care:   ·  Fever, Chills, Shakes, Hives, Rash, Itching, Swelling of lips, mouth or throat   ·  Shortness of breath, difficulty breathing or wheezing, Chest pain   ·  Cough, Sneezing   ·  Fatigue, muscle or joint pain/aching,   ·  Headache   ·  Weakness, numbness, tingling is any part of your body   ·  Low blood pressure/Oxygen saturation levels  ·  Dizziness, feeling faint   ·  Nausea        These are not all of the possible side effects. Serious and unexpected side effects may happen.

## 2021-09-17 NOTE — PROGRESS NOTES
Patient presents for scheduled monoclonal antibody infusion. Confirmed meets criteria. VSS. Educated. Infusion in progress and tolerating well.

## 2021-09-20 ENCOUNTER — CARE COORDINATION (OUTPATIENT)
Dept: CARE COORDINATION | Age: 61
End: 2021-09-20

## 2021-09-20 NOTE — CARE COORDINATION
Patient contacted regarding COVID-19 diagnosis and monoclonal antibody infusion follow up. Discussed COVID-19 related testing which was available at this time. Test results were positive. Patient informed of results, if available? Yes. Ambulatory Care Manager contacted the patient by telephone to perform post discharge assessment. Call within 2 business days of discharge: Yes. Verified name and  with patient as identifiers. Provided introduction to self, and explanation of the CTN/ACM role, and reason for call due to risk factors for infection and/or exposure to COVID-19. Symptoms reviewed with patient who verbalized the following symptoms: fatigue, nausea, diarrhea, no new symptoms and no worsening symptoms. Due to no new or worsening symptoms encounter was not routed to provider for escalation. Discussed follow-up appointments. If no appointment was previously scheduled, appointment scheduling offered: Patient has no PCP. Was given the [facility] and [employer] web site to find new provider . [facility] follow up appointment(s):   Future Appointments   Date Time Provider Jose Armando Cooper   10/13/2021  4:00 PM Rodriguez Murrieta MD [address] [place] [address]     Non-[facility] follow up appointment(s): n/a  Non-face-to-face services provided:  Obtained and reviewed discharge summary and/or continuity of care documents  Education of patient/family/caregiver/guardian to support self-management-reviewed isolation guidelines; symtom management     Advance Care Planning:   Does patient have an Advance Directive:  not on file. Educated patient about risk for severe COVID-19 due to risk factors according to CDC guidelines. ACM reviewed discharge instructions, medical action plan and red flag symptoms with the patient who verbalized understanding. Discussed COVID vaccination status: Yes. Education provided on COVID-19 vaccination as appropriate. Discussed exposure protocols and quarantine with CDC Guidelines.  Patient was given an opportunity to verbalize any questions and concerns and agrees to contact ACM or health care provider for questions related to their healthcare. Reviewed and educated patient on any new and changed medications related to discharge diagnosis     Was patient discharged with a pulse oximeter? No Discussed and confirmed pulse oximeter discharge instructions and when to notify provider or seek emergency care. ACM provided contact information. No further follow-up call identified based on severity of symptoms and risk factors. Spoke with patient. He was diagnosed with Covid on 9/11/2021. He went to the ED on Friday, 9/17, to receive monoclonal antibody infusion. Still having some fatigue and nausea. Still does not have his sense of taste and smell back. Encouraged fluids as tolerated to stay hydrated; frequent rest periods. Encouraged him to pace his activity throughout the day. Encouraged him to use the [id number] or go to [facility] to establish with new provider for follow up.

## 2021-10-13 ENCOUNTER — HOSPITAL ENCOUNTER (OUTPATIENT)
Age: 61
End: 2021-10-13
Payer: COMMERCIAL

## 2021-10-14 ENCOUNTER — TELEPHONE (OUTPATIENT)
Dept: INTERNAL MEDICINE CLINIC | Age: 61
End: 2021-10-14

## 2021-10-14 NOTE — LETTER
[facility], [address] [place] [address]            10/14/2021    Dear Angle Fuchs: You recently missed a scheduled appointment with Dr Lawson Clinton on 10/13/2021. This is the 1st scheduledappointment that you have missed within the last twelve months. If you miss 2 more appointment, you may be dismissed from the practice. It is your responsibility to arrive to your appointment. Please call our office as soon as possible so that we may reschedule your appointment, because your health and follow-up medical care are important to us. For future appointments that you are unable to keep, please call the office at [phone] at least 24 hours in advance to cancel and reschedule. If a traditional appointment is difficult for you to make, please keep in mind, we offer E-Visits for several diagnoses as well as virtual visits. We also have primary care walk-in clinics available. For more information on these options, please contact our office.    Sincerely,        [address]  [address] [facility]

## 2021-10-14 NOTE — TELEPHONE ENCOUNTER
Mailed patient no show appointment letter #1 for the date of 10/13/2021 scheduled with Dr Kiana Islas

## 2021-10-15 ENCOUNTER — APPOINTMENT (OUTPATIENT)
Dept: GENERAL RADIOLOGY | Facility: CLINIC | Age: 61
DRG: 310 | End: 2021-10-15
Payer: COMMERCIAL

## 2021-10-15 ENCOUNTER — HOSPITAL ENCOUNTER (INPATIENT)
Age: 61
LOS: 1 days | Discharge: HOME | DRG: 310 | End: 2021-10-16
Attending: EMERGENCY MEDICINE | Admitting: INTERNAL MEDICINE
Payer: COMMERCIAL

## 2021-10-15 DIAGNOSIS — I48.0 PAROXYSMAL ATRIAL FIBRILLATION WITH RAPID VENTRICULAR RESPONSE (HCC): Primary | ICD-10-CM

## 2021-10-15 LAB
ABSOLUTE EOS #: 0.4 K/UL (ref 0–0.4)
ABSOLUTE IMMATURE GRANULOCYTE: ABNORMAL K/UL (ref 0–0.3)
ABSOLUTE LYMPH #: 1.5 K/UL (ref 1–4.8)
ABSOLUTE MONO #: 0.7 K/UL (ref 0.1–1.2)
ANION GAP SERPL CALCULATED.3IONS-SCNC: 12 MMOL/L (ref 9–17)
BASOPHILS # BLD: 1 % (ref 0–2)
BASOPHILS ABSOLUTE: 0.1 K/UL (ref 0–0.2)
BUN BLDV-MCNC: 18 MG/DL (ref 8–23)
BUN/CREAT BLD: ABNORMAL (ref 9–20)
CALCIUM SERPL-MCNC: 10.7 MG/DL (ref 8.6–10.4)
CHLORIDE BLD-SCNC: 101 MMOL/L (ref 98–107)
CO2: 25 MMOL/L (ref 20–31)
CREAT SERPL-MCNC: 0.8 MG/DL (ref 0.7–1.2)
DIFFERENTIAL TYPE: ABNORMAL
EOSINOPHILS RELATIVE PERCENT: 5 % (ref 1–4)
GFR AFRICAN AMERICAN: >60 ML/MIN
GFR NON-AFRICAN AMERICAN: >60 ML/MIN
GFR SERPL CREATININE-BSD FRML MDRD: ABNORMAL ML/MIN/{1.73_M2}
GFR SERPL CREATININE-BSD FRML MDRD: ABNORMAL ML/MIN/{1.73_M2}
GLUCOSE BLD-MCNC: 106 MG/DL (ref 70–99)
HCT VFR BLD CALC: 55.4 % (ref 41–53)
HEMOGLOBIN: 18.9 G/DL (ref 13.5–17.5)
IMMATURE GRANULOCYTES: ABNORMAL %
LYMPHOCYTES # BLD: 22 % (ref 24–44)
MCH RBC QN AUTO: 32.4 PG (ref 26–34)
MCHC RBC AUTO-ENTMCNC: 34.2 G/DL (ref 31–37)
MCV RBC AUTO: 94.8 FL (ref 80–100)
MONOCYTES # BLD: 10 % (ref 2–11)
MYOGLOBIN: 62 NG/ML (ref 28–72)
NRBC AUTOMATED: ABNORMAL PER 100 WBC
PDW BLD-RTO: 14.2 % (ref 12.5–15.4)
PLATELET # BLD: 259 K/UL (ref 140–450)
PLATELET ESTIMATE: ABNORMAL
PMV BLD AUTO: 7.6 FL (ref 6–12)
POTASSIUM SERPL-SCNC: 4.1 MMOL/L (ref 3.7–5.3)
RBC # BLD: 5.84 M/UL (ref 4.5–5.9)
RBC # BLD: ABNORMAL 10*6/UL
SEG NEUTROPHILS: 62 % (ref 36–66)
SEGMENTED NEUTROPHILS ABSOLUTE COUNT: 4.3 K/UL (ref 1.8–7.7)
SODIUM BLD-SCNC: 138 MMOL/L (ref 135–144)
TROPONIN INTERP: NORMAL
TROPONIN T: NORMAL NG/ML
TROPONIN, HIGH SENSITIVITY: 13 NG/L (ref 0–22)
WBC # BLD: 7 K/UL (ref 3.5–11)
WBC # BLD: ABNORMAL 10*3/UL

## 2021-10-15 PROCEDURE — 2500000003 HC RX 250 WO HCPCS: Performed by: EMERGENCY MEDICINE

## 2021-10-15 PROCEDURE — 96365 THER/PROPH/DIAG IV INF INIT: CPT

## 2021-10-15 PROCEDURE — 36415 COLL VENOUS BLD VENIPUNCTURE: CPT

## 2021-10-15 PROCEDURE — 96376 TX/PRO/DX INJ SAME DRUG ADON: CPT

## 2021-10-15 PROCEDURE — 93005 ELECTROCARDIOGRAM TRACING: CPT | Performed by: EMERGENCY MEDICINE

## 2021-10-15 PROCEDURE — 99285 EMERGENCY DEPT VISIT HI MDM: CPT

## 2021-10-15 PROCEDURE — 85025 COMPLETE CBC W/AUTO DIFF WBC: CPT

## 2021-10-15 PROCEDURE — 71045 X-RAY EXAM CHEST 1 VIEW: CPT

## 2021-10-15 PROCEDURE — 96366 THER/PROPH/DIAG IV INF ADDON: CPT

## 2021-10-15 PROCEDURE — 2580000003 HC RX 258: Performed by: EMERGENCY MEDICINE

## 2021-10-15 PROCEDURE — 83874 ASSAY OF MYOGLOBIN: CPT

## 2021-10-15 PROCEDURE — 84484 ASSAY OF TROPONIN QUANT: CPT

## 2021-10-15 PROCEDURE — 80048 BASIC METABOLIC PNL TOTAL CA: CPT

## 2021-10-15 RX ORDER — DILTIAZEM HYDROCHLORIDE 5 MG/ML
10 INJECTION INTRAVENOUS ONCE
Status: COMPLETED | OUTPATIENT
Start: 2021-10-15 | End: 2021-10-15

## 2021-10-15 RX ADMIN — DILTIAZEM HYDROCHLORIDE 10 MG: 5 INJECTION INTRAVENOUS at 22:28

## 2021-10-15 RX ADMIN — DILTIAZEM HYDROCHLORIDE 5 MG/HR: 5 INJECTION INTRAVENOUS at 22:29

## 2021-10-15 ASSESSMENT — PAIN DESCRIPTION - LOCATION: LOCATION: CHEST

## 2021-10-15 ASSESSMENT — PAIN SCALES - GENERAL: PAINLEVEL_OUTOF10: 7

## 2021-10-16 VITALS
WEIGHT: 200.7 LBS | RESPIRATION RATE: 14 BRPM | DIASTOLIC BLOOD PRESSURE: 75 MMHG | HEIGHT: 70 IN | TEMPERATURE: 97.7 F | SYSTOLIC BLOOD PRESSURE: 120 MMHG | BODY MASS INDEX: 28.73 KG/M2 | HEART RATE: 73 BPM | OXYGEN SATURATION: 97 %

## 2021-10-16 PROBLEM — E77.8 HYPOPROTEINEMIA (HCC): Status: ACTIVE | Noted: 2021-10-16

## 2021-10-16 PROBLEM — D75.1 POLYCYTHEMIA: Status: ACTIVE | Noted: 2021-10-16

## 2021-10-16 PROBLEM — I48.91 NEW ONSET A-FIB (HCC): Status: ACTIVE | Noted: 2021-10-16

## 2021-10-16 PROBLEM — I48.91 ATRIAL FIBRILLATION WITH RVR (HCC): Status: ACTIVE | Noted: 2021-10-16

## 2021-10-16 LAB
ALBUMIN SERPL-MCNC: 3.7 G/DL (ref 3.5–5.2)
ALBUMIN/GLOBULIN RATIO: ABNORMAL (ref 1–2.5)
ALP BLD-CCNC: 53 U/L (ref 40–129)
ALT SERPL-CCNC: 37 U/L (ref 5–41)
ANION GAP SERPL CALCULATED.3IONS-SCNC: 10 MMOL/L (ref 9–17)
AST SERPL-CCNC: 20 U/L
BILIRUB SERPL-MCNC: 0.42 MG/DL (ref 0.3–1.2)
BNP INTERPRETATION: NORMAL
BUN BLDV-MCNC: 17 MG/DL (ref 8–23)
BUN/CREAT BLD: 24 (ref 9–20)
CALCIUM SERPL-MCNC: 9 MG/DL (ref 8.6–10.4)
CHLORIDE BLD-SCNC: 100 MMOL/L (ref 98–107)
CO2: 24 MMOL/L (ref 20–31)
CREAT SERPL-MCNC: 0.72 MG/DL (ref 0.7–1.2)
GFR AFRICAN AMERICAN: >60 ML/MIN
GFR NON-AFRICAN AMERICAN: >60 ML/MIN
GFR SERPL CREATININE-BSD FRML MDRD: ABNORMAL ML/MIN/{1.73_M2}
GFR SERPL CREATININE-BSD FRML MDRD: ABNORMAL ML/MIN/{1.73_M2}
GLUCOSE BLD-MCNC: 111 MG/DL (ref 70–99)
INR BLD: 1
LV EF: 65 %
LVEF MODALITY: NORMAL
MAGNESIUM: 1.8 MG/DL (ref 1.6–2.6)
POTASSIUM SERPL-SCNC: 4.1 MMOL/L (ref 3.7–5.3)
PRO-BNP: 57 PG/ML
PROTHROMBIN TIME: 13.4 SEC (ref 11.5–14.2)
SODIUM BLD-SCNC: 134 MMOL/L (ref 135–144)
TOTAL PROTEIN: 5.8 G/DL (ref 6.4–8.3)
TROPONIN INTERP: NORMAL
TROPONIN INTERP: NORMAL
TROPONIN T: NORMAL NG/ML
TROPONIN T: NORMAL NG/ML
TROPONIN, HIGH SENSITIVITY: 17 NG/L (ref 0–22)
TROPONIN, HIGH SENSITIVITY: 21 NG/L (ref 0–22)
TSH SERPL DL<=0.05 MIU/L-ACNC: 4.42 MIU/L (ref 0.3–5)

## 2021-10-16 PROCEDURE — 6360000002 HC RX W HCPCS: Performed by: NURSE PRACTITIONER

## 2021-10-16 PROCEDURE — 2580000003 HC RX 258: Performed by: NURSE PRACTITIONER

## 2021-10-16 PROCEDURE — 6370000000 HC RX 637 (ALT 250 FOR IP): Performed by: EMERGENCY MEDICINE

## 2021-10-16 PROCEDURE — G0008 ADMIN INFLUENZA VIRUS VAC: HCPCS | Performed by: INTERNAL MEDICINE

## 2021-10-16 PROCEDURE — 83735 ASSAY OF MAGNESIUM: CPT

## 2021-10-16 PROCEDURE — 6370000000 HC RX 637 (ALT 250 FOR IP): Performed by: NURSE PRACTITIONER

## 2021-10-16 PROCEDURE — 2060000000 HC ICU INTERMEDIATE R&B

## 2021-10-16 PROCEDURE — 36415 COLL VENOUS BLD VENIPUNCTURE: CPT

## 2021-10-16 PROCEDURE — 84443 ASSAY THYROID STIM HORMONE: CPT

## 2021-10-16 PROCEDURE — 93306 TTE W/DOPPLER COMPLETE: CPT

## 2021-10-16 PROCEDURE — 2500000003 HC RX 250 WO HCPCS: Performed by: NURSE PRACTITIONER

## 2021-10-16 PROCEDURE — 99223 1ST HOSP IP/OBS HIGH 75: CPT | Performed by: NURSE PRACTITIONER

## 2021-10-16 PROCEDURE — 80053 COMPREHEN METABOLIC PANEL: CPT

## 2021-10-16 PROCEDURE — 84484 ASSAY OF TROPONIN QUANT: CPT

## 2021-10-16 PROCEDURE — 85610 PROTHROMBIN TIME: CPT

## 2021-10-16 PROCEDURE — 83880 ASSAY OF NATRIURETIC PEPTIDE: CPT

## 2021-10-16 PROCEDURE — 90686 IIV4 VACC NO PRSV 0.5 ML IM: CPT | Performed by: INTERNAL MEDICINE

## 2021-10-16 PROCEDURE — 6360000002 HC RX W HCPCS: Performed by: INTERNAL MEDICINE

## 2021-10-16 PROCEDURE — 93005 ELECTROCARDIOGRAM TRACING: CPT | Performed by: INTERNAL MEDICINE

## 2021-10-16 RX ORDER — ACETAMINOPHEN 325 MG/1
650 TABLET ORAL EVERY 6 HOURS PRN
Status: DISCONTINUED | OUTPATIENT
Start: 2021-10-16 | End: 2021-10-16 | Stop reason: HOSPADM

## 2021-10-16 RX ORDER — ONDANSETRON 2 MG/ML
4 INJECTION INTRAMUSCULAR; INTRAVENOUS EVERY 6 HOURS PRN
Status: DISCONTINUED | OUTPATIENT
Start: 2021-10-16 | End: 2021-10-16 | Stop reason: HOSPADM

## 2021-10-16 RX ORDER — ACETAMINOPHEN 650 MG/1
650 SUPPOSITORY RECTAL EVERY 6 HOURS PRN
Status: DISCONTINUED | OUTPATIENT
Start: 2021-10-16 | End: 2021-10-16 | Stop reason: HOSPADM

## 2021-10-16 RX ORDER — SODIUM CHLORIDE 0.9 % (FLUSH) 0.9 %
5-40 SYRINGE (ML) INJECTION PRN
Status: ACTIVE | OUTPATIENT
Start: 2021-10-16 | End: 2021-10-16

## 2021-10-16 RX ORDER — LOSARTAN POTASSIUM 100 MG/1
100 TABLET ORAL DAILY
Status: DISCONTINUED | OUTPATIENT
Start: 2021-10-16 | End: 2021-10-16 | Stop reason: HOSPADM

## 2021-10-16 RX ORDER — BLOOD PRESSURE TEST KIT
1 KIT MISCELLANEOUS DAILY
Qty: 1 KIT | Refills: 0 | Status: SHIPPED | OUTPATIENT
Start: 2021-10-16 | End: 2021-10-16 | Stop reason: SDUPTHER

## 2021-10-16 RX ORDER — ALBUTEROL SULFATE 90 UG/1
2 AEROSOL, METERED RESPIRATORY (INHALATION) PRN
Status: ACTIVE | OUTPATIENT
Start: 2021-10-16 | End: 2021-10-16

## 2021-10-16 RX ORDER — SODIUM CHLORIDE 0.9 % (FLUSH) 0.9 %
5-40 SYRINGE (ML) INJECTION EVERY 12 HOURS SCHEDULED
Status: DISCONTINUED | OUTPATIENT
Start: 2021-10-16 | End: 2021-10-16 | Stop reason: HOSPADM

## 2021-10-16 RX ORDER — METOPROLOL SUCCINATE 50 MG/1
50 TABLET, EXTENDED RELEASE ORAL DAILY
Status: DISCONTINUED | OUTPATIENT
Start: 2021-10-16 | End: 2021-10-16

## 2021-10-16 RX ORDER — HYDROCODONE BITARTRATE AND ACETAMINOPHEN 5; 325 MG/1; MG/1
1 TABLET ORAL ONCE
Status: COMPLETED | OUTPATIENT
Start: 2021-10-16 | End: 2021-10-16

## 2021-10-16 RX ORDER — BLOOD PRESSURE TEST KIT
1 KIT MISCELLANEOUS DAILY
Qty: 1 KIT | Refills: 0 | Status: ON HOLD | OUTPATIENT
Start: 2021-10-16 | End: 2022-03-30

## 2021-10-16 RX ORDER — PANTOPRAZOLE SODIUM 40 MG/1
40 TABLET, DELAYED RELEASE ORAL 2 TIMES DAILY
Status: DISCONTINUED | OUTPATIENT
Start: 2021-10-16 | End: 2021-10-16 | Stop reason: HOSPADM

## 2021-10-16 RX ORDER — ONDANSETRON 4 MG/1
4 TABLET, ORALLY DISINTEGRATING ORAL EVERY 8 HOURS PRN
Status: DISCONTINUED | OUTPATIENT
Start: 2021-10-16 | End: 2021-10-16 | Stop reason: HOSPADM

## 2021-10-16 RX ORDER — SODIUM CHLORIDE 9 MG/ML
500 INJECTION, SOLUTION INTRAVENOUS CONTINUOUS PRN
Status: ACTIVE | OUTPATIENT
Start: 2021-10-16 | End: 2021-10-16

## 2021-10-16 RX ORDER — SODIUM CHLORIDE 9 MG/ML
25 INJECTION, SOLUTION INTRAVENOUS PRN
Status: DISCONTINUED | OUTPATIENT
Start: 2021-10-16 | End: 2021-10-16 | Stop reason: HOSPADM

## 2021-10-16 RX ORDER — METOPROLOL TARTRATE 5 MG/5ML
5 INJECTION INTRAVENOUS EVERY 5 MIN PRN
Status: ACTIVE | OUTPATIENT
Start: 2021-10-16 | End: 2021-10-16

## 2021-10-16 RX ORDER — SPIRONOLACTONE 25 MG/1
50 TABLET ORAL DAILY
Status: DISCONTINUED | OUTPATIENT
Start: 2021-10-16 | End: 2021-10-16 | Stop reason: HOSPADM

## 2021-10-16 RX ORDER — AMINOPHYLLINE DIHYDRATE 25 MG/ML
50 INJECTION, SOLUTION INTRAVENOUS PRN
Status: ACTIVE | OUTPATIENT
Start: 2021-10-16 | End: 2021-10-16

## 2021-10-16 RX ORDER — ATROPINE SULFATE 0.1 MG/ML
0.5 INJECTION INTRAVENOUS EVERY 5 MIN PRN
Status: ACTIVE | OUTPATIENT
Start: 2021-10-16 | End: 2021-10-16

## 2021-10-16 RX ORDER — LOSARTAN POTASSIUM 100 MG/1
100 TABLET ORAL DAILY
Qty: 30 TABLET | Refills: 0 | Status: SHIPPED | OUTPATIENT
Start: 2021-10-17 | End: 2022-03-08

## 2021-10-16 RX ORDER — NITROGLYCERIN 0.4 MG/1
0.4 TABLET SUBLINGUAL EVERY 5 MIN PRN
Status: ACTIVE | OUTPATIENT
Start: 2021-10-16 | End: 2021-10-16

## 2021-10-16 RX ORDER — IBUPROFEN 800 MG/1
800 TABLET ORAL ONCE
Status: COMPLETED | OUTPATIENT
Start: 2021-10-16 | End: 2021-10-16

## 2021-10-16 RX ORDER — SODIUM CHLORIDE 0.9 % (FLUSH) 0.9 %
10 SYRINGE (ML) INJECTION PRN
Status: DISCONTINUED | OUTPATIENT
Start: 2021-10-16 | End: 2021-10-16 | Stop reason: HOSPADM

## 2021-10-16 RX ORDER — POLYETHYLENE GLYCOL 3350 17 G/17G
17 POWDER, FOR SOLUTION ORAL DAILY PRN
Status: DISCONTINUED | OUTPATIENT
Start: 2021-10-16 | End: 2021-10-16 | Stop reason: HOSPADM

## 2021-10-16 RX ADMIN — DILTIAZEM HYDROCHLORIDE 5 MG/HR: 5 INJECTION INTRAVENOUS at 04:40

## 2021-10-16 RX ADMIN — ACETAMINOPHEN 650 MG: 325 TABLET ORAL at 11:19

## 2021-10-16 RX ADMIN — LOSARTAN POTASSIUM 100 MG: 100 TABLET, FILM COATED ORAL at 09:16

## 2021-10-16 RX ADMIN — SPIRONOLACTONE 50 MG: 25 TABLET ORAL at 09:16

## 2021-10-16 RX ADMIN — HYDROCODONE BITARTRATE AND ACETAMINOPHEN 1 TABLET: 5; 325 TABLET ORAL at 13:22

## 2021-10-16 RX ADMIN — HYDROCODONE BITARTRATE AND ACETAMINOPHEN 1 TABLET: 5; 325 TABLET ORAL at 05:23

## 2021-10-16 RX ADMIN — ENOXAPARIN SODIUM 80 MG: 80 INJECTION SUBCUTANEOUS at 09:00

## 2021-10-16 RX ADMIN — PANTOPRAZOLE SODIUM 40 MG: 40 TABLET, DELAYED RELEASE ORAL at 09:00

## 2021-10-16 RX ADMIN — SODIUM CHLORIDE, PRESERVATIVE FREE 10 ML: 5 INJECTION INTRAVENOUS at 09:00

## 2021-10-16 RX ADMIN — IBUPROFEN 800 MG: 800 TABLET, FILM COATED ORAL at 00:44

## 2021-10-16 RX ADMIN — INFLUENZA A VIRUS A/VICTORIA/2454/2019 IVR-207 (H1N1) ANTIGEN (PROPIOLACTONE INACTIVATED), INFLUENZA A VIRUS A/HONG KONG/2671/2019 IVR-208 (H3N2) ANTIGEN (PROPIOLACTONE INACTIVATED), INFLUENZA B VIRUS B/VICTORIA/705/2018 BVR-11 ANTIGEN (PROPIOLACTONE INACTIVATED), INFLUENZA B VIRUS B/PHUKET/3073/2013 BVR-1B ANTIGEN (PROPIOLACTONE INACTIVATED) 0.5 ML: 15; 15; 15; 15 INJECTION, SUSPENSION INTRAMUSCULAR at 14:42

## 2021-10-16 ASSESSMENT — PAIN DESCRIPTION - LOCATION: LOCATION: BACK

## 2021-10-16 ASSESSMENT — PAIN DESCRIPTION - FREQUENCY: FREQUENCY: CONTINUOUS

## 2021-10-16 ASSESSMENT — PAIN SCALES - GENERAL
PAINLEVEL_OUTOF10: 5
PAINLEVEL_OUTOF10: 6
PAINLEVEL_OUTOF10: 4
PAINLEVEL_OUTOF10: 9
PAINLEVEL_OUTOF10: 8

## 2021-10-16 ASSESSMENT — PAIN DESCRIPTION - ORIENTATION: ORIENTATION: LOWER

## 2021-10-16 ASSESSMENT — PAIN - FUNCTIONAL ASSESSMENT: PAIN_FUNCTIONAL_ASSESSMENT: ACTIVITIES ARE NOT PREVENTED

## 2021-10-16 ASSESSMENT — PAIN DESCRIPTION - PAIN TYPE: TYPE: CHRONIC PAIN

## 2021-10-16 NOTE — H&P
[facility]  Office: [address], DO, Adelaida Good, DO, Bi Zelalem, DO, Cathryn Jeanne Tobias, DO, Africa Lira MD, Bhavana Sauceda MD, Phi Vidal MD, Scott Walter MD, Velta Bernheim, MD, Paddy Rebollar MD, Aaliyah Varma MD, Galina Mahmood MD, Campbell Hawkins DO, Domitila Plummer DO, Lisa Brandon MD,  Kristan Burch DO, Rosy Schirmer, MD, Neftali Fu MD, Yolanda Carlisle MD, Lauren Navarro MD, Opal Gonzalez MD, Eleni Blackburn MD, Olga Madison [facility], [facility], CNP, Antonio Whitley, CNP, Gregg Morton, CNS, Polo Seth, CNP, Karlie Carranza, CNP, Minerva Perez, CNP, Lauren Hale, CNP, Yanci Garner, CNP, Yandel Villegas, CNP, John Manrique PA-C, Mark Sifuentes, [facility], Argenis Davis, CNP, Radha Rodriguez, CNP, Sarika Barnes, CNP, Katie Mazariegos, CNP, Lily Garcia, CNP, Azucena Cid, CNP, José Castaneda, [facility]    HISTORY AND PHYSICAL EXAMINATION            Date:   10/16/2021  Patient name:  Jamal Macedo  Date of admission:  10/15/2021 10:14 PM  MRN:   [id number]  Account:  [de-identified]  YOB: 1960  PCP:    No primary care provider on file. Room:   203/2035-01  Code Status:    Full Code    Chief Complaint:     Chief Complaint   Patient presents with    Tachycardia       History Obtained From:     patient, electronic medical record    History of Present Illness:     Jamal Macedo is a 62 y.o. Non- / non  male who presents with Tachycardia   and is admitted to the hospital for the management of Atrial fibrillation with RVR ([address] Patient is a 11year-old male with a past medical history significant for hypertension, obstructive sleep apnea noncompliant with CPAP machine who presents to the emergency department for evaluation of palpitations. Patient states that he was at home with his wife after dinner when he felt his heart start to race.   He states he has had episodes like this previously and usually after sitting still for a small amount of time the palpitations go away, unfortunately this time it did not. Patient states no other symptoms associated with the palpitations he states a previous history of chest pain prior to completing 2 cardiac caths which were normal per patient and to stress test.  He had followed up with Dr. Krys Sherman, up until last year when his insurance changed. Since then he has not had follow-up. Work-up in the emergency department included negative chest x-ray. Metabolic and hematologic abnormalities included a hemoglobin of 18.9 without current smoking history although patient states he used to smoke cigars regularly. EKG demonstrated A. fib with RVR and patient was admitted for further work-up and management. On assessment patient is awake and alert sitting in bed in no acute distress. Cardizem infusing. Heart rate fluctuating significantly between normal sinus rhythm with a heart rate in the 80s up into A. fib with RVR with a rate into the 130s with intermittent episodes of a flutter all on bedside telemetry. Patient states no dizziness with symptoms. Still endorses chest palpitations without diaphoresis. No nausea vomiting diarrhea constipation fever chills urinary symptoms or pain. States current non-smoking status noted endorse history of cigar smoking. Is a daily drinker 1-2 social drinks a day.   No concerns with withdrawal    Past Medical History:     Past Medical History:   Diagnosis Date    Abscess of bursa of right elbow     Heartburn     Hypertension     Polycythemia     PONV (postoperative nausea and vomiting)     Sleep apnea     no machine        Past Surgical History:     Past Surgical History:   Procedure Laterality Date    APPENDECTOMY      ARM SURGERY Right 02/23/2021    TRICEPS TENDON REPAIR - RIGHT ELBOW WITH ARTHREX AND BIOMET ACHILLES ALLOGRAFT    ARM SURGERY Right 7/13/2021    RIGHT ELBOW IRRIGATION AND DEBRIDEMENT performed by Raiza Kelly MD at [address]      cervical/ lumbar    CARDIAC CATHETERIZATION  2017    [facility]/ no stents    COLONOSCOPY      ENDOSCOPY, COLON, DIAGNOSTIC      HERNIA REPAIR      MUSCLE REPAIR Right 2/23/2021    TRICEPS TENDON REPAIR - RIGHT ELBOW performed by Raiza Kelly MD at [address]       cervical and lumbar    UPPER GASTROINTESTINAL ENDOSCOPY N/A 12/17/2019    EGD BIOPSY performed by Amadeo Mathur MD at [facility] N/A 7/30/2021    EGD BIOPSY OF STOMACH AND ESOPHAGUS performed by Lauren Benz MD at [facility]        Medications Prior to Admission:     Prior to Admission medications    Medication Sig Start Date End Date Taking? Authorizing Provider   ciprofloxacin (CIPRO) 500 MG tablet  7/26/21  Yes Historical Provider, MD   HYDROcodone-acetaminophen ([facility]) 5-325 MG per tablet  2/23/21  Yes Historical Provider, MD   pantoprazole (PROTONIX) 40 MG tablet Take 1 tablet by mouth 2 times daily 7/24/21  Yes Uvaldo Sanabria MD   spironolactone (ALDACTONE) 50 MG tablet Take 50 mg by mouth daily   Yes Historical Provider, MD   irbesartan (AVAPRO) 300 MG tablet Take 300 mg by mouth nightly   Yes Historical Provider, MD   metoprolol succinate (TOPROL XL) 50 MG extended release tablet Take 50 mg by mouth daily   Yes Historical Provider, MD        Allergies:     Pcn [penicillins]    Social History:     Tobacco:    reports that he has been smoking cigars. He has a 20.00 pack-year smoking history. He has never used smokeless tobacco.  Alcohol:      reports current alcohol use. Drug Use:  reports current drug use. Drug: Marijuana. Family History:     Family History   Problem Relation Age of Onset    Cancer Father         prostrate    Diabetes Father     Cancer Paternal Uncle         colon       Review of Systems:     Positive and Negative as described in HPI.     CONSTITUTIONAL: negative for fevers, chills, sweats, fatigue, weight loss  HEENT:  negative for vision, hearing changes, runny nose, throat pain  RESPIRATORY:  negative for shortness of breath, cough, congestion, wheezing  CARDIOVASCULAR:  negative for chest pain, +palpitations  GASTROINTESTINAL:  negative for nausea, vomiting, diarrhea, constipation, change in bowel habits, abdominal pain   GENITOURINARY:  negative for difficulty of urination, burning with urination, frequency   INTEGUMENT:  negative for rash, skin lesions, easy bruising   HEMATOLOGIC/LYMPHATIC:  negative for swelling/edema   ALLERGIC/IMMUNOLOGIC:  negative for urticaria , itching  ENDOCRINE:  negative increase in drinking, increase in urination, hot or cold intolerance  MUSCULOSKELETAL:  negative joint pains, muscle aches, swelling of joints  NEUROLOGICAL:  negative for headaches, dizziness, lightheadedness, numbness, pain, tingling extremities  BEHAVIOR/PSYCH:  negative for depression, anxiety    Physical Exam:   /75   Pulse 69   Temp 97.6 °F (36.4 °C) (Temporal)   Resp 14   Ht 5' 10\" (1.778 m)   Wt 200 lb 11.2 oz (91 kg)   SpO2 97%   BMI 28.80 kg/m²   Temp (24hrs), Av.7 °F (36.5 °C), Min:97.6 °F (36.4 °C), Max:97.7 °F (36.5 °C)    No results for input(s): POCGLU in the last 72 hours.     Intake/Output Summary (Last 24 hours) at 10/16/2021 1158  Last data filed at 10/16/2021 0705  Gross per 24 hour   Intake    Output 1300 ml   Net -1300 ml       General Appearance:  alert, well appearing, and in no acute distress  Mental status: oriented to person, place, and time  Head:  normocephalic, atraumatic  Eye: no icterus, redness, pupils equal and reactive, extraocular eye movements intact, conjunctiva clear  Ear: normal external ear, no discharge, hearing intact  Nose:  no drainage noted  Mouth: mucous membranes moist  Neck: supple, no carotid bruits, thyroid not palpable  Lungs: Bilateral equal air entry, clear to auscultation, no wheezing, rales or Glucose 106 (H) 70 - 99 mg/dL    BUN 18 8 - 23 mg/dL    CREATININE 0.80 0.70 - 1.20 mg/dL    Bun/Cre Ratio NOT REPORTED 9 - 20    Calcium 10.7 (H) 8.6 - 10.4 mg/dL    Sodium 138 135 - 144 mmol/L    Potassium 4.1 3.7 - 5.3 mmol/L    Chloride 101 98 - 107 mmol/L    CO2 25 20 - 31 mmol/L    Anion Gap 12 9 - 17 mmol/L    GFR Non-African American >60 >60 mL/min    GFR African American >60 >60 mL/min    GFR Comment          GFR Staging NOT REPORTED    TROP/MYOGLOBIN    Collection Time: 10/15/21 10:19 PM   Result Value Ref Range    Troponin, High Sensitivity 13 0 - 22 ng/L    Troponin T NOT REPORTED <0.03 ng/mL    Troponin Interp NOT REPORTED     Myoglobin 62 28 - 72 ng/mL   Comprehensive Metabolic Panel w/ Reflex to MG    Collection Time: 10/16/21  4:45 AM   Result Value Ref Range    Glucose 111 (H) 70 - 99 mg/dL    BUN 17 8 - 23 mg/dL    CREATININE 0.72 0.70 - 1.20 mg/dL    Bun/Cre Ratio 24 (H) 9 - 20    Calcium 9.0 8.6 - 10.4 mg/dL    Sodium 134 (L) 135 - 144 mmol/L    Potassium 4.1 3.7 - 5.3 mmol/L    Chloride 100 98 - 107 mmol/L    CO2 24 20 - 31 mmol/L    Anion Gap 10 9 - 17 mmol/L    Alkaline Phosphatase 53 40 - 129 U/L    ALT 37 5 - 41 U/L    AST 20 <40 U/L    Total Bilirubin 0.42 0.3 - 1.2 mg/dL    Total Protein 5.8 (L) 6.4 - 8.3 g/dL    Albumin 3.7 3.5 - 5.2 g/dL    Albumin/Globulin Ratio NOT REPORTED 1.0 - 2.5    GFR Non-African American >60 >60 mL/min    GFR African American >60 >60 mL/min    GFR Comment          GFR Staging NOT REPORTED    Magnesium    Collection Time: 10/16/21  4:45 AM   Result Value Ref Range    Magnesium 1.8 1.6 - 2.6 mg/dL   TSH with Reflex    Collection Time: 10/16/21  4:45 AM   Result Value Ref Range    TSH 4.42 0.30 - 5.00 mIU/L   Protime-INR    Collection Time: 10/16/21  4:45 AM   Result Value Ref Range    Protime 13.4 11.5 - 14.2 sec    INR 1.0    Brain natriuretic peptide    Collection Time: 10/16/21  4:45 AM   Result Value Ref Range    Pro-BNP 57 <300 pg/mL    BNP Interpretation primary care provider on file.

## 2021-10-16 NOTE — CARE COORDINATION
Case Management Initial Discharge Plan  Julee Decker,         Readmission Risk              Risk of Unplanned Readmission:  13             Met with:patient to discuss discharge plans. Information verified: address, contacts, phone number, , insurance Yes  PCP: No primary care provider on file. Date of last visit: na     Insurance Provider: MMO     Discharge Planning  Current Residence:  Private home   Living Arrangements:  Spouse/Significant Other       Home has 2 stories/few  stairs to climb  Support Systems:  Spouse/Significant Other       Current Services PTA:  None   Agency: none      Patient able to perform ADL's:Independent  DME in home:  none   DME used to aid ambulation prior to admission:   None   DME used during admission:  None     Potential Assistance Needed:  N/A    Pharmacy: [facility]    Potential Assistance Purchasing Medications:  No  Does patient want to participate in local refill/ meds to beds program?  Yes    Patient agreeable to home care: No  [place] of choice provided:  n/a      Type of Home Care Services:  None  Patient expects to be discharged to:    home     Prior SNF/Rehab Placement and Facility: none   Agreeable to SNF/Rehab: No  [place] of choice provided: n/a   Evaluation: n/a    Expected Discharge date:  10/19/21  Follow Up Appointment: Best Day/ Time:   none     Transportation provider: per girlfriend   Transportation arrangements needed for discharge: No    Discharge Plan:   Met with patient to follow up on plan of care. Patient lives at home with significant other . Very young, active and independent. No DME    Patient admitted with A fib and will need to follow up on anticoagulation . Patient has cardiology consulted. Patient has MMO so eligible for free month and reduced co pay card . Discussed with EDGAR Hernandez and will follow up with vosharon once determined if eliquis or xarelto is needed.      Gave both elliquis and xarelto vouchers to RN to give to patient depending on what he will be sent home with.      Electronically signed by Ignacio Yi RN on 10/16/21 at 9:09 AM EDT
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
## 2021-10-16 NOTE — PROGRESS NOTES
Dr. Gloria King at bedside. EKG performed, patient remains in sinus rhythm. Plan for Holter Monday and discharge home today. Education about the need to use the CPAP machine, tobacco use, alcohol use and caffeine and how it triggers Afib. Patient states understanding and demonstrates verbal compliance.

## 2021-10-16 NOTE — ED TRIAGE NOTES
Patient states he was eating dinner and had a cocktail and one THC gummy when his hear t began going fast. Patient states he feels dizzy and nauseated.

## 2021-10-16 NOTE — ACP (ADVANCE CARE PLANNING)
Advance Care Planning     Advance Care Planning Activator (Inpatient)  Conversation Note      Date of ACP Conversation: 10/16/2021     Conversation Conducted with: Patient with Decision Making Capacity  ACP Activator: Ramiro Bullock RN        Health Care Decision Maker:     Current Designated Health Care Decision Maker:     Click here to complete Healthcare Decision Makers including section of the Healthcare Decision Maker Relationship (ie \"Primary\")  Today we documented desired Decision Maker(s), who is (are) NOT Legal [facility]. ACP documents are required for decision maker authority. Care Preferences    Ventilation: \"If you were in your present state of health and suddenly became very ill and were unable to breathe on your own, what would your preference be about the use of a ventilator (breathing machine) if it were available to you? \"      Would the patient desire the use of ventilator (breathing machine)?: yes    \"If your health worsens and it becomes clear that your chance of recovery is unlikely, what would your preference be about the use of a ventilator (breathing machine) if it were available to you? \"     Would the patient desire the use of ventilator (breathing machine)?: Yes      Resuscitation  \"CPR works best to restart the heart when there is a sudden event, like a heart attack, in someone who is otherwise healthy. Unfortunately, CPR does not typically restart the heart for people who have serious health conditions or who are very sick. \"    \"In the event your heart stopped as a result of an underlying serious health condition, would you want attempts to be made to restart your heart (answer \"yes\" for attempt to resuscitate) or would you prefer a natural death (answer \"no\" for do not attempt to resuscitate)? \" yes       [x] Yes   [] No   Educated Patient / Baltazar Costa regarding differences between Advance Directives and portable DNR orders.     Length of ACP Conversation in minutes: Conversation Outcomes:  [x] ACP discussion completed  [] Existing advance directive reviewed with patient; no changes to patient's previously recorded wishes  [] New Advance Directive completed  [] Portable Do Not Rescitate prepared for Provider review and signature  [] POLST/POST/MOLST/MOST prepared for Provider review and signature      Follow-up plan:    [x] Schedule follow-up conversation to continue planning  [x] Referred individual to Provider for additional questions/concerns   [] Advised patient/agent/surrogate to review completed ACP document and update if needed with changes in condition, patient preferences or care setting    [x] This note routed to one or more involved healthcare providers    Call to walter to have him complete new medical POA paperwork to have his significant other Valorie kent  named

## 2021-10-16 NOTE — PROGRESS NOTES
Wife at bedside. Pt awaiting discharge home by aron. Denies needs a this time. Will continue to monitor.

## 2021-10-16 NOTE — FLOWSHEET NOTE
All IV access removed. Patient thankful for stay and care. Denies needs. Education provided. Pulse checked prior to DC 80 per radial pulse and regular.

## 2021-10-16 NOTE — PROGRESS NOTES
Maybell remains at bedside. Writer entered room to notify patient he converted to a sinus rhythm and assess. Patient states he was just praying about it and it happened. No new s/s or on set of any symptoms. Will continue to monitor.

## 2021-10-16 NOTE — PROGRESS NOTES
Pt transported per [facility] per stretcher from [place] ED. Pt arrives with cardiazem drip at 5 mg/her per pump. [facility] associate reports pt with hypotension during transport. Vital stable at present. Placed on continuous cardiac monitoring.

## 2021-10-16 NOTE — FLOWSHEET NOTE
Advance Care Planning     Advance Care Planning Inpatient Note  Spiritual Care Department    Today's Date: 10/16/2021  Unit: [facility]    Received request from [facility] Provider. Upon review of chart and communication with care team, Spiritual Care will defer advance care planning with patient at this time. . Patient was/were present in the room during visit. Goals of ACP Conversation:  Discuss advance care planning documents    Health Care Decision Makers:     Patient requests for significant other for Decisions makers. Summary:  Verified Documents     Advance Care Planning Documents (Patient Wishes):  Healthcare Power of /Advance Directive Appointment of Health Care Agent     Assessment:  Patient discusses about Advanced Directives with Significant other, shares much about medical situation. Shares about Mu-ism beliefs.  shared in presence , prayers. Interventions:  Provided education on documents for clarity and greater understanding    Care Preferences Communicated:     Hospitalization:  If the patient's health worsens and it becomes clear that the chance of recovery is unlikely,     the patient wants hospitalization. Outcomes/Plan:  ACP Discussion: Postponed    Electronically signed by Valentina Phelps on 10/16/2021 at 1:52 PM     10/16/21 1349   Encounter Summary   Services provided to: Patient   Referral/Consult From: Nurse   Support System Significant other   Continue Visiting   (10-16-21)   Complexity of Encounter Moderate   Length of Encounter 45 minutes   Spiritual Assessment Completed Yes   Advance Care Planning Yes   Routine   Type Initial   Assessment Calm; Approachable   Intervention Active listening;Prayer;Explored feelings, thoughts, concerns;Sustaining presence/ Ministry of presence; Discussed illness/injury and it's impact; Discussed belief system/Mu-ism practices/reyes;Discussed relationship with God   Outcome Expressed gratitude;Receptive;Engaged in conversation;Expressed feelings/needs/concerns

## 2021-10-16 NOTE — CONSULTS
[facility]   Consult Note                 Date:   10/16/2021  Date of admission:  10/15/2021 10:14 PM  MRN:   [id number]  YOB: 1960    Reason for Consult: A. fib    HISTORY OF PRESENT ILLNESS:    The patient is a 62 y.o.  male who is admitted to the hospital for palpitations. Yesterday patient went out to have dinner with his wife and had 1 drink after going home patient started having some palpitation that lasted long and came to ER. Patient was found in A. fib flutter with RVR patient was started on Cardizem drip. According to him he had episodes of A. fib for long time but usually lasted very briefly and it goes away this time it lasted longer associated with lightheaded and came to the ER. At this time patient converted back to normal sinus rhythm. Patient do have a history of A. fib and extensive work-up by Dr. Dayanna Georges  according to had a stress test heart cath echocardiogram.  According to him there was no significant blockade patient did not got any stents  Patient had a history of sleep apnea not using CPAP. Denies any chest pain pressure tightness ankle swelling patient has his own business of construction according to has been doing all the work without any symptoms. Patient do have a history of sleep apnea not using CPAP past Medical History:   has a past medical history of Abscess of bursa of right elbow, Heartburn, Hypertension, Polycythemia, PONV (postoperative nausea and vomiting), and Sleep apnea. Past Surgical History:   has a past surgical history that includes shoulder surgery; Spine surgery; Upper gastrointestinal endoscopy (N/A, 12/17/2019); Cardiac catheterization (2017); hernia repair; Colonoscopy; Appendectomy; back surgery; Endoscopy, colon, diagnostic; Arm Surgery (Right, 02/23/2021); Muscle Repair (Right, 2/23/2021); Arm Surgery (Right, 7/13/2021); and Upper gastrointestinal endoscopy (N/A, 7/30/2021).      Home Medications:    Prior to Admission medications    Medication Sig Start Date End Date Taking? Authorizing Provider   ciprofloxacin (CIPRO) 500 MG tablet  7/26/21  Yes Historical Provider, MD   HYDROcodone-acetaminophen ([address]) 5-325 MG per tablet  2/23/21  Yes Historical Provider, MD   pantoprazole (PROTONIX) 40 MG tablet Take 1 tablet by mouth 2 times daily 7/24/21  Yes Sita Magallon MD   spironolactone (ALDACTONE) 50 MG tablet Take 50 mg by mouth daily   Yes Historical Provider, MD   irbesartan (AVAPRO) 300 MG tablet Take 300 mg by mouth nightly   Yes Historical Provider, MD   metoprolol succinate (TOPROL XL) 50 MG extended release tablet Take 50 mg by mouth daily   Yes Historical Provider, MD       Current Medications: Scheduled Meds:   losartan  100 mg Oral Daily    pantoprazole  40 mg Oral BID    spironolactone  50 mg Oral Daily    sodium chloride flush  5-40 mL IntraVENous 2 times per day    enoxaparin  1 mg/kg SubCUTAneous BID    influenza virus vaccine  0.5 mL IntraMUSCular Prior to discharge    [Held by provider] metoprolol tartrate  25 mg Oral BID     Continuous Infusions:   sodium chloride      dilTIAZem Stopped (10/16/21 0705)    sodium chloride       PRN Meds:.sodium chloride flush, sodium chloride, ondansetron **OR** ondansetron, polyethylene glycol, acetaminophen **OR** acetaminophen, perflutren lipid microspheres, regadenoson, sodium chloride flush, sodium chloride, albuterol sulfate HFA, atropine, nitroGLYCERIN, metoprolol, aminophylline     Allergies:  Pcn [penicillins]    Social History:   reports that he has been smoking cigars. He has a 20.00 pack-year smoking history. He has never used smokeless tobacco. He reports current alcohol use. He reports current drug use. Drug: Marijuana. Family History: family history includes Cancer in his father and paternal uncle; Diabetes in his father.      Review of Systems   CONSTITUTIONAL:  negative for fevers, chills, fatigue and malaise    EYES:  negative for discharge    HEENT:  negative for epistaxis and sore throat    RESPIRATORY:  negative for cough, shortness of breath, wheezing    CARDIOVASCULAR:  negative for chest pain, palpitations, syncope, edema    GASTROINTESTINAL:  negative for nausea, vomiting, diarrhea, constipation, abdominal pain    GENITOURINARY:  negative for incontinence    MUSCULOSKELETAL:  negative for neck or back pain    NEUROLOGICAL:  negative for headaches, seizures and double vision   PSYCHIATRIC:  negative               PHYSICAL EXAM:    Blood pressure 124/76, pulse 67, temperature 97.6 °F (36.4 °C), temperature source Temporal, resp. rate 14, height 5' 10\" (1.778 m), weight 200 lb 11.2 oz (91 kg), SpO2 97 %. CONSTITUTIONAL: AOx4, no apparent distress, appears stated age   HEAD: normocephalic, atraumatic   EYES: PERRLA, EOMI   ENT: moist mucous membranes, uvula midline   NECK:  symmetric, no midline tenderness to palpation   LUNGS: clear to auscultation bilaterally   CARDIOVASCULAR: irregular rate and rhythm, no murmurs, rubs or gallops   ABDOMEN: Soft, non-tender, non-distended with normal active bowel sounds   SKIN: no rash       DATA:    ECG: A. fib with old inferior MI age undetermined  Echo: Ordered  Stress:  Cath:    Labs:     CBC:   Recent Labs     10/15/21  2219   WBC 7.0   HGB 18.9*   HCT 55.4*        BMP:   Recent Labs     10/15/21  2219 10/16/21  0445    134*   K 4.1 4.1   CO2 25 24   BUN 18 17   CREATININE 0.80 0.72   LABGLOM >60 >60   GLUCOSE 106* 111*     BNP: No results for input(s): BNP in the last 72 hours. PT/INR:   Recent Labs     10/16/21  0445   PROTIME 13.4   INR 1.0     APTT:No results for input(s): APTT in the last 72 hours. CARDIAC ENZYMES:No results for input(s): CKTOTAL, CKMB, CKMBINDEX, TROPONINI in the last 72 hours.   FASTING LIPID PANEL:  Lab Results   Component Value Date    HDL 27 10/16/2020    TRIG 148 10/16/2020     LIVER PROFILE:  Recent Labs     10/16/21  0445   AST 20   ALT 37   LABALBU 3.7       Intake/Output Summary (Last 24 hours) at 10/16/2021 0940  Last data filed at 10/16/2021 3160  Gross per 24 hour   Intake    Output 1300 ml   Net -1300 ml       IMPRESSION:    Patient Active Problem List   Diagnosis    Rupture of right triceps tendon    Abscess of right elbow    Chest pain    Essential hypertension    Epigastric pain    Gastritis    Esophagitis    New onset a-fib (HCC)    Atrial fibrillation with RVR (HCC)    Polycythemia    Hypoproteinemia (HCC)           RECOMMENDATIONS:  Paroxysmal A. fib most likely secondary to noncompliance to CPAP  Talk in detail to restart his CPAP  We will continue home medication of Lopressor 50 twice daily  We will start on Eliquis 5 mg twice daily  Talk in detail regarding the anticoagulation risk and complications  Patient need ischemia work-up that can be done as an outpatient  Patient is advised to avoid caffeine alcohol  Avoid any cuts, fall head injury  Patient need to follow in 1 to 2 weeks in the office      Discussed with patient and nursing.     Willy Neff MD, MD  [facility]        [phone]

## 2021-10-16 NOTE — ED PROVIDER NOTES
eMERGENCY dEPARTMENT eNCOUnter      Pt Name: Iftikhar Bonilla  MRN: [id number]  [place] 1960  Date of evaluation: 10/15/2021      CHIEF COMPLAINT       Chief Complaint   Patient presents with    Tachycardia         [facility] [place]    Iftikhar Bonilla is a 62 y.o. male who presents rapid heart rate. Patient states approximately an hour prior to arrival he started feeling this rapid heart rate he has had in the past he states actually most of his life normally does not last very long this 1 is lasting longer he states he was having some nausea and felt lightheaded with this. REVIEW OF SYSTEMS       Review of systems all reviewed and negative except stated above in HPI    PAST MEDICAL HISTORY    has a past medical history of Abscess of bursa of right elbow, Heartburn, Hypertension, Polycythemia, PONV (postoperative nausea and vomiting), and Sleep apnea. SURGICAL HISTORY      has a past surgical history that includes shoulder surgery; Spine surgery; Upper gastrointestinal endoscopy (N/A, 12/17/2019); Cardiac catheterization (2017); hernia repair; Colonoscopy; Appendectomy; back surgery; Endoscopy, colon, diagnostic; Arm Surgery (Right, 02/23/2021); Muscle Repair (Right, 2/23/2021); Arm Surgery (Right, 7/13/2021); and Upper gastrointestinal endoscopy (N/A, 7/30/2021). CURRENT MEDICATIONS       Previous Medications    CIPROFLOXACIN (CIPRO) 500 MG TABLET        HYDROCODONE-ACETAMINOPHEN (NORCO) 5-325 MG PER TABLET        IRBESARTAN (AVAPRO) 300 MG TABLET    Take 300 mg by mouth nightly    METOPROLOL SUCCINATE (TOPROL XL) 50 MG EXTENDED RELEASE TABLET    Take 50 mg by mouth daily    PANTOPRAZOLE (PROTONIX) 40 MG TABLET    Take 1 tablet by mouth 2 times daily    SPIRONOLACTONE (ALDACTONE) 50 MG TABLET    Take 50 mg by mouth daily       ALLERGIES     is allergic to pcn [penicillins]. FAMILY HISTORY     He indicated that his mother is alive. He indicated that his father is alive.  He indicated that the status of his paternal uncle is unknown.     family history includes Cancer in his father and paternal uncle; Diabetes in his father. SOCIAL HISTORY      reports that he has been smoking cigars. He has a 20.00 pack-year smoking history. He has never used smokeless tobacco. He reports current alcohol use. He reports current drug use. Drug: Marijuana. PHYSICAL EXAM     INITIAL VITALS:  height is 5' 10\" (1.778 m) and weight is 83 kg (183 lb). His blood pressure is 117/75 and his pulse is 98. His respiration is 17 and oxygen saturation is 94%. General: Patient alert nontoxic-appearing male in no apparent distress  HEENT: Head is atraumatic conjunctiva are clear  Respiratory: Lung sounds are clear bilateral  Cardiac: Heart is a regular regular and fast  GI: Abdomen soft nontender  Peripheral exam no cyanosis edema  Neuro: Patient has no gross focal neurological deficits at bedside exam    DIFFERENTIAL DIAGNOSIS/ MDM:     A. fib RVR    DIAGNOSTIC RESULTS     EKG: All EKG's are interpreted by the Emergency Department Physician who either signs or Co-signs this chart in the absence of a cardiologist.    EKG interpreted by me reveals a atrial fibrillation pattern with a rate of 139 no acute ST elevations Q-wave noted in lead III and aVF no old ones to compare to normal QRS at 88    RADIOLOGY:   I directly visualized the following  images and reviewed the radiologist interpretations:  XR CHEST PORTABLE   Final Result   No acute cardiopulmonary process.                LABS:  Labs Reviewed   CBC WITH AUTO DIFFERENTIAL - Abnormal; Notable for the following components:       Result Value    Hemoglobin 18.9 (*)     Hematocrit 55.4 (*)     Lymphocytes 22 (*)     Eosinophils % 5 (*)     All other components within normal limits   BASIC METABOLIC PANEL - Abnormal; Notable for the following components:    Glucose 106 (*)     Calcium 10.7 (*)     All other components within normal limits   TROP/MYOGLOBIN         EMERGENCY DEPARTMENT COURSE:   Vitals:    Vitals:    10/15/21 2244 10/15/21 2254 10/15/21 2308 10/15/21 2318   BP: 124/78  111/72 117/75   Pulse: 117 116 91 98   Resp: 17 25 21 17   SpO2: 97% 97% 94% 94%   Weight:       Height:         -------------------------  BP: 117/75,  , Pulse: 98, Resp: 17    Orders Placed This Encounter   Medications    dilTIAZem injection 10 mg    dilTIAZem 125 mg in dextrose 5 % 125 mL infusion    ibuprofen (ADVIL;MOTRIN) tablet 800 mg           Re-evaluation Notes    Patient was given Cardizem bolus and a drip his heart rate is down to around 800 he is asymptomatic labs are unremarkable at time I do feel he needs to be admitted for further evaluation I did speak with nurse practitioner on call who is agreeable    CRITICAL CARE:   IP CONSULT TO CARDIOLOGY      CONSULTS:  CRITICAL CARE: There was a high probability of clinically significant/life threatening deterioration in this patient's condition which required my urgent intervention. Total critical care time was 30 minutes. This excludes any time for separately reportable procedures. PROCEDURES:  None    FINAL IMPRESSION      1. Paroxysmal atrial fibrillation with rapid ventricular response ([facility]          DISPOSITION/PLAN   DISPOSITION Admitted 10/16/2021 12:00:14 AM      Condition on Disposition    Stable    PATIENT REFERRED TO:  No follow-up provider specified. DISCHARGE MEDICATIONS:  New Prescriptions    No medications on file       (Please note that portions of this note were completed with a voice recognition program.  Efforts were made to edit the dictations but occasionally words are mis-transcribed.)    Arielle Mathew MD,, MD, F.A.C.E.P.   Attending Emergency Physician        Arielle Mathew MD  10/16/21 1160

## 2021-10-16 NOTE — PROGRESS NOTES
RN at bedside. Patient resting in bed, lights off on the phone with his wife. Will continue to monitor.

## 2021-10-16 NOTE — ED NOTES
Patient sleeping. Respirations unlabored. Call light in reach.       Moshe. Madai 139, RN  10/16/21 0371

## 2021-10-16 NOTE — ED NOTES
Patient BP dropped to 74/57. Cardizem rate change to 5mg/hr patient placed in [facility] patient tolerated.        Zonia Aj 139, RN  10/16/21 0256

## 2021-10-16 NOTE — DISCHARGE SUMMARY
[facility]  Office: [address], DO, Mk Wing, DO, Iman Sen, DO, Lynsey Tobias, DO, Wale Keller MD, Nan Moser MD, Logan Mccann MD, Dariana Roberts MD, Mannie Cheung MD, Mohan Perez MD, Zohra Sofia MD, Nolvia Seo MD, Brad Elizondo, DO, Luzma Boykin, DO, Dayne Sofia MD,  Shannon Conklin, DO, Gilbert Chakraborty MD, Enio Wheeler MD, Elsa King MD, Quinn Mcdaniel MD, Azalea Mccann MD, Citlalli Boyd MD, Nimisha Welch, [facility], [facility], CNP, Maia Canaimee, CNP, Kalina Fret, CNS, Adri Swathi, CNP, Alycia Mccoyry, CNP, Nancy Brain, CNP, Rashard Goodson, CNP, Ann Marie Villanueva, CNP, Pa Galvez, CNP, Manav Yan PA-C, Susi Agrawal, JENNIFER, Rogelio Babin, CNP, Beatriz Keith, CNP, Radha Quiles, CNP, Casandra Li, CNP, Kane Nelson, CNP, Steve Villarreal, CNP, Mitzi Martinez, [facility]    Discharge Summary     Patient ID: Celsa Mari  :  1960   MRN: [id number]     ACCOUNT:  [de-identified]   Patient's PCP: No primary care provider on file. Admit Date: 10/15/2021   Discharge Date: 10/16/2021     Length of Stay: 1  Code Status:  Full Code  Admitting Physician: Miriam Singer DO  Discharge Physician: Susi Agrawal, APRN - NP     Active Discharge Diagnoses:     Hospital Problem Lists:  Principal Problem:    Atrial fibrillation with RVR [facility])  Active Problems:    Essential hypertension    Polycythemia    Hypoproteinemia ([facility]  Resolved Problems:    * No resolved hospital problems. *      Admission Condition:  fair    Discharged Condition: good    Hospital Stay:     Hospital Course:  Celsa Mari is a 62 y.o. male who was admitted for the management of   Atrial fibrillation with RVR ([facility] , presented to ER with Tachycardia    Celsa Mari is a 62 y.o.  Non- / non  male who presents with Tachycardia   and is admitted to the hospital for the management of Atrial fibrillation with RVR ([facility]    Patient is a 11year-old male with a past medical history significant for hypertension, obstructive sleep apnea noncompliant with CPAP machine who presents to the emergency department for evaluation of palpitations. Patient states that he was at home with his wife after dinner when he felt his heart start to race. He states he has had episodes like this previously and usually after sitting still for a small amount of time the palpitations go away, unfortunately this time it did not. Patient states no other symptoms associated with the palpitations he states a previous history of chest pain prior to completing 2 cardiac caths which were normal per patient and to stress test.  He had followed up with Dr. Richard Rubin, up until last year when his insurance changed. Since then he has not had follow-up.     Work-up in the emergency department included negative chest x-ray. Metabolic and hematologic abnormalities included a hemoglobin of 18.9 without current smoking history although patient states he used to smoke cigars regularly. EKG demonstrated A. fib with RVR and patient was admitted for further work-up and management.     On assessment patient is awake and alert sitting in bed in no acute distress. Cardizem infusing. Heart rate fluctuating significantly between normal sinus rhythm with a heart rate in the 80s up into A. fib with RVR with a rate into the 130s with intermittent episodes of a flutter all on bedside telemetry. Patient states no dizziness with symptoms. Still endorses chest palpitations without diaphoresis. No nausea vomiting diarrhea constipation fever chills urinary symptoms or pain. States current non-smoking status noted endorse history of cigar smoking. Is a daily drinker 1-2 social drinks a day. No concerns with withdrawal      Significant therapeutic interventions:     A. fib with RVR/a flutter:   - New diagnosis for patient. - Consult cardiology.     - Continue Cardizem infusion.    - Patient still with poor rate control.    - Did convert briefly to normal sinus rhythm with vagal maneuver and then rebounded back into an A. Fib/flutter rhythm. - Stress test per cards   - continue Lovenox treatment dose.     - [id number] score 1  - NOAC at discretion of cardiology at discharge      Obstructive sleep apnea:   - Patient states he has been prescribed a CPAP machine at home but noncompliant with it     Polycythemia: Most likely secondary to noncompliance with CPAP     Hypoproteinemia      GI/DVT prophylaxis: protonix, Lovenox 1 mg/kg twice daily    Significant Diagnostic Studies:   Labs / Micro:  CBC:   Lab Results   Component Value Date    WBC 7.0 10/15/2021    RBC 5.84 10/15/2021    HGB 18.9 10/15/2021    HCT 55.4 10/15/2021    MCV 94.8 10/15/2021    MCH 32.4 10/15/2021    MCHC 34.2 10/15/2021    RDW 14.2 10/15/2021     10/15/2021     BMP:    Lab Results   Component Value Date    GLUCOSE 111 10/16/2021     10/16/2021    K 4.1 10/16/2021     10/16/2021    CO2 24 10/16/2021    ANIONGAP 10 10/16/2021    BUN 17 10/16/2021    CREATININE 0.72 10/16/2021    BUNCRER 24 10/16/2021    CALCIUM 9.0 10/16/2021    LABGLOM >60 10/16/2021    GFRAA >60 10/16/2021    GFR      10/16/2021    GFR NOT REPORTED 10/16/2021     HFP:    Lab Results   Component Value Date    PROT 5.8 10/16/2021     CMP:    Lab Results   Component Value Date    GLUCOSE 111 10/16/2021     10/16/2021    K 4.1 10/16/2021     10/16/2021    CO2 24 10/16/2021    BUN 17 10/16/2021    CREATININE 0.72 10/16/2021    ANIONGAP 10 10/16/2021    ALKPHOS 53 10/16/2021    ALT 37 10/16/2021    AST 20 10/16/2021    BILITOT 0.42 10/16/2021    LABALBU 3.7 10/16/2021    ALBUMIN NOT REPORTED 10/16/2021    LABGLOM >60 10/16/2021    GFRAA >60 10/16/2021    GFR      10/16/2021    GFR NOT REPORTED 10/16/2021    PROT 5.8 10/16/2021    CALCIUM 9.0 10/16/2021     PT/INR:    Lab Results   Component Value Date    PROTIME 13.4 10/16/2021    INR 1.0 10/16/2021     PTT: No results found for: APTT  FLP:    Lab Results   Component Value Date    CHOL 172 10/16/2020    TRIG 148 10/16/2020    HDL 27 10/16/2020     U/A:  No results found for: COLORU, TURBIDITY, SPECGRAV, HGBUR, PHUR, PROTEINU, GLUCOSEU, KETUA, BILIRUBINUR, UROBILINOGEN, NITRU, LEUKOCYTESUR  TSH:    Lab Results   Component Value Date    TSH 4.42 10/16/2021        Radiology:  XR CHEST PORTABLE    Result Date: 10/15/2021  No acute cardiopulmonary process. Consultations:    Consults:     Final Specialist Recommendations/Findings:   IP CONSULT TO CARDIOLOGY      The patient was seen and examined on day of discharge and this discharge summary is in conjunction with any daily progress note from day of discharge. Discharge plan:     Disposition: Home    Physician Follow Up:     Tony Flores, [place] [address] [place] [address]  [phone]    In 1 week  Establish care as new patient post hospital stay       Requiring Further Evaluation/Follow Up POST HOSPITALIZATION/Incidental Findings: The cardiology office on Monday for Holter monitor placement. Follow-up with cardiology as outpatient    Diet: regular diet and cardiac diet    Activity: As tolerated    Instructions to Patient: Compliance with CPAP is strongly encouraged    Discharge Medications:      Medication List      START taking these medications    Blood Pressure Kit  1 kit by Does not apply route daily     losartan 100 MG tablet  Commonly known as: COZAAR  Take 1 tablet by mouth daily  Start taking on: October 17, 2021  Replaces:  Avapro 300 MG tablet     metoprolol tartrate 25 MG tablet  Commonly known as: LOPRESSOR  Take 1 tablet by mouth 2 times daily        CONTINUE taking these medications    HYDROcodone-acetaminophen 5-325 MG per tablet  Commonly known as: NORCO     pantoprazole 40 MG tablet  Commonly known as: PROTONIX  Take 1 tablet by mouth 2 times daily spironolactone 50 MG tablet  Commonly known as: ALDACTONE        STOP taking these medications    Avapro 300 MG tablet  Generic drug: irbesartan  Replaced by: losartan 100 MG tablet     ciprofloxacin 500 MG tablet  Commonly known as: CIPRO     metoprolol succinate 50 MG extended release tablet  Commonly known as: TOPROL XL           Where to Get Your Medications      These medications were sent to [address] [facility], [address] [address]  [address] [facility]    Phone: [phone]   losartan 100 MG tablet  metoprolol tartrate 25 MG tablet     You can get these medications from any pharmacy    Bring a paper prescription for each of these medications  Blood Pressure Kit         No discharge procedures on file. Time Spent on discharge is  31 mins in patient examination, evaluation, counseling as well as medication reconciliation, prescriptions for required medications, discharge plan and follow up. Discussed with attending  Electronically signed by   RU Chen NP  10/16/2021  4:12 PM      Thank you Dr. Guerrero Child primary care provider on file. for the opportunity to be involved in this patient's care.

## 2021-10-16 NOTE — ED NOTES
Access called for admission to [address] [place] [address].  Spoke with Lidya Brown, RN  10/15/21 [address], RN  10/15/21 3318

## 2021-10-18 ENCOUNTER — HOSPITAL ENCOUNTER (OUTPATIENT)
Dept: NON INVASIVE DIAGNOSTICS | Age: 61
Discharge: HOME | End: 2021-10-18
Payer: COMMERCIAL

## 2021-10-18 DIAGNOSIS — R94.31 ABNORMAL ELECTROCARDIOGRAM (ECG) (EKG): ICD-10-CM

## 2021-10-18 LAB
EKG ATRIAL RATE: 163 BPM
EKG ATRIAL RATE: 76 BPM
EKG P AXIS: 4 DEGREES
EKG P-R INTERVAL: 184 MS
EKG Q-T INTERVAL: 296 MS
EKG Q-T INTERVAL: 362 MS
EKG QRS DURATION: 88 MS
EKG QRS DURATION: 90 MS
EKG QTC CALCULATION (BAZETT): 407 MS
EKG QTC CALCULATION (BAZETT): 450 MS
EKG R AXIS: 36 DEGREES
EKG R AXIS: 7 DEGREES
EKG T AXIS: 29 DEGREES
EKG T AXIS: 69 DEGREES
EKG VENTRICULAR RATE: 139 BPM
EKG VENTRICULAR RATE: 76 BPM

## 2021-10-18 PROCEDURE — 93225 XTRNL ECG REC<48 HRS REC: CPT

## 2021-10-18 PROCEDURE — 93226 XTRNL ECG REC<48 HR SCAN A/R: CPT

## 2021-10-18 PROCEDURE — 93010 ELECTROCARDIOGRAM REPORT: CPT | Performed by: INTERNAL MEDICINE

## 2021-10-20 LAB
ACQUISITION DURATION: NORMAL S
AVERAGE HEART RATE: 90 BPM
HOOKUP DATE: NORMAL
HOOKUP TIME: NORMAL
MAX HEART RATE TIME/DATE: NORMAL
MAX HEART RATE: 122 BPM
MIN HEART RATE TIME/DATE: NORMAL
MIN HEART RATE: 68 BPM
NUMBER OF QRS COMPLEXES: NORMAL
NUMBER OF SUPRAVENTRICULAR COUPLETS: 1
NUMBER OF SUPRAVENTRICULAR ECTOPICS: 11
NUMBER OF SUPRAVENTRICULAR ISOLATED BEATS: 9
NUMBER OF VENTRICULAR BIGEMINAL CYCLES: 0
NUMBER OF VENTRICULAR COUPLETS: 0
NUMBER OF VENTRICULAR ECTOPICS: 5

## 2021-10-26 ENCOUNTER — OFFICE VISIT (OUTPATIENT)
Dept: INTERNAL MEDICINE CLINIC | Age: 61
End: 2021-10-26
Payer: COMMERCIAL

## 2021-10-26 VITALS
OXYGEN SATURATION: 96 % | BODY MASS INDEX: 28.84 KG/M2 | HEART RATE: 82 BPM | WEIGHT: 201 LBS | DIASTOLIC BLOOD PRESSURE: 82 MMHG | SYSTOLIC BLOOD PRESSURE: 120 MMHG

## 2021-10-26 DIAGNOSIS — Z12.11 SCREENING FOR MALIGNANT NEOPLASM OF COLON: ICD-10-CM

## 2021-10-26 DIAGNOSIS — G47.33 OSA (OBSTRUCTIVE SLEEP APNEA): ICD-10-CM

## 2021-10-26 DIAGNOSIS — R97.20 HIGH PROSTATE SPECIFIC ANTIGEN (PSA): ICD-10-CM

## 2021-10-26 DIAGNOSIS — I48.91 NEW ONSET A-FIB (HCC): Primary | ICD-10-CM

## 2021-10-26 DIAGNOSIS — I10 ESSENTIAL HYPERTENSION: ICD-10-CM

## 2021-10-26 PROCEDURE — 99204 OFFICE O/P NEW MOD 45 MIN: CPT | Performed by: INTERNAL MEDICINE

## 2021-10-26 RX ORDER — IRBESARTAN 300 MG/1
TABLET ORAL
COMMUNITY
Start: 2021-09-30 | End: 2021-10-28

## 2021-10-26 RX ORDER — TAMSULOSIN HYDROCHLORIDE 0.4 MG/1
CAPSULE ORAL
COMMUNITY
Start: 2021-10-17 | End: 2021-10-28

## 2021-10-26 SDOH — ECONOMIC STABILITY: FOOD INSECURITY: WITHIN THE PAST 12 MONTHS, THE FOOD YOU BOUGHT JUST DIDN'T LAST AND YOU DIDN'T HAVE MONEY TO GET MORE.: NEVER TRUE

## 2021-10-26 SDOH — ECONOMIC STABILITY: FOOD INSECURITY: WITHIN THE PAST 12 MONTHS, YOU WORRIED THAT YOUR FOOD WOULD RUN OUT BEFORE YOU GOT MONEY TO BUY MORE.: NEVER TRUE

## 2021-10-26 SDOH — ECONOMIC STABILITY: TRANSPORTATION INSECURITY
IN THE PAST 12 MONTHS, HAS LACK OF TRANSPORTATION KEPT YOU FROM MEETINGS, WORK, OR FROM GETTING THINGS NEEDED FOR DAILY LIVING?: NO

## 2021-10-26 SDOH — ECONOMIC STABILITY: TRANSPORTATION INSECURITY
IN THE PAST 12 MONTHS, HAS THE LACK OF TRANSPORTATION KEPT YOU FROM MEDICAL APPOINTMENTS OR FROM GETTING MEDICATIONS?: NO

## 2021-10-26 ASSESSMENT — PATIENT HEALTH QUESTIONNAIRE - PHQ9
SUM OF ALL RESPONSES TO PHQ QUESTIONS 1-9: 0
SUM OF ALL RESPONSES TO PHQ9 QUESTIONS 1 & 2: 0
1. LITTLE INTEREST OR PLEASURE IN DOING THINGS: 0
2. FEELING DOWN, DEPRESSED OR HOPELESS: 0

## 2021-10-26 ASSESSMENT — SOCIAL DETERMINANTS OF HEALTH (SDOH): HOW HARD IS IT FOR YOU TO PAY FOR THE VERY BASICS LIKE FOOD, HOUSING, MEDICAL CARE, AND HEATING?: NOT HARD AT ALL

## 2021-10-26 NOTE — PROGRESS NOTES
Visit Information    Have you changed or started any medications since your last visit including any over-the-counter medicines, vitamins, or herbal medicines? no   Are you having any side effects from any of your medications? -  no  Have you stopped taking any of your medications? Is so, why? -  no    Have you seen any other physician or provider since your last visit? Yes - Records Obtained  Have you had any other diagnostic tests since your last visit? Yes - Records Obtained  Have you been seen in the emergency room and/or had an admission to a hospital since we last saw you? Yes - Records Obtained  Have you had your routine dental cleaning in the past 6 months? no    Have you activated your [employer] account? If not, what are your barriers?  Yes     Patient Care Team:  Vonda Dorman MD as PCP - General (Internal Medicine)  Mine Palumbo MD as Consulting Physician (Gastroenterology)    Medical History Review  Past Medical, Family, and Social History reviewed and does not contribute to the patient presenting condition    Health Maintenance   Topic Date Due    Hepatitis C screen  Never done    Pneumococcal 0-64 years Vaccine (1 of 2 - PPSV23) Never done    COVID-19 Vaccine (1) Never done    HIV screen  Never done    DTaP/Tdap/Td vaccine (1 - Tdap) Never done    Colon cancer screen colonoscopy  Never done    Shingles Vaccine (1 of 2) Never done    Low dose CT lung screening  12/19/2020    PSA counseling  03/10/2022    A1C test (Diabetic or Prediabetic)  06/22/2022    Potassium monitoring  10/16/2022    Creatinine monitoring  10/16/2022    Lipid screen  10/16/2025    Flu vaccine  Completed    Hepatitis A vaccine  Aged Out    Hepatitis B vaccine  Aged Out    Hib vaccine  Aged Out    Meningococcal (ACWY) vaccine  Aged Out

## 2021-10-28 NOTE — PROGRESS NOTES
[address] [address]  Dept: [phone]  Dept Fax: [phone]     Name: Rehana Mulligan  : 1960           Chief Complaint   Patient presents with    Follow-Up from Hospital       History of Present Illness:    HPI  Patient here to establish care,  Was recently admitted to the hospital for acute new onset of atrial fibrillation with rapid ventricular rate, was given metoprolol and now on sinus rhythm,  Has been on long-term anticoagulation as well  On medication list patient has the losartan and it will be started on both on board, after reviewing her discharge summary from the hospital patient AV statin has been stopped,  A [id number] score was 1, was not started on any blood thinners, will start the patient on aspirin 81 mg p.o. daily until he sees the cardiology,    Patient has been diagnosed with sleep apnea, has not been using BiPAP at all,  Has longstanding allergies and sinus issues which does not let him use the CPAP, will get referral to ENT for that.   Past Medical History:    Past Medical History:   Diagnosis Date    Abscess of bursa of right elbow     Heartburn     Hypertension     Polycythemia     PONV (postoperative nausea and vomiting)     Sleep apnea     no machine      Reviewed all health maintenance requirements and ordered appropriate tests  Health Maintenance Due   Topic Date Due    Hepatitis C screen  Never done    Pneumococcal 0-64 years Vaccine (1 of 2 - PPSV23) Never done    COVID-19 Vaccine (1) Never done    HIV screen  Never done    DTaP/Tdap/Td vaccine (1 - Tdap) Never done    Colon cancer screen colonoscopy  Never done    Shingles Vaccine (1 of 2) Never done    Low dose CT lung screening  2020       Past Surgical History:    Past Surgical History:   Procedure Laterality Date    APPENDECTOMY      ARM SURGERY Right 2021    TRICEPS TENDON REPAIR - RIGHT ELBOW WITH ARTHREX AND BIOMET ACHILLES ALLOGRAFT    ARM SURGERY Right 7/13/2021    RIGHT ELBOW IRRIGATION AND DEBRIDEMENT performed by Lissett Alvarez MD at [address]      cervical/ lumbar    CARDIAC CATHETERIZATION  2017    [facility]/ no stents    COLONOSCOPY      ENDOSCOPY, COLON, DIAGNOSTIC      HERNIA REPAIR      MUSCLE REPAIR Right 2/23/2021    TRICEPS TENDON REPAIR - RIGHT ELBOW performed by Lissett Alvarez MD at [address] [facility]      cervical and lumbar    UPPER GASTROINTESTINAL ENDOSCOPY N/A 12/17/2019    EGD BIOPSY performed by Landon Parsons MD at [place] [address] N/A 7/30/2021    EGD BIOPSY OF STOMACH AND ESOPHAGUS performed by Alice Zeng MD at [facility]        Medications:      Current Outpatient Medications:     losartan (COZAAR) 100 MG tablet, Take 1 tablet by mouth daily, Disp: 30 tablet, Rfl: 0    metoprolol tartrate (LOPRESSOR) 25 MG tablet, Take 1 tablet by mouth 2 times daily, Disp: 60 tablet, Rfl: 0    Blood Pressure KIT, 1 kit by Does not apply route daily, Disp: 1 kit, Rfl: 0    HYDROcodone-acetaminophen (NORCO) 5-325 MG per tablet, , Disp: , Rfl:     pantoprazole (PROTONIX) 40 MG tablet, Take 1 tablet by mouth 2 times daily, Disp: 30 tablet, Rfl: 3    spironolactone (ALDACTONE) 50 MG tablet, Take 50 mg by mouth daily, Disp: , Rfl:     Allergies:      Pcn [penicillins]    Social History:    Tobacco:    reports that he has been smoking cigars. He has a 20.00 pack-year smoking history. He has never used smokeless tobacco.  Alcohol:      reports current alcohol use. Drug Use:  reports current drug use. Drug: Marijuana.     Family History:    Family History   Problem Relation Age of Onset    Cancer Father         prostrate    Diabetes Father     Cancer Paternal Uncle         colon       Review of Systems:    Positive and Negative as described in HPI    Constitutional:  negative for  fevers, chills, sweats, fatigue, and weight loss  HEENT:  negative for vision or hearing changes,   Respiratory:  negative for shortness of breath, cough, or congestion  Cardiovascular:  negative for  chest pain, palpitations  Gastrointestinal:  negative for nausea, vomiting, diarrhea, constipation, abdominal pain  Genitourinary:  negative for frequency, dysuria  Integument/Breast:  negative for rash, skin lesions  Musculoskeletal:  negative for muscle aches or joint pain  Neurological:  negative for headaches, dizziness, lightheadedness, numbness, pain and tingling extrimities  Behavior/Psych:  negative for depression and anxiety      Physical Exam:    Vitals:  /82 (Site: Left Upper Arm)   Pulse 82   Wt 201 lb (91.2 kg)   SpO2 96%   BMI 28.84 kg/m²     General appearance - alert, well appearing, and in no acute distress  Mental status - oriented to person, place, and time with normal affect  Head - normocephalic and atraumatic  Eyes - pupils equal and reactive, extraocular eye movements intact, conjunctiva clear  Ears - hearing appears to be intact  Nose - no drainage noted  Mouth - mucous membranes moist  Neck - supple, no carotid bruits, thyroid not palpable  Chest - clear to auscultation, normal effort  Heart - normal rate, regular rhythm, no murmurs  Abdomen - soft, nontender, nondistended, bowel sounds present all four quadrants, no masses, hepatomegaly or splenomegaly  Neurological - normal speech, no focal findings or movement disorder noted, cranial nerves II through XII grossly intact  Extremities - peripheral pulses palpable, no pedal edema or calf pain with palpation  Skin - no gross lesions, rashes, or induration noted      Data:    Lab Results   Component Value Date     10/16/2021    K 4.1 10/16/2021     10/16/2021    CO2 24 10/16/2021    BUN 17 10/16/2021    CREATININE 0.72 10/16/2021    GLUCOSE 111 10/16/2021    PROT 5.8 10/16/2021    LABALBU 3.7 10/16/2021    BILITOT 0.42 10/16/2021    ALKPHOS 53 10/16/2021    AST 20 10/16/2021    ALT 37 10/16/2021     Lab Results   Component Value Date    WBC 7.0 10/15/2021    RBC 5.84 10/15/2021    HGB 18.9 10/15/2021    HCT 55.4 10/15/2021    MCV 94.8 10/15/2021    MCH 32.4 10/15/2021    MCHC 34.2 10/15/2021    RDW 14.2 10/15/2021     10/15/2021    MPV 7.6 10/15/2021     Lab Results   Component Value Date    TSH 4.42 10/16/2021     Lab Results   Component Value Date    CHOL 172 10/16/2020    HDL 27 10/16/2020    PSA 4.16 03/10/2021    LABA1C 5.7 06/22/2021          Assessment & Plan:     Diagnosis Orders   1. New onset a-fib ([facility] [address]     2. SEBASTIAN (obstructive sleep apnea)  CATALINO - Caterina Dexter MD, Otolaryngology, [place]   3. Essential hypertension     4. Screening for malignant neoplasm of colon  Cologuard (For External Results Only)   5. High prostate specific antigen (PSA)  Marjorie Robb MD, Urology, [facility] onset of A. fib,  Patient was started on metoprolol 25 mg twice a day, home losartan, Avapro was stopped   And echocardiogram was done in the hospital, patient will see cardiology follow-up,    Obstructive sleep apnea,  Patient has not been compliant with the CPAP machine, strongly advised to use a CPAP at this time,  We will get ENT on board for med sinuses and possible surgery for sleep apnea,    HTN:    Discussed in detail about the BP, lab results, importance of diet, salt intake, exercise, and med compliance. Medication side effects discussed in detail and has none today. Patient verbalized understanding.      BP Readings from Last 3 Encounters:   10/26/21 120/82   10/16/21 120/75   09/17/21 (!) 141/89        CREATININE   Date Value Ref Range Status   10/16/2021 0.72 0.70 - 1.20 mg/dL Final   10/15/2021 0.80 0.70 - 1.20 mg/dL Final   07/24/2021 1.06 0.70 - 1.20 mg/dL Final          Healthcare maintenance:  Cologuard has been given to the patient at this time,  PSA was high in the past, patient was supposed to see urology, will get urology on board at this time recheck PSA            Completed Refills   Requested Prescriptions      No prescriptions requested or ordered in this encounter     Return in about 4 weeks (around 11/23/2021), or if symptoms worsen or fail to improve. No orders of the defined types were placed in this encounter. Orders Placed This Encounter   Procedures    Cologuard (For External Results Only)     This test is performed by an external laboratory and is used for result attachment only. It is required that this order requisition be faxed to: [facility] @ [phone]. See [email or web address] for further information.      Standing Status:   Future     Standing Expiration Date:   10/26/2022   Tejal Welsh MD, Otolaryngology, [place]     Referral Priority:   Routine     Referral Type:   Eval and Treat     Referral Reason:   Specialty Services Required     Referred to Provider:   Naldo Castro MD     Requested Specialty:   Otolaryngology     Number of Visits Requested:   Kurt Goodman MD, Urology, [place]     Referral Priority:   Routine     Referral Type:   Eval and Treat     Referral Reason:   Specialty Services Required     Referred to Provider:   Wayne Bledsoe MD     Requested Specialty:   Urology     Number of Visits Requested:   1       Electronically signed by Akanksha White MD on 10/28/2021 at 1:13 PM

## 2021-10-31 ENCOUNTER — APPOINTMENT (OUTPATIENT)
Dept: GENERAL RADIOLOGY | Facility: CLINIC | Age: 61
End: 2021-10-31
Payer: COMMERCIAL

## 2021-10-31 ENCOUNTER — HOSPITAL ENCOUNTER (EMERGENCY)
Facility: CLINIC | Age: 61
Discharge: HOME | End: 2021-10-31
Attending: EMERGENCY MEDICINE
Payer: COMMERCIAL

## 2021-10-31 VITALS
WEIGHT: 200 LBS | RESPIRATION RATE: 15 BRPM | HEART RATE: 82 BPM | DIASTOLIC BLOOD PRESSURE: 88 MMHG | TEMPERATURE: 98.4 F | HEIGHT: 70 IN | OXYGEN SATURATION: 98 % | SYSTOLIC BLOOD PRESSURE: 139 MMHG | BODY MASS INDEX: 28.63 KG/M2

## 2021-10-31 DIAGNOSIS — F41.1 ANXIETY STATE: ICD-10-CM

## 2021-10-31 DIAGNOSIS — T50.905A ADVERSE EFFECT OF DRUG, INITIAL ENCOUNTER: Primary | ICD-10-CM

## 2021-10-31 LAB
ABSOLUTE EOS #: 0.2 K/UL (ref 0–0.4)
ABSOLUTE IMMATURE GRANULOCYTE: ABNORMAL K/UL (ref 0–0.3)
ABSOLUTE LYMPH #: 1.3 K/UL (ref 1–4.8)
ABSOLUTE MONO #: 0.3 K/UL (ref 0.1–1.2)
ANION GAP SERPL CALCULATED.3IONS-SCNC: 9 MMOL/L (ref 9–17)
BASOPHILS # BLD: 1 % (ref 0–2)
BASOPHILS ABSOLUTE: 0 K/UL (ref 0–0.2)
BUN BLDV-MCNC: 17 MG/DL (ref 8–23)
BUN/CREAT BLD: ABNORMAL (ref 9–20)
CALCIUM SERPL-MCNC: 9.1 MG/DL (ref 8.6–10.4)
CHLORIDE BLD-SCNC: 103 MMOL/L (ref 98–107)
CO2: 25 MMOL/L (ref 20–31)
CREAT SERPL-MCNC: 0.8 MG/DL (ref 0.7–1.2)
DIFFERENTIAL TYPE: ABNORMAL
EOSINOPHILS RELATIVE PERCENT: 5 % (ref 1–4)
GFR AFRICAN AMERICAN: >60 ML/MIN
GFR NON-AFRICAN AMERICAN: >60 ML/MIN
GFR SERPL CREATININE-BSD FRML MDRD: ABNORMAL ML/MIN/{1.73_M2}
GFR SERPL CREATININE-BSD FRML MDRD: ABNORMAL ML/MIN/{1.73_M2}
GLUCOSE BLD-MCNC: 121 MG/DL (ref 70–99)
HCT VFR BLD CALC: 51.5 % (ref 41–53)
HEMOGLOBIN: 17.4 G/DL (ref 13.5–17.5)
IMMATURE GRANULOCYTES: ABNORMAL %
LYMPHOCYTES # BLD: 25 % (ref 24–44)
MCH RBC QN AUTO: 32.1 PG (ref 26–34)
MCHC RBC AUTO-ENTMCNC: 33.9 G/DL (ref 31–37)
MCV RBC AUTO: 94.8 FL (ref 80–100)
MONOCYTES # BLD: 6 % (ref 2–11)
NRBC AUTOMATED: ABNORMAL PER 100 WBC
PDW BLD-RTO: 13.6 % (ref 12.5–15.4)
PLATELET # BLD: 242 K/UL (ref 140–450)
PLATELET ESTIMATE: ABNORMAL
PMV BLD AUTO: 7.5 FL (ref 6–12)
POTASSIUM SERPL-SCNC: 3.8 MMOL/L (ref 3.7–5.3)
RBC # BLD: 5.43 M/UL (ref 4.5–5.9)
RBC # BLD: ABNORMAL 10*6/UL
SEG NEUTROPHILS: 63 % (ref 36–66)
SEGMENTED NEUTROPHILS ABSOLUTE COUNT: 3.3 K/UL (ref 1.8–7.7)
SODIUM BLD-SCNC: 137 MMOL/L (ref 135–144)
TROPONIN INTERP: NORMAL
TROPONIN T: NORMAL NG/ML
TROPONIN, HIGH SENSITIVITY: 8 NG/L (ref 0–22)
WBC # BLD: 5.2 K/UL (ref 3.5–11)
WBC # BLD: ABNORMAL 10*3/UL

## 2021-10-31 PROCEDURE — 6370000000 HC RX 637 (ALT 250 FOR IP): Performed by: EMERGENCY MEDICINE

## 2021-10-31 PROCEDURE — 71045 X-RAY EXAM CHEST 1 VIEW: CPT

## 2021-10-31 PROCEDURE — 80048 BASIC METABOLIC PNL TOTAL CA: CPT

## 2021-10-31 PROCEDURE — 36415 COLL VENOUS BLD VENIPUNCTURE: CPT

## 2021-10-31 PROCEDURE — 85025 COMPLETE CBC W/AUTO DIFF WBC: CPT

## 2021-10-31 PROCEDURE — 99285 EMERGENCY DEPT VISIT HI MDM: CPT

## 2021-10-31 PROCEDURE — 84484 ASSAY OF TROPONIN QUANT: CPT

## 2021-10-31 PROCEDURE — 93005 ELECTROCARDIOGRAM TRACING: CPT | Performed by: EMERGENCY MEDICINE

## 2021-10-31 RX ORDER — LORAZEPAM 1 MG/1
1 TABLET ORAL ONCE
Status: COMPLETED | OUTPATIENT
Start: 2021-10-31 | End: 2021-10-31

## 2021-10-31 RX ORDER — ALPRAZOLAM 0.25 MG/1
0.25 TABLET ORAL 2 TIMES DAILY
Qty: 8 TABLET | Refills: 0 | Status: SHIPPED | OUTPATIENT
Start: 2021-10-31 | End: 2021-11-04

## 2021-10-31 RX ADMIN — LORAZEPAM 1 MG: 1 TABLET ORAL at 21:17

## 2021-10-31 ASSESSMENT — ENCOUNTER SYMPTOMS
EYES NEGATIVE: 1
GASTROINTESTINAL NEGATIVE: 1
ALLERGIC/IMMUNOLOGIC NEGATIVE: 1

## 2021-10-31 NOTE — ED PROVIDER NOTES
eMERGENCY dEPARTMENT eNCOUnter      Pt Name: Katie Brice  MRN: [id number]  [place] 1960  Date of evaluation: 10/31/2021      CHIEF COMPLAINT       Chief Complaint   Patient presents with    Numbness     chest and arms    Tachycardia    Shortness of Breath          HISTORY OF PRESENT ILLNESS    Katie Brice is a 62 y.o. male who presents to the emergency department by EMS for evaluation of an episode of numbness and anxiety and possible afibrillation that happened while he was at home. Apparently patient had gone out had a drink and had smoked some marijuana at his house when suddenly he began to feel like he had a \"rush which started the middle of his nipple level and went up through his face and head and down his arms. He said he began to panic after that and his heart was racing. He never complained of any chest pain but he did feel slightly lightheaded and possibly somewhat short of breath. As he came to the emergency department however although symptoms are gone and his heart rate is back down to a normal sinus rhythm. The patient does have a history of recently diagnosed atrial fibrillation for which he takes a beta-blocker. REVIEW OF SYSTEMS       Review of Systems   Constitutional: Negative. HENT: Negative. Eyes: Negative. Cardiovascular: Positive for palpitations. Gastrointestinal: Negative. Endocrine: Negative. Genitourinary: Negative. Musculoskeletal: Negative. Skin: Negative. Allergic/Immunologic: Negative. Neurological: Negative. Hematological: Negative. Psychiatric/Behavioral: Negative. PAST MEDICAL HISTORY    has a past medical history of Abscess of bursa of right elbow, Atrial fibrillation ([address] Heartburn, Hypertension, Polycythemia, PONV (postoperative nausea and vomiting), and Sleep apnea. SURGICAL HISTORY      has a past surgical history that includes shoulder surgery; Spine surgery;  Upper gastrointestinal endoscopy (N/A, 12/17/2019); Cardiac catheterization (2017); hernia repair; Colonoscopy; Appendectomy; back surgery; Endoscopy, colon, diagnostic; Arm Surgery (Right, 02/23/2021); Muscle Repair (Right, 2/23/2021); Arm Surgery (Right, 7/13/2021); and Upper gastrointestinal endoscopy (N/A, 7/30/2021). CURRENT MEDICATIONS       Previous Medications    BLOOD PRESSURE KIT    1 kit by Does not apply route daily    HYDROCODONE-ACETAMINOPHEN (NORCO) 5-325 MG PER TABLET        LOSARTAN (COZAAR) 100 MG TABLET    Take 1 tablet by mouth daily    METOPROLOL TARTRATE (LOPRESSOR) 25 MG TABLET    Take 1 tablet by mouth 2 times daily    PANTOPRAZOLE (PROTONIX) 40 MG TABLET    Take 1 tablet by mouth 2 times daily    SPIRONOLACTONE (ALDACTONE) 50 MG TABLET    Take 50 mg by mouth daily       ALLERGIES     is allergic to pcn [penicillins]. FAMILY HISTORY     He indicated that his mother is alive. He indicated that his father is alive. He indicated that the status of his paternal uncle is unknown.     family history includes Cancer in his father and paternal uncle; Diabetes in his father. SOCIAL HISTORY      reports that he has been smoking cigars. He has a 20.00 pack-year smoking history. He has never used smokeless tobacco. He reports current alcohol use. He reports current drug use. Drug: Marijuana. PHYSICAL EXAM     INITIAL VITALS:  height is 5' 10\" (1.778 m) and weight is 90.7 kg (200 lb). His temperature is 98.4 °F (36.9 °C). His blood pressure is 133/91 (abnormal) and his pulse is 83. His respiration is 15 and oxygen saturation is 98%.      Constitutional: Alert, oriented x3, nontoxic, afebrile, answering questions appropriately, acting properly for age, in no acute distress  HEENT: Extraocular muscles intact, mucus membranes moist, TMs clear bilaterally, no posterior pharyngeal erythema or exudates, Pupils equal, round, reactive to light,   Neck: Trachea midline, Supple without lymphadenopathy, no posterior midline neck tenderness to palpation  Cardiovascular: Regular rhythm and rate no S3, S4, or murmurs  Respiratory: Clear to auscultation bilaterally no wheezes, rhonchi, rales, no respiratory distress  Gastrointestinal: Soft, nontender, nondistended, positive bowel sounds. No rebound, rigidity, or guarding. Musculoskeletal: No extremity pain or swelling  Neurologic: Moving all 4 extremities without difficulty there are no gross focal neurologic deficits  Skin: Warm and dry      DIFFERENTIAL DIAGNOSIS/ MDM:     Possible anxiety reaction from marijuana use. Possible episode of paroxysmal atrial fibrillation. Patient will get an EKG set of cardiac enzymes we will also get some electrolytes him and chest x-ray and then reevaluate him . DIAGNOSTIC RESULTS     EKG: All EKG's are interpreted by the Emergency Department Physician who either signs or Co-sign marijuana use. Possible episode of paroxysmal atrial fibrillation. Patient will get an EKG set of cardiac enzymes we will also get some electrolytes him and chest x-ray and then reevaluate him s this chart in the absence of a cardiologist.    EKG is a sinus rhythm at 95 bpm, NE interval is 0.198, the QRS duration is 0.102, the QTC is 434 ms. No ST or T wave changes indicative any ischemia. No ectopy noted.   Not indicated unless otherwise documented above    LABS:  Results for orders placed or performed during the hospital encounter of 63/34/53   Basic Metabolic Panel   Result Value Ref Range    Glucose 121 (H) 70 - 99 mg/dL    BUN 17 8 - 23 mg/dL    CREATININE 0.80 0.70 - 1.20 mg/dL    Bun/Cre Ratio NOT REPORTED 9 - 20    Calcium 9.1 8.6 - 10.4 mg/dL    Sodium 137 135 - 144 mmol/L    Potassium 3.8 3.7 - 5.3 mmol/L    Chloride 103 98 - 107 mmol/L    CO2 25 20 - 31 mmol/L    Anion Gap 9 9 - 17 mmol/L    GFR Non-African American >60 >60 mL/min    GFR African American >60 >60 mL/min    GFR Comment          GFR Staging NOT REPORTED    CBC Auto Differential   Result Value Ref Range    WBC 5.2 3.5 - 11.0 k/uL    RBC 5.43 4.5 - 5.9 m/uL    Hemoglobin 17.4 13.5 - 17.5 g/dL    Hematocrit 51.5 41 - 53 %    MCV 94.8 80 - 100 fL    MCH 32.1 26 - 34 pg    MCHC 33.9 31 - 37 g/dL    RDW 13.6 12.5 - 15.4 %    Platelets 855 306 - 655 k/uL    MPV 7.5 6.0 - 12.0 fL    NRBC Automated NOT REPORTED per 100 WBC    Differential Type NOT REPORTED     Seg Neutrophils 63 36 - 66 %    Lymphocytes 25 24 - 44 %    Monocytes 6 2 - 11 %    Eosinophils % 5 (H) 1 - 4 %    Basophils 1 0 - 2 %    Immature Granulocytes NOT REPORTED 0 %    Segs Absolute 3.30 1.8 - 7.7 k/uL    Absolute Lymph # 1.30 1.0 - 4.8 k/uL    Absolute Mono # 0.30 0.1 - 1.2 k/uL    Absolute Eos # 0.20 0.0 - 0.4 k/uL    Basophils Absolute 0.00 0.0 - 0.2 k/uL    Absolute Immature Granulocyte NOT REPORTED 0.00 - 0.30 k/uL    WBC Morphology NOT REPORTED     RBC Morphology NOT REPORTED     Platelet Estimate NOT REPORTED    Troponin   Result Value Ref Range    Troponin, High Sensitivity 8 0 - 22 ng/L    Troponin T NOT REPORTED <0.03 ng/mL    Troponin Interp NOT REPORTED        Not indicated unless otherwise documented above    RADIOLOGY:   I reviewed the radiologist interpretations:  XR CHEST PORTABLE   Final Result   Clear lungs. Cardiomegaly. No acute cardiopulmonary abnormality. Not indicated unless otherwise documented above    EMERGENCY DEPARTMENT COURSE:     The patient was given the following medications:  No orders of the defined types were placed in this encounter.        Vitals:    Vitals:    10/31/21 1911 10/31/21 1912 10/31/21 2011 10/31/21 2033   BP: (!) 144/91  (!) 133/91    Pulse: 92  83    Resp: 16  15    Temp:    98.4 °F (36.9 °C)   TempSrc: Oral      SpO2: 95%  98%    Weight:  90.7 kg (200 lb)     Height:  5' 10\" (1.778 m)       -------------------------  BP (!) 133/91   Pulse 83   Temp 98.4 °F (36.9 °C)   Resp 15   Ht 5' 10\" (1.778 m)   Wt 90.7 kg (200 lb)   SpO2 98%   BMI 28.70 kg/m²         I have reviewed the disposition diagnosis with the patient and or their family/guardian. I have answered their questions and given discharge instructions. They voiced understanding of these instructions and did not have any further questions or complaints. CRITICAL CARE:    None    CONSULTS:    None    PROCEDURES:    None      OARRS Report if indicated             FINAL IMPRESSION      1. Adverse effect of drug, initial encounter    2. Anxiety state          DISPOSITION/PLAN   DISPOSITION Decision To Discharge    I have reviewed the disposition diagnosis with the patient and or their family/guardian. I have answered their questions and given discharge instructions. They voiced understanding of these instructions and did not have any further questions or complaints. Reevaluation: Patient's evaluation here in the emergency department has come back negative troponins are unremarkable chest x-ray is negative EKG is a sinus rhythm. I believe this patient can be discharged home probably have an anxiety reaction secondary to marijuana use.   I believe patient stable for discharge home at this point time but should follow closely with his cardiologist.    PATIENT REFERRED TO:  Brandt Fitzpatrick MD  [address] [place] [address] [address] [place] [id number]    In 2 days        DISCHARGE MEDICATIONS:  New Prescriptions    No medications on file       (Please note that portions of this note were completed with a voice recognition program.  Efforts were made to edit the dictations but occasionally words are mis-transcribed.)    Rajwinder Lopez MD  Attending Emergency Physician           Rajwinder Lopez MD  10/31/21 7553

## 2021-10-31 NOTE — ED NOTES
Pt presents to the ED via ambulance. Pt states he smoked a marijuana stick and had 1 cocktail then suddenly experienced numbness from his chest upwards and down both arms. Pt also c/o a fast heart rate and shortness of breath. All symptoms have resolved at this time.       Verna Avila RN  10/31/21 [id number]

## 2021-11-01 LAB
EKG ATRIAL RATE: 95 BPM
EKG P AXIS: 37 DEGREES
EKG P-R INTERVAL: 198 MS
EKG Q-T INTERVAL: 346 MS
EKG QRS DURATION: 102 MS
EKG QTC CALCULATION (BAZETT): 434 MS
EKG R AXIS: -6 DEGREES
EKG T AXIS: 52 DEGREES
EKG VENTRICULAR RATE: 95 BPM

## 2021-11-03 ENCOUNTER — TELEPHONE (OUTPATIENT)
Dept: UROLOGY | Age: 61
End: 2021-11-03

## 2021-11-03 NOTE — TELEPHONE ENCOUNTER
CHRISTIANA-Pt called in to schedule an appt with Dr. Ann Marie Jackson @ the [place] location-Pt has already been seen at the [facility] office, I advised the patient that we dont recommend going from office to offce-he said he wants to be seen here, adv I would reach out to Dr. Josh Tolliver MA and get back to him.

## 2021-11-18 ENCOUNTER — TELEPHONE (OUTPATIENT)
Dept: INTERNAL MEDICINE CLINIC | Age: 61
End: 2021-11-18

## 2021-11-18 NOTE — TELEPHONE ENCOUNTER
----- Message from Sonja Cartagena sent at 11/17/2021  1:21 PM EST -----  Subject: Referral Request    QUESTIONS   Reason for referral request? Pt needs a new referral for Urology because   the one that he has, that provider only has one day available for the rest   of the year. Pt won't be able to go on that day due to a previously   planned event that is extremely important. Pt would like to know if there   is another ENT provider that he could be referred to that accepts his   insurance. Pt is extremely flexible with scheduling however the one day   that he is not available is the only day the current referred specialist   is. Has the physician seen you for this condition before? No   Preferred Specialist (if applicable)? Do you already have an appointment scheduled? No  Additional Information for Provider?   ---------------------------------------------------------------------------  --------------  CALL BACK INFO  What is the best way for the office to contact you? OK to leave message on   voicemail  Preferred Call Back Phone Number?  [phone]

## 2021-11-18 NOTE — TELEPHONE ENCOUNTER
Called pt, advised him to call his ins company to see who is in his network.  Pt will call ins and then call us so we can send referral.

## 2021-11-24 ENCOUNTER — HOSPITAL ENCOUNTER (EMERGENCY)
Facility: CLINIC | Age: 61
Discharge: HOME | End: 2021-11-24
Attending: EMERGENCY MEDICINE
Payer: COMMERCIAL

## 2021-11-24 ENCOUNTER — APPOINTMENT (OUTPATIENT)
Dept: GENERAL RADIOLOGY | Facility: CLINIC | Age: 61
End: 2021-11-24
Payer: COMMERCIAL

## 2021-11-24 VITALS
OXYGEN SATURATION: 98 % | WEIGHT: 198 LBS | HEIGHT: 70 IN | TEMPERATURE: 97.7 F | RESPIRATION RATE: 19 BRPM | BODY MASS INDEX: 28.35 KG/M2 | HEART RATE: 73 BPM | DIASTOLIC BLOOD PRESSURE: 88 MMHG | SYSTOLIC BLOOD PRESSURE: 121 MMHG

## 2021-11-24 DIAGNOSIS — R00.2 PALPITATIONS: Primary | ICD-10-CM

## 2021-11-24 LAB
ANION GAP SERPL CALCULATED.3IONS-SCNC: 11 MMOL/L (ref 9–17)
BNP INTERPRETATION: NORMAL
BUN BLDV-MCNC: 18 MG/DL (ref 8–23)
BUN/CREAT BLD: NORMAL (ref 9–20)
CALCIUM SERPL-MCNC: 8.8 MG/DL (ref 8.6–10.4)
CHLORIDE BLD-SCNC: 101 MMOL/L (ref 98–107)
CO2: 26 MMOL/L (ref 20–31)
CREAT SERPL-MCNC: 0.8 MG/DL (ref 0.7–1.2)
GFR AFRICAN AMERICAN: >60 ML/MIN
GFR NON-AFRICAN AMERICAN: >60 ML/MIN
GFR SERPL CREATININE-BSD FRML MDRD: NORMAL ML/MIN/{1.73_M2}
GFR SERPL CREATININE-BSD FRML MDRD: NORMAL ML/MIN/{1.73_M2}
GLUCOSE BLD-MCNC: 95 MG/DL (ref 70–99)
HCT VFR BLD CALC: 54.2 % (ref 41–53)
HEMOGLOBIN: 18.2 G/DL (ref 13.5–17.5)
MAGNESIUM: 2.2 MG/DL (ref 1.6–2.6)
MCH RBC QN AUTO: 31.6 PG (ref 26–34)
MCHC RBC AUTO-ENTMCNC: 33.6 G/DL (ref 31–37)
MCV RBC AUTO: 94.1 FL (ref 80–100)
NRBC AUTOMATED: ABNORMAL PER 100 WBC
PDW BLD-RTO: 14.9 % (ref 12.5–15.4)
PLATELET # BLD: 285 K/UL (ref 140–450)
PMV BLD AUTO: 6.9 FL (ref 6–12)
POTASSIUM SERPL-SCNC: 4.2 MMOL/L (ref 3.7–5.3)
PRO-BNP: 69 PG/ML
RBC # BLD: 5.76 M/UL (ref 4.5–5.9)
SODIUM BLD-SCNC: 138 MMOL/L (ref 135–144)
TROPONIN INTERP: NORMAL
TROPONIN T: NORMAL NG/ML
TROPONIN, HIGH SENSITIVITY: 12 NG/L (ref 0–22)
WBC # BLD: 5.6 K/UL (ref 3.5–11)

## 2021-11-24 PROCEDURE — 96374 THER/PROPH/DIAG INJ IV PUSH: CPT

## 2021-11-24 PROCEDURE — 71045 X-RAY EXAM CHEST 1 VIEW: CPT

## 2021-11-24 PROCEDURE — 83880 ASSAY OF NATRIURETIC PEPTIDE: CPT

## 2021-11-24 PROCEDURE — 2500000003 HC RX 250 WO HCPCS: Performed by: EMERGENCY MEDICINE

## 2021-11-24 PROCEDURE — 80048 BASIC METABOLIC PNL TOTAL CA: CPT

## 2021-11-24 PROCEDURE — 85027 COMPLETE CBC AUTOMATED: CPT

## 2021-11-24 PROCEDURE — 99284 EMERGENCY DEPT VISIT MOD MDM: CPT

## 2021-11-24 PROCEDURE — 36415 COLL VENOUS BLD VENIPUNCTURE: CPT

## 2021-11-24 PROCEDURE — 84484 ASSAY OF TROPONIN QUANT: CPT

## 2021-11-24 PROCEDURE — 93005 ELECTROCARDIOGRAM TRACING: CPT | Performed by: EMERGENCY MEDICINE

## 2021-11-24 PROCEDURE — 83735 ASSAY OF MAGNESIUM: CPT

## 2021-11-24 RX ORDER — METOPROLOL TARTRATE 5 MG/5ML
5 INJECTION INTRAVENOUS ONCE
Status: COMPLETED | OUTPATIENT
Start: 2021-11-24 | End: 2021-11-24

## 2021-11-24 RX ADMIN — METOPROLOL TARTRATE 5 MG: 5 INJECTION INTRAVENOUS at 08:28

## 2021-11-24 ASSESSMENT — ENCOUNTER SYMPTOMS
DIARRHEA: 0
VOMITING: 0
SHORTNESS OF BREATH: 0
SORE THROAT: 0

## 2021-11-24 NOTE — ED NOTES
Mode of arrival (squad #, walk in, police, etc) : walk in, from home        Chief complaint(s): palpitations, shortness of breath        Arrival Note (brief scenario, treatment PTA, etc). : Pt presented to the ED c/o palpitations and shortness of breath. Pt states he was at home this morning when he started experiencing these symptoms. States that he started feeling dizzy and had numbness in his fingers. Pt states the symptoms have resolved by the time he got back to the room. Pt thinks he might be experiencing anxiety. States he was never officially diagnosed with anxiety. Pt states he was recently diagnosed with A-Fib. Pt denies fever and chills, denies N/V/D, denies chest pain. Lung sounds clear. C= \"Have you ever felt that you should Cut down on your drinking? \"  No  A= \"Have people Annoyed you by criticizing your drinking? \"  No  G= \"Have you ever felt bad or Guilty about your drinking? \"  No  E= \"Have you ever had a drink as an Eye-opener first thing in the morning to steady your nerves or to help a hangover? \"  No      Deferred []      Reason for deferring: N/A    *If yes to two or more: probable alcohol abuse. Rosario Lopez RN  11/24/21 2933

## 2021-11-24 NOTE — ED PROVIDER NOTES
[facility]  [address]  Phone: [facility]  EMERGENCY DEPARTMENT ENCOUNTER      Pt Name: Melissa Mccoy  MRN: [id number]  [place] 1960  Date of evaluation: 11/24/2021  Provider: Cirilo Saenz, [address] [facility]       Chief Complaint   Patient presents with    Palpitations    Shortness of Breath    Anxiety         HISTORY OF PRESENT ILLNESS   (Location/Symptom, Timing/Onset,Context/Setting, Quality, Duration, Modifying Factors, Severity)  Note limiting factors. Melissa Mccoy is a 62 y.o. male who presents to the emergency department for the evaluation of an episode of lightheadedness. Patient states that this morning he got up, he was doing some chores around the house and he started to feel little lightheaded/dizzy and then he started to get a little anxious and felt short of breath. He did not have any chest pain. He still feels a little lightheaded but the shortness of breath is resolved. Patient was recently diagnosed with atrial fibrillation back in October, he was started on metoprolol, he has followed up with PCP and cardiology since then. Patient states that since that time he has been a little bit more anxious over his health. Patient states that he has felt well over the past couple of weeks. He actually followed up with cardiology a couple days ago to get clearance for nasal surgery. Still not wearing his CPAP. Does take his medication as prescribed. Currently no chest pain, no shortness of breath, no cough, no fever, resting comfortably in bed    Nursing Notes were reviewed. REVIEW OF SYSTEMS    (2-9systems for level 4, 10 or more for level 5)     Review of Systems   Constitutional: Negative for fever. HENT: Negative for sore throat. Respiratory: Negative for shortness of breath. Cardiovascular: Negative for chest pain. Gastrointestinal: Negative for diarrhea and vomiting.    Genitourinary: Negative for dysuria. Skin: Negative for rash. Neurological: Positive for light-headedness. Negative for weakness. Psychiatric/Behavioral: The patient is nervous/anxious. All other systems reviewed and are negative. Except asnoted above the remainder of the review of systems was reviewed and negative.        PAST MEDICAL HISTORY     Past Medical History:   Diagnosis Date    Abscess of bursa of right elbow     Atrial fibrillation (HCC)     Heartburn     Hypertension     Polycythemia     PONV (postoperative nausea and vomiting)     Sleep apnea     no machine         SURGICAL HISTORY       Past Surgical History:   Procedure Laterality Date    APPENDECTOMY      ARM SURGERY Right 02/23/2021    TRICEPS TENDON REPAIR - RIGHT ELBOW WITH ARTHREX AND BIOMET ACHILLES ALLOGRAFT    ARM SURGERY Right 7/13/2021    RIGHT ELBOW IRRIGATION AND DEBRIDEMENT performed by González Toussaint MD at [address] [facility]      cervical/ lumbar    CARDIAC CATHETERIZATION  2017    [facility]/ no stents    COLONOSCOPY      ENDOSCOPY, COLON, DIAGNOSTIC      HERNIA REPAIR      MUSCLE REPAIR Right 2/23/2021    TRICEPS TENDON REPAIR - RIGHT ELBOW performed by González Toussaint MD at [facility]      cervical and lumbar    UPPER GASTROINTESTINAL ENDOSCOPY N/A 12/17/2019    EGD BIOPSY performed by Modesto Dyson MD at [facility] 7/30/2021    EGD BIOPSY OF STOMACH AND ESOPHAGUS performed by Khadijah Stokes MD at [facility]     Previous Medications    BLOOD PRESSURE KIT    1 kit by Does not apply route daily    HYDROCODONE-ACETAMINOPHEN (NORCO) 5-325 MG PER TABLET        LOSARTAN (COZAAR) 100 MG TABLET    Take 1 tablet by mouth daily    METOPROLOL TARTRATE (LOPRESSOR) 25 MG TABLET    Take 1 tablet by mouth 2 times daily    PANTOPRAZOLE (PROTONIX) 40 MG TABLET    Take 1 tablet by mouth 2 times daily SPIRONOLACTONE (ALDACTONE) 50 MG TABLET    Take 50 mg by mouth daily       ALLERGIES     Pcn [penicillins]    FAMILY HISTORY       Family History   Problem Relation Age of Onset    Cancer Father         prostrate    Diabetes Father     Cancer Paternal Uncle         colon          SOCIAL HISTORY       Social History     Socioeconomic History    Marital status: Single     Spouse name: None    Number of children: None    Years of education: None    Highest education level: None   Occupational History    None   Tobacco Use    Smoking status: Light Tobacco Smoker     Packs/day: 1.00     Years: 20.00     Pack years: 20.00     Types: Cigars     Last attempt to quit: 3/15/2008     Years since quittin.7    Smokeless tobacco: Never Used    Tobacco comment: no cigarettes for 20 years   Vaping Use    Vaping Use: Never used   Substance and Sexual Activity    Alcohol use: Yes     Comment: socially    Drug use: Yes     Types: Marijuana (Weed)     Comment: sometimes nightly/ oral or smokes    Sexual activity: None   Other Topics Concern    None   Social History Narrative    None     Social Determinants of Health     Financial Resource Strain: Low Risk     Difficulty of Paying Living Expenses: Not hard at all   Food Insecurity: No Food Insecurity    Worried About Running Out of Food in the Last Year: Never true    Mary Beth of Food in the Last Year: Never true   Transportation Needs: No Transportation Needs    Lack of Transportation (Medical): No    Lack of Transportation (Non-Medical):  No   Physical Activity:     Days of Exercise per Week: Not on file    Minutes of Exercise per Session: Not on file   Stress:     Feeling of Stress : Not on file   Social Connections:     Frequency of Communication with Friends and Family: Not on file    Frequency of Social Gatherings with Friends and Family: Not on file    Attends Buddhist Services: Not on file    Active Member of Clubs or Organizations: Not on file  Attends Club or Organization Meetings: Not on file    Marital Status: Not on file   Intimate Partner Violence:     Fear of Current or Ex-Partner: Not on file    Emotionally Abused: Not on file    Physically Abused: Not on file    Sexually Abused: Not on file   Housing Stability:     Unable to Pay for Housing in the Last Year: Not on file    Number of [place] in the Last Year: Not on file    Unstable Housing in the Last Year: Not on file       SCREENINGS    Briggs Coma Scale  Eye Opening: Spontaneous  Best Verbal Response: Oriented  Best Motor Response: Obeys commands  Briggs Coma Scale Score: 15        PHYSICAL EXAM    (up to 7 for level 4, 8 or more for level 5)     ED Triage Vitals [11/24/21 0809]   BP Temp Temp Source Pulse Resp SpO2 Height Weight   (!) 148/100 97.7 °F (36.5 °C) Oral 82 19 99 % 5' 10\" (1.778 m) 198 lb (89.8 kg)       Physical Exam  Vitals and nursing note reviewed. Constitutional:       General: He is not in acute distress. Appearance: Normal appearance. He is not ill-appearing or toxic-appearing. HENT:      Head: Normocephalic and atraumatic. Nose: Nose normal. No congestion. Mouth/Throat:      Mouth: Mucous membranes are moist.   Eyes:      General:         Right eye: No discharge. Left eye: No discharge. Conjunctiva/sclera: Conjunctivae normal.   Cardiovascular:      Rate and Rhythm: Normal rate and regular rhythm. Pulses: Normal pulses. Heart sounds: Normal heart sounds. No murmur heard. Pulmonary:      Effort: Pulmonary effort is normal. No respiratory distress. Breath sounds: Normal breath sounds. No wheezing. Abdominal:      General: Abdomen is flat. There is no distension. Palpations: Abdomen is soft. Tenderness: There is no abdominal tenderness. Musculoskeletal:         General: No deformity or signs of injury. Normal range of motion. Cervical back: Normal range of motion.    Skin:     General: Skin is warm and dry. Capillary Refill: Capillary refill takes less than 2 seconds. Findings: No rash. Neurological:      General: No focal deficit present. Mental Status: He is alert and oriented to person, place, and time. Mental status is at baseline. Motor: No weakness. Comments: Speaking normally. No facial asymmetry. Moving all 4 extremities. Normal gait. No visual field deficits. Extraocular movements intact. There is no horizontal or vertical nystagmus. Pt can raise eyebrows, close eyes tight and puff out cheeks. Hearing intact. Uvula midline and no difficulty swallowing. Can rotate head side to side and shrug shoulders. The patient has a normal finger to nose exam performed at bedside. Gait testing normal   Psychiatric:         Mood and Affect: Mood normal.         EMERGENCY DEPARTMENT COURSE and DIFFERENTIAL DIAGNOSIS/MDM:   Vitals:    Vitals:    11/24/21 0809 11/24/21 0916   BP: (!) 148/100 121/88   Pulse: 82 73   Resp: 19 19   Temp: 97.7 °F (36.5 °C)    TempSrc: Oral    SpO2: 99% 98%   Weight: 89.8 kg (198 lb)    Height: 5' 10\" (1.778 m)        Patient presents to the emergency department with a complaint described above. Vital signs show slight hypertension, otherwise unremarkable. Physical exam reveals no abnormalities or deficits. I spent a while reviewing his medical record including his most recent admission for atrial fibrillation with RVR, the work-up and his follow-up. I discussed all this with him. At this time, he is in a normal sinus rhythm, he is not in any distress, no chest pain, he did not eat breakfast this morning and he had 2 cups of coffee and he did take his medication, I do think all of these things could have contributed to his lightheadedness.   His blood pressure is a little bit elevated which we will address, I am getting EKG, routine blood work, chest x-ray, cardiac enzymes and I will reevaluate      DIAGNOSTIC RESULTS     Twelve lead EKG interpreted by myself:  A 12 lead EKG done at 0808, interpreted by myself, showed a regular rhythm at a rate of 78bpm.  The ME interval was normal.  The QRS complex was normal.  There was no ST segment elevation or depression, T wave inversion not present. QRS progression through precordial leads was grossly normal.  Interpretation: Normal sinus rhythm, no ST segment changes and no pattern consistent with acute ischemia or infarct    LABS:  Labs Reviewed   CBC - Abnormal; Notable for the following components:       Result Value    Hemoglobin 18.2 (*)     Hematocrit 54.2 (*)     All other components within normal limits   BASIC METABOLIC PANEL   MAGNESIUM   TROPONIN   BRAIN NATRIURETIC PEPTIDE       All other labs were within normal range or not returned as of this dictation. RADIOLOGY:  XR CHEST PORTABLE   Final Result   No acute cardiopulmonary abnormality. Stable cardiomegaly. ED Course as of 11/24/21 0948   Wed Nov 24, 2021   0940 X-ray shows stable cardiomegaly with no acute findings [TS]   0946 Laboratory results are unremarkable and troponins negative [TS]   0946 Patient's blood pressure improved [TS]   0946 I have advised that he continue his follow-up with cardiology and primary care, continue taking medication as prescribed, did talk about cessation of alcohol and smoking and what to return to ER for    At this time the patient is without objective evidence of an acute process requiring hospitalization or inpatient management. They have remained hemodynamically stable and are stable for discharge with outpatient follow-up. Standard anticipatory guidance given to patient upon discharge. Have given them a specific time frame in which to follow-up and who to follow-up with. I have also advised them that they should return to the emergency department if they get worse, or not getting better or develop any new or concerning symptoms.   Patient demonstrates understanding. [TS]      ED Course User Index  [TS] Rangel Durham DO         PROCEDURES:  Unless otherwise noted below, none     Procedures    FINAL IMPRESSION      1.  Palpitations          DISPOSITION/PLAN   DISPOSITION Decision To Discharge 11/24/2021 09:47:16 AM      PATIENT REFERRED TO:  Malika Lim MD  [facility]  [address] [facility] [address]  [phone]    In 3 days      [facility], [address] [place] [address] [place]  [phone]    Call in 1 week        DISCHARGE MEDICATIONS:  New Prescriptions    No medications on file          (Please note that portions of this note were completed with a voice recognition program.  Efforts were made to edit the dictations but occasionally words are mis-transcribed.)    Rangel Durham DO (electronically signed)  Board Certified Emergency Physician         Rangel Durham DO  11/24/21 2124

## 2021-11-26 LAB
EKG ATRIAL RATE: 78 BPM
EKG P AXIS: -3 DEGREES
EKG P-R INTERVAL: 184 MS
EKG Q-T INTERVAL: 356 MS
EKG QRS DURATION: 98 MS
EKG QTC CALCULATION (BAZETT): 405 MS
EKG R AXIS: 3 DEGREES
EKG T AXIS: 44 DEGREES
EKG VENTRICULAR RATE: 78 BPM

## 2021-12-07 ENCOUNTER — OFFICE VISIT (OUTPATIENT)
Dept: INTERNAL MEDICINE CLINIC | Age: 61
End: 2021-12-07
Payer: COMMERCIAL

## 2021-12-07 VITALS
HEART RATE: 82 BPM | WEIGHT: 211 LBS | SYSTOLIC BLOOD PRESSURE: 138 MMHG | BODY MASS INDEX: 30.28 KG/M2 | OXYGEN SATURATION: 98 % | DIASTOLIC BLOOD PRESSURE: 84 MMHG

## 2021-12-07 DIAGNOSIS — G47.33 OSA (OBSTRUCTIVE SLEEP APNEA): ICD-10-CM

## 2021-12-07 DIAGNOSIS — F41.9 ANXIETY: ICD-10-CM

## 2021-12-07 DIAGNOSIS — I10 ESSENTIAL HYPERTENSION: ICD-10-CM

## 2021-12-07 DIAGNOSIS — I48.91 NEW ONSET A-FIB (HCC): Primary | ICD-10-CM

## 2021-12-07 DIAGNOSIS — R97.20 HIGH PROSTATE SPECIFIC ANTIGEN (PSA): ICD-10-CM

## 2021-12-07 PROCEDURE — 99214 OFFICE O/P EST MOD 30 MIN: CPT | Performed by: INTERNAL MEDICINE

## 2021-12-07 RX ORDER — TAMSULOSIN HYDROCHLORIDE 0.4 MG/1
CAPSULE ORAL
Status: ON HOLD | COMMUNITY
Start: 2021-11-28 | End: 2022-03-30

## 2021-12-07 RX ORDER — ALPRAZOLAM 0.25 MG/1
0.25 TABLET ORAL NIGHTLY PRN
Qty: 30 TABLET | Refills: 0 | Status: SHIPPED | OUTPATIENT
Start: 2021-12-07 | End: 2022-01-06

## 2021-12-07 RX ORDER — APIXABAN 5 MG/1
TABLET, FILM COATED ORAL
Status: ON HOLD | COMMUNITY
Start: 2021-12-01 | End: 2021-12-20 | Stop reason: HOSPADM

## 2021-12-07 RX ORDER — METHYLPREDNISOLONE 4 MG/1
TABLET ORAL
Qty: 1 KIT | Refills: 0 | Status: SHIPPED | OUTPATIENT
Start: 2021-12-07 | End: 2021-12-13

## 2021-12-07 NOTE — PROGRESS NOTES
[address] [facility] [address]  [place] [address]  Dept: [phone]  Dept Fax: [phone]     Name: Sheridan Mendez  : 1960           Chief Complaint   Patient presents with    ED Follow-up    Anxiety       History of Present Illness:    HPI  Patient here for follow-up after the ER,  Has seen cardiology in the past,  Has been started on Eliquis by cardiology for underlying atrial fibrillation,  Patient had anxiety and panic attack, EMT was called at home, patient was not in atrial fibrillation, subsequently taken to the ER,  All the cardiac enzymes and the lab work was fine,  Patient has been following with ENT for deviated nasal septum,  Uncontrolled sleep apnea, still not able to use CPAP due to the nasal problems,  Wants something for anxiety,  Also has some home stress going on  Fiancé at the bedside,    Past Medical History:    Past Medical History:   Diagnosis Date    Abscess of bursa of right elbow     Atrial fibrillation (HCC)     Heartburn     Hypertension     Polycythemia     PONV (postoperative nausea and vomiting)     Sleep apnea     no machine      Reviewed all health maintenance requirements and ordered appropriate tests  Health Maintenance Due   Topic Date Due    Hepatitis C screen  Never done    Pneumococcal 0-64 years Vaccine (1 of 2 - PPSV23) Never done    COVID-19 Vaccine (1) Never done    HIV screen  Never done    DTaP/Tdap/Td vaccine (1 - Tdap) Never done    Colon cancer screen colonoscopy  Never done    Shingles Vaccine (1 of 2) Never done    Low dose CT lung screening  2020       Past Surgical History:    Past Surgical History:   Procedure Laterality Date    APPENDECTOMY      ARM SURGERY Right 2021    TRICEPS TENDON REPAIR - RIGHT ELBOW WITH ARTHREX AND BIOMET ACHILLES ALLOGRAFT    ARM SURGERY Right 2021    RIGHT ELBOW IRRIGATION AND DEBRIDEMENT performed by Haylee Hudson MD at [address] [place] [address] cervical/ lumbar    [facility]  [facility]    COLONOSCOPY      ENDOSCOPY, COLON, DIAGNOSTIC      HERNIA REPAIR      MUSCLE REPAIR Right 2/23/2021    TRICEPS TENDON REPAIR - RIGHT ELBOW performed by Jhoan Luis MD at [address]       cervical and lumbar    UPPER GASTROINTESTINAL ENDOSCOPY N/A 12/17/2019    EGD BIOPSY performed by Karan Houston MD at [facility] 7/30/2021    EGD BIOPSY OF STOMACH AND ESOPHAGUS performed by Christianne Henriquez MD at [facility]        Medications:      Current Outpatient Medications:     ELIQUIS 5 MG TABS tablet, TAKE 1 TABLET BY MOUTH TWO TIMES A DAY, Disp: , Rfl:     tamsulosin (FLOMAX) 0.4 MG capsule, TAKE 1 CAPSULE BY MOUTH EVERY DAY, Disp: , Rfl:     methylPREDNISolone (MEDROL DOSEPACK) 4 MG tablet, Take by mouth., Disp: 1 kit, Rfl: 0    ALPRAZolam (XANAX) 0.25 MG tablet, Take 1 tablet by mouth nightly as needed for Anxiety for up to 30 days. , Disp: 30 tablet, Rfl: 0    losartan (COZAAR) 100 MG tablet, Take 1 tablet by mouth daily, Disp: 30 tablet, Rfl: 0    metoprolol tartrate (LOPRESSOR) 25 MG tablet, Take 1 tablet by mouth 2 times daily, Disp: 60 tablet, Rfl: 0    Blood Pressure KIT, 1 kit by Does not apply route daily, Disp: 1 kit, Rfl: 0    pantoprazole (PROTONIX) 40 MG tablet, Take 1 tablet by mouth 2 times daily, Disp: 30 tablet, Rfl: 3    spironolactone (ALDACTONE) 50 MG tablet, Take 50 mg by mouth daily, Disp: , Rfl:     Allergies:      Pcn [penicillins]    Social History:    Tobacco:    reports that he has been smoking cigars. He has a 20.00 pack-year smoking history. He has never used smokeless tobacco.  Alcohol:      reports current alcohol use. Drug Use:  reports current drug use. Drug: Marijuana [place]ock).     Family History:    Family History   Problem Relation Age of Onset    Cancer Father         prostrate    Diabetes Father    Fatoumata Cancer Paternal Uncle         colon       Review of Systems:    Positive and Negative as described in HPI    Constitutional:  negative for  fevers, chills, sweats, fatigue, and weight loss  HEENT:  negative for vision or hearing changes,   Respiratory:  negative for shortness of breath, cough, or congestion  Cardiovascular:  negative for  chest pain, palpitations  Gastrointestinal:  negative for nausea, vomiting, diarrhea, constipation, abdominal pain  Genitourinary:  negative for frequency, dysuria  Integument/Breast:  negative for rash, skin lesions  Musculoskeletal:  negative for muscle aches or joint pain  Neurological:  negative for headaches, dizziness, lightheadedness, numbness, pain and tingling extrimities  Behavior/Psych:  negative for depression and anxiety      Physical Exam:    Vitals:  /84 (Site: Left Upper Arm)   Pulse 82   Wt 211 lb (95.7 kg)   SpO2 98%   BMI 30.28 kg/m²     General appearance - alert, well appearing, and in no acute distress  Mental status - oriented to person, place, and time with normal affect  Head - normocephalic and atraumatic  Eyes - pupils equal and reactive, extraocular eye movements intact, conjunctiva clear  Ears - hearing appears to be intact  Nose - no drainage noted  Mouth - mucous membranes moist  Neck - supple, no carotid bruits, thyroid not palpable  Chest - clear to auscultation, normal effort  Heart - normal rate, regular rhythm, no murmurs  Abdomen - soft, nontender, nondistended, bowel sounds present all four quadrants, no masses, hepatomegaly or splenomegaly  Neurological - normal speech, no focal findings or movement disorder noted, cranial nerves II through XII grossly intact  Extremities - peripheral pulses palpable, no pedal edema or calf pain with palpation  Skin - no gross lesions, rashes, or induration noted      Data:    Lab Results   Component Value Date     11/24/2021    K 4.2 11/24/2021     11/24/2021    CO2 26 11/24/2021    BUN 18 11/24/2021    CREATININE 0.80 11/24/2021    GLUCOSE 95 11/24/2021    PROT 5.8 10/16/2021    LABALBU 3.7 10/16/2021    BILITOT 0.42 10/16/2021    ALKPHOS 53 10/16/2021    AST 20 10/16/2021    ALT 37 10/16/2021     Lab Results   Component Value Date    WBC 5.6 11/24/2021    RBC 5.76 11/24/2021    HGB 18.2 11/24/2021    HCT 54.2 11/24/2021    MCV 94.1 11/24/2021    MCH 31.6 11/24/2021    MCHC 33.6 11/24/2021    RDW 14.9 11/24/2021     11/24/2021    MPV 6.9 11/24/2021     Lab Results   Component Value Date    TSH 4.42 10/16/2021     Lab Results   Component Value Date    CHOL 172 10/16/2020    HDL 27 10/16/2020    PSA 4.16 03/10/2021    LABA1C 5.7 06/22/2021          Assessment & Plan:     Diagnosis Orders   1. New onset a-fib ([address]     2. SEBASTIAN (obstructive sleep apnea)     3. Essential hypertension     4. High prostate specific antigen (PSA)     5. Anxiety  ALPRAZolam (XANAX) 0.25 MG tablet     Chronic atrial fibrillation,  Following cardiology,  On metoprolol and Eliquis,  In normal sinus rhythm at this time,    Anxiety and panic attacks,  Patient had multiple anxiety and panic attacks and been to the ER,  Heart was checked fine,  Started on Xanax as needed,  Side effects explained in detail,    HTN:    Discussed in detail about the BP, lab results, importance of diet, salt intake, exercise, and med compliance. Medication side effects discussed in detail and has none today. Patient verbalized understanding.      BP Readings from Last 3 Encounters:   12/07/21 138/84   11/24/21 121/88   10/31/21 139/88        High PSA,  Patient was supposed to see urology, appointment on the way,  Understand the risk of not seen earlier    CREATININE   Date Value Ref Range Status   11/24/2021 0.80 0.70 - 1.20 mg/dL Final   10/31/2021 0.80 0.70 - 1.20 mg/dL Final   10/16/2021 0.72 0.70 - 1.20 mg/dL Final                    Completed Refills   Requested Prescriptions     Signed Prescriptions Disp Refills    methylPREDNISolone (MEDROL DOSEPACK) 4 MG tablet 1 kit 0     Sig: Take by mouth.  ALPRAZolam (XANAX) 0.25 MG tablet 30 tablet 0     Sig: Take 1 tablet by mouth nightly as needed for Anxiety for up to 30 days. Return in about 3 months (around 3/7/2022). Orders Placed This Encounter   Medications    methylPREDNISolone (MEDROL DOSEPACK) 4 MG tablet     Sig: Take by mouth. Dispense:  1 kit     Refill:  0    ALPRAZolam (XANAX) 0.25 MG tablet     Sig: Take 1 tablet by mouth nightly as needed for Anxiety for up to 30 days. Dispense:  30 tablet     Refill:  0     No orders of the defined types were placed in this encounter.       Electronically signed by Karma Wang MD on 12/7/2021 at 11:00 AM

## 2021-12-07 NOTE — PROGRESS NOTES
Visit Information    Have you changed or started any medications since your last visit including any over-the-counter medicines, vitamins, or herbal medicines? no   Are you having any side effects from any of your medications? -  no  Have you stopped taking any of your medications? Is so, why? -  no    Have you seen any other physician or provider since your last visit? Yes - Records Obtained  Have you had any other diagnostic tests since your last visit? Yes - Records Obtained  Have you been seen in the emergency room and/or had an admission to a hospital since we last saw you? Yes - Records Obtained  Have you had your routine dental cleaning in the past 6 months? no    Have you activated your [employer] account? If not, what are your barriers?  Yes     Patient Care Team:  Blanca Perez MD as PCP - General (Internal Medicine)  Blanca Preez MD as PCP - [facility]  Viviana Muir MD as Consulting Physician (Gastroenterology)    Medical History Review  Past Medical, Family, and Social History reviewed and does not contribute to the patient presenting condition    Health Maintenance   Topic Date Due    Hepatitis C screen  Never done    Pneumococcal 0-64 years Vaccine (1 of 2 - PPSV23) Never done    COVID-19 Vaccine (1) Never done    HIV screen  Never done    DTaP/Tdap/Td vaccine (1 - Tdap) Never done    Colon cancer screen colonoscopy  Never done    Shingles Vaccine (1 of 2) Never done    Low dose CT lung screening  12/19/2020    PSA counseling  03/10/2022    A1C test (Diabetic or Prediabetic)  06/22/2022    Potassium monitoring  11/24/2022    Creatinine monitoring  11/24/2022    Lipid screen  10/16/2025    Flu vaccine  Completed    Hepatitis A vaccine  Aged Out    Hepatitis B vaccine  Aged Out    Hib vaccine  Aged Out    Meningococcal (ACWY) vaccine  Aged Out

## 2021-12-10 ENCOUNTER — HOSPITAL ENCOUNTER (OUTPATIENT)
Dept: PREADMISSION TESTING | Age: 61
Discharge: HOME | End: 2021-12-14

## 2021-12-10 VITALS
WEIGHT: 210.1 LBS | HEIGHT: 70 IN | TEMPERATURE: 97.7 F | DIASTOLIC BLOOD PRESSURE: 85 MMHG | OXYGEN SATURATION: 98 % | RESPIRATION RATE: 18 BRPM | BODY MASS INDEX: 30.08 KG/M2 | HEART RATE: 77 BPM | SYSTOLIC BLOOD PRESSURE: 138 MMHG

## 2021-12-10 DIAGNOSIS — Z12.11 SCREENING FOR MALIGNANT NEOPLASM OF COLON: ICD-10-CM

## 2021-12-10 RX ORDER — OXYMETAZOLINE HYDROCHLORIDE 0.05 G/100ML
2 SPRAY NASAL ONCE
Status: CANCELLED | OUTPATIENT
Start: 2021-12-20 | End: 2021-12-13

## 2021-12-10 ASSESSMENT — PAIN SCALES - GENERAL: PAINLEVEL_OUTOF10: 0

## 2021-12-10 NOTE — PROGRESS NOTES
PAT Progress Note    Pt Name: Nina Packer  MRN: [id number]  YOB: 1960  Date of evaluation: 12/10/2021      [x] Called to [facility]. I spoke to the patient, Nina Packer, a 62 y.o. male, who is scheduled for an upcoming septoplasty bilateral turbinate reduction by Dr. Leonard Penn for deviated nasal septum, hypertrophy nasal turbinates, obstructive sleep apnea, snoring, acute maxillary sinusitis, atrial fibrillation on 12/20/2021 at 1030. [x] I reviewed the hard copy cardiology progress note by Dr. Kimber Lema dated 12/1/2021 for an Interval History and Physical Note the day of surgery. Hard copy labeled in short chart. History of sleep apnea (does not use CPAP - is unable to tolerate), hypertension, atrial fibrillation, Covid (9/2021). Patient has had multiple emergency room visits and hospitalizations for atrial fibrillation. Patient was hospitalized 10/15/2021 with atrial fib RVR and had cardizem infusion. Patient was seen again in the emergency department on 10/31/2021 for palpitations after using marijuana and having anxiety. Patient was discharged home. Most recently he was seen in the emergency department on 11/24/2021 for palpitations and anxiety. He was discharged home with no change in medications. Patient has been evaluated by primary care on 12/7/2021. Patient has occasional shortness of breath and palpitations. Patient has dizziness with position changes. Patient denies chest pain, syncope. Echo complete 10/16/2021:  Mild left ventricular hypertrophy  Global left ventricular systolic function is normal  Estimated ejection fraction is 65 % . No significant valvular regurgitation or stenosis seen. No pericardial effusion seen. Normal aortic root dimension. Physical Exam:     General Appearance:  Alert, well appearing, and in no acute distress. Mental status: Oriented to person, place, and time.   Lungs: Bilateral equal air entry, clear to auscultation, no wheezing, rales or rhonchi, and normal effort. Cardiovascular: Normal rate, regular rhythm, no murmur, gallop, or rub. Functional Capacity per pt:  1) Pt is able to walk 2 city blocks on level ground without SOB. 2) Pt is able to climb 2 flights of stairs without SOB. 3) Pt is able to walk up a hill for 1-2 city blocks without SOB.     Vital signs: /85   Pulse 77   Temp 97.7 °F (36.5 °C) (Skin)   Resp 18   Ht 5' 10\" (1.778 m)   Wt 210 lb 1.6 oz (95.3 kg)   SpO2 98%   BMI 30.15 kg/m²      RU Barrientos - CNP    Electronically signed 12/10/2021 at 10:35 AM

## 2021-12-10 NOTE — PRE-PROCEDURE INSTRUCTIONS
[address] [place] [address] Monday, December 20, 2021 at 0900 am.    Once you enter the hospital lobby, after your temperature is taken, take the elevators to the second floor. Check-In is at the surgery registration desk    One person age 25 or older may remain in the waiting room while you are in surgery    Continue to take your home medications as you normally do up to and including the night before surgery with the exception of any blood thinning medications. Please stop any blood thinning medications as directed by your surgeon or prescribing physician. Failure to stop certain medications may interfere with your scheduled surgery. These may include:  Aspirin, Warfarin (Coumadin), Clopidogrel (Plavix), Ibuprofen (Motrin, Advil), Naproxen (Aleve), Meloxicam (Mobic), Celecoxib (Celebrex), Eliquis, Pradaxa, Xarelto, Effient, Fish Oil, Herbal supplements. Stop eliquis three days before sugery. Please take the following medication(s) the day of surgery with a small sip of water:  Metoprolol and protonix    COVID TEST 12/16/2021 @ 2 PM.  [facility]    PREPARING FOR YOUR SURGERY:     Before surgery, you can play an important role in your own health. Because skin is not sterile, we need to be sure that your skin is as free of germs as possible before surgery by carefully washing before surgery. Preparing or prepping skin before surgery can reduce the risk of a surgical site infection.   Do not shave the area of your body where your surgery will be performed unless you received specific permission from your physician. Preoperative Bathing Instructions   Bathe or shower the day of surgery.  Wear clean clothing after bathing.  Shampoo hair before surgery   Keep nails clean    Do not apply alcohol-based hair or skin products,    Do not apply lotion, emollients, cosmetics, perfumes or deodorant the day of surgery.    Do not shave the area of your body where your surgery will be performed unless you receive specific permission from your surgeon. Patient Instructions:    Howie Jones If you are having any type of anesthesia you are to have nothing to eat or drink after midnight the night before your surgery. This includes gum, hard candy, mints, water or smoking or chewing tobacco.  The only exception to this is a small sip of water to take with any morning dose of heart, blood pressure, or seizure medications. No alcoholic beverages for 24 hours prior to surgery.  Brush your teeth but do not swallow water.  Bring your eye glasses and case with you. No contacts are to be worn the day of surgery. You also may bring your hearing aids. Most surgical procedures involving anesthesia will require that you remove your dentures prior to surgery.  Do not wear any jewelry or body piercings day of surgery. Also, NO lotion, perfume or deodorant to be used the day of surgery. No nail polish on the operative extremity (arm/leg surgeries)     If you are staying overnight with us, please bring a small bag of necessary personal items.  Please wear loose, comfortable clothing. If you are potentially going to have a cast or brace bring clothing that will fit over them.  In case of illness  If you have cold or flu like symptoms (high fever, runny nose, sore throat, cough, etc.) rash, nausea, vomiting, loose stools, and/or recent contact with someone who has a contagious disease (chicken pox, measles, etc.) Please call your doctor before coming to the hospital.     Day of Surgery/Procedure:    As a patient at [facility] [address] you can expect quality medical and nursing care that is centered on your individual needs. Our goal is to make your surgical experience as comfortable as possible    . Transportation After Your Surgery/Procedure:     You will need a friend or family member to drive you home after your procedure. Your  must be 25years of age or older. Discharge instructions will be reviewed with family/friend by phone. A taxi cab or any other form of public transportation is not acceptable. Someone must remain with you for the first 24 hours after your surgery if you receive anesthesia or medication. If you do not have someone to stay with you, your procedure may be cancelled.       If you have any other questions regarding your procedure or the day of surgery, please call [phone]      _________________________  ____________________________  Signature (Patient)              Signature (Provider)              Date

## 2021-12-14 NOTE — RESULT ENCOUNTER NOTE
Please call the patient, lab results are satisfactory, continue the same management, keep up the healthy lifestyle.

## 2021-12-16 ENCOUNTER — HOSPITAL ENCOUNTER (OUTPATIENT)
Dept: LAB | Age: 61
Setting detail: SPECIMEN
Discharge: HOME | End: 2021-12-16
Payer: COMMERCIAL

## 2021-12-16 DIAGNOSIS — Z01.818 PREOP TESTING: Primary | ICD-10-CM

## 2021-12-16 PROCEDURE — U0003 INFECTIOUS AGENT DETECTION BY NUCLEIC ACID (DNA OR RNA); SEVERE ACUTE RESPIRATORY SYNDROME CORONAVIRUS 2 (SARS-COV-2) (CORONAVIRUS DISEASE [COVID-19]), AMPLIFIED PROBE TECHNIQUE, MAKING USE OF HIGH THROUGHPUT TECHNOLOGIES AS DESCRIBED BY CMS-2020-01-R: HCPCS

## 2021-12-16 PROCEDURE — U0005 INFEC AGEN DETEC AMPLI PROBE: HCPCS

## 2021-12-17 LAB
SARS-COV-2: NORMAL
SARS-COV-2: NOT DETECTED
SOURCE: NORMAL

## 2021-12-20 ENCOUNTER — ANESTHESIA (OUTPATIENT)
Dept: OPERATING ROOM | Age: 61
End: 2021-12-20
Payer: COMMERCIAL

## 2021-12-20 ENCOUNTER — ANESTHESIA EVENT (OUTPATIENT)
Dept: OPERATING ROOM | Age: 61
End: 2021-12-20
Payer: COMMERCIAL

## 2021-12-20 ENCOUNTER — HOSPITAL ENCOUNTER (OUTPATIENT)
Age: 61
Setting detail: OUTPATIENT SURGERY
Discharge: HOME | End: 2021-12-20
Attending: OTOLARYNGOLOGY | Admitting: OTOLARYNGOLOGY
Payer: COMMERCIAL

## 2021-12-20 VITALS
TEMPERATURE: 97.9 F | HEART RATE: 84 BPM | RESPIRATION RATE: 23 BRPM | WEIGHT: 210.1 LBS | OXYGEN SATURATION: 98 % | HEIGHT: 70 IN | SYSTOLIC BLOOD PRESSURE: 155 MMHG | DIASTOLIC BLOOD PRESSURE: 96 MMHG | BODY MASS INDEX: 30.08 KG/M2

## 2021-12-20 VITALS — SYSTOLIC BLOOD PRESSURE: 144 MMHG | OXYGEN SATURATION: 96 % | TEMPERATURE: 86.9 F | DIASTOLIC BLOOD PRESSURE: 77 MMHG

## 2021-12-20 DIAGNOSIS — G89.18 POST-OPERATIVE PAIN: Primary | ICD-10-CM

## 2021-12-20 PROCEDURE — 6360000002 HC RX W HCPCS: Performed by: NURSE ANESTHETIST, CERTIFIED REGISTERED

## 2021-12-20 PROCEDURE — 2709999900 HC NON-CHARGEABLE SUPPLY: Performed by: OTOLARYNGOLOGY

## 2021-12-20 PROCEDURE — 3600000012 HC SURGERY LEVEL 2 ADDTL 15MIN: Performed by: OTOLARYNGOLOGY

## 2021-12-20 PROCEDURE — 2720000010 HC SURG SUPPLY STERILE: Performed by: OTOLARYNGOLOGY

## 2021-12-20 PROCEDURE — 3600000002 HC SURGERY LEVEL 2 BASE: Performed by: OTOLARYNGOLOGY

## 2021-12-20 PROCEDURE — 2580000003 HC RX 258: Performed by: ANESTHESIOLOGY

## 2021-12-20 PROCEDURE — 7100000011 HC PHASE II RECOVERY - ADDTL 15 MIN: Performed by: OTOLARYNGOLOGY

## 2021-12-20 PROCEDURE — 6370000000 HC RX 637 (ALT 250 FOR IP): Performed by: OTOLARYNGOLOGY

## 2021-12-20 PROCEDURE — 3700000001 HC ADD 15 MINUTES (ANESTHESIA): Performed by: OTOLARYNGOLOGY

## 2021-12-20 PROCEDURE — 2500000003 HC RX 250 WO HCPCS: Performed by: NURSE ANESTHETIST, CERTIFIED REGISTERED

## 2021-12-20 PROCEDURE — 2580000003 HC RX 258: Performed by: OTOLARYNGOLOGY

## 2021-12-20 PROCEDURE — 7100000001 HC PACU RECOVERY - ADDTL 15 MIN: Performed by: OTOLARYNGOLOGY

## 2021-12-20 PROCEDURE — 7100000010 HC PHASE II RECOVERY - FIRST 15 MIN: Performed by: OTOLARYNGOLOGY

## 2021-12-20 PROCEDURE — 2500000003 HC RX 250 WO HCPCS: Performed by: OTOLARYNGOLOGY

## 2021-12-20 PROCEDURE — 3700000000 HC ANESTHESIA ATTENDED CARE: Performed by: OTOLARYNGOLOGY

## 2021-12-20 PROCEDURE — 6370000000 HC RX 637 (ALT 250 FOR IP): Performed by: ANESTHESIOLOGY

## 2021-12-20 PROCEDURE — 7100000000 HC PACU RECOVERY - FIRST 15 MIN: Performed by: OTOLARYNGOLOGY

## 2021-12-20 RX ORDER — MIDAZOLAM HYDROCHLORIDE 1 MG/ML
INJECTION INTRAMUSCULAR; INTRAVENOUS PRN
Status: DISCONTINUED | OUTPATIENT
Start: 2021-12-20 | End: 2021-12-20 | Stop reason: SDUPTHER

## 2021-12-20 RX ORDER — SODIUM CHLORIDE 9 MG/ML
INJECTION, SOLUTION INTRAVENOUS CONTINUOUS
Status: DISCONTINUED | OUTPATIENT
Start: 2021-12-20 | End: 2021-12-20

## 2021-12-20 RX ORDER — FENTANYL CITRATE 50 UG/ML
INJECTION, SOLUTION INTRAMUSCULAR; INTRAVENOUS PRN
Status: DISCONTINUED | OUTPATIENT
Start: 2021-12-20 | End: 2021-12-20 | Stop reason: SDUPTHER

## 2021-12-20 RX ORDER — GINSENG 100 MG
CAPSULE ORAL PRN
Status: DISCONTINUED | OUTPATIENT
Start: 2021-12-20 | End: 2021-12-20 | Stop reason: ALTCHOICE

## 2021-12-20 RX ORDER — FENTANYL CITRATE 50 UG/ML
25 INJECTION, SOLUTION INTRAMUSCULAR; INTRAVENOUS EVERY 5 MIN PRN
Status: DISCONTINUED | OUTPATIENT
Start: 2021-12-20 | End: 2021-12-20 | Stop reason: HOSPADM

## 2021-12-20 RX ORDER — SODIUM CHLORIDE 0.9 % (FLUSH) 0.9 %
10 SYRINGE (ML) INJECTION PRN
Status: DISCONTINUED | OUTPATIENT
Start: 2021-12-20 | End: 2021-12-20 | Stop reason: HOSPADM

## 2021-12-20 RX ORDER — HYDROCODONE BITARTRATE AND ACETAMINOPHEN 5; 325 MG/1; MG/1
1 TABLET ORAL EVERY 4 HOURS PRN
Qty: 30 TABLET | Refills: 0 | Status: SHIPPED | OUTPATIENT
Start: 2021-12-20 | End: 2021-12-25

## 2021-12-20 RX ORDER — ONDANSETRON 2 MG/ML
4 INJECTION INTRAMUSCULAR; INTRAVENOUS
Status: DISCONTINUED | OUTPATIENT
Start: 2021-12-20 | End: 2021-12-20 | Stop reason: HOSPADM

## 2021-12-20 RX ORDER — ROCURONIUM BROMIDE 10 MG/ML
INJECTION, SOLUTION INTRAVENOUS PRN
Status: DISCONTINUED | OUTPATIENT
Start: 2021-12-20 | End: 2021-12-20 | Stop reason: SDUPTHER

## 2021-12-20 RX ORDER — SODIUM CHLORIDE 0.9 % (FLUSH) 0.9 %
10 SYRINGE (ML) INJECTION EVERY 12 HOURS SCHEDULED
Status: DISCONTINUED | OUTPATIENT
Start: 2021-12-20 | End: 2021-12-20 | Stop reason: HOSPADM

## 2021-12-20 RX ORDER — DEXAMETHASONE SODIUM PHOSPHATE 10 MG/ML
INJECTION INTRAMUSCULAR; INTRAVENOUS PRN
Status: DISCONTINUED | OUTPATIENT
Start: 2021-12-20 | End: 2021-12-20 | Stop reason: SDUPTHER

## 2021-12-20 RX ORDER — ONDANSETRON 2 MG/ML
INJECTION INTRAMUSCULAR; INTRAVENOUS PRN
Status: DISCONTINUED | OUTPATIENT
Start: 2021-12-20 | End: 2021-12-20 | Stop reason: SDUPTHER

## 2021-12-20 RX ORDER — OXYMETAZOLINE HYDROCHLORIDE 0.05 G/100ML
2 SPRAY NASAL ONCE
Status: COMPLETED | OUTPATIENT
Start: 2021-12-20 | End: 2021-12-20

## 2021-12-20 RX ORDER — HYDROCODONE BITARTRATE AND ACETAMINOPHEN 5; 325 MG/1; MG/1
1 TABLET ORAL PRN
Status: COMPLETED | OUTPATIENT
Start: 2021-12-20 | End: 2021-12-20

## 2021-12-20 RX ORDER — SODIUM CHLORIDE 9 MG/ML
INJECTION INTRAVENOUS PRN
Status: DISCONTINUED | OUTPATIENT
Start: 2021-12-20 | End: 2021-12-20 | Stop reason: ALTCHOICE

## 2021-12-20 RX ORDER — LIDOCAINE HYDROCHLORIDE AND EPINEPHRINE 5; 5 MG/ML; UG/ML
INJECTION, SOLUTION INFILTRATION; PERINEURAL PRN
Status: DISCONTINUED | OUTPATIENT
Start: 2021-12-20 | End: 2021-12-20 | Stop reason: ALTCHOICE

## 2021-12-20 RX ORDER — PROMETHAZINE HYDROCHLORIDE 25 MG/ML
6.25 INJECTION, SOLUTION INTRAMUSCULAR; INTRAVENOUS
Status: DISCONTINUED | OUTPATIENT
Start: 2021-12-20 | End: 2021-12-20 | Stop reason: HOSPADM

## 2021-12-20 RX ORDER — SODIUM CHLORIDE 9 MG/ML
25 INJECTION, SOLUTION INTRAVENOUS PRN
Status: DISCONTINUED | OUTPATIENT
Start: 2021-12-20 | End: 2021-12-20 | Stop reason: HOSPADM

## 2021-12-20 RX ORDER — PROPOFOL 10 MG/ML
INJECTION, EMULSION INTRAVENOUS PRN
Status: DISCONTINUED | OUTPATIENT
Start: 2021-12-20 | End: 2021-12-20 | Stop reason: SDUPTHER

## 2021-12-20 RX ORDER — FENTANYL CITRATE 50 UG/ML
50 INJECTION, SOLUTION INTRAMUSCULAR; INTRAVENOUS EVERY 5 MIN PRN
Status: DISCONTINUED | OUTPATIENT
Start: 2021-12-20 | End: 2021-12-20 | Stop reason: HOSPADM

## 2021-12-20 RX ORDER — LIDOCAINE HYDROCHLORIDE 20 MG/ML
INJECTION, SOLUTION EPIDURAL; INFILTRATION; INTRACAUDAL; PERINEURAL PRN
Status: DISCONTINUED | OUTPATIENT
Start: 2021-12-20 | End: 2021-12-20 | Stop reason: SDUPTHER

## 2021-12-20 RX ORDER — LIDOCAINE HYDROCHLORIDE 10 MG/ML
1 INJECTION, SOLUTION EPIDURAL; INFILTRATION; INTRACAUDAL; PERINEURAL
Status: DISCONTINUED | OUTPATIENT
Start: 2021-12-20 | End: 2021-12-20 | Stop reason: HOSPADM

## 2021-12-20 RX ORDER — SODIUM CHLORIDE, SODIUM LACTATE, POTASSIUM CHLORIDE, CALCIUM CHLORIDE 600; 310; 30; 20 MG/100ML; MG/100ML; MG/100ML; MG/100ML
INJECTION, SOLUTION INTRAVENOUS CONTINUOUS
Status: DISCONTINUED | OUTPATIENT
Start: 2021-12-20 | End: 2021-12-20 | Stop reason: HOSPADM

## 2021-12-20 RX ORDER — HYDROCODONE BITARTRATE AND ACETAMINOPHEN 5; 325 MG/1; MG/1
2 TABLET ORAL PRN
Status: COMPLETED | OUTPATIENT
Start: 2021-12-20 | End: 2021-12-20

## 2021-12-20 RX ORDER — CLINDAMYCIN HYDROCHLORIDE 150 MG/1
150 CAPSULE ORAL 3 TIMES DAILY
Qty: 21 CAPSULE | Refills: 0 | Status: SHIPPED | OUTPATIENT
Start: 2021-12-20 | End: 2021-12-27

## 2021-12-20 RX ORDER — CLINDAMYCIN PHOSPHATE 900 MG/50ML
900 INJECTION INTRAVENOUS ONCE
Status: COMPLETED | OUTPATIENT
Start: 2021-12-20 | End: 2021-12-20

## 2021-12-20 RX ADMIN — PROPOFOL 180 MG: 10 INJECTION, EMULSION INTRAVENOUS at 11:15

## 2021-12-20 RX ADMIN — Medication 100 MCG: at 11:15

## 2021-12-20 RX ADMIN — ROCURONIUM BROMIDE 40 MG: 10 INJECTION, SOLUTION INTRAVENOUS at 11:15

## 2021-12-20 RX ADMIN — DEXAMETHASONE SODIUM PHOSPHATE 10 MG: 10 INJECTION INTRAMUSCULAR; INTRAVENOUS at 11:28

## 2021-12-20 RX ADMIN — LIDOCAINE HYDROCHLORIDE 100 MG: 20 INJECTION, SOLUTION EPIDURAL; INFILTRATION; INTRACAUDAL; PERINEURAL at 11:15

## 2021-12-20 RX ADMIN — ONDANSETRON 4 MG: 2 INJECTION INTRAMUSCULAR; INTRAVENOUS at 11:41

## 2021-12-20 RX ADMIN — SUGAMMADEX 200 MG: 100 INJECTION, SOLUTION INTRAVENOUS at 11:42

## 2021-12-20 RX ADMIN — Medication 2 SPRAY: at 10:32

## 2021-12-20 RX ADMIN — HYDROCODONE BITARTRATE AND ACETAMINOPHEN 1 TABLET: 5; 325 TABLET ORAL at 13:01

## 2021-12-20 RX ADMIN — CLINDAMYCIN PHOSPHATE 900 MG: 900 INJECTION, SOLUTION INTRAVENOUS at 11:20

## 2021-12-20 RX ADMIN — SODIUM CHLORIDE, POTASSIUM CHLORIDE, SODIUM LACTATE AND CALCIUM CHLORIDE: 600; 310; 30; 20 INJECTION, SOLUTION INTRAVENOUS at 10:33

## 2021-12-20 RX ADMIN — MIDAZOLAM 2 MG: 1 INJECTION INTRAMUSCULAR; INTRAVENOUS at 11:11

## 2021-12-20 ASSESSMENT — PULMONARY FUNCTION TESTS
PIF_VALUE: 23
PIF_VALUE: 23
PIF_VALUE: 32
PIF_VALUE: 22
PIF_VALUE: 24
PIF_VALUE: 17
PIF_VALUE: 22
PIF_VALUE: 4
PIF_VALUE: 17
PIF_VALUE: 2
PIF_VALUE: 18
PIF_VALUE: 22
PIF_VALUE: 2
PIF_VALUE: 22
PIF_VALUE: 23
PIF_VALUE: 22
PIF_VALUE: 2
PIF_VALUE: 23
PIF_VALUE: 22
PIF_VALUE: 22
PIF_VALUE: 24
PIF_VALUE: 2
PIF_VALUE: 22
PIF_VALUE: 22
PIF_VALUE: 23
PIF_VALUE: 14
PIF_VALUE: 23
PIF_VALUE: 22
PIF_VALUE: 4
PIF_VALUE: 30
PIF_VALUE: 1
PIF_VALUE: 16
PIF_VALUE: 17
PIF_VALUE: 2
PIF_VALUE: 17
PIF_VALUE: 1
PIF_VALUE: 17
PIF_VALUE: 0
PIF_VALUE: 2
PIF_VALUE: 2
PIF_VALUE: 1
PIF_VALUE: 24
PIF_VALUE: 27
PIF_VALUE: 21
PIF_VALUE: 1
PIF_VALUE: 2
PIF_VALUE: 22
PIF_VALUE: 2

## 2021-12-20 ASSESSMENT — PAIN SCALES - GENERAL
PAINLEVEL_OUTOF10: 6
PAINLEVEL_OUTOF10: 10
PAINLEVEL_OUTOF10: 5
PAINLEVEL_OUTOF10: 6

## 2021-12-20 ASSESSMENT — PAIN DESCRIPTION - LOCATION
LOCATION: FACE;NOSE
LOCATION: FACE;NOSE

## 2021-12-20 ASSESSMENT — PAIN - FUNCTIONAL ASSESSMENT: PAIN_FUNCTIONAL_ASSESSMENT: 0-10

## 2021-12-20 NOTE — PROGRESS NOTES
Verbal order via telephone received from Cristo Sharp PA-C, at Dr. Tineo Sessions office for the patient to be off of the eliquis for 7 days post surgery. Witnessed by Ashlyn Bess RN and [address] [place] [address] RN. Dr. Merced Guillermo notified.

## 2021-12-20 NOTE — OP NOTE
Operative Note      Patient: Abraham Ambriz  YOB: 1960  MRN: [id number]    Date of Procedure: 12/20/2021    Surgeon(s):  Santos Andrews MD    Assistant:   * No surgical staff found *    Anesthesia: General    Estimated Blood Loss (mL): Minimal    Complications: None    Specimens:   * No specimens in log *    Implants:  * No implants in log *      Drains: * No LDAs found *    Account Number: [de-identified]      Pre-Op Diagnosis:   1) Septal Deviation with resulting nasal obstruction      2) Bilateral inferior turbinate hypertrophy    Post-Op Diagnosis:   1) Septal Deviation with resulting nasal obstruction      2) Bilateral inferior turbinate hypertrophy    Operation:   1)  Septoplasty with submucous resection      2)  Bilateral submucous resection of inferior turbinates      3)  Bilateral outfracture of inferior turbinates    Findings:   The patient was noted to have a significant septal      deviation to the RIGHT 4+    Estimated blood loss:  Minimal    Indications: This patient has a septal deviation with concomitant inferior turbinate hypertrophy resulting in nasal obstruction and persistent nasal symptoms despite medical therapy aimed at relieving this. The reason for the procedure as well as potential complications was discussed at great length with the patient. The discussion included but was not limited to bleeding, infection, septal hematoma, no improvement in breathing disorder, persistence of the nasal and sinus complaints, recurrent deviation, cerebrospinal fluid leak, anosmia, and anesthesia related complications were all mentioned. All questions were answered, and informed consent was then obtained. Furthermore,consent was obtained from the cardiologist to stop blood thinners pre-op and for 7 days post-op. Risks of thromboembolism were discussed with the patient who agreed to proceed.     Procedure:    After the patient was positively identified in the pre-operative and operative suites, general endotracheal anesthesia was then induced. The nasal mucosa was decongested with topical Afrin nasal spray and cotton pledgets soaked in Afrin solution. 1% Lidocaine with epinephrine was infiltrated into the nasal mucosa of the septum, floor of the nose, and columella. Injectable normal saline was injected into the inferior turbinates. The patient was then prepped and draped in the usual fashion. A hemitransfixtion incision was made on the LEFT side of the columella and taken down to the level of the cartilage. A mucoperichondrial flap was then elevated posteriorly past the perpendicular plate of the ethmoid process. An incision in the septal cartilage was made at least one centimeter posterior to the dorsal  edge of the septum, and care was taken to maintain an adequate amount of septal cartilage across the dorsum of the nose for support. Next, the mucoperichondrium was elevated on the opposite side. A swivel knife was then used to resect the deviated portion of the septal cartilage which was removed. The deviated portion of the perpendicular plate was removed with a double action biting forceps. The mucoperichondrium was then elevated off the deviated maxillary crest.  Using an osteotome and mallet, the deviated portion of the crest was removed. The septum was now midline. The columellar incision was sutured with 4-0 chromic suture placed in an interrupted fashion. Next, an incision was made at the anterior aspect of the left inferior turbinate and a submucosal tunnel was created. Hypertrophic tissue was removed, and the turbinate was treated with three passes of radiofrequency. The [facility] elevator was then used to outfracture the inferior turbinate. The same procedure was repeated on the right inferior turbinate. Now the nasal airway was noted to be widely patent bilaterally.   Quintana nasal splints were covered in bacitracin ointment, and a piece was placed on each side of the septum and secured with a 3-0 silk suture. A nasal dressing of 2 x 2 gauze was applied to the nose for drainage. The patient tolerated the procedure well and was taken to the recovery room in satisfactory condition where both written and verbal instructions were given with regard to post-op care and follow-up.       Electronically signed by Junie Overton MD on 12/20/2021 at 11:58 AM

## 2021-12-20 NOTE — H&P
(LOPRESSOR) 25 MG tablet Take 1 tablet by mouth 2 times daily  Patient taking differently: Take 25 mg by mouth 2 times daily Forgets the second dose usually 10/16/21  Yes RU Yi NP   pantoprazole (PROTONIX) 40 MG tablet Take 1 tablet by mouth 2 times daily 7/24/21  Yes Marianela Unger MD   spironolactone (ALDACTONE) 50 MG tablet Take 50 mg by mouth daily Sometimes will take two daily- if feeling bloated   Yes Historical Provider, MD   ELIQUIS 5 MG TABS tablet TAKE 1 TABLET BY MOUTH TWO TIMES A DAY 12/1/21   Historical Provider, MD   tamsulosin (FLOMAX) 0.4 MG capsule TAKE 1 CAPSULE BY MOUTH EVERY DAY 11/28/21   Historical Provider, MD   ALPRAZolam Rianna Trejo) 0.25 MG tablet Take 1 tablet by mouth nightly as needed for Anxiety for up to 30 days. 12/7/21 1/6/22  Anne Woods MD   Blood Pressure KIT 1 kit by Does not apply route daily 10/16/21   RU Yi NP         This is a 62 y.o. male who is pleasant, cooperative, alert and oriented x3, in no acute distress    Physical Exam:  Lungs: Bilateral equal air entry, unlabored, clear to ausculation, no wheezing, rales or rhonchi, normal effort  Cardiovascular: HR 75 Regular rate and rhythm at present (Hx A Fib), no murmur, gallop, rub. Abdomen: rotund, nontender, nondistended, normal bowel sounds.     Labs:  Recent Labs     11/24/21  0816   HGB 18.2*   HCT 54.2*   WBC 5.6   MCV 94.1         K 4.2      CO2 26   BUN 18   CREATININE 0.80   GLUCOSE 95       Recent Labs     12/16/21  1400   COVID19       Not Detected       Marva Dillon APRN - CNP   Electronically signed 12/20/2021 at 10:31 AM        Anne Woods MD   Physician   Specialty:  Internal Medicine   Progress Notes      Signed   Encounter Date:  12/7/2021         Related encounter: Office Visit from 12/7/2021 in [address] [place] [address] All            [address] [place], [place] [address] [id number]  Dept: [phone]  Dept Fax: [phone]      Name: Meeta Cornejo  : 1960                                               Chief Complaint   Patient presents with    ED Follow-up    Anxiety         History of Present Illness:     HPI  Patient here for follow-up after the ER,  Has seen cardiology in the past,  Has been started on Eliquis by cardiology for underlying atrial fibrillation,  Patient had anxiety and panic attack, EMT was called at home, patient was not in atrial fibrillation, subsequently taken to the ER,  All the cardiac enzymes and the lab work was fine,  Patient has been following with ENT for deviated nasal septum,  Uncontrolled sleep apnea, still not able to use CPAP due to the nasal problems,  Wants something for anxiety,  Also has some home stress going on  Fiancé at the bedside,     Past Medical History:     Past Medical History        Past Medical History:   Diagnosis Date    Abscess of bursa of right elbow      Atrial fibrillation ([address]      Heartburn      Hypertension      Polycythemia      PONV (postoperative nausea and vomiting)      Sleep apnea       no machine         Reviewed all health maintenance requirements and ordered appropriate tests       Health Maintenance Due   Topic Date Due    Hepatitis C screen  Never done    Pneumococcal 0-64 years Vaccine (1 of 2 - PPSV23) Never done    COVID-19 Vaccine (1) Never done    HIV screen  Never done    DTaP/Tdap/Td vaccine (1 - Tdap) Never done    Colon cancer screen colonoscopy  Never done    Shingles Vaccine (1 of 2) Never done    Low dose CT lung screening  2020         Past Surgical History:     Past Surgical History         Past Surgical History:   Procedure Laterality Date    APPENDECTOMY        ARM SURGERY Right 2021     TRICEPS TENDON REPAIR - RIGHT ELBOW WITH ARTHREX AND BIOMET ACHILLES ALLOGRAFT    ARM SURGERY Right 2021     RIGHT ELBOW IRRIGATION AND DEBRIDEMENT performed by Mehran Verma Norman Comer MD at [facility] [place]         cervical/ lumbar    CARDIAC CATHETERIZATION   2017     [facility]/ no stents    COLONOSCOPY        ENDOSCOPY, COLON, DIAGNOSTIC        HERNIA REPAIR        MUSCLE REPAIR Right 2/23/2021     TRICEPS TENDON REPAIR - RIGHT ELBOW performed by González Toussaint MD at [facility]         cervical and lumbar    UPPER GASTROINTESTINAL ENDOSCOPY N/A 12/17/2019     EGD BIOPSY performed by Modesto Dyson MD at [facility] N/A 7/30/2021     EGD BIOPSY OF STOMACH AND ESOPHAGUS performed by Khadijah Stokes MD at [facility]            Medications:       Current Medication      Current Outpatient Medications:     ELIQUIS 5 MG TABS tablet, TAKE 1 TABLET BY MOUTH TWO TIMES A DAY, Disp: , Rfl:     tamsulosin (FLOMAX) 0.4 MG capsule, TAKE 1 CAPSULE BY MOUTH EVERY DAY, Disp: , Rfl:     methylPREDNISolone (MEDROL DOSEPACK) 4 MG tablet, Take by mouth., Disp: 1 kit, Rfl: 0    ALPRAZolam (XANAX) 0.25 MG tablet, Take 1 tablet by mouth nightly as needed for Anxiety for up to 30 days. , Disp: 30 tablet, Rfl: 0    losartan (COZAAR) 100 MG tablet, Take 1 tablet by mouth daily, Disp: 30 tablet, Rfl: 0    metoprolol tartrate (LOPRESSOR) 25 MG tablet, Take 1 tablet by mouth 2 times daily, Disp: 60 tablet, Rfl: 0    Blood Pressure KIT, 1 kit by Does not apply route daily, Disp: 1 kit, Rfl: 0    pantoprazole (PROTONIX) 40 MG tablet, Take 1 tablet by mouth 2 times daily, Disp: 30 tablet, Rfl: 3    spironolactone (ALDACTONE) 50 MG tablet, Take 50 mg by mouth daily, Disp: , Rfl:         Allergies:       Pcn [penicillins]     Social History:     Tobacco:    reports that he has been smoking cigars. He has a 20.00 pack-year smoking history. He has never used smokeless tobacco.  Alcohol:      reports current alcohol use. Drug Use:  reports current drug use. Drug: Marijuana Lucianne Bars).      Family History:     Family History         Family History   Problem Relation Age of Onset    Cancer Father           prostrate    Diabetes Father      Cancer Paternal Uncle           colon            Review of Systems:     Positive and Negative as described in HPI     Constitutional:  negative for  fevers, chills, sweats, fatigue, and weight loss  HEENT:  negative for vision or hearing changes,   Respiratory:  negative for shortness of breath, cough, or congestion  Cardiovascular:  negative for  chest pain, palpitations  Gastrointestinal:  negative for nausea, vomiting, diarrhea, constipation, abdominal pain  Genitourinary:  negative for frequency, dysuria  Integument/Breast:  negative for rash, skin lesions  Musculoskeletal:  negative for muscle aches or joint pain  Neurological:  negative for headaches, dizziness, lightheadedness, numbness, pain and tingling extrimities  Behavior/Psych:  negative for depression and anxiety       Physical Exam:     Vitals:  /84 (Site: Left Upper Arm)   Pulse 82   Wt 211 lb (95.7 kg)   SpO2 98%   BMI 30.28 kg/m²      General appearance - alert, well appearing, and in no acute distress  Mental status - oriented to person, place, and time with normal affect  Head - normocephalic and atraumatic  Eyes - pupils equal and reactive, extraocular eye movements intact, conjunctiva clear  Ears - hearing appears to be intact  Nose - no drainage noted  Mouth - mucous membranes moist  Neck - supple, no carotid bruits, thyroid not palpable  Chest - clear to auscultation, normal effort  Heart - normal rate, regular rhythm, no murmurs  Abdomen - soft, nontender, nondistended, bowel sounds present all four quadrants, no masses, hepatomegaly or splenomegaly  Neurological - normal speech, no focal findings or movement disorder noted, cranial nerves II through XII grossly intact  Extremities - peripheral pulses palpable, no pedal edema or calf pain with palpation  Skin - no gross lesions, rashes, or induration noted        Data:           Lab Results   Component Value Date      11/24/2021     K 4.2 11/24/2021      11/24/2021     CO2 26 11/24/2021     BUN 18 11/24/2021     CREATININE 0.80 11/24/2021     GLUCOSE 95 11/24/2021     PROT 5.8 10/16/2021     LABALBU 3.7 10/16/2021     BILITOT 0.42 10/16/2021     ALKPHOS 53 10/16/2021     AST 20 10/16/2021     ALT 37 10/16/2021            Lab Results   Component Value Date     WBC 5.6 11/24/2021     RBC 5.76 11/24/2021     HGB 18.2 11/24/2021     HCT 54.2 11/24/2021     MCV 94.1 11/24/2021     MCH 31.6 11/24/2021     MCHC 33.6 11/24/2021     RDW 14.9 11/24/2021      11/24/2021     MPV 6.9 11/24/2021            Lab Results   Component Value Date     TSH 4.42 10/16/2021            Lab Results   Component Value Date     CHOL 172 10/16/2020     HDL 27 10/16/2020     PSA 4.16 03/10/2021     LABA1C 5.7 06/22/2021            Assessment & Plan:       Diagnosis Orders   1. New onset a-fib ([address]      2. SEBASTIAN (obstructive sleep apnea)      3. Essential hypertension      4. High prostate specific antigen (PSA)      5. Anxiety  ALPRAZolam (XANAX) 0.25 MG tablet      Chronic atrial fibrillation,  Following cardiology,  On metoprolol and Eliquis,  In normal sinus rhythm at this time,     Anxiety and panic attacks,  Patient had multiple anxiety and panic attacks and been to the ER,  Heart was checked fine,  Started on Xanax as needed,  Side effects explained in detail,     HTN:     Discussed in detail about the BP, lab results, importance of diet, salt intake, exercise, and med compliance. Medication side effects discussed in detail and has none today. Patient verbalized understanding.           BP Readings from Last 3 Encounters:   12/07/21 138/84   11/24/21 121/88   10/31/21 139/88         High PSA,  Patient was supposed to see urology, appointment on the way,  Understand the risk of not seen earlier     CREATININE   Date Value Ref Range Status   11/24/2021 0.80 0.70 - 1.20 mg/dL Final   10/31/2021 0.80 0.70 - 1.20 mg/dL Final   10/16/2021 0.72 0.70 - 1.20 mg/dL Final                     Completed Refills   Requested Prescriptions             Signed Prescriptions Disp Refills    methylPREDNISolone (MEDROL DOSEPACK) 4 MG tablet 1 kit 0       Sig: Take by mouth.  ALPRAZolam (XANAX) 0.25 MG tablet 30 tablet 0       Sig: Take 1 tablet by mouth nightly as needed for Anxiety for up to 30 days. Return in about 3 months (around 3/7/2022). Encounter Medications         Orders Placed This Encounter   Medications    methylPREDNISolone (MEDROL DOSEPACK) 4 MG tablet       Sig: Take by mouth. Dispense:  1 kit       Refill:  0    ALPRAZolam (XANAX) 0.25 MG tablet       Sig: Take 1 tablet by mouth nightly as needed for Anxiety for up to 30 days. Dispense:  30 tablet       Refill:  0         No orders of the defined types were placed in this encounter.         Electronically signed by Jeff Canseco MD on 12/7/2021 at 11:00 AM

## 2021-12-20 NOTE — ANESTHESIA PRE PROCEDURE
Department of Anesthesiology  Preprocedure Note       Name:  Nina Packer   Age:  62 y.o.  :  1960                                          MRN:  [id number]         Date:  2021      Surgeon: Barry Hendrix):  Hector Gurrola MD    Procedure: Procedure(s):  SEPTOPLASTY BILATERAL TURBINATE REDUCTION    Medications prior to admission:   Prior to Admission medications    Medication Sig Start Date End Date Taking? Authorizing Provider   ELIQUIS 5 MG TABS tablet TAKE 1 TABLET BY MOUTH TWO TIMES A DAY 21   Historical Provider, MD   tamsulosin (FLOMAX) 0.4 MG capsule TAKE 1 CAPSULE BY MOUTH EVERY DAY 21   Historical Provider, MD   ALPRAZolam [place] Distance) 0.25 MG tablet Take 1 tablet by mouth nightly as needed for Anxiety for up to 30 days.  21  Darin Alonso MD   losartan (COZAAR) 100 MG tablet Take 1 tablet by mouth daily 10/17/21   Charmel Osgood, APRN - NP   metoprolol tartrate (LOPRESSOR) 25 MG tablet Take 1 tablet by mouth 2 times daily  Patient taking differently: Take 25 mg by mouth 2 times daily Forgets the second dose usually 10/16/21   Charmel Osgood, APRN - NP   Blood Pressure KIT 1 kit by Does not apply route daily 10/16/21   Charmel Osgood, APRN - NP   pantoprazole (PROTONIX) 40 MG tablet Take 1 tablet by mouth 2 times daily 21   Ryan Méndez MD   spironolactone (ALDACTONE) 50 MG tablet Take 50 mg by mouth daily Sometimes will take two daily- if feeling bloated    Historical Provider, MD       Current medications:    Current Facility-Administered Medications   Medication Dose Route Frequency Provider Last Rate Last Admin    lactated ringers infusion   IntraVENous Continuous King Meraz MD        sodium chloride flush 0.9 % injection 10 mL  10 mL IntraVENous 2 times per day Carlos Troy MD        sodium chloride flush 0.9 % injection 10 mL  10 mL IntraVENous PRN Carlos Troy MD        0.9 % sodium chloride infusion  25 mL IntraVENous PRN King Meraz MD        lidocaine PF 1 % injection 1 mL  1 mL IntraDERmal Once PRN Aníbal Dowling MD        oxymetazoline (AFRIN) 0.05 % nasal spray 2 spray  2 spray Each Nostril Once Sierra Juan MD           Allergies:     Allergies   Allergen Reactions    Pcn [Penicillins] Hives       Problem List:    Patient Active Problem List   Diagnosis Code    Rupture of right triceps tendon S46.311A    Abscess of right elbow L02.413    Chest pain R07.9    Essential hypertension I10    Epigastric pain R10.13    Gastritis K29.70    Esophagitis K20.90    New onset a-fib ([facility]) I48.91    Atrial fibrillation with RVR ([facility]) I48.91    Polycythemia D75.1    Hypoproteinemia ([facility]) E77.8       Past Medical History:        Diagnosis Date    Abscess of bursa of right elbow     Arthritis     Atrial fibrillation (HCC)     BPH (benign prostatic hyperplasia)     Heartburn     Hypertension     Panic attack 11/24/2021    Polycythemia     PONV (postoperative nausea and vomiting)     Sleep apnea     no machine       Past Surgical History:        Procedure Laterality Date    APPENDECTOMY      ARM SURGERY Right 02/23/2021    TRICEPS TENDON REPAIR - RIGHT ELBOW WITH ARTHREX AND BIOMET ACHILLES ALLOGRAFT    ARM SURGERY Right 7/13/2021    RIGHT ELBOW IRRIGATION AND DEBRIDEMENT performed by Orlando Noble MD at [facility]      cervical/ lumbar    CARDIAC CATHETERIZATION  2017    [facility]/ no stents    COLONOSCOPY      ENDOSCOPY, COLON, DIAGNOSTIC      HERNIA REPAIR      MUSCLE REPAIR Right 2/23/2021    TRICEPS TENDON REPAIR - RIGHT ELBOW performed by Orlando Noble MD at [address] [place] [address]      cervical and lumbar    UPPER GASTROINTESTINAL ENDOSCOPY N/A 12/17/2019    EGD BIOPSY performed by Keaton Bowman MD at [address] N/A 7/30/2021    EGD BIOPSY OF STOMACH AND ESOPHAGUS performed by Guillermo Xavier MD at [facility]       Social History:    Social History Tobacco Use    Smoking status: Former Smoker     Packs/day: 1.00     Years: 20.00     Pack years: 20.00     Types: Cigars     Quit date: 3/15/2008     Years since quittin.7    Smokeless tobacco: Never Used    Tobacco comment: no cigarettes for 20 years- still smokes cigars and marijuana   Substance Use Topics    Alcohol use: Yes     Comment: socially                                Counseling given: Not Answered  Comment: no cigarettes for 20 years- still smokes cigars and marijuana      Vital Signs (Current):   Vitals:    21 0928   BP: (!) 168/105   Pulse: 75   Resp: 16   Temp: 97.3 °F (36.3 °C)   TempSrc: Temporal   SpO2: 97%                                              BP Readings from Last 3 Encounters:   21 (!) 168/105   12/10/21 138/85   21 138/84       NPO Status:                                                                                 BMI:   Wt Readings from Last 3 Encounters:   12/10/21 210 lb 1.6 oz (95.3 kg)   21 211 lb (95.7 kg)   21 198 lb (89.8 kg)     There is no height or weight on file to calculate BMI.    CBC:   Lab Results   Component Value Date    WBC 5.6 2021    RBC 5.76 2021    HGB 18.2 2021    HCT 54.2 2021    MCV 94.1 2021    RDW 14.9 2021     2021       CMP:   Lab Results   Component Value Date     2021    K 4.2 2021     2021    CO2 26 2021    BUN 18 2021    CREATININE 0.80 2021    GFRAA >60 2021    LABGLOM >60 2021    GLUCOSE 95 2021    PROT 5.8 10/16/2021    CALCIUM 8.8 2021    BILITOT 0.42 10/16/2021    ALKPHOS 53 10/16/2021    AST 20 10/16/2021    ALT 37 10/16/2021       POC Tests: No results for input(s): POCGLU, POCNA, POCK, POCCL, POCBUN, POCHEMO, POCHCT in the last 72 hours.     Coags:   Lab Results   Component Value Date    PROTIME 13.4 10/16/2021    INR 1.0 10/16/2021       HCG (If Applicable): No results found for: PREGTESTUR, PREGSERUM, HCG, HCGQUANT     ABGs: No results found for: PHART, PO2ART, VAM9NBJ, HFU6IVB, BEART, I6LPYJFR     Type & Screen (If Applicable):  No results found for: LABABO, LABRH    Drug/Infectious Status (If Applicable):  No results found for: HIV, HEPCAB    COVID-19 Screening (If Applicable):   Lab Results   Component Value Date    COVID19 Not Detected 12/16/2021           Anesthesia Evaluation    Airway: Mallampati: I  TM distance: >3 FB   Neck ROM: full  Mouth opening: > = 3 FB Dental:          Pulmonary:   (+) sleep apnea:                             Cardiovascular:    (+) hypertension:, dysrhythmias: atrial fibrillation,     (-)  angina                Neuro/Psych:               GI/Hepatic/Renal:             Endo/Other:                     Abdominal:             Vascular:           Other Findings:             Anesthesia Plan      general     ASA 3                                 Lon Lou MD   12/20/2021 Sepsis

## 2021-12-20 NOTE — ANESTHESIA POSTPROCEDURE EVALUATION
Department of Anesthesiology  Postprocedure Note    Patient: Cynthia Yates  MRN: [id number]  YOB: 1960  Date of evaluation: 12/20/2021  Time:  1:52 PM     Procedure Summary     Date: 12/20/21 Room / Location: [facility] / [facility] - INPATIENT    Anesthesia Start: 1112 Anesthesia Stop: 7746    Procedure: SEPTOPLASTY BILATERAL TURBINATE REDUCTION (Bilateral ) Diagnosis: (DX DEVIATED NASAL SEPTUM, HYPERTROHY NASAK TURBINATES  OBST SLEEP APNEA, SNORING, ACUTE MAXILLARY SINUSITIS, UNSPECIFIED AFIB)    Surgeons: Sanchez Reynolds MD Responsible Provider: Elyssa Logan MD    Anesthesia Type: general ASA Status: 3          Anesthesia Type: general    Vivien Phase I: Vivien Score: 4    Vivien Phase II:      Last vitals: Reviewed and per EMR flowsheets.        Anesthesia Post Evaluation    Complications: no

## 2022-01-14 RX ORDER — PANTOPRAZOLE SODIUM 40 MG/1
TABLET, DELAYED RELEASE ORAL
Qty: 30 TABLET | Refills: 0 | Status: SHIPPED | OUTPATIENT
Start: 2022-01-14 | End: 2022-04-04 | Stop reason: SDUPTHER

## 2022-01-19 ENCOUNTER — HOSPITAL ENCOUNTER (EMERGENCY)
Facility: CLINIC | Age: 62
Discharge: HOME | End: 2022-01-19
Attending: EMERGENCY MEDICINE
Payer: COMMERCIAL

## 2022-01-19 VITALS
RESPIRATION RATE: 18 BRPM | HEIGHT: 70 IN | WEIGHT: 200 LBS | DIASTOLIC BLOOD PRESSURE: 87 MMHG | BODY MASS INDEX: 28.63 KG/M2 | TEMPERATURE: 97.8 F | SYSTOLIC BLOOD PRESSURE: 147 MMHG | OXYGEN SATURATION: 98 % | HEART RATE: 92 BPM

## 2022-01-19 DIAGNOSIS — E86.0 DEHYDRATION: Primary | ICD-10-CM

## 2022-01-19 DIAGNOSIS — E16.2 HYPOGLYCEMIA: ICD-10-CM

## 2022-01-19 LAB
ABSOLUTE EOS #: 0.2 K/UL (ref 0–0.4)
ABSOLUTE IMMATURE GRANULOCYTE: ABNORMAL K/UL (ref 0–0.3)
ABSOLUTE LYMPH #: 1.1 K/UL (ref 1–4.8)
ABSOLUTE MONO #: 0.7 K/UL (ref 0.1–1.2)
ANION GAP SERPL CALCULATED.3IONS-SCNC: 12 MMOL/L (ref 9–17)
BASOPHILS # BLD: 1 % (ref 0–2)
BASOPHILS ABSOLUTE: 0.1 K/UL (ref 0–0.2)
BNP INTERPRETATION: NORMAL
BUN BLDV-MCNC: 23 MG/DL (ref 8–23)
BUN/CREAT BLD: ABNORMAL (ref 9–20)
CALCIUM SERPL-MCNC: 9.4 MG/DL (ref 8.6–10.4)
CHLORIDE BLD-SCNC: 103 MMOL/L (ref 98–107)
CHP ED QC CHECK: NORMAL
CHP ED QC CHECK: NORMAL
CO2: 21 MMOL/L (ref 20–31)
CREAT SERPL-MCNC: 0.7 MG/DL (ref 0.7–1.2)
DIFFERENTIAL TYPE: ABNORMAL
EOSINOPHILS RELATIVE PERCENT: 3 % (ref 1–4)
GFR AFRICAN AMERICAN: >60 ML/MIN
GFR NON-AFRICAN AMERICAN: >60 ML/MIN
GFR SERPL CREATININE-BSD FRML MDRD: ABNORMAL ML/MIN/{1.73_M2}
GFR SERPL CREATININE-BSD FRML MDRD: ABNORMAL ML/MIN/{1.73_M2}
GLUCOSE BLD-MCNC: 67 MG/DL (ref 70–99)
GLUCOSE BLD-MCNC: 74 MG/DL
GLUCOSE BLD-MCNC: 74 MG/DL (ref 75–110)
GLUCOSE BLD-MCNC: 83 MG/DL (ref 75–110)
GLUCOSE BLD-MCNC: 84 MG/DL
HCT VFR BLD CALC: 51.1 % (ref 41–53)
HEMOGLOBIN: 16.9 G/DL (ref 13.5–17.5)
IMMATURE GRANULOCYTES: ABNORMAL %
LYMPHOCYTES # BLD: 14 % (ref 24–44)
MCH RBC QN AUTO: 31.9 PG (ref 26–34)
MCHC RBC AUTO-ENTMCNC: 33.1 G/DL (ref 31–37)
MCV RBC AUTO: 96.5 FL (ref 80–100)
MONOCYTES # BLD: 9 % (ref 2–11)
NRBC AUTOMATED: ABNORMAL PER 100 WBC
PDW BLD-RTO: 14.4 % (ref 12.5–15.4)
PLATELET # BLD: 259 K/UL (ref 140–450)
PLATELET ESTIMATE: ABNORMAL
PMV BLD AUTO: 7.1 FL (ref 6–12)
POTASSIUM SERPL-SCNC: 4.4 MMOL/L (ref 3.7–5.3)
PRO-BNP: 52 PG/ML
RBC # BLD: 5.3 M/UL (ref 4.5–5.9)
RBC # BLD: ABNORMAL 10*6/UL
SEG NEUTROPHILS: 73 % (ref 36–66)
SEGMENTED NEUTROPHILS ABSOLUTE COUNT: 5.8 K/UL (ref 1.8–7.7)
SODIUM BLD-SCNC: 136 MMOL/L (ref 135–144)
TROPONIN INTERP: NORMAL
TROPONIN T: NORMAL NG/ML
TROPONIN, HIGH SENSITIVITY: 11 NG/L (ref 0–22)
WBC # BLD: 7.8 K/UL (ref 3.5–11)
WBC # BLD: ABNORMAL 10*3/UL

## 2022-01-19 PROCEDURE — 6370000000 HC RX 637 (ALT 250 FOR IP): Performed by: EMERGENCY MEDICINE

## 2022-01-19 PROCEDURE — 2580000003 HC RX 258: Performed by: EMERGENCY MEDICINE

## 2022-01-19 PROCEDURE — 96360 HYDRATION IV INFUSION INIT: CPT

## 2022-01-19 PROCEDURE — 93005 ELECTROCARDIOGRAM TRACING: CPT | Performed by: EMERGENCY MEDICINE

## 2022-01-19 PROCEDURE — 83880 ASSAY OF NATRIURETIC PEPTIDE: CPT

## 2022-01-19 PROCEDURE — 84484 ASSAY OF TROPONIN QUANT: CPT

## 2022-01-19 PROCEDURE — 36415 COLL VENOUS BLD VENIPUNCTURE: CPT

## 2022-01-19 PROCEDURE — 99284 EMERGENCY DEPT VISIT MOD MDM: CPT

## 2022-01-19 PROCEDURE — 80048 BASIC METABOLIC PNL TOTAL CA: CPT

## 2022-01-19 PROCEDURE — 82947 ASSAY GLUCOSE BLOOD QUANT: CPT

## 2022-01-19 PROCEDURE — 85025 COMPLETE CBC W/AUTO DIFF WBC: CPT

## 2022-01-19 RX ORDER — BLOOD-GLUCOSE METER
1 KIT MISCELLANEOUS DAILY
Qty: 1 KIT | Refills: 0 | Status: SHIPPED | OUTPATIENT
Start: 2022-01-19

## 2022-01-19 RX ORDER — ASPIRIN 81 MG/1
324 TABLET, CHEWABLE ORAL ONCE
Status: COMPLETED | OUTPATIENT
Start: 2022-01-19 | End: 2022-01-19

## 2022-01-19 RX ORDER — 0.9 % SODIUM CHLORIDE 0.9 %
1000 INTRAVENOUS SOLUTION INTRAVENOUS ONCE
Status: COMPLETED | OUTPATIENT
Start: 2022-01-19 | End: 2022-01-19

## 2022-01-19 RX ADMIN — SODIUM CHLORIDE 1000 ML: 9 INJECTION, SOLUTION INTRAVENOUS at 21:17

## 2022-01-19 RX ADMIN — ASPIRIN 81 MG CHEWABLE TABLET 324 MG: 81 TABLET CHEWABLE at 21:07

## 2022-01-20 LAB
EKG ATRIAL RATE: 101 BPM
EKG P AXIS: 26 DEGREES
EKG P-R INTERVAL: 182 MS
EKG Q-T INTERVAL: 320 MS
EKG QRS DURATION: 92 MS
EKG QTC CALCULATION (BAZETT): 414 MS
EKG R AXIS: -4 DEGREES
EKG T AXIS: 47 DEGREES
EKG VENTRICULAR RATE: 101 BPM

## 2022-01-20 NOTE — ED NOTES
Patient to ED via self to room 5  Patient here for complaint of tachycardia related to afib as well as anxiety  On arrival to ED, patient states that his symptoms have resolved but he experienced an episode of his heart racing/palpitating  Patient denies any CP at this time  Denies SOB  States that he did have one drink tonight as well as a 20mg edible before arrival; states he typically takes one to help him fall asleep  Has been seen and treated for an anxiety attack before and states he feels anxious today    Vitals obtained and call light provided  Patient resting comfortably on stretcher in no apparent distress  Respirations even and non-labored  Dr. Mary Rivas at bedside to evaluate patient     Laurann Simmonds, RN  01/19/22 2057

## 2022-01-20 NOTE — ED PROVIDER NOTES
abdominal pain, urinary/bowel symptoms, focal weakness, numbness, tingling, recent injury or illness. REVIEW OF SYSTEMS     Ten point review of systems was reviewed and is negative unless otherwise noted in the HPI    [address]    has a past medical history of Abscess of bursa of right elbow, Arthritis, Atrial fibrillation ([address] BPH (benign prostatic hyperplasia), Heartburn, Hypertension, Panic attack, Polycythemia, PONV (postoperative nausea and vomiting), and Sleep apnea. SURGICAL HISTORY      has a past surgical history that includes shoulder surgery; Spine surgery; Upper gastrointestinal endoscopy (N/A, 12/17/2019); Cardiac catheterization (2017); hernia repair; Colonoscopy; Appendectomy; back surgery; Endoscopy, colon, diagnostic; Arm Surgery (Right, 02/23/2021); Muscle Repair (Right, 2/23/2021); Arm Surgery (Right, 7/13/2021); Upper gastrointestinal endoscopy (N/A, 7/30/2021); and Septoplasty (Bilateral, 12/20/2021). CURRENT MEDICATIONS       Discharge Medication List as of 1/19/2022 10:29 PM      CONTINUE these medications which have NOT CHANGED    Details   pantoprazole (PROTONIX) 40 MG tablet TAKE 1 TABLET BY MOUTH TWO TIMES A DAY, Disp-30 tablet, R-0Normal      tamsulosin (FLOMAX) 0.4 MG capsule TAKE 1 CAPSULE BY MOUTH EVERY DAYHistorical Med      losartan (COZAAR) 100 MG tablet Take 1 tablet by mouth daily, Disp-30 tablet, R-0Normal      metoprolol tartrate (LOPRESSOR) 25 MG tablet Take 1 tablet by mouth 2 times daily, Disp-60 tablet, R-0Normal      Blood Pressure KIT DAILY Starting Sat 10/16/2021, Disp-1 kit, R-0, Print      spironolactone (ALDACTONE) 50 MG tablet Take 50 mg by mouth daily Sometimes will take two daily- if feeling bloatedHistorical Med             ALLERGIES     is allergic to pcn [penicillins]. FAMILY HISTORY     He indicated that his mother is alive. He indicated that his father is alive.  He indicated that the status of his paternal uncle is unknown.     family history includes Cancer in his father and paternal uncle; Diabetes in his father. SOCIAL HISTORY      reports that he quit smoking about 13 years ago. His smoking use included cigars. He has a 20.00 pack-year smoking history. He has never used smokeless tobacco. He reports current alcohol use. He reports current drug use. Drug: Marijuana Ileana Bachelor). PHYSICAL EXAM     INITIAL VITALS:  height is 5' 10\" (1.778 m) and weight is 90.7 kg (200 lb). His oral temperature is 97.8 °F (36.6 °C). His blood pressure is 147/87 (abnormal) and his pulse is 92. His respiration is 18 and oxygen saturation is 98%. CONSTITUTIONAL: no apparent distress, well appearing  SKIN: warm, dry, no jaundice, hives or petechiae  EYES: clear conjunctiva, non-icteric sclera  HENT: normocephalic, atraumatic, moist mucus membranes  NECK: Nontender and supple with no nuchal rigidity, full range of motion  PULMONARY: clear to auscultation without wheezes, rhonchi, or rales, normal excursion, no accessory muscle use and no stridor  CARDIOVASCULAR: regular rate, rhythm. Strong radial pulses with intact distal perfusion. Capillary refill <2 seconds. GASTROINTESTINAL: soft, non-tender, non-distended, no palpable masses, no rebound or guarding   GENITOURINARY: No costovertebral angle tenderness to palpation  MUSCULOSKELETAL: No midline spinal tenderness, step off or deformity. Extremities are otherwise nontender to palpation and nonerythematous. Compartments soft. No peripheral edema. NEUROLOGIC: alert and oriented x 3, GCS 15, normal mentation and speech. Moves all extremities x 4 without motor or sensory deficit, gait is stable without ataxia. Cranial nerves II through XII intact. No cerebellar signs. No pronator drift. Normal finger-to-nose.   PSYCHIATRIC: normal mood and affect, thought process is clear and linear    DIAGNOSTIC RESULTS     EKG:  EKG 2040 sinus tachycardia, rate 101 bpm, normal axis, normal intervals, no ST elevation or depression, no T wave inversions, poor R wave progression, Q wave in aVR and 3, no change from EKG on 11/24/2021    RADIOLOGY:   Patient declined    LABS:  Results for orders placed or performed during the hospital encounter of 01/19/22   CBC Auto Differential   Result Value Ref Range    WBC 7.8 3.5 - 11.0 k/uL    RBC 5.30 4.5 - 5.9 m/uL    Hemoglobin 16.9 13.5 - 17.5 g/dL    Hematocrit 51.1 41 - 53 %    MCV 96.5 80 - 100 fL    MCH 31.9 26 - 34 pg    MCHC 33.1 31 - 37 g/dL    RDW 14.4 12.5 - 15.4 %    Platelets 425 654 - 672 k/uL    MPV 7.1 6.0 - 12.0 fL    NRBC Automated NOT REPORTED per 100 WBC    Differential Type NOT REPORTED     Seg Neutrophils 73 (H) 36 - 66 %    Lymphocytes 14 (L) 24 - 44 %    Monocytes 9 2 - 11 %    Eosinophils % 3 1 - 4 %    Basophils 1 0 - 2 %    Immature Granulocytes NOT REPORTED 0 %    Segs Absolute 5.80 1.8 - 7.7 k/uL    Absolute Lymph # 1.10 1.0 - 4.8 k/uL    Absolute Mono # 0.70 0.1 - 1.2 k/uL    Absolute Eos # 0.20 0.0 - 0.4 k/uL    Basophils Absolute 0.10 0.0 - 0.2 k/uL    Absolute Immature Granulocyte NOT REPORTED 0.00 - 0.30 k/uL    WBC Morphology NOT REPORTED     RBC Morphology NOT REPORTED     Platelet Estimate NOT REPORTED    Basic Metabolic Panel w/ Reflex to MG   Result Value Ref Range    Glucose 67 (L) 70 - 99 mg/dL    BUN 23 8 - 23 mg/dL    CREATININE 0.70 0.70 - 1.20 mg/dL    Bun/Cre Ratio NOT REPORTED 9 - 20    Calcium 9.4 8.6 - 10.4 mg/dL    Sodium 136 135 - 144 mmol/L    Potassium 4.4 3.7 - 5.3 mmol/L    Chloride 103 98 - 107 mmol/L    CO2 21 20 - 31 mmol/L    Anion Gap 12 9 - 17 mmol/L    GFR Non-African American >60 >60 mL/min    GFR African American >60 >60 mL/min    GFR Comment          GFR Staging NOT REPORTED    Troponin   Result Value Ref Range    Troponin, High Sensitivity 11 0 - 22 ng/L    Troponin T NOT REPORTED <0.03 ng/mL    Troponin Interp NOT REPORTED    Brain Natriuretic Peptide   Result Value Ref Range    Pro-BNP 52 <300 pg/mL    BNP Interpretation NOT REPORTED    POCT Glucose   Result Value Ref Range    Glucose 74 mg/dL    QC OK? ok    POC Glucose Fingerstick   Result Value Ref Range    POC Glucose 74 (L) 75 - 110 mg/dL   POCT Glucose   Result Value Ref Range    Glucose 84 mg/dL    QC OK? ok    POC Glucose Fingerstick   Result Value Ref Range    POC Glucose 83 75 - 110 mg/dL       EMERGENCY DEPARTMENT COURSE:        The patient was given the following medications:  Orders Placed This Encounter   Medications    0.9 % sodium chloride bolus    aspirin chewable tablet 324 mg    glucose monitoring (FREESTYLE FREEDOM) kit     Si kit by Does not apply route daily     Dispense:  1 kit     Refill:  0        Vitals:    Vitals:    22 2035 22 2146 22 2226 22 2245   BP: (!) 153/96  (!) 153/88 (!) 147/87   Pulse: 105 92 92    Resp: 18      Temp: 97.8 °F (36.6 °C)      TempSrc: Oral      SpO2: 98%      Weight: 90.7 kg (200 lb)      Height: 5' 10\" (1.778 m)        -------------------------  BP: (!) 147/87, Temp: 97.8 °F (36.6 °C), Pulse: 92, Resp: 18    CONSULTS:  None    CRITICAL CARE:   None    PROCEDURES:  None    DIAGNOSIS/ MDM:   Alaina Weller is a 62 y.o. male who presents with heart palpitations. Vital signs upon arrival show slight tachycardia but his heart rate improved with IV fluids. Vital signs are otherwise stable. Exam is unremarkable. EKG shows sinus rhythm without any ST changes. The patient was agreeable to blood work with a single troponin and EKG only. CBC, BMP, troponin and BNP are unremarkable except for hyperglycemia of 67. He was given a turkey sandwich and juice and his glucose steadily improved into the 80s. I suspect the patient's symptoms may have been secondary to his marijuana use, alcohol use, and dehydration which may have led to his hypoglycemia. I tried to encourage him to get a second troponin and chest x-ray but he declined. I have low suspicion for PE since he is on Xarelto.   I instructed the patient to monitor his blood sugars at home and I ordered him a glucometer. He was instructed to follow-up with his PCP in 1 to 2 days and to return to the ER for worsening symptoms or any other concern. The patient understands that at this time there is no evidence for a more malignant underlying process, but also understands that early in the process of an illness or injury, an emergency department work-up can be falsely reassuring. Routine discharge counseling was given, and the patient understands that worsening, changing or persistent symptoms should prompt a immediate call or follow-up with their primary care physician or return to the emergency department. The importance of appropriate follow-up was also discussed. I have reviewed the disposition diagnosis with the patient. I have answered their questions and given discharge instructions. They voiced understanding of these instructions and did not have any further questions or complaints. FINAL IMPRESSION      1. Dehydration    2.  Hypoglycemia          DISPOSITION/PLAN   DISPOSITION Decision To Discharge 01/19/2022 10:28:23 PM        PATIENT REFERRED TO:  Becky Romero MD  [address] [place] [address] [address] [facility]  [phone]    Schedule an appointment as soon as possible for a visit in 2 days      [facility]  [address] [place] [address]  [phone]  Go to   If symptoms worsen      DISCHARGE MEDICATIONS:  Discharge Medication List as of 1/19/2022 10:29 PM      START taking these medications    Details   glucose monitoring (FREESTYLE FREEDOM) kit DAILY Starting Wed 1/19/2022, Disp-1 kit, R-0, Normal             (Please note that portions of this note were completed with a voice recognitionprogram.  Efforts were made to edit the dictations but occasionally words are mis-transcribed.)    Igor Kilpatrick DO, [facility]  Emergency Physician Attending         Igor Kilpatrick DO  01/20/22 5728

## 2022-01-20 NOTE — ED NOTES
Patient provided with turkey tray and chips due to slightly low blood sugar  No other needs at this time  RR even and non-labored  Call light remains within reach     Yahir Alexis, RN  01/19/22 6675

## 2022-02-07 ENCOUNTER — TELEPHONE (OUTPATIENT)
Dept: INTERNAL MEDICINE CLINIC | Age: 62
End: 2022-02-07

## 2022-02-07 RX ORDER — VARENICLINE TARTRATE
KIT
Qty: 1 BOX | Refills: 0 | Status: SHIPPED | OUTPATIENT
Start: 2022-02-07 | End: 2022-03-08

## 2022-02-07 NOTE — TELEPHONE ENCOUNTER
----- Message from Demi Shekharks sent at 2/7/2022 12:41 PM EST -----  Subject: Message to Provider    QUESTIONS  Information for Provider? PT NEEDS SOME CHANTIX TO STOP SMOKING,PT NEEDS A   PRESCRIPTION ,SENT TO [facility]   ---------------------------------------------------------------------------  --------------  CALL BACK INFO  What is the best way for the office to contact you? OK to leave message on   voicemail  Preferred Call Back Phone Number? [phone]  ---------------------------------------------------------------------------  --------------  SCRIPT ANSWERS  Relationship to Patient?  Self

## 2022-02-10 ENCOUNTER — TELEPHONE (OUTPATIENT)
Dept: INTERNAL MEDICINE CLINIC | Age: 62
End: 2022-02-10

## 2022-02-10 NOTE — TELEPHONE ENCOUNTER
Patient has an orthopedic surgeon. But he is trying to avoid surgery if at all possible. He is researching other options and would like to know your opinion on Stem Cell Therapy. Please advise.

## 2022-02-11 DIAGNOSIS — Z72.0 TOBACCO USE: Primary | ICD-10-CM

## 2022-02-11 RX ORDER — VARENICLINE TARTRATE 0.5 MG/1
TABLET, FILM COATED ORAL
Qty: 11 TABLET | Refills: 0 | Status: SHIPPED | OUTPATIENT
Start: 2022-02-11 | End: 2022-03-08

## 2022-02-11 RX ORDER — VARENICLINE TARTRATE 1 MG/1
1 TABLET, FILM COATED ORAL DAILY
Qty: 30 TABLET | Refills: 1 | Status: SHIPPED | OUTPATIENT
Start: 2022-02-11 | End: 2022-04-04

## 2022-02-11 RX ORDER — VARENICLINE TARTRATE
KIT
Qty: 1 BOX | Refills: 0 | Status: CANCELLED | OUTPATIENT
Start: 2022-02-11

## 2022-02-11 NOTE — TELEPHONE ENCOUNTER
----- Message from Migdalia Edis sent at 2/11/2022 11:49 AM EST -----  Subject: Refill Request    QUESTIONS  Name of Medication? varenicline (CHANTIX STARTING MONTH NEERAJ) 0.5 MG X 11 &   1 MG X 42 tablet  Patient-reported dosage and instructions? as needed   How many days do you have left? 0  Preferred Pharmacy? [id number]  Pharmacy phone number (if available)? [phone]  ---------------------------------------------------------------------------  --------------,  Name of Medication? Other - test strips   Patient-reported dosage and instructions? unknown   How many days do you have left? 0  Preferred Pharmacy? [address]  Pharmacy phone number (if available)? [phone]  Additional Information for Provider? diabetic test strips   ---------------------------------------------------------------------------  --------------  CALL BACK INFO  What is the best way for the office to contact you? OK to leave message on   voicemail  Preferred Call Back Phone Number?  [phone]

## 2022-02-11 NOTE — TELEPHONE ENCOUNTER
Marco Alfonso with [facility]. called to request an updated Rx be sent in by Dr. Tara Moncada. Name Brand Chantix has been recalled and is currently unavailable. Marco Alfonso stated an Rx for the 0.5mg and 1mg for the generic will be needed. These are both pending for provider review and signature if appropriate.

## 2022-02-28 ENCOUNTER — OFFICE VISIT (OUTPATIENT)
Dept: ORTHOPEDIC SURGERY | Age: 62
End: 2022-02-28
Payer: COMMERCIAL

## 2022-02-28 DIAGNOSIS — M25.562 CHRONIC PAIN OF BOTH KNEES: ICD-10-CM

## 2022-02-28 DIAGNOSIS — G89.29 CHRONIC PAIN OF RIGHT KNEE: Primary | ICD-10-CM

## 2022-02-28 DIAGNOSIS — G89.29 CHRONIC PAIN OF BOTH KNEES: ICD-10-CM

## 2022-02-28 DIAGNOSIS — M25.561 CHRONIC PAIN OF RIGHT KNEE: Primary | ICD-10-CM

## 2022-02-28 DIAGNOSIS — M25.561 CHRONIC PAIN OF BOTH KNEES: ICD-10-CM

## 2022-02-28 PROCEDURE — 20610 DRAIN/INJ JOINT/BURSA W/O US: CPT | Performed by: ORTHOPAEDIC SURGERY

## 2022-02-28 PROCEDURE — 99203 OFFICE O/P NEW LOW 30 MIN: CPT | Performed by: ORTHOPAEDIC SURGERY

## 2022-02-28 RX ORDER — BETAMETHASONE SODIUM PHOSPHATE AND BETAMETHASONE ACETATE 3; 3 MG/ML; MG/ML
12 INJECTION, SUSPENSION INTRA-ARTICULAR; INTRALESIONAL; INTRAMUSCULAR; SOFT TISSUE ONCE
Status: COMPLETED | OUTPATIENT
Start: 2022-02-28 | End: 2022-02-28

## 2022-02-28 RX ORDER — BUPIVACAINE HYDROCHLORIDE 5 MG/ML
2 INJECTION, SOLUTION PERINEURAL ONCE
Status: COMPLETED | OUTPATIENT
Start: 2022-02-28 | End: 2022-02-28

## 2022-02-28 RX ORDER — LIDOCAINE HYDROCHLORIDE 10 MG/ML
4 INJECTION, SOLUTION INFILTRATION; PERINEURAL ONCE
Status: COMPLETED | OUTPATIENT
Start: 2022-02-28 | End: 2022-02-28

## 2022-02-28 RX ADMIN — BETAMETHASONE SODIUM PHOSPHATE AND BETAMETHASONE ACETATE 12 MG: 3; 3 INJECTION, SUSPENSION INTRA-ARTICULAR; INTRALESIONAL; INTRAMUSCULAR; SOFT TISSUE at 15:53

## 2022-02-28 RX ADMIN — LIDOCAINE HYDROCHLORIDE 4 ML: 10 INJECTION, SOLUTION INFILTRATION; PERINEURAL at 15:55

## 2022-02-28 RX ADMIN — BUPIVACAINE HYDROCHLORIDE 10 MG: 5 INJECTION, SOLUTION PERINEURAL at 15:54

## 2022-02-28 NOTE — PROGRESS NOTES
Veronica Henry M.D.            [address] [facility] [address] [facility], [address] [place] [address]           Dept Phone: [phone]           Dept Fax:  [phone] [address] [facility]           Nancy Wasserman          Dept Phone: [phone]           Dept Fax:  [phone]      Chief Compliant:  Chief Complaint   Patient presents with    Pain     bilateral knees        History of Present Illness: This is a 62 y.o. male who presents to the clinic today for evaluation / follow up of right knee pain. Patient is a very active 81-year-old gentleman who works out a lot and also has a very active lifestyle. He had a previous arthroscopy done 7 years ago elsewhere on the right side. He is describes the pain on the inside of his knee does not really give a sharp stabbing sensation. No giving way sensation he still works out a lot. .       Review of Systems   Constitutional: Negative for fever, chills, sweats. Eyes: Negative for changes in vision, or pain. HENT: Negative for ear ache, epistaxis, or sore throat. Respiratory/Cardio: Negative for Chest pain, palpitations, SOB, or cough. Gastrointestinal: Negative for abdominal pain, N/V/D. Genitourinary: Negative for dysuria, frequency, urgency, or hematuria. Neurological: Negative for headache, numbness, or weakness. Integumentary: Negative for rash, itching, laceration, or abrasion. Musculoskeletal: Positive for Pain (bilateral knees)       Physical Exam:  Constitutional: Patient is oriented to person, place, and time. Patient appears well-developed and well nourished. HENT: Negative otherwise noted  Head: Normocephalic and Atraumatic  Nose: Normal  Eyes: Conjunctivae and EOM are normal  Neck: Normal range of motion Neck supple. Respiratory/Cardio: Effort normal. No respiratory distress.   Musculoskeletal: Physical examination the patient's right knee notes he does have an effusion. He is about 3degrees from full extension. Motion about 130 degrees Julio's is relatively unremarkable ligamentously is cruciates and collaterals are intact. Some patellofemoral crepitus and grinding but nothing too severe. Patient does appear to have a slight parameniscal cyst medially but very small in nature  Neurological: Patient is alert and oriented to person, place, and time. Normal strenght. No sensory deficit. Skin: Skin is warm and dry  Psychiatric: Behavior is normal. Thought content normal.  Nursing note and vitals reviewed. Labs and Imaging:     XR taken today: Patient did have x-rays taken today however he did have standing x-rays taken at the [facility] this past January. He noted on AP lateral views of right knee that he does have some very modest medial and lateral joint space narrowing but certainly nothing too severe. Left knee is essentially unremarkable. No results found. No orders of the defined types were placed in this encounter. Assessment and Plan:  1. Chronic pain of both knees    2. Early DJD right knee        This is a 62 y.o. male who presents to the clinic today for evaluation / follow up of early DJD right knee. Past History:    Current Outpatient Medications:     varenicline (CHANTIX) 1 MG tablet, Take 1 tablet by mouth daily starting on Day 8 for the remainder of treatment. , Disp: 30 tablet, Rfl: 1    varenicline (CHANTIX) 0.5 MG tablet, Take 1 tablet by mouth on days 1-3. On days 4-7 take 1 tablet by mouth twice daily. , Disp: 11 tablet, Rfl: 0    varenicline (CHANTIX STARTING MONTH PAK) 0.5 MG X 11 & 1 MG X 42 tablet, Take by mouth., Disp: 1 box, Rfl: 0    glucose monitoring (FREESTYLE FREEDOM) kit, 1 kit by Does not apply route daily, Disp: 1 kit, Rfl: 0    pantoprazole (PROTONIX) 40 MG tablet, TAKE 1 TABLET BY MOUTH TWO TIMES A DAY, Disp: 30 tablet, Rfl: 0    tamsulosin (FLOMAX) 0.4 MG capsule, TAKE 1 CAPSULE BY MOUTH EVERY DAY, Disp: , Rfl:     losartan (COZAAR) 100 MG tablet, Take 1 tablet by mouth daily, Disp: 30 tablet, Rfl: 0    metoprolol tartrate (LOPRESSOR) 25 MG tablet, Take 1 tablet by mouth 2 times daily (Patient taking differently: Take 25 mg by mouth 2 times daily Forgets the second dose usually), Disp: 60 tablet, Rfl: 0    Blood Pressure KIT, 1 kit by Does not apply route daily, Disp: 1 kit, Rfl: 0    spironolactone (ALDACTONE) 50 MG tablet, Take 50 mg by mouth daily Sometimes will take two daily- if feeling bloated, Disp: , Rfl:   Allergies   Allergen Reactions    Pcn [Penicillins] Hives     Social History     Socioeconomic History    Marital status: Single     Spouse name: Not on file    Number of children: Not on file    Years of education: Not on file    Highest education level: Not on file   Occupational History    Not on file   Tobacco Use    Smoking status: Former Smoker     Packs/day: 1.00     Years: 20.00     Pack years: 20.00     Types: Cigars     Quit date: 3/15/2008     Years since quittin.9    Smokeless tobacco: Never Used    Tobacco comment: no cigarettes for 20 years- still smokes cigars and marijuana   Vaping Use    Vaping Use: Never used   Substance and Sexual Activity    Alcohol use: Yes     Comment: socially    Drug use: Yes     Types: Marijuana (Weed)     Comment: sometimes nightly/ oral or smokes    Sexual activity: Not on file   Other Topics Concern    Not on file   Social History Narrative    Not on file     Social Determinants of Health     Financial Resource Strain: Low Risk     Difficulty of Paying Living Expenses: Not hard at all   Food Insecurity: No Food Insecurity    Worried About Running Out of Food in the Last Year: Never true    Mary Beth of Food in the Last Year: Never true   Transportation Needs: No Transportation Needs    Lack of Transportation (Medical): No    Lack of Transportation (Non-Medical):  No   Physical Activity:     Days of Exercise per Week: Not on file    Minutes of Exercise per Session: Not on file   Stress:     Feeling of Stress : Not on file   Social Connections:     Frequency of Communication with Friends and Family: Not on file    Frequency of Social Gatherings with Friends and Family: Not on file    Attends Voodoo Services: Not on file    Active Member of Clubs or Organizations: Not on file    Attends Club or Organization Meetings: Not on file    Marital Status: Not on file   Intimate Partner Violence:     Fear of Current or Ex-Partner: Not on file    Emotionally Abused: Not on file    Physically Abused: Not on file    Sexually Abused: Not on file   Housing Stability:     Unable to Pay for Housing in the Last Year: Not on file    Number of [place] in the Last Year: Not on file    Unstable Housing in the Last Year: Not on file     Past Medical History:   Diagnosis Date    Abscess of bursa of right elbow     Arthritis     Atrial fibrillation ([address]     BPH (benign prostatic hyperplasia)     Heartburn     Hypertension     Panic attack 11/24/2021    Polycythemia     PONV (postoperative nausea and vomiting)     Sleep apnea     no machine     Past Surgical History:   Procedure Laterality Date    APPENDECTOMY      ARM SURGERY Right 02/23/2021    TRICEPS TENDON REPAIR - RIGHT ELBOW WITH ARTHREX AND BIOMET ACHILLES ALLOGRAFT    ARM SURGERY Right 7/13/2021    RIGHT ELBOW IRRIGATION AND DEBRIDEMENT performed by Lord Beth MD at [address]      cervical/ lumbar    [facility]  [facility]/ no stents    COLONOSCOPY      ENDOSCOPY, COLON, DIAGNOSTIC      HERNIA REPAIR      MUSCLE REPAIR Right 2/23/2021    TRICEPS TENDON REPAIR - RIGHT ELBOW performed by Lord Beth MD at [address] SEPTOPLASTY Bilateral 12/20/2021    SEPTOPLASTY BILATERAL TURBINATE REDUCTION performed by Randall Santana MD at [address] [place] [address] SPINE SURGERY      cervical and lumbar    UPPER GASTROINTESTINAL ENDOSCOPY N/A 12/17/2019    EGD BIOPSY performed by Susannah Duverney, MD at [facility] N/A 7/30/2021    EGD BIOPSY OF STOMACH AND ESOPHAGUS performed by Abraham Philippe MD at [facility]     Family History   Problem Relation Age of Onset    Cancer Father         prostrate    Diabetes Father     Cancer Paternal Uncle         colon   Plan  An informed verbal consent for the procedure was obtained and risks including, but not limited to: allergy to medications, injection, bleeding, stiffness of joint, recurrence of symptoms, loss of function, swelling, drainage, irrigation, need for surgery and pseudo-septic inflammation, were explained to the patient. Also, discussed was the potential for further injections, irrigation and debridement and surgery. Alternate means of treatment have also been discussed with the patient. Under sterile conditions the patient's right knee was injected with lidocaine 2 cc and bupivacaine 2 cc. He was then aspirated by 10 cc of synovial fluid and then injected with 2 cc of Celestone. He tolerate procedure well    Plan the patient was advised that I do not feel that he is really in any need of any surgical intervention at this time this includes total knee arthroplasty. Patient had questions regarding stem cells and I told him this is still experimental is not have been approved by the [address] [place] [address] orthopedic surgeons. We will have the patient follow-up at his request                      Provider Attestation:  Sheryle Evert, personally performed the services described in this documentation. All medical record entries made by the scribe were at my direction and in my presence. I have reviewed the chart and discharge instructions and agree that the records reflect my personal performance and is accurate and complete.  Jessica Julien MD. 02/28/22      Please note that this chart was generated using voice recognition Dragon dictation software. Although every effort was made to ensure the accuracy of this automated transcription, some errors in transcription may have occurred.

## 2022-03-08 ENCOUNTER — OFFICE VISIT (OUTPATIENT)
Dept: INTERNAL MEDICINE CLINIC | Age: 62
End: 2022-03-08
Payer: COMMERCIAL

## 2022-03-08 VITALS
DIASTOLIC BLOOD PRESSURE: 80 MMHG | OXYGEN SATURATION: 98 % | WEIGHT: 210.2 LBS | BODY MASS INDEX: 30.09 KG/M2 | HEIGHT: 70 IN | SYSTOLIC BLOOD PRESSURE: 124 MMHG | HEART RATE: 84 BPM

## 2022-03-08 DIAGNOSIS — Z72.0 TOBACCO USE: ICD-10-CM

## 2022-03-08 DIAGNOSIS — I48.91 NEW ONSET A-FIB (HCC): Primary | ICD-10-CM

## 2022-03-08 DIAGNOSIS — G47.00 INSOMNIA, UNSPECIFIED TYPE: ICD-10-CM

## 2022-03-08 DIAGNOSIS — Z12.11 SCREENING FOR COLON CANCER: ICD-10-CM

## 2022-03-08 DIAGNOSIS — R97.20 HIGH PROSTATE SPECIFIC ANTIGEN (PSA): ICD-10-CM

## 2022-03-08 PROCEDURE — 99214 OFFICE O/P EST MOD 30 MIN: CPT | Performed by: INTERNAL MEDICINE

## 2022-03-08 RX ORDER — ZOLPIDEM TARTRATE 5 MG/1
5 TABLET ORAL NIGHTLY PRN
Qty: 30 TABLET | Refills: 0 | Status: SHIPPED | OUTPATIENT
Start: 2022-03-08 | End: 2022-04-04

## 2022-03-08 RX ORDER — VARENICLINE TARTRATE 1 MG/1
1 TABLET, FILM COATED ORAL DAILY
Qty: 30 TABLET | Refills: 1 | Status: CANCELLED | OUTPATIENT
Start: 2022-03-08

## 2022-03-08 NOTE — PROGRESS NOTES
Visit Information    Have you changed or started any medications since your last visit including any over-the-counter medicines, vitamins, or herbal medicines? no   Are you having any side effects from any of your medications? -  no  Have you stopped taking any of your medications? Is so, why? -  no    Have you seen any other physician or provider since your last visit? Yes - Records Obtained  Have you had any other diagnostic tests since your last visit? No  Have you been seen in the emergency room and/or had an admission to a hospital since we last saw you? Yes - Records Obtained  Have you had your routine dental cleaning in the past 6 months? no    Have you activated your [employer] account? If not, what are your barriers?  Yes     Patient Care Team:  Baldemar Rodriguez MD as PCP - General (Internal Medicine)  Baldemar Rodriguez MD as PCP - [facility] Provider  Peterson Mendieta MD as Consulting Physician (Gastroenterology)    Medical History Review  Past Medical, Family, and Social History reviewed and does contribute to the patient presenting condition    Health Maintenance   Topic Date Due    Hepatitis C screen  Never done    COVID-19 Vaccine (1) Never done    HIV screen  Never done    DTaP/Tdap/Td vaccine (1 - Tdap) Never done    Shingles Vaccine (1 of 2) Never done    Low dose CT lung screening  12/19/2020    PSA counseling  03/10/2022    A1C test (Diabetic or Prediabetic)  06/22/2022    Depression Screen  10/26/2022    Potassium monitoring  01/19/2023    Creatinine monitoring  01/19/2023    Colorectal Cancer Screen  11/23/2024    Lipid screen  10/16/2025    Flu vaccine  Completed    Hepatitis A vaccine  Aged Out    Hepatitis B vaccine  Aged Out    Hib vaccine  Aged Out    Meningococcal (ACWY) vaccine  Aged Out    Pneumococcal 0-64 years Vaccine  Aged Out

## 2022-03-08 NOTE — PROGRESS NOTES
[address] [facility] [address]  [place] [address]  Dept: [phone]  Dept Fax: [phone]     Name: David Clay  : 1960           Chief Complaint   Patient presents with    Atrial Fibrillation     says was low blood sugar    Joint Swelling     wants drained    Insomnia       History of Present Illness:    HPI  Patient has history of paroxysmal atrial fibrillation, does not take any medication at this time, stopped taking anticoagulation by himself, understand the risk,  High PSA, will follow with the urology,    Past Medical History:    Past Medical History:   Diagnosis Date    Abscess of bursa of right elbow     Arthritis     Atrial fibrillation ([address]     BPH (benign prostatic hyperplasia)     Heartburn     Hypertension     Panic attack 2021    Polycythemia     PONV (postoperative nausea and vomiting)     Sleep apnea     no machine      Reviewed all health maintenance requirements and ordered appropriate tests  Health Maintenance Due   Topic Date Due    Hepatitis C screen  Never done    COVID-19 Vaccine (1) Never done    HIV screen  Never done    DTaP/Tdap/Td vaccine (1 - Tdap) Never done    Shingles Vaccine (1 of 2) Never done    Low dose CT lung screening  2020    PSA counseling  03/10/2022       Past Surgical History:    Past Surgical History:   Procedure Laterality Date    APPENDECTOMY      ARM SURGERY Right 2021    TRICEPS TENDON REPAIR - RIGHT ELBOW WITH ARTHREX AND BIOMET ACHILLES ALLOGRAFT    ARM SURGERY Right 2021    RIGHT ELBOW IRRIGATION AND DEBRIDEMENT performed by Zohra Elmore MD at [facility]      cervical/ lumbar    [facility]    COLONOSCOPY      ENDOSCOPY, COLON, DIAGNOSTIC      HERNIA REPAIR      MUSCLE REPAIR Right 2021    TRICEPS TENDON REPAIR - RIGHT ELBOW performed by Zohra Elmore MD at [id number] SEPTOPLASTY Bilateral 12/20/2021    SEPTOPLASTY BILATERAL TURBINATE REDUCTION performed by Margo Barreto MD at [address]      cervical and lumbar    UPPER GASTROINTESTINAL ENDOSCOPY N/A 12/17/2019    EGD BIOPSY performed by Karishma Fuentes MD at [place] [address] 7/30/2021    EGD BIOPSY OF STOMACH AND ESOPHAGUS performed by Bertha Mcfarland MD at [facility]        Medications:      Current Outpatient Medications:     zolpidem (AMBIEN) 5 MG tablet, Take 1 tablet by mouth nightly as needed for Sleep for up to 30 days. , Disp: 30 tablet, Rfl: 0    varenicline (CHANTIX) 1 MG tablet, Take 1 tablet by mouth daily starting on Day 8 for the remainder of treatment. , Disp: 30 tablet, Rfl: 1    glucose monitoring (FREESTYLE FREEDOM) kit, 1 kit by Does not apply route daily, Disp: 1 kit, Rfl: 0    pantoprazole (PROTONIX) 40 MG tablet, TAKE 1 TABLET BY MOUTH TWO TIMES A DAY, Disp: 30 tablet, Rfl: 0    tamsulosin (FLOMAX) 0.4 MG capsule, TAKE 1 CAPSULE BY MOUTH EVERY DAY, Disp: , Rfl:     Blood Pressure KIT, 1 kit by Does not apply route daily, Disp: 1 kit, Rfl: 0    spironolactone (ALDACTONE) 50 MG tablet, Take 50 mg by mouth daily Sometimes will take two daily- if feeling bloated, Disp: , Rfl:     Allergies:      Pcn [penicillins]    Social History:    Tobacco:    reports that he quit smoking about 14 years ago. His smoking use included cigars. He has a 20.00 pack-year smoking history. He has never used smokeless tobacco.  Alcohol:      reports current alcohol use. Drug Use:  reports current drug use. Drug: Marijuana Thurl Cipro).     Family History:    Family History   Problem Relation Age of Onset    Cancer Father         prostrate    Diabetes Father     Cancer Paternal Uncle         colon       Review of Systems:    Positive and Negative as described in HPI    Constitutional:  negative for  fevers, chills, sweats, fatigue, and weight loss  HEENT:  negative for vision or hearing changes,   Respiratory:  negative for shortness of breath, cough, or congestion  Cardiovascular:  negative for  chest pain, palpitations  Gastrointestinal:  negative for nausea, vomiting, diarrhea, constipation, abdominal pain  Genitourinary:  negative for frequency, dysuria  Integument/Breast:  negative for rash, skin lesions  Musculoskeletal:  negative for muscle aches or joint pain  Neurological:  negative for headaches, dizziness, lightheadedness, numbness, pain and tingling extrimities  Behavior/Psych:  negative for depression and anxiety      Physical Exam:    Vitals:  /80   Pulse 84   Ht 5' 10\" (1.778 m)   Wt 210 lb 3.2 oz (95.3 kg)   SpO2 98%   BMI 30.16 kg/m²     General appearance - alert, well appearing, and in no acute distress  Mental status - oriented to person, place, and time with normal affect  Head - normocephalic and atraumatic  Eyes - pupils equal and reactive, extraocular eye movements intact, conjunctiva clear  Ears - hearing appears to be intact  Nose - no drainage noted  Mouth - mucous membranes moist  Neck - supple, no carotid bruits, thyroid not palpable  Chest - clear to auscultation, normal effort  Heart - normal rate, regular rhythm, no murmurs  Abdomen - soft, nontender, nondistended, bowel sounds present all four quadrants, no masses, hepatomegaly or splenomegaly  Neurological - normal speech, no focal findings or movement disorder noted, cranial nerves II through XII grossly intact  Extremities - peripheral pulses palpable, no pedal edema or calf pain with palpation  Skin - no gross lesions, rashes, or induration noted      Data:    Lab Results   Component Value Date     01/19/2022    K 4.4 01/19/2022     01/19/2022    CO2 21 01/19/2022    BUN 23 01/19/2022    CREATININE 0.70 01/19/2022    GLUCOSE 84 01/19/2022    PROT 5.8 10/16/2021    LABALBU 3.7 10/16/2021    BILITOT 0.42 10/16/2021    ALKPHOS 53 10/16/2021    AST 20 10/16/2021    ALT 37 10/16/2021     Lab Results   Component Value Date    WBC 7.8 01/19/2022    RBC 5.30 01/19/2022    HGB 16.9 01/19/2022    HCT 51.1 01/19/2022    MCV 96.5 01/19/2022    MCH 31.9 01/19/2022    MCHC 33.1 01/19/2022    RDW 14.4 01/19/2022     01/19/2022    MPV 7.1 01/19/2022     Lab Results   Component Value Date    TSH 4.42 10/16/2021     Lab Results   Component Value Date    CHOL 172 10/16/2020    HDL 27 10/16/2020    PSA 4.16 03/10/2021    LABA1C 5.7 06/22/2021          Assessment & Plan:     Diagnosis Orders   1. New onset a-fib (HCC)  CBC with Auto Differential    Comprehensive Metabolic Panel    Lipid Panel    Hemoglobin A1C    TSH    Vitamin D 25 Hydroxy    Urinalysis    PSA Screening   2. Tobacco use  CBC with Auto Differential    Comprehensive Metabolic Panel    Lipid Panel    Hemoglobin A1C    TSH    Vitamin D 25 Hydroxy    Urinalysis    PSA Screening   3. Insomnia, unspecified type  zolpidem (AMBIEN) 5 MG tablet    CBC with Auto Differential    Comprehensive Metabolic Panel    Lipid Panel    Hemoglobin A1C    TSH    Vitamin D 25 Hydroxy    Urinalysis    PSA Screening   4. High prostate specific antigen (PSA)  PSA Screening   5. Screening for colon cancer         1. New onset a-fib ([address]  -     CBC with Auto Differential; Future  -     Comprehensive Metabolic Panel; Future  -     Lipid Panel; Future  -     Hemoglobin A1C; Future  -     TSH; Future  -     Vitamin D 25 Hydroxy; Future  -     Urinalysis; Future  -     PSA Screening; Future  2. Tobacco use  -     CBC with Auto Differential; Future  -     Comprehensive Metabolic Panel; Future  -     Lipid Panel; Future  -     Hemoglobin A1C; Future  -     TSH; Future  -     Vitamin D 25 Hydroxy; Future  -     Urinalysis; Future  -     PSA Screening; Future  3. Insomnia, unspecified type  -     zolpidem (AMBIEN) 5 MG tablet; Take 1 tablet by mouth nightly as needed for Sleep for up to 30 days. , Disp-30 tablet, R-0Print  -     CBC with Auto Differential; Future  -     Comprehensive Metabolic Panel; Future  -     Lipid Panel; Future  -     Hemoglobin A1C; Future  -     TSH; Future  -     Vitamin D 25 Hydroxy; Future  -     Urinalysis; Future  -     PSA Screening; Future  4. High prostate specific antigen (PSA)  -     PSA Screening; Future  5. Screening for colon cancer     Paroxysmal atrial fibrillation,  In normal sinus rhythm at this time,  Stop taking metoprolol and anticoagulation, understand the risk,  Only taking Aldactone at this time,  Blood pressure running well, heart rate normal, will follow with cardiology,  Patient very noncompliant with the medication,    High PSA,  Needs to follow-up with urology,    Smoker,  Quit smoking,  Very high risk patient as he manages his health by himself,          Completed Refills   Requested Prescriptions     Signed Prescriptions Disp Refills    zolpidem (AMBIEN) 5 MG tablet 30 tablet 0     Sig: Take 1 tablet by mouth nightly as needed for Sleep for up to 30 days. Return in about 4 weeks (around 4/5/2022). Orders Placed This Encounter   Medications    zolpidem (AMBIEN) 5 MG tablet     Sig: Take 1 tablet by mouth nightly as needed for Sleep for up to 30 days.      Dispense:  30 tablet     Refill:  0     Orders Placed This Encounter   Procedures    CBC with Auto Differential     Standing Status:   Future     Standing Expiration Date:   3/8/2023    Comprehensive Metabolic Panel     Standing Status:   Future     Standing Expiration Date:   3/8/2023    Lipid Panel     Standing Status:   Future     Standing Expiration Date:   3/8/2023     Order Specific Question:   Is Patient Fasting?/# of Hours     Answer:   8-10 Hours    Hemoglobin A1C     Standing Status:   Future     Standing Expiration Date:   3/8/2023    TSH     Standing Status:   Future     Standing Expiration Date:   3/8/2023    Vitamin D 25 Hydroxy     Standing Status:   Future     Standing Expiration Date:   3/8/2023    Urinalysis     Standing Status: Future     Standing Expiration Date:   3/8/2023     Order Specific Question:   SPECIFY(EX-CATH,MIDSTREAM,CYSTO,ETC)?      Answer:   Mid Stream    PSA Screening     Standing Status:   Future     Standing Expiration Date:   3/8/2023       Electronically signed by Richardson To MD on 3/12/2022 at 12:44 AM

## 2022-03-14 ENCOUNTER — TELEMEDICINE (OUTPATIENT)
Dept: INTERNAL MEDICINE CLINIC | Age: 62
End: 2022-03-14
Payer: COMMERCIAL

## 2022-03-14 ENCOUNTER — TELEPHONE (OUTPATIENT)
Dept: INTERNAL MEDICINE CLINIC | Age: 62
End: 2022-03-14

## 2022-03-14 ENCOUNTER — CARE COORDINATION (OUTPATIENT)
Dept: CARE COORDINATION | Age: 62
End: 2022-03-14

## 2022-03-14 DIAGNOSIS — J20.9 ACUTE BRONCHITIS, UNSPECIFIED ORGANISM: Primary | ICD-10-CM

## 2022-03-14 PROCEDURE — 99423 OL DIG E/M SVC 21+ MIN: CPT | Performed by: INTERNAL MEDICINE

## 2022-03-14 RX ORDER — AZITHROMYCIN 250 MG/1
250 TABLET, FILM COATED ORAL SEE ADMIN INSTRUCTIONS
Qty: 6 TABLET | Refills: 0 | Status: SHIPPED | OUTPATIENT
Start: 2022-03-14 | End: 2022-03-19

## 2022-03-14 RX ORDER — METHYLPREDNISOLONE 4 MG/1
TABLET ORAL
Qty: 1 KIT | Refills: 0 | Status: SHIPPED | OUTPATIENT
Start: 2022-03-14 | End: 2022-03-20

## 2022-03-14 NOTE — PROGRESS NOTES
Tami Goodman (:  1960) is a Established patient, here for evaluation of the following:    Assessment & Plan   Below is the assessment and plan developed based on review of pertinent history, physical exam, labs, studies, and medications. 1. Acute bronchitis, unspecified organism  -     methylPREDNISolone (MEDROL DOSEPACK) 4 MG tablet; Take by mouth., Disp-1 kit, R-0Normal  -     azithromycin (ZITHROMAX) 250 MG tablet; Take 1 tablet by mouth See Admin Instructions for 5 days 500mg on day 1 followed by 250mg on days 2 - 5, Disp-6 tablet, R-0Normal    No follow-ups on file.      Lots of liqs   Tylenol for fevers   '  Advised to see cardiologist again, for Afib , n anticoagulation which he stopped by himself   C/o Heart burn, need to go to ED if he have it again     Subjective   HPI  Review of Systems       Objective   Patient-Reported Vitals  Patient-Reported Systolic (Top): 168 mmHg  Patient-Reported Diastolic (Bottom): 97 mmHg  BP Observations: Yes, BP was taken on electronic monitoring device with digital readout  Patient-Reported Pulse: 85  Patient-Reported Weight: 200lb  Patient-Reported Height: 70in       Physical Exam  [INSTRUCTIONS:  \"[x]\" Indicates a positive item  \"[]\" Indicates a negative item  -- DELETE ALL ITEMS NOT EXAMINED]    Constitutional: [x] Appears well-developed and well-nourished [x] No apparent distress      [] Abnormal -     Mental status: [x] Alert and awake  [x] Oriented to person/place/time [x] Able to follow commands    [] Abnormal -     Eyes:   EOM    [x]  Normal    [] Abnormal -   Sclera  [x]  Normal    [] Abnormal -          Discharge [x]  None visible   [] Abnormal -     HENT: [x] Normocephalic, atraumatic  [] Abnormal -   [x] Mouth/Throat: Mucous membranes are moist    External Ears [x] Normal  [] Abnormal -    Neck: [x] No visualized mass [] Abnormal -     Pulmonary/Chest: [x] Respiratory effort normal   [x] No visualized signs of difficulty breathing or respiratory distress        [] Abnormal -      Musculoskeletal:   [x] Normal gait with no signs of ataxia         [x] Normal range of motion of neck        [] Abnormal -     Neurological:        [x] No Facial Asymmetry (Cranial nerve 7 motor function) (limited exam due to video visit)          [x] No gaze palsy        [] Abnormal -          Skin:        [x] No significant exanthematous lesions or discoloration noted on facial skin         [] Abnormal -            Psychiatric:       [x] Normal Affect [] Abnormal -        [x] No Hallucinations    Other pertinent observable physical exam findings:-             On this date 3/14/2022 I have spent 23 minutes reviewing previous notes, test results and face to face (virtual) with the patient discussing the diagnosis and importance of compliance with the treatment plan as well as documenting on the day of the visit. Shakirakavita Ireland was evaluated through a synchronous (real-time) audio-video encounter. The patient (or guardian if applicable) is aware that this is a billable service, which includes applicable co-pays. This Virtual Visit was conducted with patient's (and/or legal guardian's) consent. The visit was conducted pursuant to the emergency declaration under the [facility], [address] [place] [address] wa[facility] Hospitals authority and the [employer] General Act. Patient identification was verified, and a caregiver was present when appropriate. The patient was located at home in a state where the provider was licensed to provide care.        --Lorri King MD

## 2022-03-14 NOTE — TELEPHONE ENCOUNTER
Patient just had a vv and forgot to ask for cough medicine with codeine. Would like to have something ordered.

## 2022-03-14 NOTE — PROGRESS NOTES
Visit Information    Have you changed or started any medications since your last visit including any over-the-counter medicines, vitamins, or herbal medicines? no   Are you having any side effects from any of your medications? -  no  Have you stopped taking any of your medications? Is so, why? -  no    Have you seen any other physician or provider since your last visit? No  Have you had any other diagnostic tests since your last visit? No  Have you been seen in the emergency room and/or had an admission to a hospital since we last saw you? No  Have you had your routine dental cleaning in the past 6 months? no    Have you activated your [facility] [facility] account? If not, what are your barriers?  Yes     Patient Care Team:  Galina Villegas MD as PCP - General (Internal Medicine)  Galina Villegas MD as PCP - [facility] Provider  Jennifer Estrada MD as Consulting Physician (Gastroenterology)  Arvell Harada, RN as Ambulatory Care Manager    Medical History Review  Past Medical, Family, and Social History reviewed and does contribute to the patient presenting condition    Health Maintenance   Topic Date Due    Hepatitis C screen  Never done    COVID-19 Vaccine (1) Never done    HIV screen  Never done    DTaP/Tdap/Td vaccine (1 - Tdap) Never done    Shingles Vaccine (1 of 2) Never done    Low dose CT lung screening  12/19/2020    PSA counseling  03/10/2022    A1C test (Diabetic or Prediabetic)  06/22/2022    Depression Screen  10/26/2022    Potassium monitoring  01/19/2023    Creatinine monitoring  01/19/2023    Colorectal Cancer Screen  11/23/2024    Lipid screen  10/16/2025    Flu vaccine  Completed    Hepatitis A vaccine  Aged Out    Hepatitis B vaccine  Aged Out    Hib vaccine  Aged Out    Meningococcal (ACWY) vaccine  Aged Out    Pneumococcal 0-64 years Vaccine  Aged Out

## 2022-03-14 NOTE — CARE COORDINATION
Payer referral:   left requesting a return call to [facility] to discuss care coordination. Will mail introduction letter.    Daryl Antony RN, ambulatory care manager

## 2022-03-14 NOTE — LETTER
3/14/2022    Jennifer Ohara  [id number] [facility]      Dear Jennifer Ohara,    My name is Snow Burkett RN and I am a registered nurse who partners with Unruly Jara MD to improve patients' health. Unruly Jara MD believes you would benefit from working with me. As a member of your health care team, I would work with other providers involved in your care, offer education for your specific health conditions, and connect you with additional resources as needed. I will collaborate with Unruly Jara MD to support you in following your treatment plan. The additional support I provide is no additional cost to you. My primary focus is to help you achieve specific goals and improve your health. We are committed to walk with you on this journey and look forward to working with you. Please call me to further discuss your healthcare needs. I am available by phone or for appointments at the office. You can reach me at [phone].     In good health,     Snow Burkett RN

## 2022-03-15 ENCOUNTER — CARE COORDINATION (OUTPATIENT)
Dept: CARE COORDINATION | Age: 62
End: 2022-03-15

## 2022-03-15 NOTE — CARE COORDINATION
[facility] received return VM from patient requesting a call back. [facility] called patient explained care coordination and patient declined enrollment for now. He did take [facility] contact information for future needs and is aware there is a letter coming to his home with [facility] information.

## 2022-03-16 NOTE — ED TRIAGE NOTES
[facility]    History and Physical - Hospitalist Service       Date of Admission:  3/15/2022    Assessment & Plan      Porsche Moody is a 25 year old healthy female who is right-hand dominant who presents for further evaluation after sustaining a dog bite on 3/13/2022.    Acute medical issues:  # Acute cellulitis to R hand.  No clinical or radiographic evidence of osteomyelitis.  Does not appear to be a septic arthritis nor necrotizing soft tissue infection.  # Dog bite.  Failed outpatient management    Chronic medical issues:  #Anxiety    Plan:  1. Admit to Medicine (obs)  2. Ortho consult in the AM.  Dr. Uribe is aware ([facility]).  3. Regular diet until MN then NPO in the event OR is indicated.  4. Pain management and antiemetics  5. Elevated RUE as much as possible.  6. Continue IV Rocephin 1 gm daily and Flagyl 500 mg IV Q8hrs. Hold Augmentin  7. Neurovascular checks Q4hrs.    8. Continue buspirone, citalopram, hydroxyzine for anxiety.  9.  Anticipate she will be out of work the next 7 to 10 days given the line of work she does.  However will reevaluate in the morning.     Diet:   Regular.  NPO after MN.  IVF hydration.  Replace elytes PRN.  DVT Prophylaxis: Low Risk/Ambulatory with no VTE prophylaxis indicated  Bailey Catheter: Not present  Central Lines: None  Cardiac Monitoring: None  Code Status:   Full      Disposition Plan   Expected Discharge: pending clinical course.        The patient's care was discussed with the Bedside Nurse, Patient and EM Team.    SANJANA Tenorio CNP  Hospitalist Service  [facility]  Securely message with the Vocera Web Console (learn more here)  Text page via [employer] Paging/Directory         ______________________________________________________________________    Chief Complaint   Dog bite    History is obtained from the patient    History of Present Illness   Porsche Moody is a 25 year old healthy female who is right-hand  Mode of arrival (squad #, walk in, police, etc) : walk in        Chief complaint(s): Chest pain        Arrival Note (brief scenario, treatment PTA, etc). : Pt reporting worsening central chest pain, nausea, and decreased PO intake. +SOB with exertion. Pt A&Ox4, NAD, respirations even and unlabored with no increased WOB or SOB, ambulatory with steady gait. C= \"Have you ever felt that you should Cut down on your drinking? \"  No  A= \"Have people Annoyed you by criticizing your drinking? \"  No  G= \"Have you ever felt bad or Guilty about your drinking? \"  No  E= \"Have you ever had a drink as an Eye-opener first thing in the morning to steady your nerves or to help a hangover? \"  No      Deferred []      Reason for deferring: N/A    *If yes to two or more: probable alcohol abuse. * dominant who presents for further evaluation after sustaining a dog bite on 3/13/2022.  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  2 days ago she was assisting staff when they brought a dog and was minimally responsive. While she was shaving the dog's leg, the dog subsequently bit the patient's right hand.  She was seen in urgent care and evaluated the wound was cleansed.  She was started on Augmentin.  She states that the dog is up-to-date on vaccinations including rabies vaccination.  Her tetanus is up-to-date (January 2022).  She has been using ibuprofen and Augmentin.  However despite both medication she continues to have increased pain swelling and erythema.  She was able to work today at the clinic but was referred for further evaluation when staff noted the degree of erythema and edema.    ED course: IV, labs, imaging, treatment.   Pertinent findings: Vitally stable.  CBC and BMP are normal.  hCG is negative.  X-ray of the right hand shows soft tissue swelling of the metacarpal.  No subcutaneous air is noted.  No evidence of osteomyelitis.  Pertinent treatment: Rocephin 1 g IV and Flagyl 500 mg IV.    Plan: We will admit to observation.  Hand surgery service at [facility] was notified.  Dr. Uribe will see his or his associate will see in the morning.  Plan to make n.p.o. after midnight in the event operative exploration is needed.    Review of Systems    12 point review of systems as above otherwise negative.    LMP: Has IUD and continues to have intermittent bleeding.  HCG negative.     Immunization: Tdap 1/7/2022.  Covid PCR is negative.  Has completed 3 doses of the COVID vaccination series.    Past Medical History    I have reviewed this patient's medical history and updated it with pertinent information if needed.   Anxiety    Past Surgical History      I have reviewed this patient's surgical history and updated it with pertinent information if needed.  Third molar surgery.  No issues with  anesthesia    Social History   I have reviewed this patient's social history and updated it with patient:  No illicit drug use.  No significant alcohol use.  No no tobacco use.  She works at a vet clinic.    Family History   Reviewed and is noncontributory to presenting illness.  Prior to Admission Medications   Prior to Admission Medications   Prescriptions Last Dose Informant Patient Reported? Taking?   norethindrone-ethinyl estradiol-iron (ESTROSTEP FE) 1-20/1-30/1-35 MG-MCG TABS   Yes No   Sig: Take  by mouth daily.      Facility-Administered Medications: None     Allergies   Allergies   Allergen Reactions     Amoxicillin      Sulfa Drugs        Physical Exam   Vital Signs: Temp: 98.5  F (36.9  C) Temp src: Temporal BP: 126/69 Pulse: 66   Resp: 18 SpO2: 97 % O2 Device: None (Room air)    Weight: 0 lbs 0 oz    General Appearance: Pleasant 25-year-old female in no acute distress  HEENT: Normocephalic/atraumatic.  Pupils equal round reactive light accommodation.  EOMI.  Respiratory: Bilateral breath sounds clear to auscultation  Cardiovascular: Regular rate and rhythm  GI: Soft nontender nondistended normoactive bowel sounds.  Lymph/Hematologic: Right hand swelling.  No significant lymphangitis noted.  Genitourinary: Deferred  Skin: See chart for image.  All extremities are well perfused.  Skin is warm dry.  Puncture wounds on the dorsal surface of the hand.  And there is swelling and warmth and tenderness primarily over the dorsal aspect of the hand.  There is no significant drainage from the wound.  No swelling or focal tenderness about the wrist.        Musculoskeletal: Moves all extremities.  CMS is intact.  Capillary refill less than 3 seconds.  Neurologic: Alert and oriented x4.  Follows commands.  Cranial nerves grossly intact.  No focal deficits  Psychiatric: Calm and appropriate.    Data   Data reviewed today: I reviewed all medications, new labs and imaging results over the last 24 hours. I personally  reviewed     Recent Labs   Lab 03/15/22  1822   WBC 6.8   HGB 13.7   MCV 93         POTASSIUM 3.7   CHLORIDE 106   CO2 26   BUN 10   CR 0.83   ANIONGAP 7   JAME 9.5   GLC 74     Recent Results (from the past 24 hour(s))   XR Hand Right G/E 3 Views    Narrative    EXAM: XR HAND RT G/E 3 VW  LOCATION: [facility]  DATE/TIME: 3/15/2022 6:50 PM    INDICATION: Bite wound. Swelling.  COMPARISON: None.      Impression    IMPRESSION: Soft tissue swelling dorsal to the metacarpal region. No subcutaneous emphysema. No cortical erosion to suggest osteomyelitis. No fractures are evident. Normal alignment.

## 2022-03-17 ENCOUNTER — CARE COORDINATION (OUTPATIENT)
Dept: CARE COORDINATION | Age: 62
End: 2022-03-17

## 2022-03-18 ENCOUNTER — HOSPITAL ENCOUNTER (OUTPATIENT)
Facility: CLINIC | Age: 62
Discharge: HOME | End: 2022-03-18
Payer: COMMERCIAL

## 2022-03-18 DIAGNOSIS — Z72.0 TOBACCO USE: ICD-10-CM

## 2022-03-18 DIAGNOSIS — G47.00 INSOMNIA, UNSPECIFIED TYPE: ICD-10-CM

## 2022-03-18 DIAGNOSIS — R97.20 HIGH PROSTATE SPECIFIC ANTIGEN (PSA): ICD-10-CM

## 2022-03-18 DIAGNOSIS — I48.91 NEW ONSET A-FIB (HCC): ICD-10-CM

## 2022-03-18 LAB
ALBUMIN SERPL-MCNC: 4.2 G/DL (ref 3.5–5.2)
ALBUMIN/GLOBULIN RATIO: 1.8 (ref 1–2.5)
ALP BLD-CCNC: 46 U/L (ref 40–129)
ALT SERPL-CCNC: 40 U/L (ref 5–41)
ANION GAP SERPL CALCULATED.3IONS-SCNC: 14 MMOL/L (ref 9–17)
AST SERPL-CCNC: 20 U/L
BILIRUB SERPL-MCNC: 0.46 MG/DL (ref 0.3–1.2)
BUN BLDV-MCNC: 23 MG/DL (ref 8–23)
CALCIUM SERPL-MCNC: 9.3 MG/DL (ref 8.6–10.4)
CHLORIDE BLD-SCNC: 101 MMOL/L (ref 98–107)
CHOLESTEROL/HDL RATIO: 4.8
CHOLESTEROL: 183 MG/DL
CO2: 23 MMOL/L (ref 20–31)
CREAT SERPL-MCNC: 1.04 MG/DL (ref 0.7–1.2)
GFR AFRICAN AMERICAN: >60 ML/MIN
GFR NON-AFRICAN AMERICAN: >60 ML/MIN
GFR SERPL CREATININE-BSD FRML MDRD: ABNORMAL ML/MIN/{1.73_M2}
GLUCOSE BLD-MCNC: 124 MG/DL (ref 70–99)
HDLC SERPL-MCNC: 38 MG/DL
LDL CHOLESTEROL: 127 MG/DL (ref 0–130)
POTASSIUM SERPL-SCNC: 4.5 MMOL/L (ref 3.7–5.3)
PROSTATE SPECIFIC ANTIGEN: 3.54 UG/L
SODIUM BLD-SCNC: 138 MMOL/L (ref 135–144)
TOTAL PROTEIN: 6.6 G/DL (ref 6.4–8.3)
TRIGL SERPL-MCNC: 90 MG/DL
TSH SERPL DL<=0.05 MIU/L-ACNC: 0.9 MIU/L (ref 0.3–5)
VITAMIN D 25-HYDROXY: 32.4 NG/ML

## 2022-03-18 PROCEDURE — 82306 VITAMIN D 25 HYDROXY: CPT

## 2022-03-18 PROCEDURE — 80061 LIPID PANEL: CPT

## 2022-03-18 PROCEDURE — 80053 COMPREHEN METABOLIC PANEL: CPT

## 2022-03-18 PROCEDURE — 84443 ASSAY THYROID STIM HORMONE: CPT

## 2022-03-18 PROCEDURE — 85025 COMPLETE CBC W/AUTO DIFF WBC: CPT

## 2022-03-18 PROCEDURE — 36415 COLL VENOUS BLD VENIPUNCTURE: CPT

## 2022-03-18 PROCEDURE — 84153 ASSAY OF PSA TOTAL: CPT

## 2022-03-18 PROCEDURE — 83036 HEMOGLOBIN GLYCOSYLATED A1C: CPT

## 2022-03-19 LAB
ABSOLUTE EOS #: <0.03 K/UL (ref 0–0.44)
ABSOLUTE IMMATURE GRANULOCYTE: 0.06 K/UL (ref 0–0.3)
ABSOLUTE LYMPH #: 1.26 K/UL (ref 1.1–3.7)
ABSOLUTE MONO #: 0.51 K/UL (ref 0.1–1.2)
BASOPHILS # BLD: 1 % (ref 0–2)
BASOPHILS ABSOLUTE: 0.04 K/UL (ref 0–0.2)
EOSINOPHILS RELATIVE PERCENT: 0 % (ref 1–4)
ESTIMATED AVERAGE GLUCOSE: 114 MG/DL
HBA1C MFR BLD: 5.6 % (ref 4–6)
HCT VFR BLD CALC: 60.2 % (ref 40.7–50.3)
HEMOGLOBIN: 19 G/DL (ref 13–17)
IMMATURE GRANULOCYTES: 1 %
LYMPHOCYTES # BLD: 15 % (ref 24–43)
MCH RBC QN AUTO: 30.7 PG (ref 25.2–33.5)
MCHC RBC AUTO-ENTMCNC: 31.6 G/DL (ref 28.4–34.8)
MCV RBC AUTO: 97.4 FL (ref 82.6–102.9)
MONOCYTES # BLD: 6 % (ref 3–12)
NRBC AUTOMATED: 0 PER 100 WBC
PDW BLD-RTO: 13.8 % (ref 11.8–14.4)
PLATELET # BLD: 335 K/UL (ref 138–453)
PMV BLD AUTO: 9.8 FL (ref 8.1–13.5)
RBC # BLD: 6.18 M/UL (ref 4.21–5.77)
SEG NEUTROPHILS: 77 % (ref 36–65)
SEGMENTED NEUTROPHILS ABSOLUTE COUNT: 6.43 K/UL (ref 1.5–8.1)
WBC # BLD: 8.3 K/UL (ref 3.5–11.3)

## 2022-03-20 ENCOUNTER — HOSPITAL ENCOUNTER (EMERGENCY)
Facility: CLINIC | Age: 62
Discharge: HOME | End: 2022-03-21
Attending: SPECIALIST
Payer: COMMERCIAL

## 2022-03-20 ENCOUNTER — APPOINTMENT (OUTPATIENT)
Dept: GENERAL RADIOLOGY | Facility: CLINIC | Age: 62
End: 2022-03-20
Payer: COMMERCIAL

## 2022-03-20 DIAGNOSIS — I10 UNCONTROLLED HYPERTENSION: Primary | ICD-10-CM

## 2022-03-20 PROCEDURE — 36415 COLL VENOUS BLD VENIPUNCTURE: CPT

## 2022-03-20 PROCEDURE — 84484 ASSAY OF TROPONIN QUANT: CPT

## 2022-03-20 PROCEDURE — 71045 X-RAY EXAM CHEST 1 VIEW: CPT

## 2022-03-20 PROCEDURE — 99284 EMERGENCY DEPT VISIT MOD MDM: CPT

## 2022-03-20 PROCEDURE — 83880 ASSAY OF NATRIURETIC PEPTIDE: CPT

## 2022-03-20 PROCEDURE — 93005 ELECTROCARDIOGRAM TRACING: CPT | Performed by: SPECIALIST

## 2022-03-20 PROCEDURE — 81001 URINALYSIS AUTO W/SCOPE: CPT

## 2022-03-21 ENCOUNTER — TELEPHONE (OUTPATIENT)
Dept: INTERNAL MEDICINE CLINIC | Age: 62
End: 2022-03-21

## 2022-03-21 VITALS
BODY MASS INDEX: 30.06 KG/M2 | RESPIRATION RATE: 20 BRPM | DIASTOLIC BLOOD PRESSURE: 90 MMHG | TEMPERATURE: 97.6 F | WEIGHT: 210 LBS | HEART RATE: 74 BPM | HEIGHT: 70 IN | OXYGEN SATURATION: 98 % | SYSTOLIC BLOOD PRESSURE: 144 MMHG

## 2022-03-21 LAB
-: ABNORMAL
BACTERIA: ABNORMAL
BILIRUBIN URINE: NEGATIVE
COLOR: YELLOW
EKG ATRIAL RATE: 82 BPM
EKG P AXIS: -2 DEGREES
EKG P-R INTERVAL: 172 MS
EKG Q-T INTERVAL: 348 MS
EKG QRS DURATION: 92 MS
EKG QTC CALCULATION (BAZETT): 406 MS
EKG R AXIS: 11 DEGREES
EKG T AXIS: 24 DEGREES
EKG VENTRICULAR RATE: 82 BPM
EPITHELIAL CELLS UA: ABNORMAL /HPF (ref 0–5)
GLUCOSE URINE: NEGATIVE
KETONES, URINE: NEGATIVE
LEUKOCYTE ESTERASE, URINE: ABNORMAL
NITRITE, URINE: NEGATIVE
OTHER OBSERVATIONS UA: ABNORMAL
PH UA: 7 (ref 5–8)
PRO-BNP: 72 PG/ML
PROTEIN UA: NEGATIVE
RBC UA: ABNORMAL /HPF (ref 0–2)
SPECIFIC GRAVITY UA: 1.01 (ref 1–1.03)
TROPONIN, HIGH SENSITIVITY: 13 NG/L (ref 0–22)
TURBIDITY: CLEAR
URINE HGB: ABNORMAL
UROBILINOGEN, URINE: NORMAL
WBC UA: ABNORMAL /HPF (ref 0–5)

## 2022-03-21 NOTE — ED PROVIDER NOTES
[facility] ED  [address] [facility]  Phone: [phone]      Pt Name: Karlene Hashimoto  MRN: [id number]  [place] 1960  Date of evaluation: 3/20/2022      CHIEF COMPLAINT       Chief Complaint   Patient presents with    Hypertension    Anxiety         HISTORY OF PRESENT ILLNESS    Karlene Hashimoto is a 62 y.o. male who presents   Chief Complaint   Patient presents with    Hypertension    Anxiety   . 59-year-old male patient presents to the emergency department accompanied by his family after patient felt dizzy, shaky and sweaty at about 10:30 PM prior to coming to the emergency department. Patient checked his blood pressure which was 200/98 at home and does he comes to the emergency department. He takes metoprolol, spironolactone once daily and clonidine as needed for systolic blood pressure more than 150. He took clonidine before coming to the emergency department. He denies any chest pain, headache, visual disturbances, lightheadedness, tingling, numbness or weakness in any of the extremities. He also denies any swelling in the legs. He had some nausea and palpitations but denies any vomiting. His symptoms are improving upon arrival in the emergency department. He also has history of anxiety and panic attacks. There are no exacerbating or relieving factors. Patient had CBC, CMP, TSH and lipid profile 2 days ago. REVIEW OF SYSTEMS       Review of Systems    All systems reviewed and negative unless noted in HPI. The patient denies fever or constitutional symptoms. Denies vision change. Denies any sore throat or rhinorrhea. Denies any neck pain or stiffness. Denies chest pain, he felt dizzy, shaky, sweaty and palpitations  No vomiting or diarrhea. Denies any dysuria. Denies urinary frequency or hematuria. Denies musculoskeletal injury or pain. Denies any weakness, numbness or focal neurologic deficit. Denies any skin rash or edema.     No recent psychiatric issues. No easy bruising or bleeding. Denies any polyuria, polydypsia       PAST MEDICAL HISTORY    has a past medical history of Abscess of bursa of right elbow, Arthritis, Atrial fibrillation ([address] BPH (benign prostatic hyperplasia), Heartburn, Hypertension, Panic attack, Polycythemia, PONV (postoperative nausea and vomiting), and Sleep apnea. SURGICAL HISTORY      has a past surgical history that includes shoulder surgery; Spine surgery; Upper gastrointestinal endoscopy (N/A, 12/17/2019); Cardiac catheterization (2017); hernia repair; Colonoscopy; Appendectomy; back surgery; Endoscopy, colon, diagnostic; Arm Surgery (Right, 02/23/2021); Muscle Repair (Right, 2/23/2021); Arm Surgery (Right, 7/13/2021); Upper gastrointestinal endoscopy (N/A, 7/30/2021); and Septoplasty (Bilateral, 12/20/2021). CURRENT MEDICATIONS       Discharge Medication List as of 3/21/2022 12:51 AM      CONTINUE these medications which have NOT CHANGED    Details   zolpidem (AMBIEN) 5 MG tablet Take 1 tablet by mouth nightly as needed for Sleep for up to 30 days. , Disp-30 tablet, R-0Print      varenicline (CHANTIX) 1 MG tablet Take 1 tablet by mouth daily starting on Day 8 for the remainder of treatment. , Disp-30 tablet, R-1Normal      glucose monitoring (FREESTYLE FREEDOM) kit DAILY Starting Wed 1/19/2022, Disp-1 kit, R-0, Normal      pantoprazole (PROTONIX) 40 MG tablet TAKE 1 TABLET BY MOUTH TWO TIMES A DAY, Disp-30 tablet, R-0Normal      tamsulosin (FLOMAX) 0.4 MG capsule TAKE 1 CAPSULE BY MOUTH EVERY DAYHistorical Med      Blood Pressure KIT DAILY Starting Sat 10/16/2021, Disp-1 kit, R-0, Print      spironolactone (ALDACTONE) 50 MG tablet Take 50 mg by mouth daily Sometimes will take two daily- if feeling bloatedHistorical Med             ALLERGIES     is allergic to pcn [penicillins]. FAMILY HISTORY     He indicated that his mother is alive. He indicated that his father is alive.  He indicated that the status of his paternal uncle is unknown.     family history includes Cancer in his father and paternal uncle; Diabetes in his father. SOCIAL HISTORY      reports that he quit smoking about 14 years ago. His smoking use included cigars. He has a 20.00 pack-year smoking history. He has never used smokeless tobacco. He reports current alcohol use. He reports current drug use. Drug: Marijuana Demetri Brian). PHYSICAL EXAM     INITIAL VITALS:  height is 5' 10\" (1.778 m) and weight is 95.3 kg (210 lb). His oral temperature is 97.6 °F (36.4 °C). His blood pressure is 144/90 (abnormal) and his pulse is 74. His respiration is 20 and oxygen saturation is 98%. Physical Exam  Vitals and nursing note reviewed. Constitutional:       Appearance: He is well-developed. HENT:      Head: Normocephalic and atraumatic. Nose: Nose normal.   Eyes:      Extraocular Movements: Extraocular movements intact. Pupils: Pupils are equal, round, and reactive to light. Cardiovascular:      Rate and Rhythm: Normal rate and regular rhythm. Heart sounds: Normal heart sounds. No murmur heard. Pulmonary:      Effort: Pulmonary effort is normal. No respiratory distress. Breath sounds: Normal breath sounds. Abdominal:      General: Bowel sounds are normal. There is no distension. Palpations: Abdomen is soft. Tenderness: There is no abdominal tenderness. Musculoskeletal:      Cervical back: Normal range of motion and neck supple. Skin:     General: Skin is warm and dry. Neurological:      General: No focal deficit present. Mental Status: He is alert and oriented to person, place, and time. DIFFERENTIAL DIAGNOSIS/ MDM:     Patient presents with uncontrolled hypertension/hypertensive urgency. Will obtain EKG, cardiac markers, chemistries, urinalysis and chest x-ray.     DIAGNOSTIC RESULTS     EKG: All EKG's are interpreted by the Emergency Department Physician who either signs or Co-signs this chart in the absence of a cardiologist.    EKG Interpretation    Interpreted by me    Rhythm: normal sinus   Rate: normal  Axis: normal  Ectopy: none  Conduction: normal  ST Segments: no acute change  T Waves: no acute change  Q Waves: none    Clinical Impression: no acute changes and normal EKG    RADIOLOGY:   I reviewed the radiologist interpretations:  XR CHEST PORTABLE   Final Result   Stable chest x-ray. No acute disease. XR CHEST PORTABLE    Result Date: 3/21/2022  EXAMINATION: ONE XRAY VIEW OF THE CHEST 3/20/2022 11:33 pm COMPARISON: 11/24/2021 HISTORY: ORDERING SYSTEM PROVIDED HISTORY: SOB TECHNOLOGIST PROVIDED HISTORY: SOB Reason for Exam: Hypertension and anxiety FINDINGS: Heart size is within normal limits for the portable technique. Thoracic aorta is mildly elongated and tortuous with unchanged prominence of the ascending aorta. The lungs are clear. No pneumothorax or pleural fluid. No acute bone finding. Stable chest x-ray. No acute disease. LABS:  Labs Reviewed   URINALYSIS WITH REFLEX TO CULTURE - Abnormal; Notable for the following components:       Result Value    Urine Hgb TRACE (*)     Leukocyte Esterase, Urine TRACE (*)     All other components within normal limits   MICROSCOPIC URINALYSIS - Abnormal; Notable for the following components:    Other Observations UA   (*)     Value: Utilizing a urinalysis as the only screening method to exclude a potential uropathogen can be unreliable in many patient populations. Rapid screening tests are less sensitive than culture and if UTI is a clinical possibility, culture should be considered despite a negative urinalysis. All other components within normal limits   TROPONIN   BRAIN NATRIURETIC PEPTIDE       Serum troponin, BNP and urinalysis are unremarkable.   Patient has had CBC and a CMP done 2 days ago and declined repeat CBC and BMP    EMERGENCY DEPARTMENT COURSE:   Vitals:    Vitals:    03/20/22 2357 03/21/22 0012 03/21/22 1093 03/21/22 0042   BP: (!) 156/92 (!) 150/89 (!) 155/86 (!) 144/90   Pulse:       Resp:       Temp:       TempSrc:       SpO2: 98% 97% 96% 98%   Weight:       Height:         -------------------------  BP: (!) 144/90, Temp: 97.6 °F (36.4 °C), Pulse: 74, Resp: 20    No orders of the defined types were placed in this encounter. During the emergency department course, patient was put on the monitor which reveals normal sinus rhythm. His initial blood pressure was 174/99 and it came down to 144/90 by itself. Patient remained essentially asymptomatic throughout the ED course and is resting comfortably. Plan is to discharge the patient with instructions to continue current medications, plenty of oral fluids, follow-up with PCP, return if worse. The patient and significant other understand that at this time there is no evidence for a more malignant underlying process, but also understands that early in the process of an illness or injury, an emergency department workup can be falsely reassuring. Routine discharge counseling was given, and it is understood that worsening, changing or persistent symptoms should prompt an immediate call or follow up with their primary physician or return to the emergency department. The importance of appropriate follow up was also discussed. I have reviewed the disposition diagnosis. I have answered the questions and given discharge instructions. There was voiced understanding of these instructions and no further questions or complaints. I have reviewed the disposition diagnosis with the patient and or their family/guardian. I have answered their questions and given discharge instructions. They voiced understanding of these instructions and did not have any further questions or complaints. Re-evaluation Notes    Patient is resting comfortably and does not appear to be in any pain or distress prior to discharge      PROCEDURES:  None    FINAL IMPRESSION      1.  Uncontrolled hypertension          DISPOSITION/PLAN   DISPOSITION Decision To Discharge 03/21/2022 12:50:49 AM      Condition on Disposition    Stable    PATIENT REFERRED TO:  MD Alex Mayer [facility] [id number]  [phone]    Call in 2 days  For reevaluation of current symptoms    [facility] ED  / [address]   [phone]    If symptoms worsen      DISCHARGE MEDICATIONS:  Discharge Medication List as of 3/21/2022 12:51 AM          (Please note that portions of this note were completed with a voice recognition program.  Efforts were made to edit the dictations but occasionally words are mis-transcribed.)    Jose Antonio Macario MD,, MD, F.A.C.E.P.   Attending Emergency Physician     Jose Antonio Macario MD  03/21/22 1901

## 2022-03-21 NOTE — ED TRIAGE NOTES
Patient states he took his BP at home and it was elevated. Patient unable to tell exact BP  Patient took a clonidine before heading to ED  Patient states his ears are impacted and put proxide in them for relief  Patient states he has hx of anxiety and panic attacks. Patient states he had a cough and now they are loosening up.

## 2022-03-21 NOTE — TELEPHONE ENCOUNTER
Nelson Garcia from [facility] lab called and wanted to let us know that they forgot to collect a urine from patient while he was there. They did call patient and left a message.

## 2022-03-22 ENCOUNTER — TELEPHONE (OUTPATIENT)
Dept: INTERNAL MEDICINE CLINIC | Age: 62
End: 2022-03-22

## 2022-03-22 NOTE — TELEPHONE ENCOUNTER
----- Message from Modesto Barragan sent at 3/22/2022  1:20 PM EDT -----  Subject: Message to Provider    QUESTIONS  Information for Provider? Pt was prescribed an RX for chest cold and still   is having issues with symptoms would like PCP to prescribe another Z pack   or something else to help with chest cold.   ---------------------------------------------------------------------------  --------------  CALL BACK INFO  What is the best way for the office to contact you? OK to leave message on   voicemail  Preferred Call Back Phone Number? [phone]  ---------------------------------------------------------------------------  --------------  SCRIPT ANSWERS  Relationship to Patient?  Self

## 2022-03-24 NOTE — TELEPHONE ENCOUNTER
----- Message from Alexandria Kamran sent at 3/24/2022  9:41 AM EDT -----  Subject: Refill Request    QUESTIONS  Name of Medication? azithromycin (ZITHROMAX) 250 MG tablet  Patient-reported dosage and instructions? 250 MG   How many days do you have left? 0  Preferred Pharmacy? [address]  Pharmacy phone number (if available)? [phone]  Additional Information for Provider? Patient states that his symptoms are   not going away after his last treatment with this medication.   ---------------------------------------------------------------------------  --------------  CALL BACK INFO  What is the best way for the office to contact you? OK to leave message on   voicemail  Preferred Call Back Phone Number?  [phone]

## 2022-03-25 ENCOUNTER — OFFICE VISIT (OUTPATIENT)
Dept: FAMILY MEDICINE CLINIC | Age: 62
End: 2022-03-25
Payer: COMMERCIAL

## 2022-03-25 VITALS
BODY MASS INDEX: 30.06 KG/M2 | DIASTOLIC BLOOD PRESSURE: 82 MMHG | SYSTOLIC BLOOD PRESSURE: 132 MMHG | OXYGEN SATURATION: 98 % | RESPIRATION RATE: 12 BRPM | TEMPERATURE: 98.8 F | HEART RATE: 84 BPM | HEIGHT: 70 IN | WEIGHT: 210 LBS

## 2022-03-25 DIAGNOSIS — J20.9 ACUTE BRONCHITIS, UNSPECIFIED ORGANISM: Primary | ICD-10-CM

## 2022-03-25 DIAGNOSIS — R05.9 COUGH: ICD-10-CM

## 2022-03-25 PROBLEM — R94.39 ABNORMAL THALLIUM STRESS TEST: Status: ACTIVE | Noted: 2021-02-26

## 2022-03-25 PROBLEM — K35.30 ACUTE APPENDICITIS WITH LOCALIZED PERITONITIS, WITHOUT PERFORATION, ABSCESS, OR GANGRENE: Status: ACTIVE | Noted: 2020-10-09

## 2022-03-25 PROBLEM — M94.261 CHONDROMALACIA OF RIGHT KNEE: Status: ACTIVE | Noted: 2017-11-16

## 2022-03-25 PROBLEM — N28.9 RENAL INSUFFICIENCY SYNDROME: Status: ACTIVE | Noted: 2021-02-26

## 2022-03-25 PROBLEM — S83.241A ACUTE MEDIAL MENISCUS TEAR OF RIGHT KNEE: Status: ACTIVE | Noted: 2017-11-20

## 2022-03-25 PROBLEM — M43.16 SPONDYLOLISTHESIS OF LUMBAR REGION: Status: ACTIVE | Noted: 2019-03-13

## 2022-03-25 PROCEDURE — 99214 OFFICE O/P EST MOD 30 MIN: CPT | Performed by: NURSE PRACTITIONER

## 2022-03-25 RX ORDER — PREDNISONE 20 MG/1
20 TABLET ORAL 2 TIMES DAILY
Qty: 10 TABLET | Refills: 0 | Status: ON HOLD | OUTPATIENT
Start: 2022-03-25 | End: 2022-03-30

## 2022-03-25 RX ORDER — ALBUTEROL SULFATE 90 UG/1
2 AEROSOL, METERED RESPIRATORY (INHALATION) EVERY 6 HOURS PRN
Qty: 18 G | Refills: 3 | Status: SHIPPED | OUTPATIENT
Start: 2022-03-25

## 2022-03-25 RX ORDER — GUAIFENESIN AND CODEINE PHOSPHATE 100; 10 MG/5ML; MG/5ML
5 SOLUTION ORAL 2 TIMES DAILY PRN
Qty: 30 ML | Refills: 0 | Status: SHIPPED | OUTPATIENT
Start: 2022-03-25 | End: 2022-03-28

## 2022-03-25 ASSESSMENT — ENCOUNTER SYMPTOMS
VOMITING: 0
SHORTNESS OF BREATH: 0
CHEST TIGHTNESS: 0
NAUSEA: 0
SORE THROAT: 0
COUGH: 1
RHINORRHEA: 0
EYE PAIN: 0
WHEEZING: 1

## 2022-03-25 NOTE — PATIENT INSTRUCTIONS
Patient Education        Bronchitis: Care Instructions  Your Care Instructions     Bronchitis is inflammation of the bronchial tubes, which carry air to the lungs. The tubes swell and produce mucus, or phlegm. The mucus and inflamed bronchial tubes make you cough. You may have trouble breathing. Most cases of bronchitis are caused by viruses like those that cause colds. Antibiotics usually do not help and they may be harmful. Bronchitis usually develops rapidly and lasts about 2 to 3 weeks in otherwise healthy people. Follow-up care is a key part of your treatment and safety. Be sure to make and go to all appointments, and call your doctor if you are having problems. It's also a good idea to know your test results and keep a list of the medicines you take. How can you care for yourself at home? · Take all medicines exactly as prescribed. Call your doctor if you think you are having a problem with your medicine. · Get some extra rest.  · Take an over-the-counter pain medicine, such as acetaminophen (Tylenol), ibuprofen (Advil, Motrin), or naproxen (Aleve) to reduce fever and relieve body aches. Read and follow all instructions on the label. · Do not take two or more pain medicines at the same time unless the doctor told you to. Many pain medicines have acetaminophen, which is Tylenol. Too much acetaminophen (Tylenol) can be harmful. · Take an over-the-counter cough medicine to help quiet a dry, hacking cough so that you can sleep. Avoid cough medicines that have more than one active ingredient. Read and follow all instructions on the label. · Do not smoke. Smoking can make bronchitis worse. If you need help quitting, talk to your doctor about stop-smoking programs and medicines. These can increase your chances of quitting for good. When should you call for help? Call 911 anytime you think you may need emergency care. For example, call if:    · You have severe trouble breathing.    Call your doctor now or seek immediate medical care if:    · You have new or worse trouble breathing.     · You cough up dark brown or bloody mucus (sputum).     · You have a new or higher fever.     · You have a new rash. Watch closely for changes in your health, and be sure to contact your doctor if:    · You cough more deeply or more often, especially if you notice more mucus or a change in the color of your mucus.     · You are not getting better as expected. Where can you learn more? Go to https://[employer]. org and sign in to your [employer] account. Enter H333 in the [employer] box to learn more about \"Bronchitis: Care Instructions. \"     If you do not have an account, please click on the \"Sign Up Now\" link. Current as of: July 6, 2021               Content Version: 13.1  © 2345-2215 [employer]. Care instructions adapted under license by [facility] ([facility]). If you have questions about a medical condition or this instruction, always ask your healthcare professional. [address] any warranty or liability for your use of this information.

## 2022-03-25 NOTE — PROGRESS NOTES
[address] [facility]  [address]  [address] [address]  Dept: [phone]  Dept Fax: [phone]    Colonel Wilhelm is a 62 y.o. male who presents today for his medical conditions/complaints of   Chief Complaint   Patient presents with    Chest Congestion     ~ 2 weeks    Cough     barely anything cmoinig up          HPI:     /82   Pulse 84   Temp 98.8 °F (37.1 °C) (Temporal)   Resp 12   Ht 5' 10\" (1.778 m)   Wt 210 lb (95.3 kg)   SpO2 98%   BMI 30.13 kg/m²       HPI  Pt presented to the clinic today with c/o congestion. This is a new problem. The current episode started 14 days ago. The problem has been unchanged since onset. Associated symptoms include: cough- little production, wheezing . Pertinent negatives include: No fever, chills, SOB, chest pain, abdominal pain, loss of taste, smell. Exposure to COVID:  NO  3/14 treated with medrol dose pack and antibiotic. Improved some but feels it returned. 3/20 ER- CXR no acute findings. Former smoker. No history of asthma or pneumonia.      Past Medical History:   Diagnosis Date    Abscess of bursa of right elbow     Arthritis     Atrial fibrillation (HCC)     BPH (benign prostatic hyperplasia)     Heartburn     Hypertension     Panic attack 11/24/2021    Polycythemia     PONV (postoperative nausea and vomiting)     Sleep apnea     no machine        Past Surgical History:   Procedure Laterality Date    APPENDECTOMY      ARM SURGERY Right 02/23/2021    TRICEPS TENDON REPAIR - RIGHT ELBOW WITH ARTHREX AND BIOMET ACHILLES ALLOGRAFT    ARM SURGERY Right 7/13/2021    RIGHT ELBOW IRRIGATION AND DEBRIDEMENT performed by Mora Reese MD at [address] [place] [address]      cervical/ lumbar    CARDIAC CATHETERIZATION  2017    [facility]/ no stents    COLONOSCOPY      ENDOSCOPY, COLON, DIAGNOSTIC      HERNIA REPAIR      MUSCLE REPAIR Right 2/23/2021 Subjective   Patient ID: Fern is a 76 year old female.    Chief Complaint   Patient presents with   • Leg Pain     Times 4 days, redness, feet swelling     Leg Pain  This is a new problem. The current episode started in the past 7 days. The problem occurs constantly. The problem has been gradually worsening. Associated symptoms include arthralgias (bilateral lower legs). Pertinent negatives include no abdominal pain, anorexia, change in bowel habit, chest pain, chills, congestion, coughing, diaphoresis, fatigue, fever, headaches, joint swelling, myalgias, nausea, neck pain, numbness, rash, sore throat, swollen glands, urinary symptoms, vertigo, visual change, vomiting or weakness. Nothing aggravates the symptoms. She has tried nothing for the symptoms.         Past Medical History:   Diagnosis Date   • Allergy    • Arthritis    • Diabetes mellitus (CMS/[facility])    • Essential (primary) hypertension        MEDICATIONS:  Current Outpatient Medications   Medication Sig   • Glucose Blood (COOL BLOOD GLUCOSE TEST STRIPS VI) Once daily   • glipiZIDE XL 10 MG 24 hr tablet    • calcium carbonate-vitamin D (CALTRATE+D) 600-400 MG-UNIT per tablet 1 tablet.   • amLODIPine (NORVASC) 10 MG tablet    • metformin (GLUCOPHAGE) 1000 MG tablet   1,000 MG = 1 TAB, PO, BID, # 90 TAB, 3 Refill(s), 08/31/18 14:06:53 CDT, Pharmacy: [id number]   • aspirin (ECOTRIN) 81 MG EC tablet Take 81 mg by mouth daily.   • cyanocobalamin 1000 MCG tablet   1,000 MCG = 1 TAB, PO, Daily, # 90 TAB, 3 Refill(s), 05/17/18 14:25:28 CDT, Pharmacy: [facility]   • gabapentin (NEURONTIN) 300 MG capsule Take 1 capsule by mouth at bedtime for 14 days.   • Acetaminophen 500 MG Cap Take 1,000 mg by mouth every 6 hours as needed (pain).   • sodium chloride (OCEAN) 0.65 % nasal spray Spray 1 spray in each nostril every 4 hours (while awake).     No current facility-administered medications for this visit.       ALLERGIES:  ALLERGIES:   Allergen  Reactions   • Penicillins Other (See Comments)       PAST SURGICAL HISTORY:  Past Surgical History:   Procedure Laterality Date   • Breast surgery      Right side   •  section, low transverse Right 1978    x 2-  & 1982   • Mastectomy         FAMILY HISTORY:  History reviewed. No pertinent family history.    SOCIAL HISTORY:  Social History     Tobacco Use   • Smoking status: Never Smoker   • Smokeless tobacco: Never Used   Vaping Use   • Vaping Use: never used   Substance Use Topics   • Alcohol use: Never   • Drug use: Never         Patient's medications, allergies, past medical, surgical, and social history  were reviewed and updated as appropriate.    Review of Systems   Constitutional: Negative for chills, diaphoresis, fatigue and fever.   HENT: Negative for congestion and sore throat.    Respiratory: Negative for cough.    Cardiovascular: Positive for leg swelling. Negative for chest pain and palpitations.   Gastrointestinal: Negative for abdominal pain, anorexia, change in bowel habit, nausea and vomiting.   Musculoskeletal: Positive for arthralgias (bilateral lower legs). Negative for joint swelling, myalgias and neck pain.   Skin: Negative for rash.   Neurological: Negative for vertigo, weakness, numbness and headaches.   All other systems reviewed and are negative.      Objective   Physical Exam  Vitals and nursing note reviewed.   Constitutional:       General: She is not in acute distress.     Appearance: Normal appearance. She is not ill-appearing, toxic-appearing or diaphoretic.   Cardiovascular:      Rate and Rhythm: Normal rate and regular rhythm.      Pulses: Normal pulses.           Dorsalis pedis pulses are 2+ on the right side and 2+ on the left side.      Heart sounds: Normal heart sounds.      Comments: Trace edema  Pulmonary:      Effort: Pulmonary effort is normal.      Breath sounds: Normal breath sounds.   Musculoskeletal:         General: Swelling present.      Right lower  DO Ab   pantoprazole (PROTONIX) 40 MG tablet TAKE 1 TABLET BY MOUTH TWO TIMES A DAY  Wally Dominguez MD   tamsulosin (FLOMAX) 0.4 MG capsule TAKE 1 CAPSULE BY MOUTH EVERY DAY  Historical Provider, MD   Blood Pressure KIT 1 kit by Does not apply route daily  RU Dos Santos - NP   spironolactone (ALDACTONE) 50 MG tablet Take 50 mg by mouth daily Sometimes will take two daily- if feeling bloated  Historical Provider, MD       Allergies   Allergen Reactions    Pcn [Penicillins] Hives         Subjective:      Review of Systems   Constitutional: Negative for chills and fever. HENT: Positive for congestion. Negative for ear pain, rhinorrhea and sore throat. Eyes: Negative for pain and visual disturbance. Respiratory: Positive for cough and wheezing. Negative for chest tightness and shortness of breath. Cardiovascular: Negative for chest pain, palpitations and leg swelling. Gastrointestinal: Negative for nausea and vomiting. Genitourinary: Negative for decreased urine volume and difficulty urinating. Musculoskeletal: Negative for gait problem, myalgias and neck pain. Skin: Negative for pallor and rash. Neurological: Negative for weakness, light-headedness and headaches. Psychiatric/Behavioral: Negative for sleep disturbance. Objective:     Physical Exam  Vitals and nursing note reviewed. Constitutional:       General: He is not in acute distress. Appearance: Normal appearance. HENT:      Head: Normocephalic and atraumatic. Right Ear: Tympanic membrane and ear canal normal.      Left Ear: Tympanic membrane and ear canal normal.      Nose: Congestion present. Mouth/Throat:      Lips: Pink. Mouth: Mucous membranes are moist.      Pharynx: Oropharynx is clear. Uvula midline. Eyes:      Extraocular Movements: Extraocular movements intact. Conjunctiva/sclera: Conjunctivae normal.   Cardiovascular:      Rate and Rhythm: Normal rate and regular rhythm.       Pulses: leg: Edema present.      Left lower leg: Edema present.   Skin:     Capillary Refill: Capillary refill takes less than 2 seconds.   Neurological:      General: No focal deficit present.      Mental Status: She is alert and oriented to person, place, and time.   Psychiatric:         Mood and Affect: Mood normal.         Behavior: Behavior normal.         Thought Content: Thought content normal.         Judgment: Judgment normal.       Visit Vitals  /66   Pulse 92   Temp 98.2 °F (36.8 °C) (Oral)   Resp 18   Wt 47.4 kg (104 lb 8 oz)   LMP  (LMP Unknown)   SpO2 97%       Assessment   Problem List Items Addressed This Visit     None      Visit Diagnoses     Peripheral polyneuropathy    -  Primary    Relevant Medications    gabapentin (NEURONTIN) 300 MG capsule    Acetaminophen 500 MG Cap          This is a 76 year old year-old female who presents with bilateral leg pain which she states has been present x 4 day she describes as bugs biting her. Diff RLS vs diabetic neuropathies, PAD. Advised close f/u with PCP. Advised gabapentin causes drowsiness take at bedtime. If worsening swelling, pain, cp, sob go to ED.        Instructions provided as documented in the AVS.    Thank you for visiting [facility].  Please follow up with your PCP 2-3 days.       I have formulated a discharge action plan for this patient:     1) I have given the patient comprehensive discharge instructions and instructed them on the use of any OTC or RX medications involved in their discharge care.   2) I have carefully and comprehensively answered any questions and addressed any concerns that the patient had regarding their medical illness today and their discharge care and follow up.   3) I have carefully and repeatedly discussed with the patient that the patient needs follow up with a primary care provider or specialist, and as necessary I have given this information to the patient.   4)the patient feels comfortable going home at  Normal pulses. Pulmonary:      Effort: Pulmonary effort is normal. No tachypnea. Breath sounds: Wheezing present. Abdominal:      General: Bowel sounds are normal.      Palpations: Abdomen is soft. Musculoskeletal:         General: Normal range of motion. Cervical back: Normal range of motion and neck supple. Skin:     General: Skin is warm and dry. Capillary Refill: Capillary refill takes less than 2 seconds. Coloration: Skin is not pale. Neurological:      Mental Status: He is alert and oriented to person, place, and time. Coordination: Coordination normal.      Gait: Gait normal.   Psychiatric:         Mood and Affect: Mood normal.         Thought Content: Thought content normal.           MEDICAL DECISION MAKING Assessment/Plan:     Sadaf Rojas was seen today for chest congestion and cough. Diagnoses and all orders for this visit:    Acute bronchitis, unspecified organism  -     albuterol sulfate HFA (PROVENTIL HFA) 108 (90 Base) MCG/ACT inhaler; Inhale 2 puffs into the lungs every 6 hours as needed for Wheezing  -     predniSONE (DELTASONE) 20 MG tablet; Take 1 tablet by mouth 2 times daily for 5 days    Cough  -     guaiFENesin-codeine (TUSSI-ORGANIDIN NR) 100-10 MG/5ML syrup; Take 5 mLs by mouth 2 times daily as needed for Cough for up to 3 days.         Results for orders placed or performed during the hospital encounter of 03/20/22   Troponin   Result Value Ref Range    Troponin, High Sensitivity 13 0 - 22 ng/L   Brain Natriuretic Peptide   Result Value Ref Range    Pro-BNP 72 <300 pg/mL   Urinalysis with Reflex to Culture    Specimen: Urine   Result Value Ref Range    Color, UA Yellow Yellow    Turbidity UA Clear Clear    Glucose, Ur NEGATIVE NEGATIVE    Bilirubin Urine NEGATIVE NEGATIVE    Ketones, Urine NEGATIVE NEGATIVE    Specific Gravity, UA 1.015 1.005 - 1.030    Urine Hgb TRACE (A) NEGATIVE    pH, UA 7.0 5.0 - 8.0    Protein, UA NEGATIVE NEGATIVE    Urobilinogen, this time, the patient verbally expresses that the understand the discharge action plan and clearly understands to return, proceed to the ER or call 911 if symptoms worsen, and to absolutely follow up as described and directed above.     The provider verified that the discharge instructions and medications documented on this After Visit Summary report were reviewed with patient and/or caregiver.     The patient has appropriate transport home and has verbalized understanding of instructions given.    Urine Normal Normal    Nitrite, Urine NEGATIVE NEGATIVE    Leukocyte Esterase, Urine TRACE (A) NEGATIVE   Microscopic Urinalysis   Result Value Ref Range    -          WBC, UA 0 TO 2 0 - 5 /HPF    RBC, UA 0 TO 2 0 - 2 /HPF    Epithelial Cells UA 0 TO 2 0 - 5 /HPF    Bacteria, UA None None    Other Observations UA (A) NOT REQ. Utilizing a urinalysis as the only screening method to exclude a potential uropathogen can be unreliable in many patient populations. Rapid screening tests are less sensitive than culture and if UTI is a clinical possibility, culture should be considered despite a negative urinalysis. EKG 12 Lead   Result Value Ref Range    Ventricular Rate 82 BPM    Atrial Rate 82 BPM    P-R Interval 172 ms    QRS Duration 92 ms    Q-T Interval 348 ms    QTc Calculation (Bazett) 406 ms    P Axis -2 degrees    R Axis 11 degrees    T Axis 24 degrees     Based on the patient's history and exam will treat as bronchitis. The patient does not have clinical findings suggestive of pneumonia. There is no abnormal vital signs (pulse is not greater than 100/ minute, respirations are not greater than 24/ minute, temperature is not greater than 38 degrees Celsius, or oxygen saturation less than 95 percent) There is no tachypnea, rales, or signs of parenchymal consolidation on exam. There are no changes in mental status or behavioral changes. Pt to fill and take medications as prescribed. Rest, increase fluids. Return if no improvement in symptoms. Go to the ER for any emergent concern. Plan follow up with PCP. Controlled Substance Monitoring:    Acute and Chronic Pain Monitoring:   RX Monitoring 3/25/2022   Periodic Controlled Substance Monitoring Possible medication side effects, risk of tolerance/dependence & alternative treatments discussed. Preventing the Spread of Coronavirus Disease 2019 in Homes and Residential Communities:   For the most recent information go to: [email or web address]    Patient given educational materials - see patientinstructions. Discussed use, benefit, and side effects of prescribed medications. All patient questions answered. Pt verbalized understanding. Instructed to continue current medications, diet and exercise. Patient agreed with treatment plan. Follow up as directed.      Electronically signed by RU Barrios CNP on 3/25/2022 at 1:26 PM

## 2022-03-30 ENCOUNTER — HOSPITAL ENCOUNTER (INPATIENT)
Age: 62
LOS: 1 days | Discharge: HOME | DRG: 310 | End: 2022-03-31
Attending: EMERGENCY MEDICINE | Admitting: INTERNAL MEDICINE
Payer: COMMERCIAL

## 2022-03-30 ENCOUNTER — APPOINTMENT (OUTPATIENT)
Dept: GENERAL RADIOLOGY | Age: 62
DRG: 310 | End: 2022-03-30
Payer: COMMERCIAL

## 2022-03-30 DIAGNOSIS — R07.9 CHEST PAIN, UNSPECIFIED TYPE: ICD-10-CM

## 2022-03-30 DIAGNOSIS — I48.91 ATRIAL FIBRILLATION WITH RVR (HCC): Primary | ICD-10-CM

## 2022-03-30 PROBLEM — G47.33 OSA (OBSTRUCTIVE SLEEP APNEA): Status: ACTIVE | Noted: 2022-03-30

## 2022-03-30 PROBLEM — F41.1 GENERALIZED ANXIETY DISORDER: Status: ACTIVE | Noted: 2022-03-30

## 2022-03-30 PROBLEM — F51.01 PRIMARY INSOMNIA: Status: ACTIVE | Noted: 2022-03-30

## 2022-03-30 LAB
ABSOLUTE EOS #: 0.29 K/UL (ref 0–0.44)
ABSOLUTE IMMATURE GRANULOCYTE: 0.05 K/UL (ref 0–0.3)
ABSOLUTE LYMPH #: 1.47 K/UL (ref 1.1–3.7)
ABSOLUTE MONO #: 1.01 K/UL (ref 0.1–1.2)
ANION GAP SERPL CALCULATED.3IONS-SCNC: 11 MMOL/L (ref 9–17)
BASOPHILS # BLD: 1 % (ref 0–2)
BASOPHILS ABSOLUTE: 0.05 K/UL (ref 0–0.2)
BUN BLDV-MCNC: 25 MG/DL (ref 8–23)
BUN/CREAT BLD: 27 (ref 9–20)
CALCIUM SERPL-MCNC: 9.9 MG/DL (ref 8.6–10.4)
CHLORIDE BLD-SCNC: 102 MMOL/L (ref 98–107)
CO2: 25 MMOL/L (ref 20–31)
CREAT SERPL-MCNC: 0.91 MG/DL (ref 0.7–1.2)
EOSINOPHILS RELATIVE PERCENT: 3 % (ref 1–4)
GFR AFRICAN AMERICAN: >60 ML/MIN
GFR NON-AFRICAN AMERICAN: >60 ML/MIN
GFR SERPL CREATININE-BSD FRML MDRD: ABNORMAL ML/MIN/{1.73_M2}
GLUCOSE BLD-MCNC: 146 MG/DL (ref 70–99)
HCT VFR BLD CALC: 55.4 % (ref 40.7–50.3)
HEMOGLOBIN: 18.5 G/DL (ref 13–17)
IMMATURE GRANULOCYTES: 1 %
INR BLD: 0.9
LYMPHOCYTES # BLD: 17 % (ref 24–43)
MAGNESIUM: 2 MG/DL (ref 1.6–2.6)
MCH RBC QN AUTO: 30.7 PG (ref 25.2–33.5)
MCHC RBC AUTO-ENTMCNC: 33.4 G/DL (ref 28.4–34.8)
MCV RBC AUTO: 92 FL (ref 82.6–102.9)
MONOCYTES # BLD: 11 % (ref 3–12)
NRBC AUTOMATED: 0 PER 100 WBC
PARTIAL THROMBOPLASTIN TIME: 27.1 SEC (ref 23.9–33.8)
PDW BLD-RTO: 14 % (ref 11.8–14.4)
PLATELET # BLD: 254 K/UL (ref 138–453)
PMV BLD AUTO: 9 FL (ref 8.1–13.5)
POTASSIUM SERPL-SCNC: 4.7 MMOL/L (ref 3.7–5.3)
PROTHROMBIN TIME: 12.4 SEC (ref 11.5–14.2)
RBC # BLD: 6.02 M/UL (ref 4.21–5.77)
SARS-COV-2, RAPID: NOT DETECTED
SEG NEUTROPHILS: 67 % (ref 36–65)
SEGMENTED NEUTROPHILS ABSOLUTE COUNT: 6.03 K/UL (ref 1.5–8.1)
SODIUM BLD-SCNC: 138 MMOL/L (ref 135–144)
SPECIMEN DESCRIPTION: NORMAL
TROPONIN, HIGH SENSITIVITY: 16 NG/L (ref 0–22)
TROPONIN, HIGH SENSITIVITY: 17 NG/L (ref 0–22)
WBC # BLD: 8.9 K/UL (ref 3.5–11.3)

## 2022-03-30 PROCEDURE — 85025 COMPLETE CBC W/AUTO DIFF WBC: CPT

## 2022-03-30 PROCEDURE — 83735 ASSAY OF MAGNESIUM: CPT

## 2022-03-30 PROCEDURE — 2500000003 HC RX 250 WO HCPCS: Performed by: EMERGENCY MEDICINE

## 2022-03-30 PROCEDURE — 84484 ASSAY OF TROPONIN QUANT: CPT

## 2022-03-30 PROCEDURE — 99283 EMERGENCY DEPT VISIT LOW MDM: CPT

## 2022-03-30 PROCEDURE — APPSS45 APP SPLIT SHARED TIME 31-45 MINUTES: Performed by: NURSE PRACTITIONER

## 2022-03-30 PROCEDURE — 99222 1ST HOSP IP/OBS MODERATE 55: CPT | Performed by: NURSE PRACTITIONER

## 2022-03-30 PROCEDURE — 96374 THER/PROPH/DIAG INJ IV PUSH: CPT

## 2022-03-30 PROCEDURE — 80048 BASIC METABOLIC PNL TOTAL CA: CPT

## 2022-03-30 PROCEDURE — 6370000000 HC RX 637 (ALT 250 FOR IP): Performed by: NURSE PRACTITIONER

## 2022-03-30 PROCEDURE — 85610 PROTHROMBIN TIME: CPT

## 2022-03-30 PROCEDURE — 2580000003 HC RX 258: Performed by: EMERGENCY MEDICINE

## 2022-03-30 PROCEDURE — 71045 X-RAY EXAM CHEST 1 VIEW: CPT

## 2022-03-30 PROCEDURE — 2060000000 HC ICU INTERMEDIATE R&B

## 2022-03-30 PROCEDURE — 6360000002 HC RX W HCPCS: Performed by: NURSE PRACTITIONER

## 2022-03-30 PROCEDURE — 2580000003 HC RX 258: Performed by: NURSE PRACTITIONER

## 2022-03-30 PROCEDURE — 93005 ELECTROCARDIOGRAM TRACING: CPT | Performed by: EMERGENCY MEDICINE

## 2022-03-30 PROCEDURE — 85730 THROMBOPLASTIN TIME PARTIAL: CPT

## 2022-03-30 PROCEDURE — 6370000000 HC RX 637 (ALT 250 FOR IP): Performed by: EMERGENCY MEDICINE

## 2022-03-30 PROCEDURE — 6360000002 HC RX W HCPCS: Performed by: INTERNAL MEDICINE

## 2022-03-30 PROCEDURE — 87635 SARS-COV-2 COVID-19 AMP PRB: CPT

## 2022-03-30 RX ORDER — LORAZEPAM 2 MG/ML
4 INJECTION INTRAMUSCULAR EVERY 6 HOURS PRN
Status: DISCONTINUED | OUTPATIENT
Start: 2022-03-30 | End: 2022-03-31 | Stop reason: HOSPADM

## 2022-03-30 RX ORDER — ACETAMINOPHEN 325 MG/1
650 TABLET ORAL EVERY 6 HOURS PRN
Status: DISCONTINUED | OUTPATIENT
Start: 2022-03-30 | End: 2022-03-31 | Stop reason: HOSPADM

## 2022-03-30 RX ORDER — ZINC GLUCONATE 50 MG
50 TABLET ORAL DAILY
COMMUNITY

## 2022-03-30 RX ORDER — PANTOPRAZOLE SODIUM 40 MG/1
40 TABLET, DELAYED RELEASE ORAL
Status: DISCONTINUED | OUTPATIENT
Start: 2022-03-31 | End: 2022-03-31 | Stop reason: HOSPADM

## 2022-03-30 RX ORDER — SODIUM CHLORIDE 9 MG/ML
INJECTION, SOLUTION INTRAVENOUS PRN
Status: DISCONTINUED | OUTPATIENT
Start: 2022-03-30 | End: 2022-03-31 | Stop reason: HOSPADM

## 2022-03-30 RX ORDER — METOPROLOL TARTRATE 5 MG/5ML
5 INJECTION INTRAVENOUS EVERY 6 HOURS PRN
Status: DISCONTINUED | OUTPATIENT
Start: 2022-03-30 | End: 2022-03-31 | Stop reason: HOSPADM

## 2022-03-30 RX ORDER — ONDANSETRON 4 MG/1
4 TABLET, ORALLY DISINTEGRATING ORAL EVERY 8 HOURS PRN
Status: DISCONTINUED | OUTPATIENT
Start: 2022-03-30 | End: 2022-03-31 | Stop reason: HOSPADM

## 2022-03-30 RX ORDER — PAROXETINE HYDROCHLORIDE 20 MG/1
20 TABLET, FILM COATED ORAL NIGHTLY
Status: DISCONTINUED | OUTPATIENT
Start: 2022-03-30 | End: 2022-03-31 | Stop reason: HOSPADM

## 2022-03-30 RX ORDER — M-VIT,TX,IRON,MINS/CALC/FOLIC 27MG-0.4MG
1 TABLET ORAL DAILY
COMMUNITY

## 2022-03-30 RX ORDER — DILTIAZEM HYDROCHLORIDE 5 MG/ML
10 INJECTION INTRAVENOUS ONCE
Status: COMPLETED | OUTPATIENT
Start: 2022-03-30 | End: 2022-03-30

## 2022-03-30 RX ORDER — BUSPIRONE HYDROCHLORIDE 5 MG/1
5 TABLET ORAL DAILY PRN
COMMUNITY
End: 2022-04-04

## 2022-03-30 RX ORDER — ASPIRIN 81 MG/1
324 TABLET, CHEWABLE ORAL ONCE
Status: COMPLETED | OUTPATIENT
Start: 2022-03-30 | End: 2022-03-30

## 2022-03-30 RX ORDER — DIGOXIN 0.25 MG/ML
125 INJECTION INTRAMUSCULAR; INTRAVENOUS ONCE
Status: COMPLETED | OUTPATIENT
Start: 2022-03-30 | End: 2022-03-30

## 2022-03-30 RX ORDER — ASCORBIC ACID 500 MG
500 TABLET ORAL DAILY
COMMUNITY

## 2022-03-30 RX ORDER — ACETAMINOPHEN 650 MG/1
650 SUPPOSITORY RECTAL EVERY 6 HOURS PRN
Status: DISCONTINUED | OUTPATIENT
Start: 2022-03-30 | End: 2022-03-31 | Stop reason: HOSPADM

## 2022-03-30 RX ORDER — TAMSULOSIN HYDROCHLORIDE 0.4 MG/1
0.4 CAPSULE ORAL DAILY
Status: DISCONTINUED | OUTPATIENT
Start: 2022-03-30 | End: 2022-03-31 | Stop reason: HOSPADM

## 2022-03-30 RX ORDER — POLYETHYLENE GLYCOL 3350 17 G/17G
17 POWDER, FOR SOLUTION ORAL DAILY PRN
Status: DISCONTINUED | OUTPATIENT
Start: 2022-03-30 | End: 2022-03-31 | Stop reason: HOSPADM

## 2022-03-30 RX ORDER — ALPRAZOLAM 0.25 MG/1
0.25 TABLET ORAL DAILY PRN
COMMUNITY
End: 2022-04-04

## 2022-03-30 RX ORDER — SODIUM CHLORIDE 0.9 % (FLUSH) 0.9 %
10 SYRINGE (ML) INJECTION PRN
Status: DISCONTINUED | OUTPATIENT
Start: 2022-03-30 | End: 2022-03-31 | Stop reason: HOSPADM

## 2022-03-30 RX ORDER — ALBUTEROL SULFATE 90 UG/1
2 AEROSOL, METERED RESPIRATORY (INHALATION) EVERY 6 HOURS PRN
Status: DISCONTINUED | OUTPATIENT
Start: 2022-03-30 | End: 2022-03-31 | Stop reason: HOSPADM

## 2022-03-30 RX ORDER — SPIRONOLACTONE 25 MG/1
50 TABLET ORAL DAILY
Status: DISCONTINUED | OUTPATIENT
Start: 2022-03-30 | End: 2022-03-31 | Stop reason: HOSPADM

## 2022-03-30 RX ORDER — CLONIDINE HYDROCHLORIDE 0.1 MG/1
0.1 TABLET ORAL 2 TIMES DAILY PRN
Status: ON HOLD | COMMUNITY
End: 2022-03-31 | Stop reason: HOSPADM

## 2022-03-30 RX ORDER — SODIUM CHLORIDE 0.9 % (FLUSH) 0.9 %
5-40 SYRINGE (ML) INJECTION EVERY 12 HOURS SCHEDULED
Status: DISCONTINUED | OUTPATIENT
Start: 2022-03-30 | End: 2022-03-31 | Stop reason: HOSPADM

## 2022-03-30 RX ORDER — IRBESARTAN 300 MG/1
300 TABLET ORAL NIGHTLY
COMMUNITY
End: 2022-04-04 | Stop reason: SDUPTHER

## 2022-03-30 RX ORDER — ONDANSETRON 2 MG/ML
4 INJECTION INTRAMUSCULAR; INTRAVENOUS EVERY 6 HOURS PRN
Status: DISCONTINUED | OUTPATIENT
Start: 2022-03-30 | End: 2022-03-31 | Stop reason: HOSPADM

## 2022-03-30 RX ADMIN — PAROXETINE HYDROCHLORIDE 20 MG: 20 TABLET, FILM COATED ORAL at 20:00

## 2022-03-30 RX ADMIN — DILTIAZEM HYDROCHLORIDE 30 MG: 30 TABLET, FILM COATED ORAL at 20:00

## 2022-03-30 RX ADMIN — ENOXAPARIN SODIUM 90 MG: 100 INJECTION SUBCUTANEOUS at 20:02

## 2022-03-30 RX ADMIN — DILTIAZEM HYDROCHLORIDE 10 MG: 5 INJECTION INTRAVENOUS at 13:20

## 2022-03-30 RX ADMIN — ALUMINA, MAGNESIA, AND SIMETHICONE ORAL SUSPENSION REGULAR STRENGTH: 1200; 1200; 120 SUSPENSION ORAL at 13:27

## 2022-03-30 RX ADMIN — ASPIRIN 81 MG 324 MG: 81 TABLET ORAL at 13:27

## 2022-03-30 RX ADMIN — DILTIAZEM HYDROCHLORIDE 30 MG: 30 TABLET, FILM COATED ORAL at 15:22

## 2022-03-30 RX ADMIN — LORAZEPAM 4 MG: 2 INJECTION INTRAMUSCULAR at 20:04

## 2022-03-30 RX ADMIN — DILTIAZEM HYDROCHLORIDE 5 MG/HR: 5 INJECTION INTRAVENOUS at 13:34

## 2022-03-30 RX ADMIN — DIGOXIN 125 MCG: 250 INJECTION, SOLUTION INTRAMUSCULAR; INTRAVENOUS; PARENTERAL at 18:26

## 2022-03-30 RX ADMIN — SODIUM CHLORIDE, PRESERVATIVE FREE 10 ML: 5 INJECTION INTRAVENOUS at 20:02

## 2022-03-30 RX ADMIN — ALUMINA, MAGNESIA, AND SIMETHICONE ORAL SUSPENSION REGULAR STRENGTH: 1200; 1200; 120 SUSPENSION ORAL at 16:22

## 2022-03-30 ASSESSMENT — ENCOUNTER SYMPTOMS
SINUS PRESSURE: 0
PHOTOPHOBIA: 0
SINUS PAIN: 0
DIARRHEA: 0
EYE DISCHARGE: 0
NAUSEA: 0
EYE REDNESS: 0
SHORTNESS OF BREATH: 1
ABDOMINAL DISTENTION: 0
SORE THROAT: 0
RHINORRHEA: 0
COUGH: 0
VOMITING: 0
COLOR CHANGE: 0
ABDOMINAL PAIN: 0
WHEEZING: 0

## 2022-03-30 ASSESSMENT — PAIN DESCRIPTION - PAIN TYPE: TYPE: ACUTE PAIN

## 2022-03-30 ASSESSMENT — PAIN SCALES - GENERAL
PAINLEVEL_OUTOF10: 0
PAINLEVEL_OUTOF10: 7
PAINLEVEL_OUTOF10: 10

## 2022-03-30 ASSESSMENT — PAIN DESCRIPTION - LOCATION: LOCATION: BACK;LEG

## 2022-03-30 ASSESSMENT — PAIN - FUNCTIONAL ASSESSMENT: PAIN_FUNCTIONAL_ASSESSMENT: 0-10

## 2022-03-30 NOTE — FLOWSHEET NOTE
Patient arrived to room 1018 via stretcher from ED. Patient is alert and oriented and reports palpitations and chest pressure that is improved from arrival to ED. Patient ambulated to bed per self. Bed locked and placed in lowest position. Side rails up x2. Call light placed at the patient's bedside. Bedside report completed. Pulses palpable bilaterally. Vital signs obtained (see flowsheet). RN educated the patient and patient's spouse on plan of care at this time. Patient and spouse verbalize understanding. Will continue to monitor closely.

## 2022-03-30 NOTE — ED PROVIDER NOTES
EMERGENCY DEPARTMENT ENCOUNTER    Pt Name: Wang Nguyen  MRN: [id number]  [place] 1960  Date of evaluation: 3/30/22  CHIEF COMPLAINT       Chief Complaint   Patient presents with    Chest Pain    Atrial Fibrillation     HISTORY OF PRESENT ILLNESS   This is a 56-year-old male with a history of atrial fibrillation that presents with complaints of A. fib with RVR and chest pain. The patient states that his symptoms began earlier today, he was having some chest pain palpitations and just did not quite feel right. Patient states he is supposed to be on Eliquis but does not take it, he is uncertain of his other medications that he supposed be taking. Patient denies any fevers or chills, no cough or sputum production. REVIEW OF SYSTEMS     Review of Systems   Constitutional: Negative for chills and fever. HENT: Negative for rhinorrhea and sore throat. Eyes: Negative for discharge, redness and visual disturbance. Respiratory: Positive for shortness of breath. Negative for cough. Cardiovascular: Positive for chest pain. Negative for palpitations and leg swelling. Gastrointestinal: Negative for diarrhea, nausea and vomiting. Musculoskeletal: Negative for arthralgias, myalgias and neck pain. Skin: Negative for color change and rash. Neurological: Negative for seizures, weakness and headaches. Psychiatric/Behavioral: Negative for hallucinations, self-injury and suicidal ideas.      PASTMEDICAL HISTORY     Past Medical History:   Diagnosis Date    Abscess of bursa of right elbow     Arthritis     Atrial fibrillation (HCC)     BPH (benign prostatic hyperplasia)     Heartburn     Hypertension     Panic attack 11/24/2021    Polycythemia     PONV (postoperative nausea and vomiting)     Sleep apnea     no machine     Past Problem List  Patient Active Problem List   Diagnosis Code    Rupture of right triceps tendon S46.311A    Abscess of right elbow L02.413    Chest pain R07.9  Essential hypertension I10    Epigastric pain R10.13    Gastritis K29.70    Esophagitis K20.90    New onset a-fib ([facility]) I48.91    Atrial fibrillation with RVR ([facility]) I48.91    Polycythemia D75.1    Hypoproteinemia ([facility]) E77.8    Acute bronchitis J20.9    Abnormal thallium stress test R94.39    Acute appendicitis with localized peritonitis, without perforation, abscess, or gangrene K35.30    Acute medial meniscus tear of right knee S83.241A    Cardiomyopathy ([address] I42.9    Chondromalacia of right knee M94.261    Erectile dysfunction N52.9    Heartburn R12    Primary localized osteoarthrosis of shoulder region M19.019    Renal insufficiency syndrome N28.9    Spondylolisthesis of lumbar region M43.16     SURGICAL HISTORY       Past Surgical History:   Procedure Laterality Date    APPENDECTOMY      ARM SURGERY Right 02/23/2021    TRICEPS TENDON REPAIR - RIGHT ELBOW WITH ARTHREX AND BIOMET ACHILLES ALLOGRAFT    ARM SURGERY Right 7/13/2021    RIGHT ELBOW IRRIGATION AND DEBRIDEMENT performed by Hari Joe MD at [address]      cervical/ lumbar    CARDIAC CATHETERIZATION  2017    [facility]/ no stents    COLONOSCOPY      ENDOSCOPY, COLON, DIAGNOSTIC      HERNIA REPAIR      MUSCLE REPAIR Right 2/23/2021    TRICEPS TENDON REPAIR - RIGHT ELBOW performed by Hari Joe MD at [email or web address] SEPTOPLASTY Bilateral 12/20/2021    SEPTOPLASTY BILATERAL TURBINATE REDUCTION performed by Marva Tavarez MD at [facility]      cervical and lumbar    UPPER GASTROINTESTINAL ENDOSCOPY N/A 12/17/2019    EGD BIOPSY performed by Shavon Alexis MD at [facility] N/A 7/30/2021    EGD BIOPSY OF STOMACH AND ESOPHAGUS performed by Tay Escoto MD at [address] [facility]       Previous Medications    ALBUTEROL SULFATE HFA (PROVENTIL HFA) 108 (90 BASE) MCG/ACT INHALER    Inhale 2 puffs into the lungs Neck:      Trachea: Trachea normal.   Cardiovascular:      Rate and Rhythm: Tachycardia present. Rhythm irregularly irregular. Heart sounds: S1 normal and S2 normal. No murmur heard. Pulmonary:      Effort: Pulmonary effort is normal. No accessory muscle usage or respiratory distress. Breath sounds: Normal breath sounds. Chest:      Chest wall: No deformity or tenderness. Abdominal:      General: Bowel sounds are normal. There is no distension or abdominal bruit. Palpations: Abdomen is not rigid. Tenderness: There is no abdominal tenderness. There is no guarding or rebound. Musculoskeletal:      Cervical back: Normal range of motion and neck supple. Skin:     General: Skin is warm. Findings: No rash. Neurological:      Mental Status: He is alert and oriented to person, place, and time. GCS: GCS eye subscore is 4. GCS verbal subscore is 5. GCS motor subscore is 6. Psychiatric:         Speech: Speech normal.         MEDICAL DECISION MAKIN-year-old male presents with complaints of tachycardia, atrial fibrillation with RVR, plan is Cardizem basic labs reevaluation. 2:50 PM EDT  Patient's heart rate is improving, case was discussed with hospitalist who agrees with admission. CRITICAL CARE:       PROCEDURES:    Critical Care  Performed by: Jose Gtz MD  Authorized by: Jose Gtz MD     Critical care provider statement:     Critical care time (minutes):  36    Critical care time was exclusive of:  Separately billable procedures and treating other patients and teaching time    Critical care was necessary to treat or prevent imminent or life-threatening deterioration of the following conditions: atrial fibrillation with RVR.     Critical care was time spent personally by me on the following activities:  Discussions with primary provider, evaluation of patient's response to treatment, ordering and review of laboratory studies and ordering and review of radiographic studies        DIAGNOSTIC RESULTS   EKG:All EKG's are interpreted by the Emergency Department Physician who either signs or Co-signs this chart in the absence of a cardiologist.    Patient's EKG shows A. fib with RVR, QRS QTC intervals unremarkable, the patient has normal axis no ST elevations or depressions, nonspecific EKG. RADIOLOGY:All plain film, CT, MRI, and formal ultrasound images (except ED bedside ultrasound) are read by the radiologist, see reports below, unless otherwisenoted in MDM or here. XR CHEST PORTABLE   Final Result   No acute chest abnormality. Stable mild cardiomegaly. LABS: All lab results were reviewed by myself, and all abnormals are listed below. Labs Reviewed   BASIC METABOLIC PANEL - Abnormal; Notable for the following components:       Result Value    Glucose 146 (*)     BUN 25 (*)     Bun/Cre Ratio 27 (*)     All other components within normal limits   CBC WITH AUTO DIFFERENTIAL - Abnormal; Notable for the following components:    RBC 6.02 (*)     Hemoglobin 18.5 (*)     Hematocrit 55.4 (*)     Seg Neutrophils 67 (*)     Lymphocytes 17 (*)     Immature Granulocytes 1 (*)     All other components within normal limits   MAGNESIUM   PROTIME-INR   APTT   TROPONIN   TROPONIN       EMERGENCY DEPARTMENTCOURSE:         Vitals:    Vitals:    03/30/22 1255 03/30/22 1314 03/30/22 1344 03/30/22 1359   BP: (!) 108/98 125/70 104/64 93/74   Pulse:  146 82 99   Resp:  30 21 16   Temp:       TempSrc:       SpO2:  96% 98% 98%       The patient was given the following medications while in the emergency department:  Orders Placed This Encounter   Medications    dilTIAZem injection 10 mg    dilTIAZem 125 mg in dextrose 5 % 125 mL infusion     Order Specific Question:   Titrate Infusion? Answer:   Yes     Order Specific Question:   Initial Infusion Dose: Answer:   5 mg/hr     Order Specific Question:   Goal of Therapy is:      Answer:   HR less than 100 bpm Order Specific Question:   Contact Provider if:     Answer:   SBP less than 90 mmHg     Order Specific Question:   Contact Provider if:     Answer:   HR less than 60 bpm     Order Specific Question:   HOLD for     Answer:   SBP less than 90 mmHg     Order Specific Question:   HOLD for     Answer:   HR less than 60 bpm    aspirin chewable tablet 324 mg    aluminum & magnesium hydroxide-simethicone (MAALOX) 30 mL, lidocaine viscous hcl (XYLOCAINE) 5 mL (GI COCKTAIL)    dilTIAZem (CARDIZEM) tablet 30 mg     CONSULTS:  IP CONSULT TO HOSPITALIST    FINAL IMPRESSION      1. Atrial fibrillation with RVR (HCC)    2. Chest pain, unspecified type          DISPOSITION/PLAN   DISPOSITION Decision To Admit 03/30/2022 02:21:35 PM      PATIENT REFERRED TO:  No follow-up provider specified. DISCHARGE MEDICATIONS:  New Prescriptions    No medications on file     The care is provided during an unprecedented national emergency due to the novel coronavirus, COVID 19.   MD Jose Paula MD  03/30/22 [id number]

## 2022-03-30 NOTE — PROGRESS NOTES
Transitions of Care Pharmacy Service   Medication Review    The patient's list of current home medications has been reviewed. Source(s) of information: Patient, Diya Dowling    Based on information provided by the above source(s), I have updated the patient's home med list as described below. Please review the ACTION REQUESTED section of this note below for any discrepancies on current hospital orders. I changed or updated the following medications on the patient's home medication list:  Removed Blood pressure kit  Prednisone  Flomax     Added Clonidine  Avapro  Buspar  Multivitamin  Vitamin C  Zinc  Xanax  Eliquis     Other Notes Patient is non-compliant with his medications. He often takes them at his own discretion when he \"needs them\". He also has stopped taking, or started retaking medications without the direction of his physician. Home med list updated as best as possible based on the medication bottles he has with him and pharmacy fill history. Please feel free to call me with any questions about this encounter. Thank you. [facility]   Transitions of Care Pharmacy Service  Phone:  [phone]  Fax: [phone]      Electronically signed by [facility] on 3/30/2022 at 6:12 PM       Medications Prior to Admission: cloNIDine (CATAPRES) 0.1 MG tablet, Take 0.1 mg by mouth 2 times daily as needed for High Blood Pressure (SBP > 150)  irbesartan (AVAPRO) 300 MG tablet, Take 300 mg by mouth nightly  busPIRone (BUSPAR) 5 MG tablet, Take 5 mg by mouth daily as needed (anxiety)  Multiple Vitamins-Minerals (THERAPEUTIC MULTIVITAMIN-MINERALS) tablet, Take 1 tablet by mouth daily  vitamin C (ASCORBIC ACID) 500 MG tablet, Take 500 mg by mouth daily  zinc gluconate 50 MG tablet, Take 50 mg by mouth daily  ALPRAZolam (XANAX) 0.25 MG tablet, Take 0.25 mg by mouth daily as needed for Anxiety.   apixaban (ELIQUIS) 5 MG TABS tablet, Take 5 mg by mouth 2 times daily  albuterol sulfate HFA (PROVENTIL HFA) 108 (90 Base) MCG/ACT inhaler, Inhale 2 puffs into the lungs every 6 hours as needed for Wheezing  zolpidem (AMBIEN) 5 MG tablet, Take 1 tablet by mouth nightly as needed for Sleep for up to 30 days. varenicline (CHANTIX) 1 MG tablet, Take 1 tablet by mouth daily starting on Day 8 for the remainder of treatment.   glucose monitoring (FREESTYLE FREEDOM) kit, 1 kit by Does not apply route daily  pantoprazole (PROTONIX) 40 MG tablet, TAKE 1 TABLET BY MOUTH TWO TIMES A DAY  spironolactone (ALDACTONE) 50 MG tablet, Take 50 mg by mouth daily Sometimes will take two daily- if feeling bloated

## 2022-03-30 NOTE — H&P
[facility]  Office: [address], DO, Aimee Dickinson, DO, Fredrickaarti Davies, DO, Chel Tobias, DO, Valiant Sicard, MD, Caleb Driver MD, Deon Johnson MD, Yung Soler MD, Hawa Bliss MD, Heladio Young MD, Bennie Deleon MD, Bryce Hess, DO, Christina Titus, DO, Kelsea Skelton MD,  Terri Evans, DO, Mamta Vo MD, Qasim Esqueda MD, Ivan Rai MD, Maria A Hayes DO, Nikolai Lou MD, Nedra Callahan MD, Quinn Amor, [facility], [facility], [facility], Mary Hayes, [facility], Jayla Vizcaino, CNS, Loraine Jasso, CNP, Brittney Toth, CNP, Linda Perez, CNP, Joe Swanson, CNP, Yeimy Schwartz, CNP, Yahaira Her PA-C, Terri Carmen, [facility], [facility], Dulce Valenzuela, CNP, Chucho Myers, CNP, Prakash Briggs, [facility]    HISTORY AND PHYSICAL EXAMINATION            Date:   3/30/2022  Patient name:  Mina Nissen  Date of admission:  3/30/2022 12:50 PM  MRN:   [id number]  Account:  [de-identified]  YOB: 1960  PCP:    Carine Cardenas MD  Room:   [place] [address]  Code Status:    Full code    Chief Complaint:     Chief Complaint   Patient presents with    Chest Pain    Atrial Fibrillation       History Obtained From:     patient    History of Present Illness:     Mina Nissen is a 62 y.o. Non- / non  male who presents with Chest Pain and Atrial Fibrillation   and is admitted to the hospital for the management of Atrial fibrillation with RVR ([facility] [address] Patient reports to the emergency department with severe fatigue and palpitations in his chest.  Patient reports that he has had intermittent chest palpitations and chest tightness every several weeks for the past several months. Patient was seen and evaluated in the emergency department multiple times for this and diagnosed with anxiety attacks.   Patient reports that he has a history of atrial fibrillation for which she is supposed to be taking beta-blockers however due to side effects he has been noncompliant with his beta-blockers. Patient reports that he takes Eliquis daily however he recently self discontinued this due to his recurrent anxiety attacks and fatigue, he has been attempting to limit his oral medications to prevent side effects. Today the patient reports that in the middle of the night he developed severe shortness of breath and palpitations. Patient was in the middle of a sleep study for likely obstructive sleep apnea when the symptoms began. Patient attempted rest and taking his home medications without resolution of his symptoms and he therefore reported to the emergency department today where he was found to be in A. fib with RVR with a rate in the 170s. Patient was started on Cardizem drip and rate was quickly controlled. Further interview with the patient reveals that he has had obstructive sleep apnea for several years and has not yet been able to find a mask or titration setting that he is able to tolerate at night. Patient also reports severe stress and anxiety about his overall health and claustrophobia from the BiPAP.   Patient reports that over the past 3 to 5 years he has had severe primary insomnia and he will only sleep 1 to 2 hours at a time for a total of 4.5 hours a night at best.    Past Medical History:     Past Medical History:   Diagnosis Date    Abscess of bursa of right elbow     Arthritis     Atrial fibrillation (HCC)     BPH (benign prostatic hyperplasia)     Heartburn     Hypertension     Panic attack 11/24/2021    Polycythemia     PONV (postoperative nausea and vomiting)     Sleep apnea     no machine        Past Surgical History:     Past Surgical History:   Procedure Laterality Date    APPENDECTOMY      ARM SURGERY Right 02/23/2021    TRICEPS TENDON REPAIR - RIGHT ELBOW WITH ARTHREX AND BIOMET ACHILLES ALLOGRAFT    ARM SURGERY Right 7/13/2021    RIGHT ELBOW IRRIGATION AND DEBRIDEMENT performed by Celio Skaggs MD at [address]      cervical/ lumbar    CARDIAC CATHETERIZATION  2017    [facility]/ no stents    COLONOSCOPY      ENDOSCOPY, COLON, DIAGNOSTIC      HERNIA REPAIR      MUSCLE REPAIR Right 2/23/2021    TRICEPS TENDON REPAIR - RIGHT ELBOW performed by Celio Skaggs MD at [id number] [id number] SEPTOPLASTY Bilateral 12/20/2021    SEPTOPLASTY BILATERAL TURBINATE REDUCTION performed by Jb Aguirre MD at [facility]      cervical and lumbar    UPPER GASTROINTESTINAL ENDOSCOPY N/A 12/17/2019    EGD BIOPSY performed by Humberto Orozco MD at [address] [facility] 7/30/2021    EGD BIOPSY OF STOMACH AND ESOPHAGUS performed by Keven Davis MD at [facility]        Medications Prior to Admission:     Prior to Admission medications    Medication Sig Start Date End Date Taking? Authorizing Provider   albuterol sulfate HFA (PROVENTIL HFA) 108 (90 Base) MCG/ACT inhaler Inhale 2 puffs into the lungs every 6 hours as needed for Wheezing 3/25/22   RU Blandon CNP   predniSONE (DELTASONE) 20 MG tablet Take 1 tablet by mouth 2 times daily for 5 days 3/25/22 3/30/22  RU Blandon CNP   zolpidem (AMBIEN) 5 MG tablet Take 1 tablet by mouth nightly as needed for Sleep for up to 30 days. 3/8/22 4/7/22  Dirk Hairston MD   varenicline (CHANTIX) 1 MG tablet Take 1 tablet by mouth daily starting on Day 8 for the remainder of treatment.  2/11/22   Dirk Hairston MD   glucose monitoring (FREESTYLE FREEDOM) kit 1 kit by Does not apply route daily 1/19/22   Anita Davenport DO   pantoprazole (PROTONIX) 40 MG tablet TAKE 1 TABLET BY MOUTH TWO TIMES A DAY 1/14/22   Dirk Hairston MD   tamsulosin (FLOMAX) 0.4 MG capsule TAKE 1 CAPSULE BY MOUTH EVERY DAY 11/28/21   Historical Provider, MD   Blood Pressure KIT 1 kit by Does not apply route daily 10/16/21   Mary Jansen APRN - NP   spironolactone (ALDACTONE) 50 MG tablet Take 50 mg by mouth daily Sometimes will take two daily- if feeling bloated    Historical Provider, MD        Allergies:     Pcn [penicillins]    Social History:     Tobacco:    reports that he quit smoking about 14 years ago. His smoking use included cigars. He has a 20.00 pack-year smoking history. He has never used smokeless tobacco.  Alcohol:      reports current alcohol use. Drug Use:  reports current drug use. Drug: Marijuana [facility]). Family History:     Family History   Problem Relation Age of Onset    Cancer Father         prostrate    Diabetes Father     Cancer Paternal Uncle         colon       Review of Systems:     Positive and Negative as described in HPI. Review of Systems   Constitutional: Positive for activity change and fatigue. HENT: Negative for sinus pressure and sinus pain. Eyes: Negative for photophobia and visual disturbance. Respiratory: Positive for shortness of breath. Negative for wheezing. Cardiovascular: Positive for chest pain and palpitations. Gastrointestinal: Negative for abdominal distention, abdominal pain, nausea and vomiting. Endocrine: Negative for cold intolerance and heat intolerance. Genitourinary: Negative for urgency. Musculoskeletal: Negative for arthralgias and gait problem. Skin: Negative for color change and rash. Neurological: Positive for weakness. Negative for syncope. Hematological: Negative for adenopathy. Does not bruise/bleed easily. Psychiatric/Behavioral: Negative for agitation and confusion. Physical Exam:   /81   Pulse 99   Temp 97.9 °F (36.6 °C) (Oral)   Resp 19   SpO2 97%   Temp (24hrs), Av.9 °F (36.6 °C), Min:97.9 °F (36.6 °C), Max:97.9 °F (36.6 °C)    No results for input(s): POCGLU in the last 72 hours. No intake or output data in the 24 hours ending 22 1631    Physical Exam  Constitutional:       General: He is not in acute distress. Appearance: He is normal weight.  He is not ill-appearing or toxic-appearing. HENT:      Head: Normocephalic and atraumatic. Nose: Nose normal.      Mouth/Throat:      Mouth: Mucous membranes are moist.   Eyes:      Extraocular Movements: Extraocular movements intact. Conjunctiva/sclera: Conjunctivae normal.      Pupils: Pupils are equal, round, and reactive to light. Cardiovascular:      Rate and Rhythm: Tachycardia present. Rhythm irregular. Pulses: Normal pulses. Heart sounds: Normal heart sounds. No murmur heard. Pulmonary:      Effort: Pulmonary effort is normal. No respiratory distress. Breath sounds: Normal breath sounds. Abdominal:      General: Abdomen is flat. Bowel sounds are normal. There is no distension. Palpations: Abdomen is soft. Tenderness: There is no abdominal tenderness. Musculoskeletal:         General: No swelling, tenderness or deformity. Normal range of motion. Cervical back: Normal range of motion and neck supple. No rigidity. Skin:     General: Skin is warm and dry. Capillary Refill: Capillary refill takes less than 2 seconds. Coloration: Skin is not jaundiced or pale. Findings: No bruising. Neurological:      General: No focal deficit present. Mental Status: He is alert and oriented to person, place, and time. Cranial Nerves: No cranial nerve deficit. Motor: No weakness.          Investigations:      Laboratory Testing:  Recent Results (from the past 24 hour(s))   EKG 12 Lead    Collection Time: 03/30/22 12:45 PM   Result Value Ref Range    Ventricular Rate 160 BPM    QRS Duration 82 ms    Q-T Interval 228 ms    QTc Calculation (Bazett) 372 ms    R Axis 37 degrees    T Axis 64 degrees   Basic Metabolic Panel    Collection Time: 03/30/22  1:05 PM   Result Value Ref Range    Glucose 146 (H) 70 - 99 mg/dL    BUN 25 (H) 8 - 23 mg/dL    CREATININE 0.91 0.70 - 1.20 mg/dL    Bun/Cre Ratio 27 (H) 9 - 20    Calcium 9.9 8.6 - 10.4 mg/dL    Sodium 138 135 - 144 mmol/L    Potassium 4.7 3.7 - 5.3 mmol/L    Chloride 102 98 - 107 mmol/L    CO2 25 20 - 31 mmol/L    Anion Gap 11 9 - 17 mmol/L    GFR Non-African American >60 >60 mL/min    GFR African American >60 >60 mL/min    GFR Comment         CBC with Auto Differential    Collection Time: 03/30/22  1:05 PM   Result Value Ref Range    WBC 8.9 3.5 - 11.3 k/uL    RBC 6.02 (H) 4.21 - 5.77 m/uL    Hemoglobin 18.5 (H) 13.0 - 17.0 g/dL    Hematocrit 55.4 (H) 40.7 - 50.3 %    MCV 92.0 82.6 - 102.9 fL    MCH 30.7 25.2 - 33.5 pg    MCHC 33.4 28.4 - 34.8 g/dL    RDW 14.0 11.8 - 14.4 %    Platelets 253 140 - 738 k/uL    MPV 9.0 8.1 - 13.5 fL    NRBC Automated 0.0 0.0 per 100 WBC    Seg Neutrophils 67 (H) 36 - 65 %    Lymphocytes 17 (L) 24 - 43 %    Monocytes 11 3 - 12 %    Eosinophils % 3 1 - 4 %    Basophils 1 0 - 2 %    Immature Granulocytes 1 (H) 0 %    Segs Absolute 6.03 1.50 - 8.10 k/uL    Absolute Lymph # 1.47 1.10 - 3.70 k/uL    Absolute Mono # 1.01 0.10 - 1.20 k/uL    Absolute Eos # 0.29 0.00 - 0.44 k/uL    Basophils Absolute 0.05 0.00 - 0.20 k/uL    Absolute Immature Granulocyte 0.05 0.00 - 0.30 k/uL   Magnesium    Collection Time: 03/30/22  1:05 PM   Result Value Ref Range    Magnesium 2.0 1.6 - 2.6 mg/dL   Protime-INR    Collection Time: 03/30/22  1:05 PM   Result Value Ref Range    Protime 12.4 11.5 - 14.2 sec    INR 0.9    APTT    Collection Time: 03/30/22  1:05 PM   Result Value Ref Range    PTT 27.1 23.9 - 33.8 sec   Troponin    Collection Time: 03/30/22  1:05 PM   Result Value Ref Range    Troponin, High Sensitivity 16 0 - 22 ng/L   Troponin    Collection Time: 03/30/22  3:18 PM   Result Value Ref Range    Troponin, High Sensitivity 17 0 - 22 ng/L       Imaging/Diagnostics:  XR CHEST PORTABLE    Result Date: 3/30/2022  No acute chest abnormality. Stable mild cardiomegaly.        Assessment :      Hospital Problems           Last Modified POA    * (Principal) Atrial fibrillation with RVR ([address] 3/30/2022 Yes Essential hypertension 3/30/2022 Yes    SEBASTIAN (obstructive sleep apnea) 3/30/2022 Yes    Primary insomnia 3/30/2022 Yes    Generalized anxiety disorder 3/30/2022 Yes          Plan:     Patient status inpatient in the  Progressive Unit/Step down    1. Atrial fibrillation with rapid ventricular response  1. Transition IV Cardizem to oral regiment  2. As needed metoprolol  3. Cardiology consultation as requested by the patient  2. Primary essential hypertension  1. Blood pressure well controlled  2. Recommend restarting metoprolol on an outpatient basis  3. Obstructive sleep apnea, likely contributing to paroxysmal atrial fibrillation versus primary cause  1. Continue BiPAP at night  2. Continue to recommend outpatient follow-up with BiPAP titration and mask fitting  4. Generalized anxiety disorder with primary insomnia  1. As needed Ativan, high-dose benzos as patient has failed benzodiazepine for insomnia in the past  2. Initiate Paxil at night and recommend outpatient follow-up with PCP    Consultations:   IP CONSULT TO HOSPITALIST    Patient is admitted as inpatient status because of co-morbidities listed above, severity of signs and symptoms as outlined, requirement for current medical therapies and most importantly because of direct risk to patient if care not provided in a hospital setting. Expected length of stay > 48 hours.     RU Angulo NP  3/30/2022  4:31 PM    Copy sent to Dr. Roseline Dougherty MD

## 2022-03-30 NOTE — RT PROTOCOL NOTE
RT Inhaler-Nebulizer Bronchodilator Protocol Note    There is a bronchodilator order in the chart from a provider indicating to follow the RT Bronchodilator Protocol and there is an Initiate RT Inhaler-Nebulizer Bronchodilator Protocol order as well (see protocol at bottom of note). CXR Findings:  XR CHEST PORTABLE    Result Date: 3/30/2022  No acute chest abnormality. Stable mild cardiomegaly. The findings from the last RT Protocol Assessment were as follows:   History Pulmonary Disease: None or smoker <15 pack years  Respiratory Pattern: Regular pattern and RR 12-20 bpm  Breath Sounds: Slightly diminished and/or crackles  Cough: Strong, spontaneous, non-productive  Indication for Bronchodilator Therapy: On home bronchodilators (PRN Alb MDI)  Bronchodilator Assessment Score: 2    Aerosolized bronchodilator medication orders have been revised according to the RT Inhaler-Nebulizer Bronchodilator Protocol below. Respiratory Therapist to perform RT Therapy Protocol Assessment initially then follow the protocol. Repeat RT Therapy Protocol Assessment PRN for score 0-3 or on second treatment, BID, and PRN for scores above 3. No Indications - adjust the frequency to every 6 hours PRN wheezing or bronchospasm, if no treatments needed after 48 hours then discontinue using Per Protocol order mode. If indication present, adjust the RT bronchodilator orders based on the Bronchodilator Assessment Score as indicated below. Use Inhaler orders unless patient has one or more of the following: on home nebulizer, not able to hold breath for 10 seconds, is not alert and oriented, cannot activate and use MDI correctly, or respiratory rate 25 breaths per minute or more, then use the equivalent nebulizer order(s) with same Frequency and PRN reasons based on the score. If a patient is on this medication at home then do not decrease Frequency below that used at home.     0-3 - enter or revise RT bronchodilator order(s) to equivalent RT Bronchodilator order with Frequency of every 4 hours PRN for wheezing or increased work of breathing using Per Protocol order mode. 4-6 - enter or revise RT Bronchodilator order(s) to two equivalent RT bronchodilator orders with one order with BID Frequency and one order with Frequency of every 4 hours PRN wheezing or increased work of breathing using Per Protocol order mode. 7-10 - enter or revise RT Bronchodilator order(s) to two equivalent RT bronchodilator orders with one order with TID Frequency and one order with Frequency of every 4 hours PRN wheezing or increased work of breathing using Per Protocol order mode. 11-13 - enter or revise RT Bronchodilator order(s) to one equivalent RT bronchodilator order with QID Frequency and an Albuterol order with Frequency of every 4 hours PRN wheezing or increased work of breathing using Per Protocol order mode. Greater than 13 - enter or revise RT Bronchodilator order(s) to one equivalent RT bronchodilator order with every 4 hours Frequency and an Albuterol order with Frequency of every 2 hours PRN wheezing or increased work of breathing using Per Protocol order mode. RT to enter RT Home Evaluation for COPD & MDI Assessment order using Per Protocol order mode.     Electronically signed by Fabio Renteria RCP on 3/30/2022 at 6:35 PM

## 2022-03-31 VITALS
DIASTOLIC BLOOD PRESSURE: 75 MMHG | SYSTOLIC BLOOD PRESSURE: 123 MMHG | OXYGEN SATURATION: 95 % | WEIGHT: 200 LBS | BODY MASS INDEX: 28.63 KG/M2 | HEART RATE: 71 BPM | RESPIRATION RATE: 16 BRPM | HEIGHT: 70 IN | TEMPERATURE: 97.5 F

## 2022-03-31 LAB
ALBUMIN SERPL-MCNC: 3.4 G/DL (ref 3.5–5.2)
ALP BLD-CCNC: 35 U/L (ref 40–129)
ALT SERPL-CCNC: 35 U/L (ref 5–41)
ANION GAP SERPL CALCULATED.3IONS-SCNC: 10 MMOL/L (ref 9–17)
AST SERPL-CCNC: 20 U/L
BILIRUB SERPL-MCNC: 0.69 MG/DL (ref 0.3–1.2)
BUN BLDV-MCNC: 26 MG/DL (ref 8–23)
BUN/CREAT BLD: 28 (ref 9–20)
CALCIUM SERPL-MCNC: 8.5 MG/DL (ref 8.6–10.4)
CHLORIDE BLD-SCNC: 104 MMOL/L (ref 98–107)
CO2: 23 MMOL/L (ref 20–31)
CREAT SERPL-MCNC: 0.93 MG/DL (ref 0.7–1.2)
EKG Q-T INTERVAL: 228 MS
EKG QRS DURATION: 82 MS
EKG QTC CALCULATION (BAZETT): 372 MS
EKG R AXIS: 37 DEGREES
EKG T AXIS: 64 DEGREES
EKG VENTRICULAR RATE: 160 BPM
GFR AFRICAN AMERICAN: >60 ML/MIN
GFR NON-AFRICAN AMERICAN: >60 ML/MIN
GFR SERPL CREATININE-BSD FRML MDRD: ABNORMAL ML/MIN/{1.73_M2}
GLUCOSE BLD-MCNC: 107 MG/DL (ref 70–99)
MAGNESIUM: 2 MG/DL (ref 1.6–2.6)
POTASSIUM SERPL-SCNC: 4.8 MMOL/L (ref 3.7–5.3)
PRO-BNP: 208 PG/ML
SODIUM BLD-SCNC: 137 MMOL/L (ref 135–144)
TOTAL PROTEIN: 5.5 G/DL (ref 6.4–8.3)
TSH SERPL DL<=0.05 MIU/L-ACNC: 1.45 UIU/ML (ref 0.3–5)

## 2022-03-31 PROCEDURE — 99238 HOSP IP/OBS DSCHRG MGMT 30/<: CPT | Performed by: INTERNAL MEDICINE

## 2022-03-31 PROCEDURE — 80053 COMPREHEN METABOLIC PANEL: CPT

## 2022-03-31 PROCEDURE — 6370000000 HC RX 637 (ALT 250 FOR IP): Performed by: NURSE PRACTITIONER

## 2022-03-31 PROCEDURE — 6370000000 HC RX 637 (ALT 250 FOR IP): Performed by: INTERNAL MEDICINE

## 2022-03-31 PROCEDURE — 83880 ASSAY OF NATRIURETIC PEPTIDE: CPT

## 2022-03-31 PROCEDURE — 36415 COLL VENOUS BLD VENIPUNCTURE: CPT

## 2022-03-31 PROCEDURE — 6360000002 HC RX W HCPCS: Performed by: NURSE PRACTITIONER

## 2022-03-31 PROCEDURE — 84443 ASSAY THYROID STIM HORMONE: CPT

## 2022-03-31 PROCEDURE — 83735 ASSAY OF MAGNESIUM: CPT

## 2022-03-31 PROCEDURE — 2580000003 HC RX 258: Performed by: NURSE PRACTITIONER

## 2022-03-31 RX ORDER — DILTIAZEM HYDROCHLORIDE 120 MG/1
120 CAPSULE, COATED, EXTENDED RELEASE ORAL DAILY
Qty: 30 CAPSULE | Refills: 3 | Status: SHIPPED | OUTPATIENT
Start: 2022-03-31

## 2022-03-31 RX ORDER — DILTIAZEM HYDROCHLORIDE 120 MG/1
120 CAPSULE, COATED, EXTENDED RELEASE ORAL DAILY
Status: DISCONTINUED | OUTPATIENT
Start: 2022-03-31 | End: 2022-03-31 | Stop reason: HOSPADM

## 2022-03-31 RX ORDER — PAROXETINE HYDROCHLORIDE 20 MG/1
20 TABLET, FILM COATED ORAL NIGHTLY
Qty: 30 TABLET | Refills: 3 | Status: SHIPPED | OUTPATIENT
Start: 2022-03-31 | End: 2022-09-09

## 2022-03-31 RX ADMIN — SODIUM CHLORIDE, PRESERVATIVE FREE 10 ML: 5 INJECTION INTRAVENOUS at 08:09

## 2022-03-31 RX ADMIN — ENOXAPARIN SODIUM 90 MG: 100 INJECTION SUBCUTANEOUS at 08:02

## 2022-03-31 RX ADMIN — PANTOPRAZOLE SODIUM 40 MG: 40 TABLET, DELAYED RELEASE ORAL at 08:02

## 2022-03-31 RX ADMIN — DILTIAZEM HYDROCHLORIDE 120 MG: 120 CAPSULE, EXTENDED RELEASE ORAL at 08:16

## 2022-03-31 RX ADMIN — DILTIAZEM HYDROCHLORIDE 30 MG: 30 TABLET, FILM COATED ORAL at 02:33

## 2022-03-31 RX ADMIN — ACETAMINOPHEN 650 MG: 325 TABLET ORAL at 08:16

## 2022-03-31 RX ADMIN — LORAZEPAM 4 MG: 2 INJECTION INTRAMUSCULAR at 02:46

## 2022-03-31 RX ADMIN — SPIRONOLACTONE 50 MG: 25 TABLET ORAL at 08:02

## 2022-03-31 ASSESSMENT — PAIN SCALES - GENERAL
PAINLEVEL_OUTOF10: 9
PAINLEVEL_OUTOF10: 0
PAINLEVEL_OUTOF10: 0

## 2022-03-31 NOTE — PROGRESS NOTES
[facility]  Office: [address], DO, Hakanguerita Schaffer, DO, Andresdada Walker, DO, Desean Sellers Blood, DO, Patel Casiano MD, Dereje Julien MD, Stacie Barreto MD, Esequiel Coyle MD, Antonia De Oliveira MD, Tay Blankenship MD, Joshua Loya MD, Trupti Leigh, DO, Milan Lam, DO, Kelly Perez MD,  Sebas Bautista DO, Sary Hendrix MD, Bianca Charles MD, Fabrice Grgeory MD, Eneida Castro DO, Avi Johnson MD, Jerry Goodman MD, Art Quintana, [facility], [facility], [facility], Roel Montero, CNP, Mundo Brito, CNS, Reg Guerra, CNP, Hollie Nicolas, CNP, Yamila Bhagat, CNP, Felicia Burdick, CNP, Kane Ordaz CNP, Luke Marte PA-C, Chandler Askew [facility], Lieutenant Diggs [facility], Whit Grimes, CNP, Vickie Gonzalez, CNP, Rowdy Santo, [facility]    Progress Note    3/31/2022    7:58 AM    Name:   Micha Sanabria  MRN:     [id number]     Acct:      [de-identified]   Room:   [facility] Day:  1  Admit Date:  3/30/2022 12:50 PM    PCP:   Solis Shannon MD  Code Status:  Full Code    Subjective:     C/C:   Chief Complaint   Patient presents with    Chest Pain    Atrial Fibrillation     Interval History Status: improved. Feels good today  Converted back to nsr  No longer in afib    Had self-d/c'd several meds at home including anticoagulation    Brief History:     Per my JEANNA:  Jeffery Burciaga is a 62 y.o. Non- / non  male who presents with Chest Pain and Atrial Fibrillation   and is admitted to the hospital for the management of Atrial fibrillation with RVR ([facility] [address]    Patient reports to the emergency department with severe fatigue and palpitations in his chest.  Patient reports that he has had intermittent chest palpitations and chest tightness every several weeks for the past several months. Patient was seen and evaluated in the emergency department multiple times for this and diagnosed with anxiety attacks.   Patient reports that he has a history of atrial fibrillation for which she is supposed to be taking beta-blockers however due to side effects he has been noncompliant with his beta-blockers. Patient reports that he takes Eliquis daily however he recently self discontinued this due to his recurrent anxiety attacks and fatigue, he has been attempting to limit his oral medications to prevent side effects. Today the patient reports that in the middle of the night he developed severe shortness of breath and palpitations. Patient was in the middle of a sleep study for likely obstructive sleep apnea when the symptoms began. Patient attempted rest and taking his home medications without resolution of his symptoms and he therefore reported to the emergency department today where he was found to be in A. fib with RVR with a rate in the 170s. Patient was started on Cardizem drip and rate was quickly controlled. \"    Review of Systems:     Constitutional:  negative for chills, fevers, sweats  Respiratory:  negative for cough, dyspnea on exertion, shortness of breath, wheezing  Cardiovascular:  negative for chest pain, chest pressure/discomfort, lower extremity edema, palpitations  Gastrointestinal:  negative for abdominal pain, constipation, diarrhea, nausea, vomiting  Neurological:  negative for dizziness, headache    Medications: Allergies:     Allergies   Allergen Reactions    Pcn [Penicillins] Hives       Current Meds:   Scheduled Meds:    dilTIAZem  30 mg Oral 4 times per day    pantoprazole  40 mg Oral QAM AC    spironolactone  50 mg Oral Daily    tamsulosin  0.4 mg Oral Daily    sodium chloride flush  5-40 mL IntraVENous 2 times per day    enoxaparin  1 mg/kg SubCUTAneous BID    PARoxetine  20 mg Oral Nightly     Continuous Infusions:    dilTIAZem (CARDIZEM) 125 mg in dextrose 5% 125 mL infusion Stopped (03/30/22 1623)    sodium chloride       PRN Meds: albuterol sulfate HFA, sodium chloride flush, sodium chloride, ondansetron **OR** ondansetron, polyethylene glycol, acetaminophen **OR** acetaminophen, metoprolol, LORazepam    Data:     Past Medical History:   has a past medical history of Abscess of bursa of right elbow, Arthritis, Atrial fibrillation ([address] BPH (benign prostatic hyperplasia), Heartburn, Hypertension, Panic attack, Polycythemia, PONV (postoperative nausea and vomiting), and Sleep apnea. Social History:   reports that he quit smoking about 14 years ago. His smoking use included cigars. He has a 20.00 pack-year smoking history. He has never used smokeless tobacco. He reports current alcohol use. He reports current drug use. Drug: Marijuana Tyler Cas). Family History:   Family History   Problem Relation Age of Onset    Cancer Father         prostrate    Diabetes Father     Cancer Paternal Uncle         colon       Vitals:  /84   Pulse 72   Temp 97.3 °F (36.3 °C) (Oral)   Resp 16   Ht 5' 10\" (1.778 m)   Wt 200 lb (90.7 kg)   SpO2 96%   BMI 28.70 kg/m²   Temp (24hrs), Av.6 °F (36.4 °C), Min:97.3 °F (36.3 °C), Max:98.2 °F (36.8 °C)    No results for input(s): POCGLU in the last 72 hours. I/O (24Hr):   No intake or output data in the 24 hours ending 22 0758    Labs:  Hematology:  Recent Labs     22  1305   WBC 8.9   RBC 6.02*   HGB 18.5*   HCT 55.4*   MCV 92.0   MCH 30.7   MCHC 33.4   RDW 14.0      MPV 9.0   INR 0.9     Chemistry:  Recent Labs     22  1305 22  1518 22  0504     --  137   K 4.7  --  4.8     --  104   CO2 25  --  23   GLUCOSE 146*  --  107*   BUN 25*  --  26*   CREATININE 0.91  --  0.93   MG 2.0  --  2.0   ANIONGAP 11  --  10   LABGLOM >60  --  >60   GFRAA >60  --  >60   CALCIUM 9.9  --  8.5*   PROBNP  --   --  208   TROPHS 16 17  --      Recent Labs     22  0504   PROT 5.5*   LABALBU 3.4*   TSH 1.45   AST 20   ALT 35   ALKPHOS 35*   BILITOT 0.69     ABG:No results found for: POCPH, PHART, PH, POCPCO2, VNU2AVY, PCO2, POCPO2, PO2ART, PO2, POCHCO3, BQB5BQW, HCO3, NBEA, PBEA, BEART, BE, THGBART, THB, NVZ9HBC, QZTS2VDG, U6LZBRNN, O2SAT, FIO2  Lab Results   Component Value Date/Time    SPECIAL NOT REPORTED 07/13/2021 11:34 AM     Lab Results   Component Value Date/Time    CULTURE NO GROWTH 5 DAYS 07/13/2021 11:34 AM       Radiology:  XR CHEST PORTABLE    Result Date: 3/30/2022  No acute chest abnormality. Stable mild cardiomegaly. Physical Examination:        General appearance:  alert, cooperative and no distress  Mental Status:  oriented to person, place and time and normal affect  Lungs:  clear to auscultation bilaterally, normal effort  Heart:  regular rate and rhythm, no murmur  Abdomen:  soft, nontender, nondistended, normal bowel sounds, no masses, hepatomegaly, splenomegaly  Extremities:  no edema, redness, tenderness in the calves  Skin:  no gross lesions, rashes, induration    Assessment:        Hospital Problems           Last Modified POA    * (Principal) Atrial fibrillation with RVR ([address] 3/30/2022 Yes    Essential hypertension 3/30/2022 Yes    SEBASTIAN (obstructive sleep apnea) 3/30/2022 Yes    Primary insomnia 3/30/2022 Yes    Generalized anxiety disorder 3/30/2022 Yes          Plan:        1. Switch short acting cardizem to long acting  2. Resume eliquis  3. Needs sebastian treated  4. Dc home today  5.  Has follow up appt with uriah Tobias,   3/31/2022  7:58 AM

## 2022-03-31 NOTE — DISCHARGE SUMMARY
[facility]  Office: [address], DO, Rica Simple, DO, Stephen You, DO, Fatemehcamden Fontana Blood, DO, Stephanie Chicas MD, Madison Gallardo MD, Frederic Mario MD, Danita De Oliveira MD, Khai Zayas MD, Jerrod William MD, Cass Morales MD, Breezy Omalley, DO, Ana Rosa Grimes, DO, Michael Godwin MD,  Eunice Lou, DO, Jessie Still MD, Kj Rivas MD, Priya Walker MD, Stacy Paiz DO, Anand Mae MD, Dimitrios Patricia MD, Janee Burgess, [facility], [facility], CNP, Viky Howard CNP, Antonino Pennington, CNS, Hema Gao, CNP, Alonso Fisher, CNP, Dary Jarquin, CNP, Fidelia Davis, CNP, Neelam Ramires, CNP, Kiki Bishop PA-C, Vicky Munoz, [facility], Alice Yarbrough [facility], Kenneth Nielson, CNP, Gustavo Andrews, CNP, Shaunna Fuchs, [facility]    Discharge Summary     Patient ID: Gabriel Ellison  :  1960   MRN: [id number]     ACCOUNT:  [de-identified]   Patient's PCP: Han Deng MD  Admit Date: 3/30/2022   Discharge Date: 3/31/2022     Length of Stay: 1  Code Status:  Full Code  Admitting Physician: Rashawn Tobias DO  Discharge Physician: Rashawn Tobias DO     Active Discharge Diagnoses:     Hospital Problem Lists:  Principal Problem:    Atrial fibrillation with RVR [facility])  Active Problems:    Essential hypertension    SEBASTIAN (obstructive sleep apnea)    Primary insomnia    Generalized anxiety disorder  Resolved Problems:    * No resolved hospital problems. *      Admission Condition:  fair     Discharged Condition: good    Hospital Stay:     Hospital Course:  Gabriel Ellison is a 62 y.o. male who was admitted for the management of  Atrial fibrillation with RVR ([facility] [address] , presented to ER with Chest Pain and Atrial Fibrillation    Admitted with rapid afib, placed on cardizem drip which lowered hr quite well. He was switched to po cardizem and discharged home on it. Also told to resume his eliquis.   He already has a cardiology appt in near future      Significant therapeutic interventions: see above    Significant Diagnostic Studies:   Labs / Micro:  CBC:   Lab Results   Component Value Date    WBC 8.9 03/30/2022    RBC 6.02 03/30/2022    HGB 18.5 03/30/2022    HCT 55.4 03/30/2022    MCV 92.0 03/30/2022    MCH 30.7 03/30/2022    MCHC 33.4 03/30/2022    RDW 14.0 03/30/2022     03/30/2022     CMP:    Lab Results   Component Value Date    GLUCOSE 107 03/31/2022     03/31/2022    K 4.8 03/31/2022     03/31/2022    CO2 23 03/31/2022    BUN 26 03/31/2022    CREATININE 0.93 03/31/2022    ANIONGAP 10 03/31/2022    ALKPHOS 35 03/31/2022    ALT 35 03/31/2022    AST 20 03/31/2022    BILITOT 0.69 03/31/2022    LABALBU 3.4 03/31/2022    ALBUMIN 1.8 03/18/2022    LABGLOM >60 03/31/2022    GFRAA >60 03/31/2022    GFR      03/31/2022    PROT 5.5 03/31/2022    CALCIUM 8.5 03/31/2022        Radiology:  XR CHEST PORTABLE    Result Date: 3/30/2022  No acute chest abnormality. Stable mild cardiomegaly. Consultations:    Consults:     Final Specialist Recommendations/Findings:   IP CONSULT TO HOSPITALIST  IP CONSULT TO CARDIOLOGY      The patient was seen and examined on day of discharge and this discharge summary is in conjunction with any daily progress note from day of discharge.     Discharge plan:     Disposition: Home    Physician Follow Up:     MD Alex Bowden [address] [phone]    Schedule an appointment as soon as possible for a visit      Campbell Rubio  [facility] [address] [phone]             Requiring Further Evaluation/Follow Up POST HOSPITALIZATION/Incidental Findings: afib    Diet: regular diet    Activity: As tolerated    Instructions to Patient: take medications as prescribed      Discharge Medications:      Medication List      START taking these medications    dilTIAZem 120 MG extended release capsule  Commonly known as: CARDIZEM CD  Take 1 capsule by mouth daily PARoxetine 20 MG tablet  Commonly known as: PAXIL  Take 1 tablet by mouth at bedtime        CONTINUE taking these medications    albuterol sulfate  (90 Base) MCG/ACT inhaler  Commonly known as: Proventil HFA  Inhale 2 puffs into the lungs every 6 hours as needed for Wheezing     ALPRAZolam 0.25 MG tablet  Commonly known as: XANAX     busPIRone 5 MG tablet  Commonly known as: BUSPAR     Eliquis 5 MG Tabs tablet  Generic drug: apixaban     glucose monitoring kit  1 kit by Does not apply route daily     irbesartan 300 MG tablet  Commonly known as: AVAPRO     pantoprazole 40 MG tablet  Commonly known as: PROTONIX  TAKE 1 TABLET BY MOUTH TWO TIMES A DAY     spironolactone 50 MG tablet  Commonly known as: ALDACTONE     therapeutic multivitamin-minerals tablet     varenicline 1 MG tablet  Commonly known as: Chantix  Take 1 tablet by mouth daily starting on Day 8 for the remainder of treatment. vitamin C 500 MG tablet  Commonly known as: ASCORBIC ACID     zinc gluconate 50 MG tablet     zolpidem 5 MG tablet  Commonly known as: Ambien  Take 1 tablet by mouth nightly as needed for Sleep for up to 30 days. STOP taking these medications    cloNIDine 0.1 MG tablet  Commonly known as: CATAPRES           Where to Get Your Medications      These medications were sent to [facility] [address] [phone] Bernice Habermann [phone]  [address] [place] [address]    Phone: [phone]   · dilTIAZem 120 MG extended release capsule  · PARoxetine 20 MG tablet         No discharge procedures on file. Time Spent on discharge is  20 mins in patient examination, evaluation, counseling as well as medication reconciliation, prescriptions for required medications, discharge plan and follow up. Electronically signed by   Sruthi Tobias DO  3/31/2022  8:03 AM      Thank you Dr. Martha Barton MD for the opportunity to be involved in this patient's care.

## 2022-03-31 NOTE — PROGRESS NOTES
CLINICAL PHARMACY NOTE: MEDS TO BEDS    Total # of Prescriptions Filled: 2   The following medications were delivered to the patient:  · Paroxetine 20mg  · Diltiazem er 120    Additional Documentation:

## 2022-03-31 NOTE — PROGRESS NOTES
RT spoke with pt regarding Bipap use tonight. RT informed pt that he is allowed to have his home unit brought in or we can set one of our units up on him. Pt states that he can't stand wearing his home unit and does not wear it very much. Pt is not interested in wearing out Bipap unit. RN aware and spoke with pt as well.

## 2022-03-31 NOTE — CARE COORDINATION
Case Management Initial Discharge Plan  Dene Sessions,         Readmission Risk              Risk of Unplanned Readmission:  23             Met with:patient to discuss discharge plans. Information verified: address, contacts, phone number, , insurance Yes  PCP: Nyoka Hodgkins, MD  Date of last visit: 3/8    Insurance Provider: [employer]    Discharge Planning  Current Residence:  Private home   Living Arrangements:  Spouse/Significant Other       Home has 2 stories/few  stairs to climb  Support Systems:  Spouse/Significant Other         Current Services PTA:  None   Agency: none        Patient able to perform ADL's:Independent  DME in home:  none   DME used to aid ambulation prior to admission:   None   DME used during admission:  None      Potential Assistance Needed:  N/A     [facility]armacy[facility]           Potential Assistance Purchasing Medications:  No  Does patient want to participate in local refill/ meds to beds program?  Yes     Patient agreeable to home care: No  [place] of choice provided:  n/a        Type of Home Care Services:  None  Patient expects to be discharged to:    home      Prior SNF/Rehab Placement and Facility: none   Agreeable to SNF/Rehab: No  [place] of choice provided: n/a   Evaluation: n/a     Expected Discharge date:  3/31/22  Follow Up Appointment: Best Day/ Time:   none      Transportation provider: per girlfriend   Transportation arrangements needed for discharge: No     Discharge Plan:   Met with patient to follow up on plan of care. Patient lives at home with significant other . Very young, active and independent. No DME     Patient admitted with A fib and was on elquis at home but was non compliant. Discussed importance of taking his medications. He did have the vouchers and still has medications at home     He is requesting doctor write him script for assisted him in sleep last night. Did pull up med sheet and he was given IV ativan.  Explained he would have t to discuss that and pain medication with his pcp. No anticipated needs.      Electronically signed by Veronica Garcia RN on 3/31/22 at 12:27 PM EDT

## 2022-03-31 NOTE — PLAN OF CARE
Pt's only complaint was being able to sleep. Pt sleeping well overnight and verbalized understanding of safety protocols with use of bed alarm and call light.    Problem: Pain:  Goal: Pain level will decrease  Description: Pain level will decrease  Outcome: Ongoing  Goal: Control of acute pain  Description: Control of acute pain  Outcome: Ongoing  Goal: Control of chronic pain  Description: Control of chronic pain  Outcome: Ongoing     Problem: Falls - Risk of:  Goal: Will remain free from falls  Description: Will remain free from falls  Outcome: Ongoing  Goal: Absence of physical injury  Description: Absence of physical injury  Outcome: Ongoing

## 2022-04-04 ENCOUNTER — TELEPHONE (OUTPATIENT)
Dept: INTERNAL MEDICINE CLINIC | Age: 62
End: 2022-04-04

## 2022-04-04 ENCOUNTER — OFFICE VISIT (OUTPATIENT)
Dept: INTERNAL MEDICINE CLINIC | Age: 62
End: 2022-04-04
Payer: COMMERCIAL

## 2022-04-04 VITALS
BODY MASS INDEX: 28.92 KG/M2 | OXYGEN SATURATION: 96 % | DIASTOLIC BLOOD PRESSURE: 82 MMHG | HEART RATE: 77 BPM | WEIGHT: 202 LBS | HEIGHT: 70 IN | SYSTOLIC BLOOD PRESSURE: 128 MMHG

## 2022-04-04 DIAGNOSIS — I48.91 NEW ONSET A-FIB (HCC): Primary | ICD-10-CM

## 2022-04-04 PROCEDURE — 1111F DSCHRG MED/CURRENT MED MERGE: CPT | Performed by: INTERNAL MEDICINE

## 2022-04-04 PROCEDURE — 99495 TRANSJ CARE MGMT MOD F2F 14D: CPT | Performed by: INTERNAL MEDICINE

## 2022-04-04 RX ORDER — PANTOPRAZOLE SODIUM 40 MG/1
TABLET, DELAYED RELEASE ORAL
Qty: 30 TABLET | Refills: 0 | Status: SHIPPED | OUTPATIENT
Start: 2022-04-04 | End: 2022-04-27

## 2022-04-04 RX ORDER — IRBESARTAN 300 MG/1
300 TABLET ORAL NIGHTLY
Qty: 30 TABLET | Refills: 3 | Status: SHIPPED | OUTPATIENT
Start: 2022-04-04

## 2022-04-04 RX ORDER — SPIRONOLACTONE 50 MG/1
50 TABLET, FILM COATED ORAL DAILY
Qty: 30 TABLET | Refills: 3 | Status: SHIPPED | OUTPATIENT
Start: 2022-04-04

## 2022-04-04 NOTE — PROGRESS NOTES
Visit Information    Have you changed or started any medications since your last visit including any over-the-counter medicines, vitamins, or herbal medicines? yes - Paxil, Cardizem CD  Are you having any side effects from any of your medications? -  no  Have you stopped taking any of your medications? Is so, why? -  yes - Catapres    Have you seen any other physician or provider since your last visit? Yes - Records Obtained  Have you had any other diagnostic tests since your last visit? Yes - Records Obtained  Have you been seen in the emergency room and/or had an admission to a hospital since we last saw you? Yes - Records Obtained  Have you had your routine dental cleaning in the past 6 months? no    Have you activated your [employer] account? If not, what are your barriers?  Yes     Patient Care Team:  Jerry Garcia MD as PCP - General (Internal Medicine)  Jerry Garcia MD as PCP - [facility]  Dakota Mcfarland MD as Consulting Physician (Gastroenterology)    Medical History Review  Past Medical, Family, and Social History reviewed and does contribute to the patient presenting condition    Health Maintenance   Topic Date Due    Hepatitis C screen  Never done    COVID-19 Vaccine (1) Never done    Pneumococcal 0-64 years Vaccine (1 of 2 - PPSV23) Never done    HIV screen  Never done    DTaP/Tdap/Td vaccine (1 - Tdap) Never done    Shingles Vaccine (1 of 2) Never done    Low dose CT lung screening  12/19/2020    Depression Screen  10/26/2022    Potassium monitoring  03/31/2023    Creatinine monitoring  03/31/2023    Colorectal Cancer Screen  11/23/2024    Diabetes screen  03/18/2025    Lipid screen  03/18/2027    Flu vaccine  Completed    Hepatitis A vaccine  Aged Out    Hepatitis B vaccine  Aged Out    Hib vaccine  Aged Out    Meningococcal (ACWY) vaccine  Aged Out

## 2022-04-04 NOTE — TELEPHONE ENCOUNTER
[facility] Transitions Initial Follow Up Call    Outreach made within 2 business days of discharge: Yes    Patient: Opal Nazario Patient : 1960   MRN: [id number]  Reason for Admission:Atrial Fibrillation with RVR Discharge Date: 3/31/22       Spoke with: Marian Lewis (Patient)    Discharge department/facility: [facility]     TCM Interactive Patient Contact:  Was patient able to fill all prescriptions: Yes  Was patient instructed to bring all medications to the follow-up visit: Yes  Is patient taking all medications as directed in the discharge summary?  Yes  Does patient understand their discharge instructions: Yes  Does patient have questions or concerns that need addressed prior to 7-14 day follow up office visit: yes - appointment schedule for 22    Scheduled appointment with PCP within 7-14 days    Follow Up  Future Appointments   Date Time Provider Jose Armando Cooper   2022  1:45 PM Darlin Silverio MD [address] [place] [address]

## 2022-04-05 NOTE — PROGRESS NOTES
Post-Discharge Transitional Care  Follow Up      Korey Falcon   YOB: 1960    Date of Office Visit:  4/4/2022  Date of Hospital Admission: 3/30/22  Date of Hospital Discharge: 3/31/22  Risk of hospital readmission (high >=14%. Medium >=10%) :Readmission Risk Score: 17.3 ( )      Care management risk score Rising risk (score 2-5) and Complex Care (Scores >=6): 0     Non face to face  following discharge, date last encounter closed (first attempt may have been earlier): 4/4/2022  9:41 AM    Call initiated 2 business days of discharge: Yes    ASSESSMENT/PLAN:   New onset a-fib ([address]  -     AR DISCHARGE MEDS RECONCILED W/ CURRENT OUTPATIENT MED LIST      Medical Decision Making: moderate complexity  Return in 3 months (on 7/4/2022). On this date 4/4/2022 I have spent 25 minutes reviewing previous notes, test results and face to face with the patient discussing the diagnosis and importance of compliance with the treatment plan as well as documenting on the day of the visit. Subjective:   HPI:  Follow up of Hospital problems/diagnosis(es): afib  Patient has longstanding history of atrial fibrillation, has been managing his medication by himself, stopped taking Eliquis in the past, I had a long discussion with him,  Possibly do not see cardiology,  Had episode of atrial fibrillation, ended up in the ER, getting admitted to the hospital, seen by cardiology, echocardiogram was done,  Started on Cardizem and Eliquis again, strongly advised to continue taking that,  Medications readjusted,  Blood pressure running well today,  Strongly advised to continue taking the medications and follow with cardiology as advised,    Inpatient course: Discharge summary reviewed- see chart.     Interval history/Current status:     Patient Active Problem List   Diagnosis    Rupture of right triceps tendon    Abscess of right elbow    Chest pain    Essential hypertension    Epigastric pain    Gastritis    Esophagitis    New onset a-fib (HCC)    Atrial fibrillation with RVR (HCC)    Polycythemia    Hypoproteinemia (HCC)    Acute bronchitis    Abnormal thallium stress test    Acute appendicitis with localized peritonitis, without perforation, abscess, or gangrene    Acute medial meniscus tear of right knee    Cardiomyopathy ([address]    Chondromalacia of right knee    Erectile dysfunction    Heartburn    Primary localized osteoarthrosis of shoulder region    Renal insufficiency syndrome    Spondylolisthesis of lumbar region    SEBASTIAN (obstructive sleep apnea)    Primary insomnia    Generalized anxiety disorder       Medications listed as ordered at the time of discharge from hospital     Medication List          Accurate as of April 4, 2022 11:59 PM. If you have any questions, ask your nurse or doctor.             CONTINUE taking these medications    albuterol sulfate  (90 Base) MCG/ACT inhaler  Commonly known as: Proventil HFA  Inhale 2 puffs into the lungs every 6 hours as needed for Wheezing     dilTIAZem 120 MG extended release capsule  Commonly known as: CARDIZEM CD  Take 1 capsule by mouth daily     Eliquis 5 MG Tabs tablet  Generic drug: apixaban     glucose monitoring kit  1 kit by Does not apply route daily     irbesartan 300 MG tablet  Commonly known as: AVAPRO  Take 1 tablet by mouth nightly     pantoprazole 40 MG tablet  Commonly known as: PROTONIX  TAKE 1 TABLET BY MOUTH TWO TIMES A DAY     PARoxetine 20 MG tablet  Commonly known as: PAXIL  Take 1 tablet by mouth at bedtime     spironolactone 50 MG tablet  Commonly known as: ALDACTONE  Take 1 tablet by mouth daily Sometimes will take two daily- if feeling bloated     therapeutic multivitamin-minerals tablet     vitamin C 500 MG tablet  Commonly known as: ASCORBIC ACID     zinc gluconate 50 MG tablet           Where to Get Your Medications      These medications were sent to [address], [address] [address]    Phone: [phone]   · irbesartan 300 MG tablet  · pantoprazole 40 MG tablet  · spironolactone 50 MG tablet           Medications marked \"taking\" at this time  Outpatient Medications Marked as Taking for the 4/4/22 encounter (Office Visit) with Solis Shannon MD   Medication Sig Dispense Refill    pantoprazole (PROTONIX) 40 MG tablet TAKE 1 TABLET BY MOUTH TWO TIMES A DAY 30 tablet 0    irbesartan (AVAPRO) 300 MG tablet Take 1 tablet by mouth nightly 30 tablet 3    spironolactone (ALDACTONE) 50 MG tablet Take 1 tablet by mouth daily Sometimes will take two daily- if feeling bloated 30 tablet 3        Medications patient taking as of now reconciled against medications ordered at time of hospital discharge: Yes    A comprehensive review of systems was negative except for what was noted in the HPI.     Objective:    /82   Pulse 77   Ht 5' 10\" (1.778 m)   Wt 202 lb (91.6 kg)   SpO2 96%   BMI 28.98 kg/m²   General Appearance: alert and oriented to person, place and time, well developed and well- nourished, in no acute distress  Skin: warm and dry, no rash or erythema  Head: normocephalic and atraumatic  Eyes: pupils equal, round, and reactive to light, extraocular eye movements intact, conjunctivae normal  ENT: tympanic membrane, external ear and ear canal normal bilaterally, nose without deformity, nasal mucosa and turbinates normal without polyps  Neck: supple and non-tender without mass, no thyromegaly or thyroid nodules, no cervical lymphadenopathy  Pulmonary/Chest: clear to auscultation bilaterally- no wheezes, rales or rhonchi, normal air movement, no respiratory distress  Cardiovascular: Irregularly irregular, rate controlled  Abdomen: soft, non-tender, non-distended, normal bowel sounds, no masses or organomegaly  Extremities: no cyanosis, clubbing or edema  Musculoskeletal: normal range of motion, no joint swelling, deformity or tenderness  Neurologic: reflexes normal and symmetric, no cranial nerve deficit, gait, coordination and speech normal      An electronic signature was used to authenticate this note.   --Lyn Wilson MD

## 2022-04-27 RX ORDER — PANTOPRAZOLE SODIUM 40 MG/1
TABLET, DELAYED RELEASE ORAL
Qty: 30 TABLET | Refills: 0 | Status: SHIPPED | OUTPATIENT
Start: 2022-04-27 | End: 2022-05-17

## 2022-05-17 RX ORDER — PANTOPRAZOLE SODIUM 40 MG/1
TABLET, DELAYED RELEASE ORAL
Qty: 30 TABLET | Refills: 0 | Status: SHIPPED | OUTPATIENT
Start: 2022-05-17 | End: 2022-06-16

## 2022-06-05 ENCOUNTER — HOSPITAL ENCOUNTER (EMERGENCY)
Facility: CLINIC | Age: 62
Discharge: HOME | End: 2022-06-05
Attending: EMERGENCY MEDICINE
Payer: COMMERCIAL

## 2022-06-05 ENCOUNTER — APPOINTMENT (OUTPATIENT)
Dept: GENERAL RADIOLOGY | Facility: CLINIC | Age: 62
End: 2022-06-05
Payer: COMMERCIAL

## 2022-06-05 VITALS
HEART RATE: 87 BPM | RESPIRATION RATE: 16 BRPM | OXYGEN SATURATION: 96 % | BODY MASS INDEX: 28.92 KG/M2 | TEMPERATURE: 98.4 F | WEIGHT: 202 LBS | HEIGHT: 70 IN | DIASTOLIC BLOOD PRESSURE: 77 MMHG | SYSTOLIC BLOOD PRESSURE: 124 MMHG

## 2022-06-05 DIAGNOSIS — Z86.79 HISTORY OF ATRIAL FIBRILLATION: ICD-10-CM

## 2022-06-05 DIAGNOSIS — R00.0 TACHYCARDIA: Primary | ICD-10-CM

## 2022-06-05 LAB
ABSOLUTE EOS #: 0.3 K/UL (ref 0–0.4)
ABSOLUTE LYMPH #: 1.2 K/UL (ref 1–4.8)
ABSOLUTE MONO #: 0.4 K/UL (ref 0.1–1.2)
ANION GAP SERPL CALCULATED.3IONS-SCNC: 9 MMOL/L (ref 9–17)
BASOPHILS # BLD: 1 % (ref 0–2)
BASOPHILS ABSOLUTE: 0 K/UL (ref 0–0.2)
BUN BLDV-MCNC: 17 MG/DL (ref 8–23)
CALCIUM SERPL-MCNC: 9.4 MG/DL (ref 8.6–10.4)
CHLORIDE BLD-SCNC: 105 MMOL/L (ref 98–107)
CO2: 22 MMOL/L (ref 20–31)
CREAT SERPL-MCNC: 0.8 MG/DL (ref 0.7–1.2)
EOSINOPHILS RELATIVE PERCENT: 6 % (ref 1–4)
GFR AFRICAN AMERICAN: >60 ML/MIN
GFR NON-AFRICAN AMERICAN: >60 ML/MIN
GFR SERPL CREATININE-BSD FRML MDRD: ABNORMAL ML/MIN/{1.73_M2}
GLUCOSE BLD-MCNC: 150 MG/DL (ref 70–99)
HCT VFR BLD CALC: 53.8 % (ref 41–53)
HEMOGLOBIN: 17.7 G/DL (ref 13.5–17.5)
LYMPHOCYTES # BLD: 23 % (ref 24–44)
MCH RBC QN AUTO: 31.4 PG (ref 26–34)
MCHC RBC AUTO-ENTMCNC: 33 G/DL (ref 31–37)
MCV RBC AUTO: 95.2 FL (ref 80–100)
MONOCYTES # BLD: 7 % (ref 2–11)
PDW BLD-RTO: 15.3 % (ref 12.5–15.4)
PLATELET # BLD: 228 K/UL (ref 140–450)
PMV BLD AUTO: 7 FL (ref 6–12)
POTASSIUM SERPL-SCNC: 4 MMOL/L (ref 3.7–5.3)
RBC # BLD: 5.64 M/UL (ref 4.5–5.9)
SEG NEUTROPHILS: 63 % (ref 36–66)
SEGMENTED NEUTROPHILS ABSOLUTE COUNT: 3.2 K/UL (ref 1.8–7.7)
SODIUM BLD-SCNC: 136 MMOL/L (ref 135–144)
TROPONIN, HIGH SENSITIVITY: 18 NG/L (ref 0–22)
WBC # BLD: 5 K/UL (ref 3.5–11)

## 2022-06-05 PROCEDURE — 93005 ELECTROCARDIOGRAM TRACING: CPT | Performed by: EMERGENCY MEDICINE

## 2022-06-05 PROCEDURE — 85025 COMPLETE CBC W/AUTO DIFF WBC: CPT

## 2022-06-05 PROCEDURE — 84484 ASSAY OF TROPONIN QUANT: CPT

## 2022-06-05 PROCEDURE — 99285 EMERGENCY DEPT VISIT HI MDM: CPT

## 2022-06-05 PROCEDURE — 36415 COLL VENOUS BLD VENIPUNCTURE: CPT

## 2022-06-05 PROCEDURE — 71045 X-RAY EXAM CHEST 1 VIEW: CPT

## 2022-06-05 PROCEDURE — 80048 BASIC METABOLIC PNL TOTAL CA: CPT

## 2022-06-05 NOTE — ED NOTES
Patient to ED with wife to room 8  Here for complaint of palpitations, a-fib, and hypotension  Patient states that he woke up today feeling weak, checked his blood pressure and got a reading of 80/60, then felt his heart beating out of his chest  Patient states he did take his prescribed medications which were just changed after patient was admitted for afib and had his medications changed around  On arrival to ED, patient is no longer in a-fib and has a BP of 124/77.  Patient states he is feeling better than he did  States he did have a small episode of chest pain but not anymore  Denies SOB or N/V    Vitals obtained, EKG obtained, placed on monitor  IV started and blood work obtained  Respirations even and non-labored  Dr. Dominguez Members at bedside to evaluate patient     Hugo Fung RN  06/05/22 2099

## 2022-06-05 NOTE — ED PROVIDER NOTES
[address]      Pt Name: Colette Ramsey  MRN: [id number]  [place] 1960  Date of evaluation: 6/5/2022      CHIEF COMPLAINT       Chief Complaint   Patient presents with    Hypotension    Atrial Fibrillation    Palpitations         HISTORY OF PRESENT ILLNESS      The patient presents with palpitations. He says he today he awoke and felt very fatigued. He noticed sudden palpitations like his whole body was tremoring. He has recently been hospitalized and had his medication adjusted for A. fib with RVR. He took his medication this morning and now is back in sinus rhythm and feels better. His blood pressure was 85 systolic this morning when his heart was doing this rapid rhythm. He is no longer having palpitations. He denies chest pain. The patient denies fever. Nothing makes his symptoms better or worse otherwise. REVIEW OF SYSTEMS       All systems reviewed and negative unless noted in HPI. The patient denies fever or constitutional symptoms. Denies vision change. Denies any sore throat or rhinorrhea. Denies any neck pain or stiffness. Denies chest pain or shortness of breath. Palpitations this morning. History of A. fib. No nausea,  vomiting or diarrhea. Denies any dysuria. Denies urinary frequency or hematuria. Denies musculoskeletal injury or pain. Denies any weakness, numbness or focal neurologic deficit. Denies any skin rash or edema. No recent psychiatric issues. Takes Eliquis. Denies any polyuria, polydypsia or history of immunocompromise. PAST MEDICAL HISTORY    has a past medical history of Abscess of bursa of right elbow, Arthritis, Atrial fibrillation ([address] BPH (benign prostatic hyperplasia), Heartburn, Hypertension, Panic attack, Polycythemia, PONV (postoperative nausea and vomiting), and Sleep apnea.     SURGICAL HISTORY      has a past surgical history that includes shoulder surgery; Spine surgery; Upper gastrointestinal endoscopy (N/A, 12/17/2019); Cardiac catheterization (2017); hernia repair; Colonoscopy; Appendectomy; back surgery; Endoscopy, colon, diagnostic; Arm Surgery (Right, 02/23/2021); Muscle Repair (Right, 2/23/2021); Arm Surgery (Right, 7/13/2021); Upper gastrointestinal endoscopy (N/A, 7/30/2021); and Septoplasty (Bilateral, 12/20/2021). CURRENT MEDICATIONS       Previous Medications    ALBUTEROL SULFATE HFA (PROVENTIL HFA) 108 (90 BASE) MCG/ACT INHALER    Inhale 2 puffs into the lungs every 6 hours as needed for Wheezing    APIXABAN (ELIQUIS) 5 MG TABS TABLET    Take 5 mg by mouth 2 times daily    DILTIAZEM (CARDIZEM CD) 120 MG EXTENDED RELEASE CAPSULE    Take 1 capsule by mouth daily    GLUCOSE MONITORING (FREESTYLE FREEDOM) KIT    1 kit by Does not apply route daily    IRBESARTAN (AVAPRO) 300 MG TABLET    Take 1 tablet by mouth nightly    MULTIPLE VITAMINS-MINERALS (THERAPEUTIC MULTIVITAMIN-MINERALS) TABLET    Take 1 tablet by mouth daily    PANTOPRAZOLE (PROTONIX) 40 MG TABLET    take 1 tablet by mouth twice a day    PAROXETINE (PAXIL) 20 MG TABLET    Take 1 tablet by mouth at bedtime    SPIRONOLACTONE (ALDACTONE) 50 MG TABLET    Take 1 tablet by mouth daily Sometimes will take two daily- if feeling bloated    VITAMIN C (ASCORBIC ACID) 500 MG TABLET    Take 500 mg by mouth daily    ZINC GLUCONATE 50 MG TABLET    Take 50 mg by mouth daily       ALLERGIES     is allergic to pcn [penicillins]. FAMILY HISTORY     He indicated that his mother is alive. He indicated that his father is alive. He indicated that the status of his paternal uncle is unknown.     family history includes Cancer in his father and paternal uncle; Diabetes in his father. SOCIAL HISTORY      reports that he quit smoking about 14 years ago. His smoking use included cigars. He has a 20.00 pack-year smoking history. He has never used smokeless tobacco. He reports current alcohol use.  He reports current drug use. Drug: Marijuana Gina Fernández). PHYSICAL EXAM     INITIAL VITALS:  height is 5' 10\" (1.778 m) and weight is 91.6 kg (202 lb). His oral temperature is 98.4 °F (36.9 °C). His blood pressure is 124/77 and his pulse is 87. His respiration is 16 and oxygen saturation is 96%. The patient is alert and oriented, in no apparent distress. HEENT is atraumatic. Pupils are PERRL at 4 mm with normal extraocular motion. Mucous membranes moist.    Neck is supple with no lymphadenopathy. No JVD. No meningismus. Heart sounds regular rate and rhythm with no gallops, murmurs, or rubs. Lungs clear, no wheezes, rales or rhonchi. Abdomen: soft, nontender with no pain to palpation. No pulsatile mass. Normal bowel sounds are noted. No rebound or guarding. Musculoskeletal exam shows no evidence of trauma. Normal distal pulses in all extremities. Skin: no rash or edema. Neurological exam reveals cranial nerves 2 through 12 grossly intact. Patient has equal  and normal deep tendon reflexes. Psychiatric: no hallucinations or suicidal ideation. Lymphatics.:  No lymphadenopathy. DIFFERENTIAL DIAGNOSIS/ MDM:     Transient A. fib, MI, ACS, PE    DIAGNOSTIC RESULTS     EKG: All EKG's are interpreted by the Emergency Department Physician who either signs or Co-signs this chart in the absence of a cardiologist.    Sinus 84 with no ischemia. Axis XX 6, , , . No morphologic change compared to March 2022. A. fib that was present on prior EKG has resolved. RADIOLOGY:   I reviewed the radiologist interpretations:  XR CHEST PORTABLE   Final Result   No acute process. XR CHEST PORTABLE (Final result)  Result time 06/05/22 11:31:09  Final result by Eduardo Rosas DO (06/05/22 11:31:09)                Impression:    No acute process.              Narrative:    EXAMINATION:   ONE XRAY VIEW OF THE CHEST     6/5/2022 11:16 am     COMPARISON:   March 30, 2022 HISTORY:   ORDERING SYSTEM PROVIDED HISTORY: chest pain   TECHNOLOGIST PROVIDED HISTORY:   chest pain   Reason for Exam: Chest pain, hypotension, A-Fib     FINDINGS:   The lungs are without acute focal process.  There is no effusion or   pneumothorax. The cardiomediastinal silhouette is without acute process.  The   osseous structures are without acute process.                       LABS:  Results for orders placed or performed during the hospital encounter of 06/05/22   CBC with Auto Differential   Result Value Ref Range    WBC 5.0 3.5 - 11.0 k/uL    RBC 5.64 4.5 - 5.9 m/uL    Hemoglobin 17.7 (H) 13.5 - 17.5 g/dL    Hematocrit 53.8 (H) 41 - 53 %    MCV 95.2 80 - 100 fL    MCH 31.4 26 - 34 pg    MCHC 33.0 31 - 37 g/dL    RDW 15.3 12.5 - 15.4 %    Platelets 809 364 - 264 k/uL    MPV 7.0 6.0 - 12.0 fL    Seg Neutrophils 63 36 - 66 %    Lymphocytes 23 (L) 24 - 44 %    Monocytes 7 2 - 11 %    Eosinophils % 6 (H) 1 - 4 %    Basophils 1 0 - 2 %    Segs Absolute 3.20 1.8 - 7.7 k/uL    Absolute Lymph # 1.20 1.0 - 4.8 k/uL    Absolute Mono # 0.40 0.1 - 1.2 k/uL    Absolute Eos # 0.30 0.0 - 0.4 k/uL    Basophils Absolute 0.00 0.0 - 0.2 k/uL   Basic Metabolic Panel   Result Value Ref Range    Glucose 150 (H) 70 - 99 mg/dL    BUN 17 8 - 23 mg/dL    CREATININE 0.80 0.70 - 1.20 mg/dL    Calcium 9.4 8.6 - 10.4 mg/dL    Sodium 136 135 - 144 mmol/L    Potassium 4.0 3.7 - 5.3 mmol/L    Chloride 105 98 - 107 mmol/L    CO2 22 20 - 31 mmol/L    Anion Gap 9 9 - 17 mmol/L    GFR Non-African American >60 >60 mL/min    GFR African American >60 >60 mL/min    GFR Comment         Troponin   Result Value Ref Range    Troponin, High Sensitivity 18 0 - 22 ng/L         EMERGENCY DEPARTMENT COURSE:   Vitals:    Vitals:    06/05/22 1048   BP: 124/77   Pulse: 87   Resp: 16   Temp: 98.4 °F (36.9 °C)   TempSrc: Oral   SpO2: 96%   Weight: 91.6 kg (202 lb)   Height: 5' 10\" (1.778 m)     -------------------------  BP: 124/77, Temp: 98.4 °F (36.9 °C), Heart Rate: 87, Resp: 16      Re-evaluation Notes    The patient was in sinus rhythm upon arrival and has remained in sinus here. My index of suspicion for ACS is low. He should talk to his doctor about his medication dosing. The patient is discharged in good condition. FINAL IMPRESSION      1. Tachycardia    2.  History of atrial fibrillation          DISPOSITION/PLAN   DISPOSITION Decision To Discharge 06/05/2022 11:47:47 AM      Condition on Disposition    good    PATIENT REFERRED TO:  Jerry Garcia MD  [address] [place] [address] [address] [facility]  [phone]    In 1 week        DISCHARGE MEDICATIONS:  New Prescriptions    No medications on file       (Please note that portions of this note were completed with a voice recognition program.  Efforts were made to edit the dictations but occasionally words are mis-transcribed.)    Bubba Shine MD,, MD   Attending Emergency Physician       Cheikh Lindo MD  06/05/22 0035

## 2022-06-06 LAB
EKG ATRIAL RATE: 84 BPM
EKG P AXIS: 24 DEGREES
EKG P-R INTERVAL: 178 MS
EKG Q-T INTERVAL: 338 MS
EKG QRS DURATION: 100 MS
EKG QTC CALCULATION (BAZETT): 399 MS
EKG R AXIS: 26 DEGREES
EKG T AXIS: 61 DEGREES
EKG VENTRICULAR RATE: 84 BPM

## 2022-06-13 ENCOUNTER — APPOINTMENT (OUTPATIENT)
Dept: CT IMAGING | Age: 62
End: 2022-06-13
Payer: COMMERCIAL

## 2022-06-13 ENCOUNTER — NURSE TRIAGE (OUTPATIENT)
Dept: OTHER | Facility: CLINIC | Age: 62
End: 2022-06-13

## 2022-06-13 ENCOUNTER — HOSPITAL ENCOUNTER (EMERGENCY)
Age: 62
Discharge: HOME | End: 2022-06-13
Attending: EMERGENCY MEDICINE
Payer: COMMERCIAL

## 2022-06-13 ENCOUNTER — APPOINTMENT (OUTPATIENT)
Dept: GENERAL RADIOLOGY | Age: 62
End: 2022-06-13
Payer: COMMERCIAL

## 2022-06-13 VITALS
HEIGHT: 70 IN | SYSTOLIC BLOOD PRESSURE: 142 MMHG | OXYGEN SATURATION: 97 % | BODY MASS INDEX: 28.63 KG/M2 | RESPIRATION RATE: 15 BRPM | TEMPERATURE: 98.2 F | DIASTOLIC BLOOD PRESSURE: 84 MMHG | WEIGHT: 200 LBS | HEART RATE: 67 BPM

## 2022-06-13 DIAGNOSIS — K57.32 DIVERTICULITIS OF COLON: Primary | ICD-10-CM

## 2022-06-13 LAB
ABSOLUTE EOS #: 0.07 K/UL (ref 0–0.44)
ABSOLUTE IMMATURE GRANULOCYTE: 0.02 K/UL (ref 0–0.3)
ABSOLUTE LYMPH #: 0.88 K/UL (ref 1.1–3.7)
ABSOLUTE MONO #: 0.64 K/UL (ref 0.1–1.2)
ALBUMIN SERPL-MCNC: 3.7 G/DL (ref 3.5–5.2)
ALP BLD-CCNC: 43 U/L (ref 40–129)
ALT SERPL-CCNC: 30 U/L (ref 5–41)
ANION GAP SERPL CALCULATED.3IONS-SCNC: 7 MMOL/L (ref 9–17)
AST SERPL-CCNC: 21 U/L
BASOPHILS # BLD: 1 % (ref 0–2)
BASOPHILS ABSOLUTE: 0.04 K/UL (ref 0–0.2)
BILIRUB SERPL-MCNC: 0.45 MG/DL (ref 0.3–1.2)
BUN BLDV-MCNC: 21 MG/DL (ref 8–23)
BUN/CREAT BLD: 23 (ref 9–20)
CALCIUM SERPL-MCNC: 8.2 MG/DL (ref 8.6–10.4)
CHLORIDE BLD-SCNC: 102 MMOL/L (ref 98–107)
CO2: 26 MMOL/L (ref 20–31)
CREAT SERPL-MCNC: 0.9 MG/DL (ref 0.7–1.2)
EOSINOPHILS RELATIVE PERCENT: 1 % (ref 1–4)
GFR AFRICAN AMERICAN: >60 ML/MIN
GFR NON-AFRICAN AMERICAN: >60 ML/MIN
GFR SERPL CREATININE-BSD FRML MDRD: ABNORMAL ML/MIN/{1.73_M2}
GLUCOSE BLD-MCNC: 104 MG/DL (ref 70–99)
HCT VFR BLD CALC: 44.9 % (ref 40.7–50.3)
HEMOGLOBIN: 14.7 G/DL (ref 13–17)
IMMATURE GRANULOCYTES: 0 %
LACTIC ACID: 1.1 MMOL/L (ref 0.5–2.2)
LYMPHOCYTES # BLD: 12 % (ref 24–43)
MCH RBC QN AUTO: 31.8 PG (ref 25.2–33.5)
MCHC RBC AUTO-ENTMCNC: 32.7 G/DL (ref 28.4–34.8)
MCV RBC AUTO: 97.2 FL (ref 82.6–102.9)
MONOCYTES # BLD: 9 % (ref 3–12)
NRBC AUTOMATED: 0 PER 100 WBC
PDW BLD-RTO: 14.8 % (ref 11.8–14.4)
PLATELET # BLD: 213 K/UL (ref 138–453)
PMV BLD AUTO: 9.2 FL (ref 8.1–13.5)
POTASSIUM SERPL-SCNC: 3.8 MMOL/L (ref 3.7–5.3)
PRO-BNP: 52 PG/ML
RBC # BLD: 4.62 M/UL (ref 4.21–5.77)
RBC # BLD: ABNORMAL 10*6/UL
SARS-COV-2, RAPID: NOT DETECTED
SEG NEUTROPHILS: 77 % (ref 36–65)
SEGMENTED NEUTROPHILS ABSOLUTE COUNT: 5.88 K/UL (ref 1.5–8.1)
SODIUM BLD-SCNC: 135 MMOL/L (ref 135–144)
SPECIMEN DESCRIPTION: NORMAL
TOTAL CK: 384 U/L (ref 39–308)
TOTAL PROTEIN: 5.7 G/DL (ref 6.4–8.3)
TROPONIN, HIGH SENSITIVITY: 14 NG/L (ref 0–22)
WBC # BLD: 7.5 K/UL (ref 3.5–11.3)

## 2022-06-13 PROCEDURE — 99285 EMERGENCY DEPT VISIT HI MDM: CPT

## 2022-06-13 PROCEDURE — 87635 SARS-COV-2 COVID-19 AMP PRB: CPT

## 2022-06-13 PROCEDURE — 71045 X-RAY EXAM CHEST 1 VIEW: CPT

## 2022-06-13 PROCEDURE — 80053 COMPREHEN METABOLIC PANEL: CPT

## 2022-06-13 PROCEDURE — 83605 ASSAY OF LACTIC ACID: CPT

## 2022-06-13 PROCEDURE — 93005 ELECTROCARDIOGRAM TRACING: CPT | Performed by: EMERGENCY MEDICINE

## 2022-06-13 PROCEDURE — 74176 CT ABD & PELVIS W/O CONTRAST: CPT

## 2022-06-13 PROCEDURE — 83880 ASSAY OF NATRIURETIC PEPTIDE: CPT

## 2022-06-13 PROCEDURE — 82550 ASSAY OF CK (CPK): CPT

## 2022-06-13 PROCEDURE — 85025 COMPLETE CBC W/AUTO DIFF WBC: CPT

## 2022-06-13 PROCEDURE — 84484 ASSAY OF TROPONIN QUANT: CPT

## 2022-06-13 RX ORDER — METRONIDAZOLE 500 MG/1
500 TABLET ORAL 2 TIMES DAILY
Qty: 20 TABLET | Refills: 0 | Status: SHIPPED | OUTPATIENT
Start: 2022-06-13 | End: 2022-06-23

## 2022-06-13 RX ORDER — ONDANSETRON 4 MG/1
4 TABLET, ORALLY DISINTEGRATING ORAL EVERY 8 HOURS PRN
Qty: 12 TABLET | Refills: 0 | Status: SHIPPED | OUTPATIENT
Start: 2022-06-13

## 2022-06-13 RX ORDER — CIPROFLOXACIN 500 MG/1
500 TABLET, FILM COATED ORAL 2 TIMES DAILY
Qty: 20 TABLET | Refills: 0 | Status: SHIPPED | OUTPATIENT
Start: 2022-06-13 | End: 2022-06-23

## 2022-06-13 ASSESSMENT — ENCOUNTER SYMPTOMS
BACK PAIN: 1
TROUBLE SWALLOWING: 0
VOMITING: 1
FACIAL SWELLING: 1
ABDOMINAL PAIN: 1
NAUSEA: 1
SHORTNESS OF BREATH: 1

## 2022-06-13 ASSESSMENT — PAIN - FUNCTIONAL ASSESSMENT: PAIN_FUNCTIONAL_ASSESSMENT: NONE - DENIES PAIN

## 2022-06-13 NOTE — TELEPHONE ENCOUNTER
Received call from Colin Ware at [facility] with [facility] [employer]. Subjective: Caller states \"Had an Afib episode over the weekend. Face has swollen, has lethargy and discoloration in eyes. Lots of stressor\"     Current Symptoms: face swelling nose and cheeks primarily, eyes are puffy, mild flush in cheeks, mild yellow in eyes, lethargy, diarrhea and vomiting daily. /79, HR 70s, denies tender to touch    Onset: 2 days ago; gradual, worsening    Associated Symptoms: reduced activity    Pain Severity: 10/10; aching; intermittent (weakness)    Temperature: Denies      What has been tried: fluids rest, nothing    LMP: NA Pregnant: NA    Recommended disposition: Go to Office Now. See in office within 4 hours. Care advice provided, patient verbalizes understanding; denies any other questions or concerns; instructed to call back for any new or worsening symptoms. Patient/Caller agrees with recommended disposition; writer provided warm transfer to Julián Sanchez at [facility] for appointment scheduling     Attention Provider: Thank you for allowing me to participate in the care of your patient. The patient was connected to triage in response to information provided to the ECC/PSC. Please do not respond through this encounter as the response is not directed to a shared pool.       Reason for Disposition   MODERATE weakness (i.e., interferes with work, school, normal activities) and cause unknown (Exceptions: weakness with acute minor illness, or weakness from poor fluid intake)    Protocols used: WEAKNESS (GENERALIZED) AND FATIGUE-ADULT-OH

## 2022-06-13 NOTE — ED PROVIDER NOTES
EMERGENCY DEPARTMENT ENCOUNTER    Pt Name: Opal Nazario  MRN: [id number]  [place] 1960  Date of evaluation: 6/13/22  CHIEF COMPLAINT       Chief Complaint   Patient presents with    Fatigue     Pt reports hx of afib; had an episode of afib over the weekend and states he has generalized bodyaches and fatigue that started this morning; c/o N/V/D x 2 days    Nausea    Emesis    Diarrhea     HISTORY OF PRESENT ILLNESS   62-year-old male presents emergency room for generalized fatigue weakness dizziness abdominal discomfort nausea vomiting and feeling of puffiness in the face. Patient is most concerned about the dizziness and puffiness in the face. He has issues with recurrent episodes of abdominal discomfort nausea and vomiting. He has had it looked into before and there is no clear indication as to why he gets the vomiting. He has felt ill since the weekend and has had the fatigue and lightheadedness. He reports he feels like his face is swollen. Patient does have history of A. fib and is reporting intermittent issues with palpitations. REVIEW OF SYSTEMS     Review of Systems   Constitutional: Positive for activity change, appetite change and fatigue. HENT: Positive for facial swelling. Negative for trouble swallowing. Respiratory: Positive for shortness of breath. Cardiovascular: Positive for palpitations. Gastrointestinal: Positive for abdominal pain, nausea and vomiting. Genitourinary: Negative for difficulty urinating. Musculoskeletal: Positive for arthralgias and back pain. Neurological: Positive for dizziness, weakness and light-headedness.        PASTMEDICAL HISTORY     Past Medical History:   Diagnosis Date    Abscess of bursa of right elbow     Arthritis     Atrial fibrillation (HCC)     BPH (benign prostatic hyperplasia)     Heartburn     Hypertension     Panic attack 11/24/2021    Polycythemia     PONV (postoperative nausea and vomiting)     Sleep apnea no machine     Past Problem List  Patient Active Problem List   Diagnosis Code    Rupture of right triceps tendon S46.311A    Abscess of right elbow L02.413    Chest pain R07.9    Essential hypertension I10    Epigastric pain R10.13    Gastritis K29.70    Esophagitis K20.90    New onset a-fib ([facility]) I48.91    Atrial fibrillation with RVR ([facility]) I48.91    Polycythemia D75.1    Hypoproteinemia ([facility]) E77.8    Acute bronchitis J20.9    Abnormal thallium stress test R94.39    Acute appendicitis with localized peritonitis, without perforation, abscess, or gangrene K35.30    Acute medial meniscus tear of right knee S83.241A    Cardiomyopathy ([facility]) I42.9    Chondromalacia of right knee M94.261    Erectile dysfunction N52.9    Heartburn R12    Primary localized osteoarthrosis of shoulder region M19.019    Renal insufficiency syndrome N28.9    Spondylolisthesis of lumbar region M43.16    SEBASTIAN (obstructive sleep apnea) G47.33    Primary insomnia F51.01    Generalized anxiety disorder F41.1     SURGICAL HISTORY       Past Surgical History:   Procedure Laterality Date    APPENDECTOMY      ARM SURGERY Right 02/23/2021    TRICEPS TENDON REPAIR - RIGHT ELBOW WITH ARTHREX AND BIOMET ACHILLES ALLOGRAFT    ARM SURGERY Right 7/13/2021    RIGHT ELBOW IRRIGATION AND DEBRIDEMENT performed by Clare Garcia MD at [facility]      cervical/ lumbar    CARDIAC CATHETERIZATION  2017    [facility]/ no stents    COLONOSCOPY      ENDOSCOPY, COLON, DIAGNOSTIC      HERNIA REPAIR      MUSCLE REPAIR Right 2/23/2021    TRICEPS TENDON REPAIR - RIGHT ELBOW performed by Clare Garcia MD at [address] [address] SEPTOPLASTY Bilateral 12/20/2021    SEPTOPLASTY BILATERAL TURBINATE REDUCTION performed by Glenna Langston MD at [facility]      cervical and lumbar    UPPER GASTROINTESTINAL ENDOSCOPY N/A 12/17/2019    EGD BIOPSY performed by Mariaelena Jordan MD at [address] UPPER GASTROINTESTINAL ENDOSCOPY N/A 2021    EGD BIOPSY OF STOMACH AND ESOPHAGUS performed by Gilberto Murphy MD at [address]       Previous Medications    ALBUTEROL SULFATE HFA (PROVENTIL HFA) 108 (90 BASE) MCG/ACT INHALER    Inhale 2 puffs into the lungs every 6 hours as needed for Wheezing    APIXABAN (ELIQUIS) 5 MG TABS TABLET    Take 5 mg by mouth 2 times daily    DILTIAZEM (CARDIZEM CD) 120 MG EXTENDED RELEASE CAPSULE    Take 1 capsule by mouth daily    GLUCOSE MONITORING (FREESTYLE FREEDOM) KIT    1 kit by Does not apply route daily    IRBESARTAN (AVAPRO) 300 MG TABLET    Take 1 tablet by mouth nightly    MULTIPLE VITAMINS-MINERALS (THERAPEUTIC MULTIVITAMIN-MINERALS) TABLET    Take 1 tablet by mouth daily    PANTOPRAZOLE (PROTONIX) 40 MG TABLET    take 1 tablet by mouth twice a day    PAROXETINE (PAXIL) 20 MG TABLET    Take 1 tablet by mouth at bedtime    SPIRONOLACTONE (ALDACTONE) 50 MG TABLET    Take 1 tablet by mouth daily Sometimes will take two daily- if feeling bloated    VITAMIN C (ASCORBIC ACID) 500 MG TABLET    Take 500 mg by mouth daily    ZINC GLUCONATE 50 MG TABLET    Take 50 mg by mouth daily     ALLERGIES     is allergic to pcn [penicillins]. FAMILY HISTORY     He indicated that his mother is alive. He indicated that his father is alive. He indicated that the status of his paternal uncle is unknown.      SOCIAL HISTORY       Social History     Tobacco Use    Smoking status: Former Smoker     Packs/day: 1.00     Years: 20.00     Pack years: 20.00     Types: Cigars     Quit date: 3/15/2008     Years since quittin.2    Smokeless tobacco: Never Used    Tobacco comment: no cigarettes for 20 years- still smokes cigars and marijuana   Vaping Use    Vaping Use: Never used   Substance Use Topics    Alcohol use: Yes     Comment: socially    Drug use: Yes     Types: Marijuana (Weed)     Comment: sometimes nightly/ oral or smokes     PHYSICAL EXAM     INITIAL VITALS: BP (!) 142/84   Pulse 66   Temp 98.2 °F (36.8 °C) (Oral)   Resp 23   Ht 5' 10\" (1.778 m)   Wt 200 lb (90.7 kg)   SpO2 97%   BMI 28.70 kg/m²    Physical Exam  Constitutional:       General: He is not in acute distress. Appearance: He is well-developed. HENT:      Head: Normocephalic. Eyes:      Pupils: Pupils are equal, round, and reactive to light. Cardiovascular:      Rate and Rhythm: Normal rate and regular rhythm. Heart sounds: Normal heart sounds. Pulmonary:      Effort: Pulmonary effort is normal. No respiratory distress. Breath sounds: Normal breath sounds. Abdominal:      General: Bowel sounds are normal.      Palpations: Abdomen is soft. Tenderness: There is no abdominal tenderness. Musculoskeletal:         General: Normal range of motion. Skin:     General: Skin is warm and dry. Neurological:      Mental Status: He is alert and oriented to person, place, and time. MEDICAL DECISION MAKING:     11year-old male presenting to the emergency room for nausea vomiting and abdominal discomfort. Patient has a soft abdomen with some left lower quadrant tenderness. No peritoneal signs patient has normal white count. CT imaging shows acute diverticulitis. Given patient's evaluation here in the ED and labs I feel he is appropriate for outpatient management. I have low suspicion for malignant or emergent process. I updated patient results here in the ED. disposition and plan were reviewed with the patient. Patient is aware that ED testing may be falsely reassuring and patient understands if symptoms worsen reassessment may be necessary.        CRITICAL CARE:       PROCEDURES:    Procedures    DIAGNOSTIC RESULTS   EKG:All EKG's are interpreted by the Emergency Department Physician who either signs or Co-signs this chart in the absence of a cardiologist.    EKG sinus rhythm ventricular rate 75 nonspecific T wave changes lead III V6  No significant ST changes  CT (ZOFRAN ODT) 4 MG disintegrating tablet     Sig: Take 1 tablet by mouth every 8 hours as needed for Nausea or Vomiting     Dispense:  12 tablet     Refill:  0     CONSULTS:  None    FINAL IMPRESSION      1. Diverticulitis of colon          DISPOSITION/PLAN   DISPOSITION Decision To Discharge 06/13/2022 06:00:30 PM      PATIENT REFERRED TO:  Nyoka Hodgkins, MD  [facility] [address]  [phone]    Schedule an appointment as soon as possible for a visit in 1 week      DISCHARGE MEDICATIONS:  New Prescriptions    CIPROFLOXACIN (CIPRO) 500 MG TABLET    Take 1 tablet by mouth 2 times daily for 10 days    METRONIDAZOLE (FLAGYL) 500 MG TABLET    Take 1 tablet by mouth 2 times daily for 10 days    ONDANSETRON (ZOFRAN ODT) 4 MG DISINTEGRATING TABLET    Take 1 tablet by mouth every 8 hours as needed for Nausea or Vomiting     Denzel Rajput MD  Attending Emergency Physician      Care during this encounter was due to an unprecedented national emergency due to COVID-19.             Enriqueta Meraz MD  06/13/22 6882

## 2022-06-14 LAB
EKG ATRIAL RATE: 75 BPM
EKG P AXIS: 11 DEGREES
EKG P-R INTERVAL: 192 MS
EKG Q-T INTERVAL: 366 MS
EKG QRS DURATION: 94 MS
EKG QTC CALCULATION (BAZETT): 408 MS
EKG R AXIS: 26 DEGREES
EKG T AXIS: 66 DEGREES
EKG VENTRICULAR RATE: 75 BPM

## 2022-06-15 ENCOUNTER — HOSPITAL ENCOUNTER (EMERGENCY)
Facility: CLINIC | Age: 62
Discharge: HOME | End: 2022-06-15
Attending: SPECIALIST
Payer: COMMERCIAL

## 2022-06-15 ENCOUNTER — APPOINTMENT (OUTPATIENT)
Dept: GENERAL RADIOLOGY | Facility: CLINIC | Age: 62
End: 2022-06-15
Payer: COMMERCIAL

## 2022-06-15 VITALS
TEMPERATURE: 98 F | HEART RATE: 89 BPM | WEIGHT: 201.6 LBS | OXYGEN SATURATION: 97 % | RESPIRATION RATE: 16 BRPM | HEIGHT: 70 IN | DIASTOLIC BLOOD PRESSURE: 103 MMHG | BODY MASS INDEX: 28.86 KG/M2 | SYSTOLIC BLOOD PRESSURE: 152 MMHG

## 2022-06-15 DIAGNOSIS — R00.2 PALPITATIONS: Primary | ICD-10-CM

## 2022-06-15 DIAGNOSIS — Z86.79 HISTORY OF ATRIAL FIBRILLATION: ICD-10-CM

## 2022-06-15 LAB
ABSOLUTE EOS #: 0.2 K/UL (ref 0–0.4)
ABSOLUTE LYMPH #: 1.1 K/UL (ref 1–4.8)
ABSOLUTE MONO #: 0.5 K/UL (ref 0.1–1.2)
ANION GAP SERPL CALCULATED.3IONS-SCNC: 10 MMOL/L (ref 9–17)
BASOPHILS # BLD: 1 % (ref 0–2)
BASOPHILS ABSOLUTE: 0.1 K/UL (ref 0–0.2)
BUN BLDV-MCNC: 20 MG/DL (ref 8–23)
CALCIUM SERPL-MCNC: 9.3 MG/DL (ref 8.6–10.4)
CHLORIDE BLD-SCNC: 103 MMOL/L (ref 98–107)
CO2: 23 MMOL/L (ref 20–31)
CREAT SERPL-MCNC: 0.9 MG/DL (ref 0.7–1.2)
EOSINOPHILS RELATIVE PERCENT: 3 % (ref 1–4)
GFR AFRICAN AMERICAN: >60 ML/MIN
GFR NON-AFRICAN AMERICAN: >60 ML/MIN
GFR SERPL CREATININE-BSD FRML MDRD: NORMAL ML/MIN/{1.73_M2}
GLUCOSE BLD-MCNC: 83 MG/DL (ref 70–99)
HCT VFR BLD CALC: 49.7 % (ref 41–53)
HEMOGLOBIN: 16.3 G/DL (ref 13.5–17.5)
LYMPHOCYTES # BLD: 16 % (ref 24–44)
MAGNESIUM: 2.2 MG/DL (ref 1.6–2.6)
MCH RBC QN AUTO: 31.5 PG (ref 26–34)
MCHC RBC AUTO-ENTMCNC: 32.7 G/DL (ref 31–37)
MCV RBC AUTO: 96.2 FL (ref 80–100)
MONOCYTES # BLD: 7 % (ref 2–11)
PDW BLD-RTO: 15.8 % (ref 12.5–15.4)
PLATELET # BLD: 288 K/UL (ref 140–450)
PMV BLD AUTO: 7.2 FL (ref 6–12)
POTASSIUM SERPL-SCNC: 4.1 MMOL/L (ref 3.7–5.3)
RBC # BLD: 5.17 M/UL (ref 4.5–5.9)
SEG NEUTROPHILS: 73 % (ref 36–66)
SEGMENTED NEUTROPHILS ABSOLUTE COUNT: 5.1 K/UL (ref 1.8–7.7)
SODIUM BLD-SCNC: 136 MMOL/L (ref 135–144)
TROPONIN, HIGH SENSITIVITY: 17 NG/L (ref 0–22)
WBC # BLD: 6.9 K/UL (ref 3.5–11)

## 2022-06-15 PROCEDURE — 84484 ASSAY OF TROPONIN QUANT: CPT

## 2022-06-15 PROCEDURE — 80048 BASIC METABOLIC PNL TOTAL CA: CPT

## 2022-06-15 PROCEDURE — 36415 COLL VENOUS BLD VENIPUNCTURE: CPT

## 2022-06-15 PROCEDURE — 99285 EMERGENCY DEPT VISIT HI MDM: CPT

## 2022-06-15 PROCEDURE — 83735 ASSAY OF MAGNESIUM: CPT

## 2022-06-15 PROCEDURE — 85025 COMPLETE CBC W/AUTO DIFF WBC: CPT

## 2022-06-15 PROCEDURE — 93005 ELECTROCARDIOGRAM TRACING: CPT

## 2022-06-15 PROCEDURE — 71045 X-RAY EXAM CHEST 1 VIEW: CPT

## 2022-06-15 ASSESSMENT — PAIN - FUNCTIONAL ASSESSMENT: PAIN_FUNCTIONAL_ASSESSMENT: 0-10

## 2022-06-16 LAB
EKG ATRIAL RATE: 89 BPM
EKG P AXIS: 21 DEGREES
EKG P-R INTERVAL: 186 MS
EKG Q-T INTERVAL: 336 MS
EKG QRS DURATION: 88 MS
EKG QTC CALCULATION (BAZETT): 408 MS
EKG R AXIS: -11 DEGREES
EKG T AXIS: 45 DEGREES
EKG VENTRICULAR RATE: 89 BPM

## 2022-06-16 RX ORDER — PANTOPRAZOLE SODIUM 40 MG/1
TABLET, DELAYED RELEASE ORAL
Qty: 30 TABLET | Refills: 0 | Status: SHIPPED | OUTPATIENT
Start: 2022-06-16 | End: 2022-06-30

## 2022-06-16 NOTE — PROGRESS NOTES
PT arrives ambulatory to ED with C/O heart palpitations. Has a known HX of Afib. Sees cardiology. On medications for Afib. Reports that he can feel when his heart goes into Afib. Has a monitor at home, and he checks his heart rate PRN. Reports that his heart fluctuates between 120's and drops as low as 50's. IS asymptomatic when this occurs. Denies any SOB, chest pain, dizziness, or dyspnea. Reports that he cannot get in to see his cardiologist until July. Has been in ED numerous times over the past few months for the same issue. Currently no S/S distress. Call light within reach. Will continue to monitor.

## 2022-06-16 NOTE — ED PROVIDER NOTES
[facility]  [address] [facility]  Phone: Geon Vjyorah      Pt Name: Cheikh Pelaez  MRN: [id number]  [place] 1960  Date of evaluation: 6/15/2022  Provider: RU Stanley - NICOLA    CHIEF COMPLAINT       Chief Complaint   Patient presents with    Atrial Fibrillation    Palpitations       HISTORY OF PRESENT ILLNESS  (Location/Symptom, Timing/Onset, Context/Setting, Quality, Duration, Modifying Factors, Severity.)   Cheikh Pelaez is a 62 y.o. male history of atrial fibrillation who presents to the emergency department with complaints of palpitation and irregular heartbeat. He states that he was sitting in his backyard with his wife when he started to experience palpitations around 6:15 PM tonight. He states that he felt his pulse and could feel an irregular beating in his chest.  He denies any complaints of pain or shortness of breath with the palpitations. He states that upon arrival to the emergency room his palpitations and irregular heartbeat has subsided. He denies any complaints of fever, chills, lightheadedness, dizziness, cough, shortness of breath, dyspnea, chest pain, and reports that he is a former smoker. Currently denies any complaints at the present time. Last stress test was 10/16/2021 with ECHO that indicated mild left ventricular hypertrophy with estimated  ejection fracture of 65%. He does follow with cardiology through the [place] [facility].     Risk Stratification for Pulmonary Embolism (PE)  Previous PE - No  Malignancy - No  Obesity - No  Trauma - No  Natural Bridge filter - No  Pregnancy/postpartum - No  Smoking - No  Prior DVT - No  Immobilization (i.e. leg cast, travel) - No  Surgery within the last 60 days - No  Coagulation disorder - No  Estrogen medicine - No    PERC Criteria     Age      > or = 50    Yes  Heart Rate     > or = 100   No  Pulse Oximetry    <  95%    No  Previous History of DVT   No  Trauma or Surgery within 4 Weeks-   No  Hemoptysis-    No  Exogenous Estrogen use-  No  Unilateral Leg Swelling-   No     Nursing Notes were reviewed. REVIEW OF SYSTEMS    (2-9 systems for level 4, 10 or more for level 5)     Constitutional: Denies recent fever, chills. Eyes: No visual changes. Neck: No neck pain. Respiratory: Denies cough, shortness of breath or dyspnea  Cardiac: Reports palpitations. Denies chest pain  GI:  Denies abdominal pain/nausea/vomiting/diarrhea. Musculoskeletal: Denies focal weakness. Neurologic: Denies lightheadedness, dizziness or syncope  Skin:  No rash. Except as noted above the remainder of the review of systems was reviewed and negative. PAST MEDICAL HISTORY   History reviewed.         Diagnosis Date    Abscess of bursa of right elbow     Arthritis     Atrial fibrillation (HCC)     BPH (benign prostatic hyperplasia)     Heartburn     Hypertension     Panic attack 11/24/2021    Polycythemia     PONV (postoperative nausea and vomiting)     Sleep apnea     no machine       SURGICAL HISTORY           Procedure Laterality Date    APPENDECTOMY      ARM SURGERY Right 02/23/2021    TRICEPS TENDON REPAIR - RIGHT ELBOW WITH ARTHREX AND BIOMET ACHILLES ALLOGRAFT    ARM SURGERY Right 7/13/2021    RIGHT ELBOW IRRIGATION AND DEBRIDEMENT performed by Allyson Martines MD at [address] [facility]      cervical/ lumbar    CARDIAC CATHETERIZATION  2017    [facility]/ no stents    COLONOSCOPY      ENDOSCOPY, COLON, DIAGNOSTIC      HERNIA REPAIR      MUSCLE REPAIR Right 2/23/2021    TRICEPS TENDON REPAIR - RIGHT ELBOW performed by Allyson Martines MD at [address] [employer] SEPTOPLASTY Bilateral 12/20/2021    SEPTOPLASTY BILATERAL TURBINATE REDUCTION performed by Renetta Beltran MD at [facility]      cervical and lumbar    UPPER GASTROINTESTINAL ENDOSCOPY N/A 12/17/2019    EGD BIOPSY performed by Paulo Mayorga MD at [facility] tobacco. He reports current alcohol use. He reports current drug use. Drug: Marijuana [place]). Lives with others. PHYSICAL EXAM    (up to 7 for level 4, 8 or more for level 5)     ED Triage Vitals   BP Temp Temp src Heart Rate Resp SpO2 Height Weight   06/15/22 2012 06/15/22 2012 -- 06/15/22 2012 06/15/22 2012 06/15/22 2012 06/15/22 2017 06/15/22 2017   (!) 152/103 98 °F (36.7 °C)  89 16 97 % 5' 10\" (1.778 m) 201 lb 9.6 oz (91.4 kg)       Constitutional:  Well developed   Eyes:  Pupils equal/round  HENT:  Atraumatic, external ears normal, nose normal, oropharynx moist. Neck- supple   Respiratory:  Clear to auscultation bilaterally with good air exchange, no W/R/R. No chestwall tenderness  Cardiovascular:  RRR with normal S1 and S2. Gastrointestinal/Abdomen:  Soft, NT. BS wnl. Musculoskeletal:  No edema, no tenderness, no deformities. Back:  No CVA tenderness. Normal to inspection. Integument:  No rash.   Neurologic:  Alert, appropriate mentation/interaction, no focal deficits noted     DIAGNOSTIC RESULTS     EKG: All EKG's are interpreted by the Emergency Department Physician who either signs or Co-signs this chart in the absence of a cardiologist.    EKG Interpretation    Interpreted by emergency department physician    Rhythm: normal sinus   Rate: 89  Axis: normal  Ectopy: none  Conduction: normal  ST Segments: no acute change  T Waves: no acute change  Q Waves: none    Clinical Impression: no acute changes    RU Dc CNP     OH interval 186 ms  QRS duration 88 ms  QT/QTc 336/408 ms  P-R-T axes 21 -11 45    EKG completed at 2015 by nursing staff  Normal sinus rhythm  Inferior infarct, age undetermined      RADIOLOGY:   Non-plain film images such as CT, Ultrasound and MRI are read by the radiologist. Plain radiographic images are visualized and preliminarily interpreted by the emergency physician with the below findings:      Interpretation per the Radiologist below, if available at the time of this note:    XR CHEST PORTABLE    (Results Pending)     LABS:  Labs Reviewed   CBC WITH AUTO DIFFERENTIAL - Abnormal; Notable for the following components:       Result Value    RDW 15.8 (*)     Seg Neutrophils 73 (*)     Lymphocytes 16 (*)     All other components within normal limits   BASIC METABOLIC PANEL   TROPONIN   MAGNESIUM       All other labs were within normal range or not returned as of this dictation. EMERGENCY DEPARTMENT COURSE and DIFFERENTIAL DIAGNOSIS/MDM:   Vitals:    Vitals:    06/15/22 2012 06/15/22 2017   BP: (!) 152/103    Pulse: 89    Resp: 16    Temp: 98 °F (36.7 °C)    SpO2: 97%    Weight:  91.4 kg (201 lb 9.6 oz)   Height:  5' 10\" (1.778 m)     No orders of the defined types were placed in this encounter. Patient presents emergency room today with complaints as described above. Patient denies any palpitations or heart racing at present time. Vital signs reveal that he is hypertensive at 152/103 although does appear anxious. Heart rate is 89 bpm and normal sinus rhythm. Plan is to obtain IV with labs to include CBC, BMP, magnesium and troponin. Will obtain EKG and chest x-ray. EKG as charted above. CBC stable. Attending to complete all remaining care, diagnosis and disposition for this patient. We discussed this patient prior to my departure. My note will be refreshed to reflect these details, though I was not actively involved in this patient's care following the time stamp above. CONSULTS:  None    PROCEDURES:  None    FINAL IMPRESSION      1. Palpitations    2. History of atrial fibrillation          DISPOSITION/PLAN   DISPOSITION        PATIENT REFERRED TO:  No follow-up provider specified.     DISCHARGE MEDICATIONS:  New Prescriptions    No medications on file       (Please note that portions of this note were completed with a voice recognition program.  Efforts were made to edit the dictations but occasionally words are mis-transcribed.)    Keaton Verdugo [facility], [address] [place] [address], APRN - CNP  06/15/22 0624

## 2022-06-16 NOTE — ED PROVIDER NOTES
Emergency Department     Faculty Attestation    I performed a history and physical examination of the patient and discussed management with the mid level provideer. I reviewed the mid level provider's note and agree with the documented findings and plan of care. Any areas of disagreement are noted on the chart. I was personally present for the key portions of any procedures. I have documented in the chart those procedures where I was not present during the key portions. I have reviewed the emergency nurses triage note. I agree with the chief complaint, past medical history, past surgical history, allergies, medications, social and family history as documented unless otherwise noted below. Documentation of the HPI, Physical Exam and Medical Decision Making performed by medical students or scribes is based on my personal performance of the HPI, PE and MDM. For Physician Assistant/ Nurse Practitioner cases/documentation I have personally evaluated this patient and have completed at least one if not all key elements of the E/M (history, physical exam, and MDM). Additional findings are as noted. Primary Care Physician:  Kristal Cheatham MD    [address] [place] [address]       Chief Complaint   Patient presents with    Atrial Fibrillation    Palpitations       RECENT VITALS:   Temp: 98 °F (36.7 °C),  Heart Rate: 89, Resp: 16, BP: (!) 152/103    LABS:  Labs Reviewed   CBC WITH AUTO DIFFERENTIAL - Abnormal; Notable for the following components:       Result Value    RDW 15.8 (*)     Seg Neutrophils 73 (*)     Lymphocytes 16 (*)     All other components within normal limits   BASIC METABOLIC PANEL   TROPONIN   MAGNESIUM         PERTINENT ATTENDING PHYSICIAN COMMENTS:    30-year-old male patient presents to the emergency department complaining of palpitation and irregular heartbeat. Patient has history of atrial fibrillation and is currently on Eliquis. Symptoms started while sitting in his backyard with his wife.   He denies any chest pain, shortness of breath, nausea, lightheadedness, dizziness or diaphoresis. No history of cough, fever or chills. Patient is afebrile and vital signs are stable. His lungs are quite clear to auscultation, rhythm is irregularly irregular no murmurs or gallops abdomen soft nontender active bowel sounds extremities without any pedal edema and no calf tenderness. Neurologically no focal deficits. EKG Interpretation    Interpreted by emergency department physician    Rhythm: normal sinus   Rate: normal  Axis: normal  Conduction: normal  ST Segments: no acute change  T Waves: no acute change  Q Waves: no acute change    Clinical Impression: no acute change, but this is a nonspecific EKG. Rest of the diagnostic work-up included CBC, basic metabolic panel, serum troponin level, magnesium level and chest x-ray. During the emergency department course, patient has been resting comfortably and does not appear to be in any pain or distress. He remained hemodynamically stable and neurologically intact. He has had occasional PVCs on the monitor. Plan is to discharge the patient with instructions to continue current medications, plenty of oral fluids, follow-up with PCP and your cardiologist, return if worse.      Jesús Bullock MD  06/16/22 1449

## 2022-06-30 RX ORDER — PANTOPRAZOLE SODIUM 40 MG/1
TABLET, DELAYED RELEASE ORAL
Qty: 30 TABLET | Refills: 0 | Status: SHIPPED | OUTPATIENT
Start: 2022-06-30 | End: 2022-08-17

## 2022-07-01 ENCOUNTER — OFFICE VISIT (OUTPATIENT)
Dept: INTERNAL MEDICINE CLINIC | Age: 62
End: 2022-07-01
Payer: COMMERCIAL

## 2022-07-01 VITALS
BODY MASS INDEX: 29.84 KG/M2 | HEART RATE: 64 BPM | SYSTOLIC BLOOD PRESSURE: 118 MMHG | WEIGHT: 208.4 LBS | DIASTOLIC BLOOD PRESSURE: 80 MMHG | OXYGEN SATURATION: 99 % | HEIGHT: 70 IN

## 2022-07-01 DIAGNOSIS — R25.2 CRAMPS OF RIGHT LOWER EXTREMITY: ICD-10-CM

## 2022-07-01 DIAGNOSIS — G47.33 OSA (OBSTRUCTIVE SLEEP APNEA): ICD-10-CM

## 2022-07-01 DIAGNOSIS — I48.20 CHRONIC ATRIAL FIBRILLATION (HCC): Primary | ICD-10-CM

## 2022-07-01 DIAGNOSIS — I10 ESSENTIAL HYPERTENSION: ICD-10-CM

## 2022-07-01 PROCEDURE — 99214 OFFICE O/P EST MOD 30 MIN: CPT | Performed by: INTERNAL MEDICINE

## 2022-07-01 RX ORDER — METOCLOPRAMIDE 10 MG/1
TABLET ORAL
COMMUNITY
Start: 2022-06-22

## 2022-07-01 RX ORDER — LOSARTAN POTASSIUM 100 MG/1
TABLET ORAL
COMMUNITY
Start: 2022-06-21

## 2022-07-01 RX ORDER — CYCLOBENZAPRINE HCL 10 MG
10 TABLET ORAL NIGHTLY PRN
Qty: 10 TABLET | Refills: 0 | Status: SHIPPED | OUTPATIENT
Start: 2022-07-01 | End: 2022-07-11

## 2022-07-01 RX ORDER — METOPROLOL SUCCINATE 25 MG/1
TABLET, EXTENDED RELEASE ORAL
COMMUNITY
Start: 2022-06-21

## 2022-07-01 ASSESSMENT — PATIENT HEALTH QUESTIONNAIRE - PHQ9
1. LITTLE INTEREST OR PLEASURE IN DOING THINGS: 0
SUM OF ALL RESPONSES TO PHQ9 QUESTIONS 1 & 2: 0
SUM OF ALL RESPONSES TO PHQ QUESTIONS 1-9: 0
2. FEELING DOWN, DEPRESSED OR HOPELESS: 0
SUM OF ALL RESPONSES TO PHQ QUESTIONS 1-9: 0

## 2022-07-01 NOTE — PROGRESS NOTES
Visit Information    Have you changed or started any medications since your last visit including any over-the-counter medicines, vitamins, or herbal medicines? no   Are you having any side effects from any of your medications? -  no  Have you stopped taking any of your medications? Is so, why? -  no    Have you seen any other physician or provider since your last visit? No  Have you had any other diagnostic tests since your last visit? No  Have you been seen in the emergency room and/or had an admission to a hospital since we last saw you? No  Have you had your routine dental cleaning in the past 6 months? no -     Have you activated your [employer] account? If not, what are your barriers?  Yes     Patient Care Team:  Malika Lim MD as PCP - General (Internal Medicine)  Malika Lim MD as PCP - [facility]  Amber Rankin MD as Consulting Physician (Gastroenterology)    Medical History Review  Past Medical, Family, and Social History reviewed and does contribute to the patient presenting condition    Health Maintenance   Topic Date Due    COVID-19 Vaccine (1) Never done    Pneumococcal 0-64 years Vaccine (1 - PCV) Never done    HIV screen  Never done    Hepatitis C screen  Never done    DTaP/Tdap/Td vaccine (1 - Tdap) Never done    Shingles vaccine (1 of 2) Never done    Low dose CT lung screening  Never done    Flu vaccine (1) 09/01/2022    Depression Screen  10/26/2022    Prostate Specific Antigen (PSA) Screening or Monitoring  03/18/2023    Colorectal Cancer Screen  11/23/2024    Lipids  03/18/2027    Hepatitis A vaccine  Aged Out    Hepatitis B vaccine  Aged Out    Hib vaccine  Aged Out    Meningococcal (ACWY) vaccine  Aged Out

## 2022-07-08 PROBLEM — I48.20 CHRONIC ATRIAL FIBRILLATION (HCC): Status: ACTIVE | Noted: 2022-07-08

## 2022-07-08 PROBLEM — R25.2 CRAMPS OF RIGHT LOWER EXTREMITY: Status: ACTIVE | Noted: 2022-07-08

## 2022-07-09 NOTE — PROGRESS NOTES
[address] [facility] [address]  [place] [address]  Dept: [phone]  Dept Fax: [phone]     Name: Luzmaria Chaudhari  : 1960           Chief Complaint   Patient presents with    Atrial Fibrillation     Newly onset, routine follow up appointment        History of Present Illness:    HPI  Here for FU   C/o leg cramps   Rate controlled     Past Medical History:    Past Medical History:   Diagnosis Date    Abscess of bursa of right elbow     Arthritis     Atrial fibrillation ([address]     BPH (benign prostatic hyperplasia)     Heartburn     Hypertension     Panic attack 2021    Polycythemia     PONV (postoperative nausea and vomiting)     Sleep apnea     no machine      Reviewed all health maintenance requirements and ordered appropriate tests  Health Maintenance Due   Topic Date Due    COVID-19 Vaccine (1) Never done    Pneumococcal 0-64 years Vaccine (1 - PCV) Never done    HIV screen  Never done    Hepatitis C screen  Never done    DTaP/Tdap/Td vaccine (1 - Tdap) Never done    Shingles vaccine (1 of 2) Never done    Low dose CT lung screening  Never done       Past Surgical History:    Past Surgical History:   Procedure Laterality Date    APPENDECTOMY      ARM SURGERY Right 2021    TRICEPS TENDON REPAIR - RIGHT ELBOW WITH ARTHREX AND BIOMET ACHILLES ALLOGRAFT    ARM SURGERY Right 2021    RIGHT ELBOW IRRIGATION AND DEBRIDEMENT performed by Mariam Murrell MD at [facility]      cervical/ lumbar    CARDIAC CATHETERIZATION      [facility]/ no stents    COLONOSCOPY      ENDOSCOPY, COLON, DIAGNOSTIC      HERNIA REPAIR      MUSCLE REPAIR Right 2021    TRICEPS TENDON REPAIR - RIGHT ELBOW performed by Mariam Murrell MD at [address] [employer] SEPTOPLASTY Bilateral 2021    SEPTOPLASTY BILATERAL TURBINATE REDUCTION performed by Junie Overton MD at [facility]      cervical and lumbar    UPPER GASTROINTESTINAL ENDOSCOPY N/A 12/17/2019    EGD BIOPSY performed by Eder Mead MD at [place] [address] N/A 7/30/2021    EGD BIOPSY OF STOMACH AND ESOPHAGUS performed by Ora Dakin, MD at [facility]        Medications:      Current Outpatient Medications:     metoclopramide (REGLAN) 10 MG tablet, , Disp: , Rfl:     losartan (COZAAR) 100 MG tablet, take 1 tablet by mouth once daily, Disp: , Rfl:     metoprolol succinate (TOPROL XL) 25 MG extended release tablet, take 1 tablet by mouth once daily, Disp: , Rfl:     cyclobenzaprine (FLEXERIL) 10 MG tablet, Take 1 tablet by mouth nightly as needed for Muscle spasms, Disp: 10 tablet, Rfl: 0    pantoprazole (PROTONIX) 40 MG tablet, take 1 tablet by mouth twice a day, Disp: 30 tablet, Rfl: 0    ondansetron (ZOFRAN ODT) 4 MG disintegrating tablet, Take 1 tablet by mouth every 8 hours as needed for Nausea or Vomiting, Disp: 12 tablet, Rfl: 0    irbesartan (AVAPRO) 300 MG tablet, Take 1 tablet by mouth nightly, Disp: 30 tablet, Rfl: 3    spironolactone (ALDACTONE) 50 MG tablet, Take 1 tablet by mouth daily Sometimes will take two daily- if feeling bloated, Disp: 30 tablet, Rfl: 3    PARoxetine (PAXIL) 20 MG tablet, Take 1 tablet by mouth at bedtime, Disp: 30 tablet, Rfl: 3    dilTIAZem (CARDIZEM CD) 120 MG extended release capsule, Take 1 capsule by mouth daily, Disp: 30 capsule, Rfl: 3    Multiple Vitamins-Minerals (THERAPEUTIC MULTIVITAMIN-MINERALS) tablet, Take 1 tablet by mouth daily, Disp: , Rfl:     vitamin C (ASCORBIC ACID) 500 MG tablet, Take 500 mg by mouth daily, Disp: , Rfl:     zinc gluconate 50 MG tablet, Take 50 mg by mouth daily, Disp: , Rfl:     apixaban (ELIQUIS) 5 MG TABS tablet, Take 5 mg by mouth 2 times daily, Disp: , Rfl:     albuterol sulfate HFA (PROVENTIL HFA) 108 (90 Base) MCG/ACT inhaler, Inhale 2 puffs into the lungs every 6 hours as needed for Wheezing, Disp: 18 g, Rfl: 3   glucose monitoring (FREESTYLE FREEDOM) kit, 1 kit by Does not apply route daily, Disp: 1 kit, Rfl: 0    Allergies:      Pcn [penicillins]    Social History:    Tobacco:    reports that he quit smoking about 14 years ago. His smoking use included cigars. He has a 20.00 pack-year smoking history. He has never used smokeless tobacco.  Alcohol:      reports current alcohol use. Drug Use:  reports current drug use. Drug: Marijuana Alexa Naseem).     Family History:    Family History   Problem Relation Age of Onset    Cancer Father         prostrate    Diabetes Father     Cancer Paternal Uncle         colon       Review of Systems:    Positive and Negative as described in HPI    Constitutional:  negative for  fevers, chills, sweats, fatigue, and weight loss  HEENT:  negative for vision or hearing changes,   Respiratory:  negative for shortness of breath, cough, or congestion  Cardiovascular:  negative for  chest pain, palpitations  Gastrointestinal:  negative for nausea, vomiting, diarrhea, constipation, abdominal pain  Genitourinary:  negative for frequency, dysuria  Integument/Breast:  negative for rash, skin lesions  Musculoskeletal:  negative for muscle aches or joint pain  Neurological:  negative for headaches, dizziness, lightheadedness, numbness, pain and tingling extrimities  Behavior/Psych:  negative for depression and anxiety      Physical Exam:    Vitals:  /80   Pulse 64   Ht 5' 10\" (1.778 m)   Wt 208 lb 6.4 oz (94.5 kg)   SpO2 99%   BMI 29.90 kg/m²     General appearance - alert, well appearing, and in no acute distress  Mental status - oriented to person, place, and time with normal affect  Head - normocephalic and atraumatic  Eyes - pupils equal and reactive, extraocular eye movements intact, conjunctiva clear  Ears - hearing appears to be intact  Nose - no drainage noted  Mouth - mucous membranes moist  Neck - supple, no carotid bruits, thyroid not palpable  Chest - clear to auscultation, normal effort  Heart - normal rate, regular rhythm, no murmurs  Abdomen - soft, nontender, nondistended, bowel sounds present all four quadrants, no masses, hepatomegaly or splenomegaly  Neurological - normal speech, no focal findings or movement disorder noted, cranial nerves II through XII grossly intact  Extremities - peripheral pulses palpable, no pedal edema or calf pain with palpation  Skin - no gross lesions, rashes, or induration noted      Data:    Lab Results   Component Value Date/Time     06/15/2022 08:20 PM    K 4.1 06/15/2022 08:20 PM     06/15/2022 08:20 PM    CO2 23 06/15/2022 08:20 PM    BUN 20 06/15/2022 08:20 PM    CREATININE 0.90 06/15/2022 08:20 PM    GLUCOSE 83 06/15/2022 08:20 PM    PROT 5.7 06/13/2022 02:07 PM    LABALBU 3.7 06/13/2022 02:07 PM    BILITOT 0.45 06/13/2022 02:07 PM    ALKPHOS 43 06/13/2022 02:07 PM    AST 21 06/13/2022 02:07 PM    ALT 30 06/13/2022 02:07 PM     Lab Results   Component Value Date/Time    WBC 6.9 06/15/2022 08:20 PM    RBC 5.17 06/15/2022 08:20 PM    HGB 16.3 06/15/2022 08:20 PM    HCT 49.7 06/15/2022 08:20 PM    MCV 96.2 06/15/2022 08:20 PM    MCH 31.5 06/15/2022 08:20 PM    MCHC 32.7 06/15/2022 08:20 PM    RDW 15.8 06/15/2022 08:20 PM     06/15/2022 08:20 PM    MPV 7.2 06/15/2022 08:20 PM     Lab Results   Component Value Date/Time    TSH 1.45 03/31/2022 05:04 AM     Lab Results   Component Value Date/Time    CHOL 183 03/18/2022 11:53 AM    HDL 38 03/18/2022 11:53 AM    PSA 3.54 03/18/2022 11:55 AM    LABA1C 5.6 03/18/2022 11:53 AM          Assessment & Plan:     Diagnosis Orders   1. Chronic atrial fibrillation ([address]     2. Cramps of right lower extremity  cyclobenzaprine (FLEXERIL) 10 MG tablet    [facility]   3. Essential hypertension     4. SEBASTIAN (obstructive sleep apnea)         1. Chronic atrial fibrillation ([address]  2. Cramps of right lower extremity  -     cyclobenzaprine (FLEXERIL) 10 MG tablet;  Take 1 tablet by mouth nightly as needed for Muscle spasms, Disp-10 tablet, R-0Normal  -     [facility]  3. Essential hypertension  4. SEBASTIAN (obstructive sleep apnea)     Ch Afib   Rate controlled   On AC     Cramps after work out   meds ,  PT     HTN:    Discussed in detail about the BP, lab results, importance of diet, salt intake, exercise, and med compliance. Medication side effects discussed in detail and has none today. Patient verbalized understanding. BP Readings from Last 3 Encounters:   07/01/22 118/80   06/15/22 (!) 152/103   06/13/22 (!) 142/84        CREATININE   Date Value Ref Range Status   06/15/2022 0.90 0.70 - 1.20 mg/dL Final   06/13/2022 0.90 0.70 - 1.20 mg/dL Final   06/05/2022 0.80 0.70 - 1.20 mg/dL Final          Obstructive Sleep Apnea:  Had long discussion about the compliance of CPAP with sleep apnea and importance of weight loss. Patient has been doing well and seems to be controlled. Follows with Pulmonary. Had sleep study in past 5 years? Yes            Completed Refills   Requested Prescriptions     Signed Prescriptions Disp Refills    cyclobenzaprine (FLEXERIL) 10 MG tablet 10 tablet 0     Sig: Take 1 tablet by mouth nightly as needed for Muscle spasms     No follow-ups on file.   Orders Placed This Encounter   Medications    cyclobenzaprine (FLEXERIL) 10 MG tablet     Sig: Take 1 tablet by mouth nightly as needed for Muscle spasms     Dispense:  10 tablet     Refill:  0     Orders Placed This Encounter   Procedures   [address]     Referral Priority:   Routine     Referral Type:   Eval and Treat     Referral Reason:   Specialty Services Required     Requested Specialty:   Physical Therapist     Number of Visits Requested:   1       Electronically signed by Joan Chappell MD on 7/8/2022 at 8:23 PM

## 2022-07-13 RX ORDER — PANTOPRAZOLE SODIUM 40 MG/1
TABLET, DELAYED RELEASE ORAL
Qty: 30 TABLET | Refills: 0 | OUTPATIENT
Start: 2022-07-13

## 2022-07-26 ENCOUNTER — APPOINTMENT (OUTPATIENT)
Dept: GENERAL RADIOLOGY | Facility: CLINIC | Age: 62
End: 2022-07-26
Payer: COMMERCIAL

## 2022-07-26 ENCOUNTER — HOSPITAL ENCOUNTER (EMERGENCY)
Facility: CLINIC | Age: 62
Discharge: HOME | End: 2022-07-27
Attending: EMERGENCY MEDICINE
Payer: COMMERCIAL

## 2022-07-26 DIAGNOSIS — I48.91 ATRIAL FIBRILLATION WITH RVR (HCC): Primary | ICD-10-CM

## 2022-07-26 LAB
ABSOLUTE EOS #: 0.2 K/UL (ref 0–0.4)
ABSOLUTE LYMPH #: 1 K/UL (ref 1–4.8)
ABSOLUTE MONO #: 0.6 K/UL (ref 0.1–1.2)
ALBUMIN SERPL-MCNC: 4.5 G/DL (ref 3.5–5.2)
ALBUMIN/GLOBULIN RATIO: 2.8 (ref 1–2.5)
ALP BLD-CCNC: 51 U/L (ref 40–129)
ALT SERPL-CCNC: 43 U/L (ref 5–41)
ANION GAP SERPL CALCULATED.3IONS-SCNC: 9 MMOL/L (ref 9–17)
AST SERPL-CCNC: 39 U/L
BASOPHILS # BLD: 1 % (ref 0–2)
BASOPHILS ABSOLUTE: 0.1 K/UL (ref 0–0.2)
BILIRUB SERPL-MCNC: 0.5 MG/DL (ref 0.3–1.2)
BUN BLDV-MCNC: 18 MG/DL (ref 8–23)
CALCIUM SERPL-MCNC: 9.2 MG/DL (ref 8.6–10.4)
CHLORIDE BLD-SCNC: 100 MMOL/L (ref 98–107)
CO2: 25 MMOL/L (ref 20–31)
CREAT SERPL-MCNC: 0.8 MG/DL (ref 0.7–1.2)
EOSINOPHILS RELATIVE PERCENT: 3 % (ref 1–4)
GFR AFRICAN AMERICAN: >60 ML/MIN
GFR NON-AFRICAN AMERICAN: >60 ML/MIN
GFR SERPL CREATININE-BSD FRML MDRD: ABNORMAL ML/MIN/{1.73_M2}
GLUCOSE BLD-MCNC: 92 MG/DL (ref 70–99)
HCT VFR BLD CALC: 52.1 % (ref 41–53)
HEMOGLOBIN: 17.1 G/DL (ref 13.5–17.5)
LYMPHOCYTES # BLD: 13 % (ref 24–44)
MCH RBC QN AUTO: 31 PG (ref 26–34)
MCHC RBC AUTO-ENTMCNC: 32.8 G/DL (ref 31–37)
MCV RBC AUTO: 94.4 FL (ref 80–100)
MONOCYTES # BLD: 8 % (ref 2–11)
PDW BLD-RTO: 13.6 % (ref 12.5–15.4)
PLATELET # BLD: 267 K/UL (ref 140–450)
PMV BLD AUTO: 7.3 FL (ref 6–12)
POTASSIUM SERPL-SCNC: 4.3 MMOL/L (ref 3.7–5.3)
RBC # BLD: 5.52 M/UL (ref 4.5–5.9)
SEG NEUTROPHILS: 75 % (ref 36–66)
SEGMENTED NEUTROPHILS ABSOLUTE COUNT: 5.3 K/UL (ref 1.8–7.7)
SODIUM BLD-SCNC: 134 MMOL/L (ref 135–144)
TOTAL PROTEIN: 6.1 G/DL (ref 6.4–8.3)
TROPONIN, HIGH SENSITIVITY: 19 NG/L (ref 0–22)
WBC # BLD: 7.2 K/UL (ref 3.5–11)

## 2022-07-26 PROCEDURE — 2580000003 HC RX 258: Performed by: EMERGENCY MEDICINE

## 2022-07-26 PROCEDURE — 36415 COLL VENOUS BLD VENIPUNCTURE: CPT

## 2022-07-26 PROCEDURE — 96365 THER/PROPH/DIAG IV INF INIT: CPT

## 2022-07-26 PROCEDURE — 80053 COMPREHEN METABOLIC PANEL: CPT

## 2022-07-26 PROCEDURE — 93005 ELECTROCARDIOGRAM TRACING: CPT | Performed by: EMERGENCY MEDICINE

## 2022-07-26 PROCEDURE — 85025 COMPLETE CBC W/AUTO DIFF WBC: CPT

## 2022-07-26 PROCEDURE — 96376 TX/PRO/DX INJ SAME DRUG ADON: CPT

## 2022-07-26 PROCEDURE — 99285 EMERGENCY DEPT VISIT HI MDM: CPT

## 2022-07-26 PROCEDURE — 84484 ASSAY OF TROPONIN QUANT: CPT

## 2022-07-26 PROCEDURE — 71045 X-RAY EXAM CHEST 1 VIEW: CPT

## 2022-07-26 PROCEDURE — 2500000003 HC RX 250 WO HCPCS: Performed by: EMERGENCY MEDICINE

## 2022-07-26 RX ORDER — PILOCARPINE HYDROCHLORIDE 5 MG/1
7.5 TABLET, FILM COATED ORAL 3 TIMES DAILY
COMMUNITY

## 2022-07-26 RX ORDER — DILTIAZEM HYDROCHLORIDE 5 MG/ML
20 INJECTION INTRAVENOUS ONCE
Status: COMPLETED | OUTPATIENT
Start: 2022-07-26 | End: 2022-07-26

## 2022-07-26 RX ADMIN — DILTIAZEM HYDROCHLORIDE 20 MG: 5 INJECTION INTRAVENOUS at 23:18

## 2022-07-26 RX ADMIN — DILTIAZEM HYDROCHLORIDE 2.5 MG/HR: 5 INJECTION, SOLUTION INTRAVENOUS at 23:19

## 2022-07-26 ASSESSMENT — PAIN - FUNCTIONAL ASSESSMENT: PAIN_FUNCTIONAL_ASSESSMENT: NONE - DENIES PAIN

## 2022-07-27 VITALS
SYSTOLIC BLOOD PRESSURE: 91 MMHG | DIASTOLIC BLOOD PRESSURE: 62 MMHG | HEART RATE: 97 BPM | RESPIRATION RATE: 17 BRPM | HEIGHT: 69 IN | BODY MASS INDEX: 30.66 KG/M2 | WEIGHT: 207 LBS | OXYGEN SATURATION: 94 % | TEMPERATURE: 98.4 F

## 2022-07-27 PROCEDURE — 2580000003 HC RX 258: Performed by: EMERGENCY MEDICINE

## 2022-07-27 PROCEDURE — 96361 HYDRATE IV INFUSION ADD-ON: CPT

## 2022-07-27 RX ORDER — 0.9 % SODIUM CHLORIDE 0.9 %
1000 INTRAVENOUS SOLUTION INTRAVENOUS ONCE
Status: COMPLETED | OUTPATIENT
Start: 2022-07-27 | End: 2022-07-27

## 2022-07-27 RX ADMIN — SODIUM CHLORIDE 1000 ML: 9 INJECTION, SOLUTION INTRAVENOUS at 00:03

## 2022-07-27 NOTE — ED NOTES
Pt presents to ED ambulatory a&o x4. Pt comes with complaints of palpitations and feelings of a-fib. Pt has hx of. Pt denies cp but has sob. Pt states that he took 50mg of metoprolol after feeling palpitations. Pt has echo and possible ablation coming up with cardiology. Pt placed on full cardiac monitor.       Minh Shaw RN  07/26/22 4705

## 2022-07-27 NOTE — ED PROVIDER NOTES
[facility]  [facility]  Phone: [phone]      Pt Name: Rl Bang  XYO:[id number]  Birthdate 1960  Date of evaluation: 7/26/2022      CHIEF COMPLAINT       Chief Complaint   Patient presents with    Shortness of Breath    Palpitations       HISTORY OF PRESENT ILLNESS   Rl Bang is a 62 y.o. male [facility] who presents for evaluation of A. fib. The patient reports that he goes in and out of A. fib frequently but his heart rate typically comes down and is rate controlled after a few minutes. He states that this morning he forgot to take his medications including his metoprolol. This evening he got home from work and had a cocktail. He states that starting around 10:30 PM he developed gradual onset, constant, progressive, rapid heart rate that is much higher than his baseline. He states that it was as high as 150s and when he gets this high he gets slightly short of breath. The patient took his nighttime meds including metoprolol but has not had any improvement in his heart rate. He does not list any provoking or palliating factors. The patient's cardiologist is Dr. Estevan Carmichael and the patient is scheduled for a echo in 2 days with eventual plan for ablation. The patient denies fever, chills, headache, vision changes, neck pain, back pain, chest pain, abdominal pain, nausea, vomiting, urinary/bowel symptoms, focal weakness, numbness, tingling, recent injury or illness. REVIEW OF SYSTEMS     Ten point review of systems was reviewed and is negative unless otherwise noted in the HPI    [address]    has a past medical history of Abscess of bursa of right elbow, Arthritis, Atrial fibrillation ([address] BPH (benign prostatic hyperplasia), Heartburn, Hypertension, Panic attack, Polycythemia, PONV (postoperative nausea and vomiting), and Sleep apnea. SURGICAL HISTORY      has a past surgical history that includes shoulder surgery;  Spine surgery; Upper gastrointestinal endoscopy (N/A, 12/17/2019); Cardiac catheterization (2017); hernia repair; Colonoscopy; Appendectomy; back surgery; Endoscopy, colon, diagnostic; Arm Surgery (Right, 02/23/2021); Muscle Repair (Right, 2/23/2021); Arm Surgery (Right, 7/13/2021); Upper gastrointestinal endoscopy (N/A, 7/30/2021); and Septoplasty (Bilateral, 12/20/2021). CURRENT MEDICATIONS       Discharge Medication List as of 7/27/2022  1:26 AM        CONTINUE these medications which have NOT CHANGED    Details   pilocarpine (SALAGEN) 5 MG tablet Take 7.5 mg by mouth in the morning and 7.5 mg at noon and 7.5 mg before bedtime. Historical Med      metoclopramide (REGLAN) 10 MG tablet Historical Med      losartan (COZAAR) 100 MG tablet take 1 tablet by mouth once dailyHistorical Med      metoprolol succinate (TOPROL XL) 25 MG extended release tablet take 1 tablet by mouth once dailyHistorical Med      pantoprazole (PROTONIX) 40 MG tablet take 1 tablet by mouth twice a day, Disp-30 tablet, R-0Normal      ondansetron (ZOFRAN ODT) 4 MG disintegrating tablet Take 1 tablet by mouth every 8 hours as needed for Nausea or Vomiting, Disp-12 tablet, R-0Normal      irbesartan (AVAPRO) 300 MG tablet Take 1 tablet by mouth nightly, Disp-30 tablet, R-3Normal      spironolactone (ALDACTONE) 50 MG tablet Take 1 tablet by mouth daily Sometimes will take two daily- if feeling bloated, Disp-30 tablet, R-3Normal      PARoxetine (PAXIL) 20 MG tablet Take 1 tablet by mouth at bedtime, Disp-30 tablet, R-3Normal      dilTIAZem (CARDIZEM CD) 120 MG extended release capsule Take 1 capsule by mouth daily, Disp-30 capsule, R-3Normal      Multiple Vitamins-Minerals (THERAPEUTIC MULTIVITAMIN-MINERALS) tablet Take 1 tablet by mouth dailyHistorical Med      vitamin C (ASCORBIC ACID) 500 MG tablet Take 500 mg by mouth dailyHistorical Med      zinc gluconate 50 MG tablet Take 50 mg by mouth dailyHistorical Med      apixaban (ELIQUIS) 5 MG TABS tablet Take 5 mg by mouth 2 times dailyHistorical Med      albuterol sulfate HFA (PROVENTIL HFA) 108 (90 Base) MCG/ACT inhaler Inhale 2 puffs into the lungs every 6 hours as needed for Wheezing, Disp-18 g, R-3Normal      glucose monitoring (FREESTYLE FREEDOM) kit DAILY Starting Wed 1/19/2022, Disp-1 kit, R-0, Normal             ALLERGIES     is allergic to pcn [penicillins]. FAMILY HISTORY     He indicated that his mother is alive. He indicated that his father is alive. He indicated that the status of his paternal uncle is unknown.     family history includes Cancer in his father and paternal uncle; Diabetes in his father. SOCIAL HISTORY      reports that he has been smoking cigars. He has never used smokeless tobacco. He reports current alcohol use. He reports current drug use. Drug: Marijuana Diablo Meryl). PHYSICAL EXAM     INITIAL VITALS:  height is 5' 9\" (1.753 m) and weight is 93.9 kg (207 lb). His oral temperature is 98.4 °F (36.9 °C). His blood pressure is 91/62 and his pulse is 97. His respiration is 17 and oxygen saturation is 94%. CONSTITUTIONAL: no apparent distress, well appearing  SKIN: warm, dry, no jaundice, hives or petechiae  EYES: clear conjunctiva, non-icteric sclera  HENT: normocephalic, atraumatic, moist mucus membranes  NECK: Nontender and supple with no nuchal rigidity, full range of motion  PULMONARY: clear to auscultation without wheezes, rhonchi, or rales, normal excursion, no accessory muscle use and no stridor  CARDIOVASCULAR: Tachycardic rate, irregularly irregular rhythm. Strong radial pulses with intact distal perfusion. Capillary refill <2 seconds. GASTROINTESTINAL: soft, non-tender, non-distended, no palpable masses, no rebound or guarding   GENITOURINARY: No costovertebral angle tenderness to palpation  MUSCULOSKELETAL: No midline spinal tenderness, step off or deformity. Extremities are otherwise nontender to palpation and nonerythematous. Compartments soft.  No peripheral edema.  NEUROLOGIC: alert and oriented x 3, GCS 15, normal mentation and speech. Moves all extremities x 4 without motor or sensory deficit, gait is stable without ataxia  PSYCHIATRIC: normal mood and affect, thought process is clear and linear    DIAGNOSTIC RESULTS     EKG:  EKG 2308 atrial fibrillation with RVR, rate 152 bpm, normal axis, normal QRS and QTc interval, no ST elevation or depression, T wave flattening in 3 and aVF, poor R wave progression, Q wave in aVR and 3, similar to EKG on 3/30/2022 but change from 6/15/2022 which showed sinus rhythm    RADIOLOGY:   XR CHEST PORTABLE    Result Date: 7/26/2022  EXAMINATION: ONE XRAY VIEW OF THE CHEST 7/26/2022 11:14 pm COMPARISON: 06/15/2021 HISTORY: ORDERING SYSTEM PROVIDED HISTORY: shortness of breath TECHNOLOGIST PROVIDED HISTORY: shortness of breath Reason for Exam: SOB and Palpitations FINDINGS: Cardiomediastinal silhouette is normal in size. There is no pleural effusion or pneumothorax. There is linear atelectasis in the left lung base. No acute osseous abnormality. No acute cardiopulmonary abnormality.         LABS:  Results for orders placed or performed during the hospital encounter of 07/26/22   CBC with Auto Differential   Result Value Ref Range    WBC 7.2 3.5 - 11.0 k/uL    RBC 5.52 4.5 - 5.9 m/uL    Hemoglobin 17.1 13.5 - 17.5 g/dL    Hematocrit 52.1 41 - 53 %    MCV 94.4 80 - 100 fL    MCH 31.0 26 - 34 pg    MCHC 32.8 31 - 37 g/dL    RDW 13.6 12.5 - 15.4 %    Platelets 815 156 - 772 k/uL    MPV 7.3 6.0 - 12.0 fL    Seg Neutrophils 75 (H) 36 - 66 %    Lymphocytes 13 (L) 24 - 44 %    Monocytes 8 2 - 11 %    Eosinophils % 3 1 - 4 %    Basophils 1 0 - 2 %    Segs Absolute 5.30 1.8 - 7.7 k/uL    Absolute Lymph # 1.00 1.0 - 4.8 k/uL    Absolute Mono # 0.60 0.1 - 1.2 k/uL    Absolute Eos # 0.20 0.0 - 0.4 k/uL    Basophils Absolute 0.10 0.0 - 0.2 k/uL   CMP   Result Value Ref Range    Glucose 92 70 - 99 mg/dL    BUN 18 8 - 23 mg/dL    Creatinine 0.80 0.70 - 1.20 mg/dL    Calcium 9.2 8.6 - 10.4 mg/dL    Sodium 134 (L) 135 - 144 mmol/L    Potassium 4.3 3.7 - 5.3 mmol/L    Chloride 100 98 - 107 mmol/L    CO2 25 20 - 31 mmol/L    Anion Gap 9 9 - 17 mmol/L    Alkaline Phosphatase 51 40 - 129 U/L    ALT 43 (H) 5 - 41 U/L    AST 39 <40 U/L    Total Bilirubin 0.50 0.3 - 1.2 mg/dL    Total Protein 6.1 (L) 6.4 - 8.3 g/dL    Albumin 4.5 3.5 - 5.2 g/dL    Albumin/Globulin Ratio 2.8 (H) 1.0 - 2.5    GFR Non-African American >60 >60 mL/min    GFR African American >60 >60 mL/min    GFR Comment         Troponin   Result Value Ref Range    Troponin, High Sensitivity 19 0 - 22 ng/L       EMERGENCY DEPARTMENT COURSE:        The patient was given the following medications:  Orders Placed This Encounter   Medications    dilTIAZem injection 20 mg    dilTIAZem 125 mg in dextrose 5 % 125 mL infusion     Order Specific Question:   Titrate Infusion? Answer:   Yes     Order Specific Question:   Initial Infusion Dose: Answer:   5 mg/hr     Order Specific Question:   Goal of Therapy is: Answer:   HR less than 100 bpm     Order Specific Question:   Contact Provider if:     Answer:   SBP less than 90 mmHg     Order Specific Question:   Contact Provider if:     Answer:   HR less than 60 bpm     Order Specific Question:   HOLD for     Answer:   SBP less than 90 mmHg     Order Specific Question:   HOLD for     Answer:   HR less than 60 bpm    0.9 % sodium chloride bolus        Vitals:    Vitals:    07/27/22 0042 07/27/22 0056 07/27/22 0106 07/27/22 0119   BP: 100/67  (!) 85/56 91/62   Pulse: 91 85 96 97   Resp: 16 17     Temp:       TempSrc:       SpO2: 96% 94%     Weight:       Height:         -------------------------  BP: 91/62, Temp: 98.4 °F (36.9 °C), Heart Rate: 97, Resp: 17    CONSULTS:  None    CRITICAL CARE:   CRITICAL CARE: There was a high probability of clinically significant/life threatening deterioration in this patient's condition which required my urgent intervention. Total critical care time was 35 minutes. This excludes any time for separately reportable procedures. PROCEDURES:  None    DIAGNOSIS/ MDM:   Michael Medina is a 62 y.o. male who presents with recurrent atrial fibrillation. He presents in A. fib with RVR with heart rate ranging from 1 20-1 70s. His blood pressure was initially slightly elevated. He did receive a Cardizem bolus and then was started on a Cardizem drip with significant improvement in his heart rate. He did not convert to sinus rhythm but his heart rate remained controlled between 70-80. His blood pressure did drop but I suspect this is secondary to taking his metoprolol prior to arrival and receiving Cardizem. I subsequently stopped the Cardizem. He did receive IV fluids and his blood pressure improved. He denies any dizziness, lightheadedness or syncope. CBC, CMP, troponin and chest x-ray are unremarkable. The patient did not want to wait any longer and requested to be discharged. He is already scheduled to see cardiology in 2 days. At this time he is already anticoagulated and is on medications for rate control. I did not feel that he needs to be admitted since he is no longer in RVR. The patient currently denies any physical complaints. His shortness of breath resolved after his heart rate improved. I have low suspicion for ACS, PE, dissection, esophageal rupture, cardiac tamponade, pneumothorax, myocarditis, or infection at this time. I instructed him to return to the ER for worsening symptoms or any other concern and to follow-up in 2 days with his cardiologist as scheduled. The patient understands that at this time there is no evidence for a more malignant underlying process, but also understands that early in the process of an illness or injury, an emergency department work-up can be falsely reassuring.   Routine discharge counseling was given, and the patient understands that worsening, changing or persistent symptoms should prompt a immediate call or follow-up with their primary care physician or return to the emergency department. The importance of appropriate follow-up was also discussed. I have reviewed the disposition diagnosis with the patient. I have answered their questions and given discharge instructions. They voiced understanding of these instructions and did not have any further questions or complaints. FINAL IMPRESSION      1.  Atrial fibrillation with RVR [facility])          DISPOSITION/PLAN   DISPOSITION Decision To Discharge 07/27/2022 01:25:39 AM        PATIENT REFERRED TO:  Carolyn Estrada MD  [address] [place] [address] [address] [facility]  [phone]    Schedule an appointment as soon as possible for a visit in 2 days      Jose Mario MD  [address] [place] [address]  [phone]    Schedule an appointment as soon as possible for a visit in 2 days      [facility] ED  [address] [place] [id number] [phone]  Go to   If symptoms worsen    DISCHARGE MEDICATIONS:  Discharge Medication List as of 7/27/2022  1:26 AM          (Please note that portions of this note were completed with a voice recognitionprogram.  Efforts were made to edit the dictations but occasionally words are mis-transcribed.)    Keshia Flanagan DO, [facility]  Emergency Physician Attending          Keshia Flanagan DO  07/27/22 0223

## 2022-07-28 LAB
EKG ATRIAL RATE: 159 BPM
EKG Q-T INTERVAL: 234 MS
EKG QRS DURATION: 84 MS
EKG QTC CALCULATION (BAZETT): 372 MS
EKG R AXIS: 8 DEGREES
EKG T AXIS: 44 DEGREES
EKG VENTRICULAR RATE: 152 BPM

## 2022-08-08 ENCOUNTER — HOSPITAL ENCOUNTER (EMERGENCY)
Age: 62
Discharge: HOME | End: 2022-08-08
Attending: EMERGENCY MEDICINE
Payer: COMMERCIAL

## 2022-08-08 VITALS
DIASTOLIC BLOOD PRESSURE: 80 MMHG | SYSTOLIC BLOOD PRESSURE: 142 MMHG | HEART RATE: 68 BPM | RESPIRATION RATE: 20 BRPM | WEIGHT: 210 LBS | HEIGHT: 69 IN | TEMPERATURE: 97.7 F | BODY MASS INDEX: 31.1 KG/M2 | OXYGEN SATURATION: 97 %

## 2022-08-08 DIAGNOSIS — F69 BEHAVIOR CONCERN IN ADULT: Primary | ICD-10-CM

## 2022-08-08 PROCEDURE — 99282 EMERGENCY DEPT VISIT SF MDM: CPT

## 2022-08-08 ASSESSMENT — ENCOUNTER SYMPTOMS
SHORTNESS OF BREATH: 0
ABDOMINAL PAIN: 0
VOMITING: 0
COUGH: 0
NAUSEA: 0

## 2022-08-08 ASSESSMENT — PAIN - FUNCTIONAL ASSESSMENT: PAIN_FUNCTIONAL_ASSESSMENT: NONE - DENIES PAIN
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
## 2022-08-08 NOTE — DISCHARGE INSTRUCTIONS
Follow up with mental health treatment resources provided above. Return to the Emergency Department if your symptoms worsen. Avoid alcohol or any drug use not prescribed to you by your doctors.
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
## 2022-08-11 ENCOUNTER — OFFICE VISIT (OUTPATIENT)
Dept: ORTHOPEDIC SURGERY | Age: 62
End: 2022-08-11
Payer: COMMERCIAL

## 2022-08-11 VITALS — BODY MASS INDEX: 31.1 KG/M2 | HEIGHT: 69 IN | WEIGHT: 210 LBS

## 2022-08-11 DIAGNOSIS — M23.305 INTERNAL DERANGEMENT OF KNEE INVOLVING MEDIAL MENISCUS: ICD-10-CM

## 2022-08-11 DIAGNOSIS — M25.561 CHRONIC PAIN OF RIGHT KNEE: Primary | ICD-10-CM

## 2022-08-11 DIAGNOSIS — G89.29 CHRONIC PAIN OF RIGHT KNEE: Primary | ICD-10-CM

## 2022-08-11 PROCEDURE — 20610 DRAIN/INJ JOINT/BURSA W/O US: CPT | Performed by: PHYSICIAN ASSISTANT

## 2022-08-11 RX ORDER — LIDOCAINE HYDROCHLORIDE 10 MG/ML
4 INJECTION, SOLUTION INFILTRATION; PERINEURAL ONCE
Status: COMPLETED | OUTPATIENT
Start: 2022-08-11 | End: 2022-08-11

## 2022-08-11 RX ORDER — BETAMETHASONE SODIUM PHOSPHATE AND BETAMETHASONE ACETATE 3; 3 MG/ML; MG/ML
12 INJECTION, SUSPENSION INTRA-ARTICULAR; INTRALESIONAL; INTRAMUSCULAR; SOFT TISSUE ONCE
Status: COMPLETED | OUTPATIENT
Start: 2022-08-11 | End: 2022-08-11

## 2022-08-11 RX ADMIN — LIDOCAINE HYDROCHLORIDE 4 ML: 10 INJECTION, SOLUTION INFILTRATION; PERINEURAL at 09:36

## 2022-08-11 RX ADMIN — BETAMETHASONE SODIUM PHOSPHATE AND BETAMETHASONE ACETATE 12 MG: 3; 3 INJECTION, SUSPENSION INTRA-ARTICULAR; INTRALESIONAL; INTRAMUSCULAR; SOFT TISSUE at 09:35

## 2022-08-11 NOTE — PROGRESS NOTES
[facility], [address] [place] [address], [address] [place] [address], [address] [facility]           Dept Phone: [phone]           Dept Fax:  [phone] [address] [facility]           Nancy Wasserman          Dept Phone: [phone]           Dept Fax:  [phone]      Chief Compliant:  Chief Complaint   Patient presents with    Knee Pain     right        History of Present Illness: This is a 65 y.o. male who presents to the clinic today for evaluation of had concerns including Knee Pain (right). Mr. Myla Quintero is a 29-year-old gentleman who presents today for reevaluation of right knee pain and swelling. Has been seen by myself and Dr. Anuj Gonzalez for this issue in the past.  Last saw Dr. Anuj Gonzalez on 2/20/2022 and underwent an aspiration and injection. Patient returns today stating that the aspiration injection provided minimal relief of pain unfortunately. He continues to have pain over the medial aspect with focal swelling. Pain continues to be aggravated by twisting or turning and prolonged period of standing. No new injury or trauma no knee joint warmth, redness, fever or chills.        Past History:    Current Outpatient Medications:     metoclopramide (REGLAN) 10 MG tablet, , Disp: , Rfl:     losartan (COZAAR) 100 MG tablet, take 1 tablet by mouth once daily, Disp: , Rfl:     metoprolol succinate (TOPROL XL) 25 MG extended release tablet, take 1 tablet by mouth once daily, Disp: , Rfl:     pantoprazole (PROTONIX) 40 MG tablet, take 1 tablet by mouth twice a day, Disp: 30 tablet, Rfl: 0    Multiple Vitamins-Minerals (THERAPEUTIC MULTIVITAMIN-MINERALS) tablet, Take 1 tablet by mouth daily, Disp: , Rfl:     vitamin C (ASCORBIC ACID) 500 MG tablet, Take 500 mg by mouth daily, Disp: , Rfl:     zinc gluconate 50 MG tablet, Take 50 mg by mouth daily, Disp: , Rfl:     apixaban (ELIQUIS) 5 MG TABS tablet, Take 5 mg by mouth 2 times daily, Disp: , Rfl:     pilocarpine (SALAGEN) 5 MG tablet, Take 7.5 mg by mouth in the morning and 7.5 mg at noon and 7.5 mg before bedtime. (Patient not taking: Reported on 8/11/2022), Disp: , Rfl:     ondansetron (ZOFRAN ODT) 4 MG disintegrating tablet, Take 1 tablet by mouth every 8 hours as needed for Nausea or Vomiting (Patient not taking: Reported on 8/11/2022), Disp: 12 tablet, Rfl: 0    irbesartan (AVAPRO) 300 MG tablet, Take 1 tablet by mouth nightly (Patient not taking: Reported on 8/11/2022), Disp: 30 tablet, Rfl: 3    spironolactone (ALDACTONE) 50 MG tablet, Take 1 tablet by mouth daily Sometimes will take two daily- if feeling bloated (Patient not taking: Reported on 8/11/2022), Disp: 30 tablet, Rfl: 3    PARoxetine (PAXIL) 20 MG tablet, Take 1 tablet by mouth at bedtime (Patient not taking: Reported on 8/11/2022), Disp: 30 tablet, Rfl: 3    dilTIAZem (CARDIZEM CD) 120 MG extended release capsule, Take 1 capsule by mouth daily (Patient not taking: Reported on 8/11/2022), Disp: 30 capsule, Rfl: 3    albuterol sulfate HFA (PROVENTIL HFA) 108 (90 Base) MCG/ACT inhaler, Inhale 2 puffs into the lungs every 6 hours as needed for Wheezing (Patient not taking: Reported on 8/11/2022), Disp: 18 g, Rfl: 3    glucose monitoring (FREESTYLE FREEDOM) kit, 1 kit by Does not apply route daily, Disp: 1 kit, Rfl: 0  Allergies   Allergen Reactions    Pcn [Penicillins] Hives     Social History     Socioeconomic History    Marital status: Single     Spouse name: Not on file    Number of children: Not on file    Years of education: Not on file    Highest education level: Not on file   Occupational History    Not on file   Tobacco Use    Smoking status: Every Day     Types: Cigars    Smokeless tobacco: Never    Tobacco comments:     no cigarettes for 20 years- still smokes cigars and marijuana   Vaping Use    Vaping Use: Never used   Substance and Sexual Activity    Alcohol use:  Yes Comment: socially    Drug use: Yes     Types: Marijuana Jennifer Wolf     Comment: sometimes nightly/ oral or smokes    Sexual activity: Not on file   Other Topics Concern    Not on file   Social History Narrative    Not on file     Social Determinants of Health     Financial Resource Strain: Low Risk     Difficulty of Paying Living Expenses: Not hard at all   Food Insecurity: No Food Insecurity    Worried About Running Out of Food in the Last Year: Never true    [address] in the Last Year: Never true   Transportation Needs: No Transportation Needs    Lack of Transportation (Medical): No    Lack of Transportation (Non-Medical):  No   Physical Activity: Not on file   Stress: Not on file   Social Connections: Not on file   Intimate Partner Violence: Not on file   Housing Stability: Not on file     Past Medical History:   Diagnosis Date    Abscess of bursa of right elbow     Arthritis     Atrial fibrillation (HCC)     BPH (benign prostatic hyperplasia)     Heartburn     Hypertension     Panic attack 11/24/2021    Polycythemia     PONV (postoperative nausea and vomiting)     Sleep apnea     no machine     Past Surgical History:   Procedure Laterality Date    APPENDECTOMY      ARM SURGERY Right 02/23/2021    TRICEPS TENDON REPAIR - RIGHT ELBOW WITH ARTHREX AND BIOMET ACHILLES ALLOGRAFT    ARM SURGERY Right 7/13/2021    RIGHT ELBOW IRRIGATION AND DEBRIDEMENT performed by Rosey Campbell MD at [address] [facility]""      cervical/ lumbar    CARDIAC CATHETERIZATION  2017    [facility]/ no stents    COLONOSCOPY      ENDOSCOPY, COLON, DIAGNOSTIC      HERNIA REPAIR      MUSCLE REPAIR Right 2/23/2021    TRICEPS TENDON REPAIR - RIGHT ELBOW performed by Rosey Campbell MD at [address] Bilateral 12/20/2021    SEPTOPLASTY BILATERAL TURBINATE REDUCTION performed by Yareli Maldonado MD at [address]      cervical and lumbar    UPPER GASTROINTESTINAL ENDOSCOPY N/A 12/17/2019 EGD BIOPSY performed by Amie Rhoades MD at [address] [place] [address] N/A 7/30/2021    EGD BIOPSY OF STOMACH AND ESOPHAGUS performed by Gustavo Lentz MD at [facility]     Family History   Problem Relation Age of Onset    Cancer Father         prostrate    Diabetes Father     Cancer Paternal Uncle         colon        Review of Systems   Constitutional: Negative for fever, chills, sweats. Eyes: Negative for changes in vision, or pain. HENT: Negative for ear ache, epistaxis, or sore throat. Respiratory/Cardio: Negative for Chest pain, palpitations, SOB, or cough. Gastrointestinal: Negative for abdominal pain, N/V/D. Genitourinary: Negative for dysuria, frequency, urgency, or hematuria. Neurological: Negative for headache, numbness, or weakness. Integumentary: Negative for rash, itching, laceration, or abrasion. Musculoskeletal: Positive for Knee Pain (right)       Physical Exam:  Constitutional: Patient is oriented to person, place, and time. Patient appears well-developed and well nourished. HENT: Negative otherwise noted  Head: Normocephalic and Atraumatic  Nose: Normal  Eyes: Conjunctivae and EOM are normal  Neck: Normal range of motion Neck supple. Respiratory/Cardio: Effort normal. No respiratory distress. Musculoskeletal:    Right Knee:     Skin: warm and dry, no rash or erythema  Vasculature: 2+ pedal pulses bilaterally  Neuro: Sensation grossly intact to light touch diffusely  Alignment: Normal  Tenderness: Moderate tenderness to medial joint line. No tenderness to quad/patellar tendon, pes anserine bursa or posterior knee.   Effusion: trace    ROM: (Degrees)       A P       Extension  0 0       Flexion   100 105       Crepitation  Yes       Muscle strength:         Flexion   5      Extension  5      SLR   5        Extensor lag   y          Special testing:  y    Pain with deep knee flexion     y    Patellar grind       n    Patellar apprehension      n    Patellar glide         n    Lachman       n    Anterior drawer      n    Pivot shift       n    Posterior drawer      n    Dial test       n    Posterolateral drawer      n    Posterior Sag       n    MCL        n    LCL          mod    Medial joint line tenderness     n    Lateral joint line tenderness     y    McMurrey's         Neurological: Patient is alert and oriented to person, place, and time. Normal strenght. No sensory deficit. Skin: Skin is warm and dry  Psychiatric: Behavior is normal. Thought content normal.  Nursing note and vitals reviewed. Labs and Imaging:     No new x-rays taken today however those from 6/10/2021  AP bilateral knees standing lateral view of the right knee available for independent review demonstrate fairly advanced patellofemoral DJD with patellofemoral spurring. Mild medial compartmental narrowing. No evidence of acute fracture or other acute osseous abnormality at that time           Orders Placed This Encounter   Procedures    MRI KNEE RIGHT WO CONTRAST     Standing Status:   Future     Standing Expiration Date:   8/11/2023     Order Specific Question:   What is the sedation requirement? Answer:   None    20610 - DRAIN/INJECT LARGE JOINT BURSA       Assessment and Plan:  1. Chronic pain of right knee    2. Internal derangement of knee involving medial meniscus          PLAN:  Katie Brice is a 65 y.o. old male who presents to the clinic today for evaluation of right knee pain concerning for possible medial meniscus tear of right  knee. Patient is educated on all treatment options including both nonoperative and operative intervention. At this time I believe patient will benefit from further diagnostic evaluation with MRI of the right  knee to evaluate for possible medial meniscus tear. For current pain relief patient is educated on the option of a intra-articular corticosteroid injections versus oral steroid prescription.       Patient opted to pursue a intra-articular injection today which is given as outlined below. Follow up after MRI is completed    Procedure Note: right Knee Celestone Injection   An informed verbal consent for the procedure was obtained and risks including, but not limited to: allergy to medications, injection, bleeding, stiffness of joint, recurrence of symptoms, loss of function, swelling, drainage, irrigation, need for surgery and pseudo-septic inflammation, were explained to the patient. Also, discussed was the potential for further injections, irrigation and debridement and surgery. Alternate means of treatment have also been discussed with the patient. Following an appropriate discussion with the patient regarding the risks and benefits of the procedure he consented to proceed. his right knee was prepped using betadine solution and alcohol swab. Using aseptic technique and through a lateral joint line approach, which was then injected superficially with 4 cc of 1% lidocaine without epinephrine and subsequently with 2 cc of 6 mg/mL Celestone into the right knee. A band aid was applied to the injection site. he tolerated the injection with no immediate adverse reactions. Electronically signed by NIEVES Clifton on 8/11/22 at 9:37 AM EDT        Please note that this chart was generated using voice recognition Dragon dictation software. Although every effort was made to ensure the accuracy of this automated transcription, some errors in transcription may have occurred.

## 2022-08-17 RX ORDER — PANTOPRAZOLE SODIUM 40 MG/1
TABLET, DELAYED RELEASE ORAL
Qty: 30 TABLET | Refills: 0 | Status: SHIPPED | OUTPATIENT
Start: 2022-08-17 | End: 2022-09-28

## 2022-08-24 ENCOUNTER — TELEPHONE (OUTPATIENT)
Dept: INTERNAL MEDICINE CLINIC | Age: 62
End: 2022-08-24

## 2022-08-24 DIAGNOSIS — F41.9 ANXIETY: Primary | ICD-10-CM

## 2022-08-24 NOTE — TELEPHONE ENCOUNTER
----- Message from Honeygiovana Lim sent at 8/24/2022 10:14 AM EDT -----  Subject: Referral Request    Reason for referral request? Patient would like a referral to psychiatry   at the [facility] clinic with Dr. Eric Mack. Or any provider in the network that   is covered by insurance. Provider patient wants to be referred to(if known):     Provider Phone Number(if known):     Additional Information for Provider?   ---------------------------------------------------------------------------  --------------  [address] [employer]    [phone]; OK to leave message on voicemail  ---------------------------------------------------------------------------  --------------

## 2022-08-26 RX ORDER — PANTOPRAZOLE SODIUM 40 MG/1
TABLET, DELAYED RELEASE ORAL
Qty: 30 TABLET | Refills: 0 | OUTPATIENT
Start: 2022-08-26

## 2022-08-31 ENCOUNTER — TELEPHONE (OUTPATIENT)
Dept: INTERNAL MEDICINE CLINIC | Age: 62
End: 2022-08-31

## 2022-08-31 DIAGNOSIS — Z12.11 SCREENING FOR MALIGNANT NEOPLASM OF COLON: Primary | ICD-10-CM

## 2022-08-31 NOTE — TELEPHONE ENCOUNTER
----- Message from Brianna Moon sent at 8/31/2022 10:04 AM EDT -----  Subject: Referral Request    Reason for referral request? Pt is requesting a referral to a Gastrologist     Provider patient wants to be referred to(if known):     Provider Phone Number(if known):[phone]    Additional Information for Provider?  Fax number is [phone]  ---------------------------------------------------------------------------  --------------  [address] [email or web address]    [phone]; OK to leave message on voicemail  ---------------------------------------------------------------------------  -------------- Detail Level: Zone Discontinue Regimen: Betamethasone.

## 2022-09-09 ENCOUNTER — HOSPITAL ENCOUNTER (EMERGENCY)
Facility: CLINIC | Age: 62
Discharge: HOME | End: 2022-09-09
Attending: EMERGENCY MEDICINE
Payer: COMMERCIAL

## 2022-09-09 VITALS
BODY MASS INDEX: 29.35 KG/M2 | SYSTOLIC BLOOD PRESSURE: 130 MMHG | HEIGHT: 70 IN | RESPIRATION RATE: 16 BRPM | HEART RATE: 72 BPM | OXYGEN SATURATION: 95 % | WEIGHT: 205 LBS | DIASTOLIC BLOOD PRESSURE: 85 MMHG

## 2022-09-09 DIAGNOSIS — R00.2 PALPITATIONS: Primary | ICD-10-CM

## 2022-09-09 DIAGNOSIS — F41.1 ANXIETY STATE: ICD-10-CM

## 2022-09-09 PROCEDURE — 93005 ELECTROCARDIOGRAM TRACING: CPT | Performed by: EMERGENCY MEDICINE

## 2022-09-09 PROCEDURE — 99283 EMERGENCY DEPT VISIT LOW MDM: CPT

## 2022-09-09 RX ORDER — LORAZEPAM 0.5 MG/1
0.5 TABLET ORAL 2 TIMES DAILY PRN
Qty: 10 TABLET | Refills: 0 | Status: SHIPPED | OUTPATIENT
Start: 2022-09-09 | End: 2022-09-14

## 2022-09-09 ASSESSMENT — PAIN - FUNCTIONAL ASSESSMENT: PAIN_FUNCTIONAL_ASSESSMENT: NONE - DENIES PAIN

## 2022-09-09 NOTE — ED PROVIDER NOTES
[address] ED  EMERGENCY DEPARTMENT ENCOUNTER      Pt Name: Leny Enciso  MRN: [id number]  Armserwingfrohini 1960  Date of evaluation: 9/9/2022  Provider: Socorro Ram MD    CHIEF COMPLAINT     Chief Complaint   Patient presents with    Palpitations     Hx of Afib, started earlier this morning         HISTORY OF PRESENT ILLNESS   (Location/Symptom, Timing/Onset, Context/Setting,Quality, Duration, Modifying Factors, Severity)  Note limiting factors. Leny Enciso is a 65 y.o. male who presents to the emergency department stating he started experiencing some chest palpitations earlier today. He is concerned he may be in atrial fibrillation again. Patient has a history of paroxysmal atrial fibrillation. He is on metoprolol and Eliquis. Patient states his father passed away less than a month ago from prolonged illness and there has been a lot of stress following his death with money matters and family with which he had to deal today also. Patient feels anxious and states that he has had panic attacks in the past.  He denies diaphoresis or shortness of breath. The history is provided by the patient, the spouse and medical records. Nursing Notes werereviewed. REVIEW OF SYSTEMS    (2-9 systems for level 4, 10 or more for level 5)     Review of Systems   All other systems reviewed and are negative. Except as noted above the remainder of the review of systems was reviewed and negative.        PAST MEDICAL HISTORY     Past Medical History:   Diagnosis Date    Abscess of bursa of right elbow     Arthritis     Atrial fibrillation (HCC)     BPH (benign prostatic hyperplasia)     Heartburn     Hypertension     Panic attack 11/24/2021    Polycythemia     PONV (postoperative nausea and vomiting)     Sleep apnea     no machine         SURGICALHISTORY       Past Surgical History:   Procedure Laterality Date    APPENDECTOMY      ARM SURGERY Right 02/23/2021    TRICEPS TENDON REPAIR - RIGHT ELBOW WITH ARTHREX AND BIOMET ACHILLES ALLOGRAFT    ARM SURGERY Right 7/13/2021    RIGHT ELBOW IRRIGATION AND DEBRIDEMENT performed by Ciera Echevarria MD at [facility]      cervical/ lumbar    CARDIAC CATHETERIZATION  2017    [facility]/ no stents    COLONOSCOPY      ENDOSCOPY, COLON, DIAGNOSTIC      HERNIA REPAIR      MUSCLE REPAIR Right 2/23/2021    TRICEPS TENDON REPAIR - RIGHT ELBOW performed by Ciera Echevarria MD at [address] Bilateral 12/20/2021    SEPTOPLASTY BILATERAL TURBINATE REDUCTION performed by Conor Fuentes MD at [address]      cervical and lumbar    UPPER GASTROINTESTINAL ENDOSCOPY N/A 12/17/2019    EGD BIOPSY performed by Simran Green MD at [facility] 7/30/2021    EGD BIOPSY OF STOMACH AND ESOPHAGUS performed by Abena Hayden MD at [facility]       Discharge Medication List as of 9/9/2022  6:39 PM        CONTINUE these medications which have NOT CHANGED    Details   pantoprazole (PROTONIX) 40 MG tablet take 1 tablet by mouth twice a day, Disp-30 tablet, R-0Normal      pilocarpine (SALAGEN) 5 MG tablet Take 7.5 mg by mouth in the morning and 7.5 mg at noon and 7.5 mg before bedtime. Historical Med      metoclopramide (REGLAN) 10 MG tablet Historical Med      losartan (COZAAR) 100 MG tablet take 1 tablet by mouth once dailyHistorical Med      metoprolol succinate (TOPROL XL) 25 MG extended release tablet take 1 tablet by mouth once dailyHistorical Med      ondansetron (ZOFRAN ODT) 4 MG disintegrating tablet Take 1 tablet by mouth every 8 hours as needed for Nausea or Vomiting, Disp-12 tablet, R-0Normal      irbesartan (AVAPRO) 300 MG tablet Take 1 tablet by mouth nightly, Disp-30 tablet, R-3Normal      spironolactone (ALDACTONE) 50 MG tablet Take 1 tablet by mouth daily Sometimes will take two daily- if feeling bloated, Disp-30 tablet, R-3Normal      dilTIAZem (CARDIZEM CD) 120 MG extended release capsule Take 1 capsule by mouth daily, Disp-30 capsule, R-3Normal      Multiple Vitamins-Minerals (THERAPEUTIC MULTIVITAMIN-MINERALS) tablet Take 1 tablet by mouth dailyHistorical Med      vitamin C (ASCORBIC ACID) 500 MG tablet Take 500 mg by mouth dailyHistorical Med      zinc gluconate 50 MG tablet Take 50 mg by mouth dailyHistorical Med      apixaban (ELIQUIS) 5 MG TABS tablet Take 5 mg by mouth 2 times dailyHistorical Med      albuterol sulfate HFA (PROVENTIL HFA) 108 (90 Base) MCG/ACT inhaler Inhale 2 puffs into the lungs every 6 hours as needed for Wheezing, Disp-18 g, R-3Normal      glucose monitoring (FREESTYLE FREEDOM) kit DAILY Starting Wed 1/19/2022, Disp-1 kit, R-0, Normal             ALLERGIES     Pcn [penicillins]    FAMILY HISTORY       Family History   Problem Relation Age of Onset    Cancer Father         prostrate    Diabetes Father     Cancer Paternal Uncle         colon          SOCIAL HISTORY       Social History     Socioeconomic History    Marital status: Single   Tobacco Use    Smoking status: Every Day     Types: Cigars    Smokeless tobacco: Never    Tobacco comments:     no cigarettes for 20 years- still smokes cigars and marijuana   Vaping Use    Vaping Use: Never used   Substance and Sexual Activity    Alcohol use: Yes     Comment: socially    Drug use: Yes     Types: Marijuana Candiss Littler)     Comment: sometimes nightly/ oral or smokes     Social Determinants of Health     Financial Resource Strain: Low Risk     Difficulty of Paying Living Expenses: Not hard at all   Food Insecurity: No Food Insecurity    Worried About Running Out of Food in the Last Year: Never true    Ran Out of Food in the Last Year: Never true   Transportation Needs: No Transportation Needs    Lack of Transportation (Medical): No    Lack of Transportation (Non-Medical):  No       SCREENINGS             PHYSICAL EXAM    (up to 7 for level 4, 8 or more for level 5)     ED Triage Vitals   BP Temp Temp src Heart Rate Resp SpO2 Height Weight   09/09/22 1815 -- -- 09/09/22 1815 09/09/22 1815 09/09/22 1815 09/09/22 1812 09/09/22 1812   130/85   72 16 95 % 5' 10\" (1.778 m) 205 lb (93 kg)       Physical Exam  Vitals reviewed. Constitutional:       General: He is not in acute distress. Appearance: He is not ill-appearing. HENT:      Head: Normocephalic. Right Ear: External ear normal.      Left Ear: External ear normal.      Nose: Nose normal.   Eyes:      Extraocular Movements: Extraocular movements intact. Cardiovascular:      Rate and Rhythm: Normal rate and regular rhythm. Occasional Extrasystoles are present. Pulmonary:      Effort: Pulmonary effort is normal.      Breath sounds: Normal breath sounds. Abdominal:      General: There is no distension. Palpations: Abdomen is soft. Tenderness: There is no abdominal tenderness. Musculoskeletal:         General: No tenderness. Cervical back: Neck supple. Skin:     General: Skin is warm and dry. Coloration: Skin is not pale. Neurological:      General: No focal deficit present. Mental Status: He is alert and oriented to person, place, and time. DIAGNOSTIC RESULTS     EKG: All EKG's are interpreted by the Emergency Department Physician who either signs orCo-signs this chart in the absence of a cardiologist.    Sinus rhythm with a rate of 79 beats a minute. Occasional APCs. Old anterior wall infarct. No acute ischemic changes. No ST elevation. Normal axis. Normal QTC complex. RADIOLOGY:     Interpretation per the Radiologist below, ifavailable at the time of this note:    No orders to display         ED BEDSIDE ULTRASOUND:   Performed by ED Physician - none    LABS:  Labs Reviewed - No data to display    All other labs were within normal range ornot returned as of this dictation.     EMERGENCY DEPARTMENT COURSE and DIFFERENTIAL DIAGNOSIS/MDM:   Vitals:    Vitals:    09/09/22 1812 09/09/22 1815   BP:  130/85 Pulse:  72   Resp:  16   SpO2:  95%   Weight: 93 kg (205 lb)    Height: 5' 10\" (1.778 m)             Patient has had some APCs in his EKG and this may be what he is experiencing his palpitations. At the time of my examination I was watching the monitor and he was pretty much having long runs of just sinus rhythm without any extra beats. He is advised to take magnesium pill every night. He is placed on Ativan 0.5 mg twice daily for anxiety for 5 days which will get him through the weekend after which he is to contact his primary care physician for follow-up. I do not suspect coronary ischemia at this time. Patient is to return anytime for worsening symptoms. MDM    CONSULTS:  None    PROCEDURES:  Unlessotherwise noted below, none     Procedures    FINAL IMPRESSION      1. Palpitations    2.  Anxiety state          DISPOSITION/PLAN   DISPOSITION Decision To Discharge 09/09/2022 06:35:58 PM      PATIENT REFERRED TO:  Daniele Arthur MD  [address] [place] [address]  [phone]          DISCHARGE MEDICATIONS:         Problem List:  Patient Active Problem List   Diagnosis Code    Rupture of right triceps tendon S46.311A    Abscess of right elbow L02.413    Chest pain R07.9    Essential hypertension I10    Epigastric pain R10.13    Gastritis K29.70    Esophagitis K20.90    New onset a-fib ([facility]) I48.91    Atrial fibrillation with RVR ([facility]) I48.91    Polycythemia D75.1    Hypoproteinemia ([facility]) E77.8    Acute bronchitis J20.9    Abnormal thallium stress test R94.39    Acute appendicitis with localized peritonitis, without perforation, abscess, or gangrene K35.30    Acute medial meniscus tear of right knee S83.241A    Cardiomyopathy ([address] I42.9    Chondromalacia of right knee M94.261    Erectile dysfunction N52.9    Heartburn R12    Primary localized osteoarthrosis of shoulder region M19.019    Renal insufficiency syndrome N28.9    Spondylolisthesis of lumbar region M43.16    SEBASTIAN (obstructive sleep apnea) G47.33    Primary insomnia F51.01    Generalized anxiety disorder F41.1    Cramps of right lower extremity R25.2    Chronic atrial fibrillation (HCC) I48.20           Summation      Patient Course: Discharged    ED Medicationsadministered this visit:  Medications - No data to display    New Prescriptions from this visit:    Discharge Medication List as of 9/9/2022  6:39 PM        START taking these medications    Details   LORazepam (ATIVAN) 0.5 MG tablet Take 1 tablet by mouth 2 times daily as needed for Anxiety for up to 5 days. , Disp-10 tablet, R-0Normal             Follow-up:  Vonda Dorman MD  [address] [place] [phone]            Final Impression:   1. Palpitations    2.  Anxiety state               (Please note that portions of this note were completed with a voice recognitionprogram.  Efforts were made to edit the dictations but occasionally words are mis-transcribed.)    Ligia Lazaro MD (electronically signed)  Attending Emergency Physician            Ligia Lazaro MD  09/09/22 [id number]

## 2022-09-10 LAB
EKG ATRIAL RATE: 79 BPM
EKG P AXIS: 0 DEGREES
EKG P-R INTERVAL: 180 MS
EKG Q-T INTERVAL: 372 MS
EKG QRS DURATION: 106 MS
EKG QTC CALCULATION (BAZETT): 426 MS
EKG R AXIS: -7 DEGREES
EKG T AXIS: 51 DEGREES
EKG VENTRICULAR RATE: 79 BPM

## 2022-09-27 ENCOUNTER — OFFICE VISIT (OUTPATIENT)
Dept: INTERNAL MEDICINE CLINIC | Age: 62
End: 2022-09-27
Payer: COMMERCIAL

## 2022-09-27 ENCOUNTER — TELEPHONE (OUTPATIENT)
Dept: INTERNAL MEDICINE CLINIC | Age: 62
End: 2022-09-27

## 2022-09-27 VITALS
DIASTOLIC BLOOD PRESSURE: 84 MMHG | HEART RATE: 78 BPM | SYSTOLIC BLOOD PRESSURE: 138 MMHG | HEIGHT: 70 IN | OXYGEN SATURATION: 98 % | WEIGHT: 204.6 LBS | BODY MASS INDEX: 29.29 KG/M2

## 2022-09-27 DIAGNOSIS — F41.1 GAD (GENERALIZED ANXIETY DISORDER): Primary | ICD-10-CM

## 2022-09-27 DIAGNOSIS — I48.0 PAROXYSMAL ATRIAL FIBRILLATION (HCC): ICD-10-CM

## 2022-09-27 PROCEDURE — 99213 OFFICE O/P EST LOW 20 MIN: CPT | Performed by: INTERNAL MEDICINE

## 2022-09-27 RX ORDER — ALPRAZOLAM 0.25 MG/1
0.25 TABLET ORAL NIGHTLY PRN
Qty: 10 TABLET | Refills: 0 | Status: SHIPPED | OUTPATIENT
Start: 2022-09-27 | End: 2022-10-07

## 2022-09-27 RX ORDER — CITALOPRAM 20 MG/1
20 TABLET ORAL DAILY
Qty: 30 TABLET | Refills: 3 | Status: SHIPPED | OUTPATIENT
Start: 2022-09-27

## 2022-09-27 NOTE — PROGRESS NOTES
[address]  [place] [address]  Dept: [phone]  Dept Fax: [phone]    Office Progress/Follow Up Note  Date of patient's visit: 9/27/2022  Patient's Name:  Sahil Boss YOB: 1960            Patient Care Team:  Blanca Perez MD as PCP - General (Internal Medicine)  Blanca Perez MD as PCP - [facility] Provider  Viviana Muir MD as Consulting Physician (Gastroenterology)    REASON FOR VISIT:  same day   HISTORY OF PRESENT ILLNESS:      Chief Complaint   Patient presents with    Anxiety     Has been to the ER a few different times for the anxiety. This is effecting him daily, he states that is has been worsening more recently         History was obtained from the patient.  Sahil Boss is a 65 y.o. is here for a  same day    Has been having panic attacks recently  Has been to ER multiple time  Complaining of lack of focus, insomnia, appetite has been okay  Going through a lot of stressors currently  Not able to focus at work, always fatigued and tired  States that he just wants to stop smoking but he cannot  Appetite has been okay  Denies any suicidal homicidal ideations  States that he was given Ativan which helped  Got Xanax last year which she stated helped  Has tried BuSpar in the past with no help    Patient advised to go to ER if he has any suicidal homicidal ideation, patient voiced understanding    Patient Active Problem List   Diagnosis    Rupture of right triceps tendon    Abscess of right elbow    Chest pain    Essential hypertension    Epigastric pain    Gastritis    Esophagitis    New onset a-fib ([address]    Atrial fibrillation with RVR ([address]    Polycythemia    Hypoproteinemia ([address]    Acute bronchitis    Abnormal thallium stress test    Acute appendicitis with localized peritonitis, without perforation, abscess, or gangrene    Acute medial meniscus tear of right knee    Cardiomyopathy ([address]    Chondromalacia of right knee    Erectile dysfunction    Heartburn    Primary localized osteoarthrosis of shoulder region    Renal insufficiency syndrome    Spondylolisthesis of lumbar region    SEBASTIAN (obstructive sleep apnea)    Primary insomnia    Generalized anxiety disorder    Cramps of right lower extremity    Chronic atrial fibrillation [facility])       Health Maintenance Due   Topic Date Due    COVID-19 Vaccine (1) Never done    Pneumococcal 0-64 years Vaccine (1 - PCV) Never done    HIV screen  Never done    Hepatitis C screen  Never done    DTaP/Tdap/Td vaccine (1 - Tdap) Never done    Shingles vaccine (1 of 2) Never done    Flu vaccine (1) 08/01/2022       Allergies   Allergen Reactions    Pcn [Penicillins] Hives         MEDICATIONS:     Current Outpatient Medications   Medication Sig Dispense Refill    citalopram (CELEXA) 20 MG tablet Take 1 tablet by mouth daily 30 tablet 3    ALPRAZolam (XANAX) 0.25 MG tablet Take 1 tablet by mouth nightly as needed for Anxiety for up to 10 days. 10 tablet 0    pantoprazole (PROTONIX) 40 MG tablet take 1 tablet by mouth twice a day 30 tablet 0    losartan (COZAAR) 100 MG tablet take 1 tablet by mouth once daily      metoprolol succinate (TOPROL XL) 25 MG extended release tablet take 1 tablet by mouth once daily      vitamin C (ASCORBIC ACID) 500 MG tablet Take 500 mg by mouth daily      zinc gluconate 50 MG tablet Take 50 mg by mouth daily      apixaban (ELIQUIS) 5 MG TABS tablet Take 5 mg by mouth 2 times daily      glucose monitoring (FREESTYLE FREEDOM) kit 1 kit by Does not apply route daily 1 kit 0    pilocarpine (SALAGEN) 5 MG tablet Take 7.5 mg by mouth in the morning and 7.5 mg at noon and 7.5 mg before bedtime.  (Patient not taking: No sig reported)      metoclopramide (REGLAN) 10 MG tablet  (Patient not taking: Reported on 9/27/2022)      ondansetron (ZOFRAN ODT) 4 MG disintegrating tablet Take 1 tablet by mouth every 8 hours as needed for Nausea or Vomiting (Patient not taking: No sig reported) 12 tablet 0    irbesartan (AVAPRO) 300 MG tablet Take 1 tablet by mouth nightly (Patient not taking: No sig reported) 30 tablet 3    spironolactone (ALDACTONE) 50 MG tablet Take 1 tablet by mouth daily Sometimes will take two daily- if feeling bloated (Patient not taking: No sig reported) 30 tablet 3    dilTIAZem (CARDIZEM CD) 120 MG extended release capsule Take 1 capsule by mouth daily (Patient not taking: No sig reported) 30 capsule 3    Multiple Vitamins-Minerals (THERAPEUTIC MULTIVITAMIN-MINERALS) tablet Take 1 tablet by mouth daily      albuterol sulfate HFA (PROVENTIL HFA) 108 (90 Base) MCG/ACT inhaler Inhale 2 puffs into the lungs every 6 hours as needed for Wheezing (Patient not taking: No sig reported) 18 g 3     No current facility-administered medications for this visit. SOCIAL HISTORY    Reviewed and no change from previous record. Han Deluca  reports that he has been smoking cigars. He started smoking about 3 years ago. He has never used smokeless tobacco.    FAMILY HISTORY:    Reviewed and No change from previous visit  family history includes Cancer in his father and paternal uncle; Diabetes in his father.     OF SYSTEMS:    General : Negative for fatigue, weight loss, appetite change  HEENT : Negative for nasal congestion, sneezing, runny nose, sinus pain  Respiratory : Negative for shortness of breath cough, congestion, wheezing  Cardiovascular: Negative for chest pain, palpitations, shortness of breath  GI: Negative for abdominal pain, nausea, vomiting, diarrhea, constipation  Genitourinary : negative for dysuria, urinary frequency, urinary urgency, hematuria  Neurological : Negative for headache, dizziness, gait change,   Psych : Positive for anxiety skin: Negative musculoskeletal : Negative for joint pain, muscle pain      PHYSICAL EXAM:      Vitals:    09/27/22 1539   BP: 138/84   Pulse: 78   SpO2: 98%   Weight: 204 lb 9.6 oz (92.8 kg)   Height: 5' 10\" (1.778 m)     BP Readings from Last 3 Encounters:   09/27/22 138/84   09/09/22 130/85   08/08/22 (!) 142/80        Physical Exam  HENT:      Head: Normocephalic. Nose: Nose normal.      Mouth/Throat:      Mouth: Mucous membranes are moist.   Eyes:      Extraocular Movements: Extraocular movements intact. Pupils: Pupils are equal, round, and reactive to light. Cardiovascular:      Rate and Rhythm: Normal rate and regular rhythm. Pulses: Normal pulses. Heart sounds: Normal heart sounds. Pulmonary:      Effort: Pulmonary effort is normal.   Abdominal:      Palpations: Abdomen is soft. Musculoskeletal:         General: No swelling. Normal range of motion. Cervical back: Normal range of motion. Skin:     General: Skin is warm. Neurological:      General: No focal deficit present. Mental Status: He is alert and oriented to person, place, and time. Psychiatric:      Comments: anxiuos        Lab Results   Component Value Date    LABA1C 5.6 03/18/2022     Lab Results   Component Value Date     03/18/2022      LABORATORY FINDINGS:    CBC:  Lab Results   Component Value Date/Time    WBC 7.2 07/26/2022 11:14 PM    HGB 17.1 07/26/2022 11:14 PM     07/26/2022 11:14 PM       BMP:    Lab Results   Component Value Date/Time     07/26/2022 11:14 PM    K 4.3 07/26/2022 11:14 PM     07/26/2022 11:14 PM    CO2 25 07/26/2022 11:14 PM    BUN 18 07/26/2022 11:14 PM    CREATININE 0.80 07/26/2022 11:14 PM    GLUCOSE 92 07/26/2022 11:14 PM       HEMOGLOBIN A1C:   Lab Results   Component Value Date/Time    LABA1C 5.6 03/18/2022 11:53 AM       FASTING LIPID PANEL:  Lab Results   Component Value Date    CHOL 183 03/18/2022    HDL 38 (L) 03/18/2022    TRIG 90 03/18/2022       ASSESSMENT AND PLAN:      1. MARQUITA (generalized anxiety disorder)    - citalopram (CELEXA) 20 MG tablet; Take 1 tablet by mouth daily  Dispense: 30 tablet; Refill: 3  - ALPRAZolam (XANAX) 0.25 MG tablet;  Take 1 tablet by mouth nightly as needed for Anxiety for up to 10 days. Dispense: 10 tablet; Refill: 0    2. Paroxysmal atrial fibrillation (HCC)  On Lopressor and Eliquis, rate controlled, patient currently in rhythm    FOLLOW UP AND INSTRUCTIONS:   Return in about 4 weeks (around 10/25/2022). Daniel RUIZ received counseling on the following healthy behaviors: medication adherence    Discussed use, benefit, and side effects of prescribed medications. Barriers to medication compliance addressed. All patient questions answered. Pt voiced understanding. Patient given educational materials - see patient instructions    MD CARINE FooteMer[facility]  9/27/2022, 4:06 PM    Please note that this chart was generated using voice recognition Dragon dictation software. Although every effort was made to ensure the accuracy of this automatedtranscription, some errors in transcription may have occurred.

## 2022-09-27 NOTE — PROGRESS NOTES
Visit Information    Have you changed or started any medications since your last visit including any over-the-counter medicines, vitamins, or herbal medicines? no   Are you having any side effects from any of your medications? -  no  Have you stopped taking any of your medications? Is so, why? -  no    Have you seen any other physician or provider since your last visit? No  Have you had any other diagnostic tests since your last visit? No  Have you been seen in the emergency room and/or had an admission to a hospital since we last saw you? No  Have you had your routine dental cleaning in the past 6 months? no    Have you activated your [employer] account? If not, what are your barriers?  Yes     Patient Care Team:  Adeline Goldmann, MD as PCP - General (Internal Medicine)  Adeline Goldmann, MD as PCP - [address] [place] [address]  [facility] Provider  Issac Tovar MD as Consulting Physician (Gastroenterology)    Medical History Review  Past Medical, Family, and Social History reviewed and does contribute to the patient presenting condition    Health Maintenance   Topic Date Due    COVID-19 Vaccine (1) Never done    Pneumococcal 0-64 years Vaccine (1 - PCV) Never done    HIV screen  Never done    Hepatitis C screen  Never done    DTaP/Tdap/Td vaccine (1 - Tdap) Never done    Shingles vaccine (1 of 2) Never done    Flu vaccine (1) 08/01/2022    Depression Screen  07/01/2023    Colorectal Cancer Screen  11/23/2024    Lipids  03/18/2027    Hepatitis A vaccine  Aged Out    Hepatitis B vaccine  Aged Out    Hib vaccine  Aged Out    Meningococcal (ACWY) vaccine  Aged Out

## 2022-09-27 NOTE — TELEPHONE ENCOUNTER
Pt called having bad anxiety, having a hard time with work, stating he's not himself and having Suicidal thoughts but doesn't want to hurt himself just not feeling himself made an appt for 3:30 today.  Pt states lxanax 10mg works for his anxiety but doesn't want to have to be on them for the rest of his life, pt is calm and talking

## 2022-09-28 RX ORDER — PANTOPRAZOLE SODIUM 40 MG/1
TABLET, DELAYED RELEASE ORAL
Qty: 30 TABLET | Refills: 0 | Status: SHIPPED | OUTPATIENT
Start: 2022-09-28

## 2022-10-09 ENCOUNTER — HOSPITAL ENCOUNTER (OUTPATIENT)
Facility: CLINIC | Age: 62
Discharge: HOME | End: 2022-10-09
Payer: COMMERCIAL

## 2022-10-09 LAB — PROSTATE SPECIFIC ANTIGEN: 2.12 NG/ML

## 2022-10-09 PROCEDURE — 36415 COLL VENOUS BLD VENIPUNCTURE: CPT

## 2022-10-09 PROCEDURE — 84153 ASSAY OF PSA TOTAL: CPT

## 2022-10-18 NOTE — PROGRESS NOTES
Patient self converted to sinus rhythm. See EKG printed in media.  Patient resting in bed denies needs at this time; alert

## 2022-10-24 ENCOUNTER — OFFICE VISIT (OUTPATIENT)
Dept: INTERNAL MEDICINE CLINIC | Age: 62
End: 2022-10-24
Payer: COMMERCIAL

## 2022-10-24 VITALS — BODY MASS INDEX: 29.27 KG/M2 | DIASTOLIC BLOOD PRESSURE: 68 MMHG | SYSTOLIC BLOOD PRESSURE: 144 MMHG | WEIGHT: 204 LBS

## 2022-10-24 DIAGNOSIS — I10 ESSENTIAL HYPERTENSION: ICD-10-CM

## 2022-10-24 DIAGNOSIS — Z23 NEED FOR VACCINATION FOR H FLU TYPE B: Primary | ICD-10-CM

## 2022-10-24 DIAGNOSIS — I48.91 ATRIAL FIBRILLATION WITH RVR (HCC): ICD-10-CM

## 2022-10-24 DIAGNOSIS — F41.1 GENERALIZED ANXIETY DISORDER: ICD-10-CM

## 2022-10-24 PROCEDURE — 90471 IMMUNIZATION ADMIN: CPT | Performed by: INTERNAL MEDICINE

## 2022-10-24 PROCEDURE — 90674 CCIIV4 VAC NO PRSV 0.5 ML IM: CPT | Performed by: INTERNAL MEDICINE

## 2022-10-24 PROCEDURE — 99214 OFFICE O/P EST MOD 30 MIN: CPT | Performed by: INTERNAL MEDICINE

## 2022-10-24 RX ORDER — TRAZODONE HYDROCHLORIDE 50 MG/1
50 TABLET ORAL NIGHTLY
Qty: 30 TABLET | Refills: 0 | Status: SHIPPED | OUTPATIENT
Start: 2022-10-24 | End: 2022-11-22

## 2022-10-24 NOTE — PROGRESS NOTES
Visit Information    Have you changed or started any medications since your last visit including any over-the-counter medicines, vitamins, or herbal medicines? no   Are you having any side effects from any of your medications? -  no  Have you stopped taking any of your medications? Is so, why? -  no    Have you seen any other physician or provider since your last visit? No  Have you had any other diagnostic tests since your last visit? No  Have you been seen in the emergency room and/or had an admission to a hospital since we last saw you? No  Have you had your routine dental cleaning in the past 6 months? no    Have you activated your [employer] account? If not, what are your barriers?  Yes     Patient Care Team:  Ruby Lopez MD as PCP - General (Internal Medicine)  Ruby Lopez MD as PCP - [facility]  Rossy Moore MD as Consulting Physician (Gastroenterology)    Medical History Review  Past Medical, Family, and Social History reviewed and does not contribute to the patient presenting condition    Health Maintenance   Topic Date Due    COVID-19 Vaccine (1) Never done    Pneumococcal 0-64 years Vaccine (1 - PCV) Never done    HIV screen  Never done    Hepatitis C screen  Never done    DTaP/Tdap/Td vaccine (1 - Tdap) Never done    Shingles vaccine (1 of 2) Never done    Flu vaccine (1) 08/01/2022    Depression Screen  07/01/2023    Colorectal Cancer Screen  11/23/2024    Lipids  03/18/2027    Hepatitis A vaccine  Aged Out    Hib vaccine  Aged Out    Meningococcal (ACWY) vaccine  Aged Out

## 2022-10-24 NOTE — PROGRESS NOTES
[address]  [place] [address]  Dept: [phone]  Dept Fax: [phone]    Office Progress/Follow Up Note  Date of patient's visit: 10/24/2022  Patient's Name:  Sandip Brothers YOB: 1960            Patient Care Team:  Juni Macario MD as PCP - General (Internal Medicine)  Juni Macario MD as PCP - [facility]  Alisa Angel MD as Consulting Physician (Gastroenterology)    REASON FOR VISIT:   HISTORY OF PRESENT ILLNESS:      Chief Complaint   Patient presents with    Hypertension      Patient came for follow-up today    Was started on Celexa last time for anxiety and depression  Symptoms much improved  Patient also took Xanax  Stated that he still has 2 tablets of Xanax left  Uses as only as needed basis    History of hypertension  Blood pressure borderline high today,  Denies any headache blurred vision    History of sleep apnea  Not using CPAP  States that he could not tolerate the mask  Supposed to see Dr. Pasquale Cohen for the implant  Stated that he is still thinking about it  Is been having insomnia for a long time  Was taking trazodone did not seem to help          Patient Active Problem List   Diagnosis    Rupture of right triceps tendon    Abscess of right elbow    Chest pain    Essential hypertension    Epigastric pain    Gastritis    Esophagitis    New onset a-fib ([address]    Atrial fibrillation with RVR ([address]    Polycythemia    Hypoproteinemia ([address]    Acute bronchitis    Abnormal thallium stress test    Acute appendicitis with localized peritonitis, without perforation, abscess, or gangrene    Acute medial meniscus tear of right knee    Cardiomyopathy ([address]    Chondromalacia of right knee    Erectile dysfunction    Heartburn    Primary localized osteoarthrosis of shoulder region    Renal insufficiency syndrome    Spondylolisthesis of lumbar region    SEBASTIAN (obstructive sleep apnea)    Primary insomnia    Generalized anxiety disorder    Cramps of right lower extremity    Chronic atrial fibrillation [facility])       Health Maintenance Due   Topic Date Due    COVID-19 Vaccine (1) Never done    Pneumococcal 0-64 years Vaccine (1 - PCV) Never done    HIV screen  Never done    Hepatitis C screen  Never done    DTaP/Tdap/Td vaccine (1 - Tdap) Never done    Shingles vaccine (1 of 2) Never done    Flu vaccine (1) 08/01/2022       Allergies   Allergen Reactions    Pcn [Penicillins] Hives         MEDICATIONS:     Current Outpatient Medications   Medication Sig Dispense Refill    pantoprazole (PROTONIX) 40 MG tablet take 1 tablet by mouth twice a day 30 tablet 0    citalopram (CELEXA) 20 MG tablet Take 1 tablet by mouth daily 30 tablet 3    losartan (COZAAR) 100 MG tablet take 1 tablet by mouth once daily      metoprolol succinate (TOPROL XL) 25 MG extended release tablet take 1 tablet by mouth once daily      Multiple Vitamins-Minerals (THERAPEUTIC MULTIVITAMIN-MINERALS) tablet Take 1 tablet by mouth daily      vitamin C (ASCORBIC ACID) 500 MG tablet Take 500 mg by mouth daily      zinc gluconate 50 MG tablet Take 50 mg by mouth daily      apixaban (ELIQUIS) 5 MG TABS tablet Take 5 mg by mouth 2 times daily      glucose monitoring (FREESTYLE FREEDOM) kit 1 kit by Does not apply route daily 1 kit 0    pilocarpine (SALAGEN) 5 MG tablet Take 7.5 mg by mouth in the morning and 7.5 mg at noon and 7.5 mg before bedtime.  (Patient not taking: No sig reported)      metoclopramide (REGLAN) 10 MG tablet  (Patient not taking: No sig reported)      ondansetron (ZOFRAN ODT) 4 MG disintegrating tablet Take 1 tablet by mouth every 8 hours as needed for Nausea or Vomiting (Patient not taking: No sig reported) 12 tablet 0    irbesartan (AVAPRO) 300 MG tablet Take 1 tablet by mouth nightly (Patient not taking: No sig reported) 30 tablet 3    spironolactone (ALDACTONE) 50 MG tablet Take 1 tablet by mouth daily Sometimes will take two daily- if feeling bloated (Patient not taking: No sig reported) 30 tablet 3    dilTIAZem (CARDIZEM CD) 120 MG extended release capsule Take 1 capsule by mouth daily (Patient not taking: No sig reported) 30 capsule 3    albuterol sulfate HFA (PROVENTIL HFA) 108 (90 Base) MCG/ACT inhaler Inhale 2 puffs into the lungs every 6 hours as needed for Wheezing (Patient not taking: No sig reported) 18 g 3     No current facility-administered medications for this visit. SOCIAL HISTORY    Reviewed and no change from previous record. Naldo Malcolm  reports that he has been smoking cigars. He started smoking about 3 years ago. He has never used smokeless tobacco.    FAMILY HISTORY:    Reviewed and No change from previous visit  family history includes Cancer in his father and paternal uncle; Diabetes in his father. OF SYSTEMS:    General : Positive for insomnia   HEENT : Negative for nasal congestion, sneezing, runny nose, sinus pain  Respiratory : Negative for shortness of breath cough, congestion, wheezing  Cardiovascular: Negative for chest pain, palpitations, shortness of breath  GI: Negative for abdominal pain, nausea, vomiting, diarrhea, constipation  Genitourinary : negative for dysuria, urinary frequency, urinary urgency, hematuria  Neurological : Negative for headache, dizziness, gait change,   Psych : Negative for depression, anxiety  Skin: Deferred rash and skin lesions  Musculoskeletal : Negative for joint pain, muscle pain      PHYSICAL EXAM:    There were no vitals filed for this visit.   BP Readings from Last 3 Encounters:   09/27/22 138/84   09/09/22 130/85   08/08/22 (!) 142/80        Physical Exam   Physical exam  General : Patient alert awake in no acute distress  HEENT : Atraumatic, normocephalic , oral mucosa membrane moist,  Eyes : Extraocular movements intact  Neck : Supple, range of motion intact,  Cardiovascular : Regular rate and rhythm, no murmur appreciated  Respiratory : Bilateral clear breath sounds, no wheezing crackles appreciated  Gastrointestinal  : Abdomen soft, nontender, bowel sounds present  Extremities : No pedal edema clubbing or cyanosis present  Skin : Warm and dry, no skin lesions appreciated  Psych : Mood normal  Neurological no focal motor neurological deficit present. Lab Results   Component Value Date    LABA1C 5.6 03/18/2022     Lab Results   Component Value Date     03/18/2022      LABORATORY FINDINGS:    CBC:  Lab Results   Component Value Date/Time    WBC 7.2 07/26/2022 11:14 PM    HGB 17.1 07/26/2022 11:14 PM     07/26/2022 11:14 PM       BMP:    Lab Results   Component Value Date/Time     07/26/2022 11:14 PM    K 4.3 07/26/2022 11:14 PM     07/26/2022 11:14 PM    CO2 25 07/26/2022 11:14 PM    BUN 18 07/26/2022 11:14 PM    CREATININE 0.80 07/26/2022 11:14 PM    GLUCOSE 92 07/26/2022 11:14 PM       HEMOGLOBIN A1C:   Lab Results   Component Value Date/Time    LABA1C 5.6 03/18/2022 11:53 AM       FASTING LIPID PANEL:  Lab Results   Component Value Date    CHOL 183 03/18/2022    HDL 38 (L) 03/18/2022    TRIG 90 03/18/2022       ASSESSMENT AND PLAN:      1. Need for vaccination for H flu type B    - Influenza, FLUCELVAX, (age 10 mo+), IM, Preservative Free, 0.5 mL    2. Generalized anxiety disorder  On Celexa 20 mg, tolerating it well rate controlled    3. Atrial fibrillation with RVR (HCC)  On Eliquis and metoprolol    4. Essential hypertension  Borderline high today, patient asymptomatic, recommended to check blood pressure  Patient on losartan and Toprol    Obstructive sleep apnea  Not using CPAP BiPAP at night  Thinking about getting Implanon, supposed to see Dr. Jose Overton but has not made appointment yet. FOLLOW UP AND INSTRUCTIONS:   Return in about 3 months (around 1/24/2023). [facility] on the following healthy behaviors: exercise    Discussed use, benefit, and side effects of prescribed medications. Barriers to medication compliance addressed.   All patient questions answered. Pt voiced understanding. Patient given educational materials - see patient instructions    MD CARINE Prabhakar [facility]  10/24/2022, 9:40 AM    Please note that this chart was generated using voice recognition Dragon dictation software. Although every effort was made to ensure the accuracy of this automatedtranscription, some errors in transcription may have occurred.

## 2022-11-01 ENCOUNTER — CARE COORDINATION (OUTPATIENT)
Dept: CARE COORDINATION | Age: 62
End: 2022-11-01

## 2022-11-01 NOTE — LETTER
11/2/2022    Debra Burnham  [id number] [facility]      Dear Debra Burnham,    My name is Carrie Mansfield RN and I am a registered nurse who partners with Agusto Leal MD to improve patients' health. Agusto Leal MD believes you would benefit from working with me. As a member of your health care team, I would work with other providers involved in your care, offer education for your specific health conditions, and connect you with additional resources as needed. I will collaborate with Agusto Leal MD to support you in following your treatment plan. The additional support I provide is no additional cost to you. My primary focus is to help you achieve specific goals and improve your health. We are committed to walk with you on this journey and look forward to working with you. Please call me to further discuss your healthcare needs. You can reach me at [phone]. In good health,     Carrie Mansfield RN  .

## 2022-11-02 ENCOUNTER — CARE COORDINATION (OUTPATIENT)
Dept: CARE COORDINATION | Age: 62
End: 2022-11-02

## 2022-11-02 SDOH — ECONOMIC STABILITY: HOUSING INSECURITY
IN THE LAST 12 MONTHS, WAS THERE A TIME WHEN YOU DID NOT HAVE A STEADY PLACE TO SLEEP OR SLEPT IN A SHELTER (INCLUDING NOW)?: NO

## 2022-11-02 SDOH — ECONOMIC STABILITY: FOOD INSECURITY: WITHIN THE PAST 12 MONTHS, YOU WORRIED THAT YOUR FOOD WOULD RUN OUT BEFORE YOU GOT MONEY TO BUY MORE.: NEVER TRUE

## 2022-11-02 SDOH — ECONOMIC STABILITY: FOOD INSECURITY: WITHIN THE PAST 12 MONTHS, THE FOOD YOU BOUGHT JUST DIDN'T LAST AND YOU DIDN'T HAVE MONEY TO GET MORE.: NEVER TRUE

## 2022-11-02 SDOH — ECONOMIC STABILITY: INCOME INSECURITY: IN THE LAST 12 MONTHS, WAS THERE A TIME WHEN YOU WERE NOT ABLE TO PAY THE MORTGAGE OR RENT ON TIME?: NO

## 2022-11-02 SDOH — ECONOMIC STABILITY: HOUSING INSECURITY: IN THE LAST 12 MONTHS, HOW MANY PLACES HAVE YOU LIVED?: 1

## 2022-11-02 NOTE — CARE COORDINATION
Ambulatory Care Coordination Note  11/2/2022    ACC: Colton Spivey RN  Payer referral  Spoke with patient, explained care coordination. Patient is accepting of program.  Per patient his biggest concern at this time is his abdominal pain, nausea and heart burn. He stated that he had been scheduled for a scope a couple of time but had to cancel due to \"life\". He is driving to the GI office today to see if he can get back on their schedule since they have not returned his call. ACM provided patient with the referral information in his chart to another GI in case he can not be scheduled. Patient reports that he has a \"great\" cardiologist and has no cardiac concerns today. No medication concerns today. ACM will follow up with patient in 5-7 days, continue education and support. Offered patient enrollment in the Remote Patient Monitoring (RPM) program for in-home monitoring: Patient is not eligible for RPM program.    Ambulatory Care Coordination Assessment    Care Coordination Protocol  Referral from Primary Care Provider: No  Week 1 - Initial Assessment     Do you have all of your prescriptions and are they filled?: Yes  How often do you have trouble taking your medications the way you have been told to take them?: I always take them as prescribed. Do you have Home O2 Therapy?: No      Ability to seek help/take action for Emergent Urgent situations i.e. fire, crime, inclement weather or health crisis. : Independent  Ability to ambulate to restroom: Independent  Ability handle personal hygeine needs (bathing/dressing/grooming): Independent  Ability to manage Medications: Independent  Ability to prepare Food Preparation: Independent  Ability to maintain home (clean home, laundry): Independent  Ability to drive and/or has transportation: Independent  Ability to do shopping: Independent  Ability to manage finances:  Independent  Is patient able to live independently?: Yes     Current Housing: Private Residence        Per the Fall Risk Screening, did the patient have 2 or more falls or 1 fall with injury in the past year?: No        Are you experiencing loss of meaning?: No  Are you experiencing loss of hope and peace?: No     Thinking about your patient's physical health needs, are there any symptoms or problems (risk indicators) you are unsure about that require further investigation?: No identified areas of uncertainly or problems already being investigated   Are the patients physical health problems impacting on their mental well-being?: No identified areas of concern   Are there any problems with your patients lifestyle behaviors (alcohol, drugs, diet, exercise) that are impacting on physical or mental well-being?: No identified areas of concern   Do you have any other concerns about your patients mental well-being?  How would you rate their severity and impact on the patient?: No identified areas of concern   How would you rate their home environment in terms of safety and stability (including domestic violence, insecure housing, neighbor harassment)?: Consistently safe, supportive, stable, no identified problems   How do daily activities impact on the patient's well-being? (include current or anticipated unemployment, work, caregiving, access to transportation or other): No identified problems or perceived positive benefits   How would you rate their social network (family, work, friends)?: Adequate participation with social networks   How would you rate their financial resources (including ability to afford all required medical care)?: Financially secure, resources adequate, no identified problems   How wells does the patient now understand their health and well-being (symptoms, signs or risk factors) and what they need to do to manage their health?: Reasonable to good understanding and already engages in managing health or is willing to undertake better management   How well do you think your patient can engage in healthcare discussions? (Barriers include language, deafness, aphasia, alcohol or drug problems, learning difficulties, concentration): Clear and open communication, no identified barriers   Do other services need to be involved to help this patient?: Other care/services not required at this time   Are current services involved with this patient well-coordinated? (Include coordination with other services you are now recommendation): All required care/services in place and well-coordinated   Suggested Interventions and Community Resources                  Prior to Admission medications    Medication Sig Start Date End Date Taking? Authorizing Provider   traZODone (DESYREL) 50 MG tablet Take 1 tablet by mouth nightly 10/24/22   Rose Wright MD   pantoprazole (PROTONIX) 40 MG tablet take 1 tablet by mouth twice a day 9/28/22   Juni Macario MD   citalopram (CELEXA) 20 MG tablet Take 1 tablet by mouth daily 9/27/22   Rose Wright MD   pilocarpine (SALAGEN) 5 MG tablet Take 7.5 mg by mouth in the morning and 7.5 mg at noon and 7.5 mg before bedtime.   Patient not taking: No sig reported    Historical Provider, MD   metoclopramide (REGLAN) 10 MG tablet  6/22/22   Historical Provider, MD   losartan (COZAAR) 100 MG tablet take 1 tablet by mouth once daily 6/21/22   Historical Provider, MD   metoprolol succinate (TOPROL XL) 25 MG extended release tablet take 1 tablet by mouth once daily 6/21/22   Historical Provider, MD   ondansetron (ZOFRAN ODT) 4 MG disintegrating tablet Take 1 tablet by mouth every 8 hours as needed for Nausea or Vomiting  Patient not taking: No sig reported 6/13/22   Gisele Sawyer MD   irbesartan (AVAPRO) 300 MG tablet Take 1 tablet by mouth nightly  Patient not taking: No sig reported 4/4/22   Juni Macario MD   spironolactone (ALDACTONE) 50 MG tablet Take 1 tablet by mouth daily Sometimes will take two daily- if feeling bloated  Patient not taking: No sig reported 4/4/22 Ivett Paula MD   dilTIAZem (CARDIZEM CD) 120 MG extended release capsule Take 1 capsule by mouth daily  Patient not taking: No sig reported 3/31/22   Ciara AUSTIN Blood, DO   Multiple Vitamins-Minerals (THERAPEUTIC MULTIVITAMIN-MINERALS) tablet Take 1 tablet by mouth daily    Historical Provider, MD   vitamin C (ASCORBIC ACID) 500 MG tablet Take 500 mg by mouth daily    Historical Provider, MD   zinc gluconate 50 MG tablet Take 50 mg by mouth daily    Historical Provider, MD   apixaban (ELIQUIS) 5 MG TABS tablet Take 5 mg by mouth 2 times daily    Historical Provider, MD   albuterol sulfate HFA (PROVENTIL HFA) 108 (90 Base) MCG/ACT inhaler Inhale 2 puffs into the lungs every 6 hours as needed for Wheezing  Patient not taking: No sig reported 3/25/22   RU Sauceda CNP   glucose monitoring (FREESTYLE FREEDOM) kit 1 kit by Does not apply route daily 1/19/22   Nando Springer, DO       No future appointments.    and   General Assessment    Do you have any symptoms that are causing concern?: Yes  Progression since Onset: Unchanged  Reported Symptoms: Abdominal Pain, Nausea, Other (Comment: heart burn)

## 2022-11-02 NOTE — CARE COORDINATION
Payer referral  ACM left  requesting a return call to AC to discuss any needs. Will mail introduction letter.   Abhishek Isaacs RN, ambulatory care manager

## 2022-11-02 NOTE — CARE COORDINATION
Patient left  for AC requesting a return call. ACM called back and person answering his phone stated that this phone was left at [employer] on [address]. Person asked if RN would try to contact Allison Otoole in another way to let him know where his phone is. ACM left  for [facility], emergency contact explaining that his phone was left at the store.

## 2022-11-03 ENCOUNTER — CARE COORDINATION (OUTPATIENT)
Dept: CARE COORDINATION | Age: 62
End: 2022-11-03

## 2022-11-03 NOTE — CARE COORDINATION
CC introduction letter, My [address], walk-in clinic and [facility] information sheets was mailed out to pt.

## 2022-11-09 ENCOUNTER — CARE COORDINATION (OUTPATIENT)
Dept: CARE COORDINATION | Age: 62
End: 2022-11-09

## 2022-11-09 NOTE — CARE COORDINATION
Ambulatory Care Coordination Note  11/9/2022    ACC: Dax Sher RN  Summary  Date Care Coordination Episode Started:11.1.22    Week: 2    Reason patient is in Care Coordination:     Payer referral    Topics Discussed Today:     GI appointment, patient stated that hs is scheduled for a scope tomorrow, 11.10.22. ACM encouraged patient to request a copy of the report be sent to his PCP since it is not being done by [id number] [facility], he agreed. Medications, no needs today. Patient denied any concerns today. Assessments completed today:     Fall risk  Initial assessment  Medication reconciliation  [facility]      Care Coordinator plan of care: Follow up call in 1 week, check on needs, continue education and support. Offered patient enrollment in the Remote Patient Monitoring (RPM) program for in-home monitoring: Patient is not eligible for RPM program.      Care Coordination Interventions    Referral from Primary Care Provider: No  Suggested Interventions and Community Resources          Goals Addressed                   This Visit's Progress     Conditions and Symptoms   On track     I will schedule office visits, as directed by my provider. I will keep my appointment or reschedule if I have to cancel. I will notify my provider of any barriers to my plan of care. I will follow my Zone Management tool to seek urgent or emergent care. I will notify my provider of any symptoms that indicate a worsening of my condition. Barriers: overwhelmed by complexity of regimen  Plan for overcoming my barriers: work with AC   Confidence: 10/10  Anticipated Goal Completion Date: 2.2.23                Prior to Admission medications    Medication Sig Start Date End Date Taking?  Authorizing Provider   traZODone (DESYREL) 50 MG tablet Take 1 tablet by mouth nightly 10/24/22   Manuel Nieves MD   pantoprazole (PROTONIX) 40 MG tablet take 1 tablet by mouth twice a day 9/28/22   Dirk Ryan MD   citalopram (CELEXA) 20 MG tablet Take 1 tablet by mouth daily 9/27/22   Sen Puga MD   pilocarpine (SALAGEN) 5 MG tablet Take 7.5 mg by mouth in the morning and 7.5 mg at noon and 7.5 mg before bedtime.   Patient not taking: No sig reported    Historical Provider, MD   metoclopramide (REGLAN) 10 MG tablet  6/22/22   Historical Provider, MD   losartan (COZAAR) 100 MG tablet take 1 tablet by mouth once daily 6/21/22   Historical Provider, MD   metoprolol succinate (TOPROL XL) 25 MG extended release tablet take 1 tablet by mouth once daily 6/21/22   Historical Provider, MD   ondansetron (ZOFRAN ODT) 4 MG disintegrating tablet Take 1 tablet by mouth every 8 hours as needed for Nausea or Vomiting  Patient not taking: No sig reported 6/13/22   Sheela Umaña MD   irbesartan (AVAPRO) 300 MG tablet Take 1 tablet by mouth nightly  Patient not taking: No sig reported 4/4/22   Isidoro Montano MD   spironolactone (ALDACTONE) 50 MG tablet Take 1 tablet by mouth daily Sometimes will take two daily- if feeling bloated  Patient not taking: No sig reported 4/4/22   Isidoro Montano MD   dilTIAZem (CARDIZEM CD) 120 MG extended release capsule Take 1 capsule by mouth daily  Patient not taking: No sig reported 3/31/22   Brian AUSTIN Blood, DO   Multiple Vitamins-Minerals (THERAPEUTIC MULTIVITAMIN-MINERALS) tablet Take 1 tablet by mouth daily    Historical Provider, MD   vitamin C (ASCORBIC ACID) 500 MG tablet Take 500 mg by mouth daily    Historical Provider, MD   zinc gluconate 50 MG tablet Take 50 mg by mouth daily    Historical Provider, MD   apixaban (ELIQUIS) 5 MG TABS tablet Take 5 mg by mouth 2 times daily    Historical Provider, MD   albuterol sulfate HFA (PROVENTIL HFA) 108 (90 Base) MCG/ACT inhaler Inhale 2 puffs into the lungs every 6 hours as needed for Wheezing  Patient not taking: No sig reported 3/25/22   RU Tom - CNP   glucose monitoring (FREESTYLE FREEDOM) kit 1 kit by Does not apply route daily 1/19/22   Crescencio Haider DO No future appointments.  and   General Assessment    Do you have any symptoms that are causing concern?: Yes  Progression since Onset: Unchanged  Reported Symptoms: Other, Nausea (Comment: heartburn)

## 2022-11-14 ENCOUNTER — APPOINTMENT (OUTPATIENT)
Dept: GENERAL RADIOLOGY | Facility: CLINIC | Age: 62
End: 2022-11-14
Payer: COMMERCIAL

## 2022-11-14 ENCOUNTER — HOSPITAL ENCOUNTER (OUTPATIENT)
Age: 62
Setting detail: OBSERVATION
Discharge: HOME | End: 2022-11-15
Attending: SPECIALIST | Admitting: HOSPITALIST
Payer: COMMERCIAL

## 2022-11-14 DIAGNOSIS — E87.6 HYPOKALEMIA: ICD-10-CM

## 2022-11-14 DIAGNOSIS — I48.91 ATRIAL FIBRILLATION WITH RVR (HCC): Primary | ICD-10-CM

## 2022-11-14 LAB
ALBUMIN SERPL-MCNC: 4.5 G/DL (ref 3.5–5.2)
ALBUMIN/GLOBULIN RATIO: 1.9 (ref 1–2.5)
ALP BLD-CCNC: 48 U/L (ref 40–129)
ALT SERPL-CCNC: 26 U/L (ref 5–41)
ANION GAP SERPL CALCULATED.3IONS-SCNC: 13 MMOL/L (ref 9–17)
AST SERPL-CCNC: 36 U/L
BILIRUB SERPL-MCNC: 0.5 MG/DL (ref 0.3–1.2)
BUN BLDV-MCNC: 12 MG/DL (ref 8–23)
CALCIUM SERPL-MCNC: 9.2 MG/DL (ref 8.6–10.4)
CHLORIDE BLD-SCNC: 97 MMOL/L (ref 98–107)
CO2: 27 MMOL/L (ref 20–31)
CREAT SERPL-MCNC: 0.9 MG/DL (ref 0.7–1.2)
GFR SERPL CREATININE-BSD FRML MDRD: >60 ML/MIN/1.73M2
GLUCOSE BLD-MCNC: 78 MG/DL (ref 70–99)
HCT VFR BLD CALC: 53.9 % (ref 41–53)
HEMOGLOBIN: 17.9 G/DL (ref 13.5–17.5)
MAGNESIUM: 2 MG/DL (ref 1.6–2.6)
MCH RBC QN AUTO: 30.1 PG (ref 26–34)
MCHC RBC AUTO-ENTMCNC: 33.2 G/DL (ref 31–37)
MCV RBC AUTO: 90.6 FL (ref 80–100)
PDW BLD-RTO: 17.2 % (ref 12.5–15.4)
PLATELET # BLD: 306 K/UL (ref 140–450)
PMV BLD AUTO: 6.8 FL (ref 6–12)
POTASSIUM SERPL-SCNC: 3.4 MMOL/L (ref 3.7–5.3)
RBC # BLD: 5.94 M/UL (ref 4.5–5.9)
SODIUM BLD-SCNC: 137 MMOL/L (ref 135–144)
TOTAL PROTEIN: 6.9 G/DL (ref 6.4–8.3)
TROPONIN, HIGH SENSITIVITY: 14 NG/L (ref 0–22)
TROPONIN, HIGH SENSITIVITY: 15 NG/L (ref 0–22)
WBC # BLD: 8.6 K/UL (ref 3.5–11)

## 2022-11-14 PROCEDURE — 99285 EMERGENCY DEPT VISIT HI MDM: CPT

## 2022-11-14 PROCEDURE — 96375 TX/PRO/DX INJ NEW DRUG ADDON: CPT

## 2022-11-14 PROCEDURE — 80053 COMPREHEN METABOLIC PANEL: CPT

## 2022-11-14 PROCEDURE — 96365 THER/PROPH/DIAG IV INF INIT: CPT

## 2022-11-14 PROCEDURE — 2060000000 HC ICU INTERMEDIATE R&B

## 2022-11-14 PROCEDURE — 83735 ASSAY OF MAGNESIUM: CPT

## 2022-11-14 PROCEDURE — G0378 HOSPITAL OBSERVATION PER HR: HCPCS

## 2022-11-14 PROCEDURE — 85027 COMPLETE CBC AUTOMATED: CPT

## 2022-11-14 PROCEDURE — 84484 ASSAY OF TROPONIN QUANT: CPT

## 2022-11-14 PROCEDURE — 2580000003 HC RX 258: Performed by: SPECIALIST

## 2022-11-14 PROCEDURE — 96366 THER/PROPH/DIAG IV INF ADDON: CPT

## 2022-11-14 PROCEDURE — 36415 COLL VENOUS BLD VENIPUNCTURE: CPT

## 2022-11-14 PROCEDURE — 71045 X-RAY EXAM CHEST 1 VIEW: CPT

## 2022-11-14 PROCEDURE — 2500000003 HC RX 250 WO HCPCS: Performed by: SPECIALIST

## 2022-11-14 PROCEDURE — 6370000000 HC RX 637 (ALT 250 FOR IP): Performed by: SPECIALIST

## 2022-11-14 PROCEDURE — 96376 TX/PRO/DX INJ SAME DRUG ADON: CPT

## 2022-11-14 PROCEDURE — 93005 ELECTROCARDIOGRAM TRACING: CPT | Performed by: SPECIALIST

## 2022-11-14 RX ORDER — POTASSIUM CHLORIDE 20 MEQ/1
20 TABLET, EXTENDED RELEASE ORAL ONCE
Status: COMPLETED | OUTPATIENT
Start: 2022-11-14 | End: 2022-11-14

## 2022-11-14 RX ORDER — ZOLPIDEM TARTRATE 5 MG/1
5 TABLET ORAL ONCE
Status: DISCONTINUED | OUTPATIENT
Start: 2022-11-14 | End: 2022-11-15 | Stop reason: HOSPADM

## 2022-11-14 RX ORDER — DILTIAZEM HYDROCHLORIDE 5 MG/ML
10 INJECTION INTRAVENOUS ONCE
Status: COMPLETED | OUTPATIENT
Start: 2022-11-14 | End: 2022-11-14

## 2022-11-14 RX ADMIN — POTASSIUM CHLORIDE 20 MEQ: 1500 TABLET, EXTENDED RELEASE ORAL at 22:33

## 2022-11-14 RX ADMIN — DILTIAZEM HYDROCHLORIDE 10 MG: 5 INJECTION, SOLUTION INTRAVENOUS at 20:34

## 2022-11-14 RX ADMIN — DILTIAZEM HYDROCHLORIDE 5 MG/HR: 5 INJECTION, SOLUTION INTRAVENOUS at 20:39

## 2022-11-15 VITALS
SYSTOLIC BLOOD PRESSURE: 123 MMHG | RESPIRATION RATE: 21 BRPM | DIASTOLIC BLOOD PRESSURE: 81 MMHG | OXYGEN SATURATION: 96 % | BODY MASS INDEX: 28.63 KG/M2 | WEIGHT: 200 LBS | HEIGHT: 70 IN | HEART RATE: 75 BPM | TEMPERATURE: 97.5 F

## 2022-11-15 PROBLEM — I48.91 A-FIB (HCC): Status: ACTIVE | Noted: 2022-11-15

## 2022-11-15 LAB
ALBUMIN SERPL-MCNC: 3.7 G/DL (ref 3.5–5.2)
ALBUMIN/GLOBULIN RATIO: 1.7 (ref 1–2.5)
ALP BLD-CCNC: 44 U/L (ref 40–129)
ALT SERPL-CCNC: 22 U/L (ref 5–41)
ANION GAP SERPL CALCULATED.3IONS-SCNC: 7 MMOL/L (ref 9–17)
AST SERPL-CCNC: 29 U/L
BILIRUB SERPL-MCNC: 0.5 MG/DL (ref 0.3–1.2)
BUN BLDV-MCNC: 16 MG/DL (ref 8–23)
CALCIUM SERPL-MCNC: 8.6 MG/DL (ref 8.6–10.4)
CHLORIDE BLD-SCNC: 100 MMOL/L (ref 98–107)
CO2: 31 MMOL/L (ref 20–31)
CREAT SERPL-MCNC: 1.2 MG/DL (ref 0.7–1.2)
EKG ATRIAL RATE: 227 BPM
EKG Q-T INTERVAL: 324 MS
EKG QRS DURATION: 92 MS
EKG QTC CALCULATION (BAZETT): 450 MS
EKG R AXIS: -20 DEGREES
EKG T AXIS: 61 DEGREES
EKG VENTRICULAR RATE: 116 BPM
GFR SERPL CREATININE-BSD FRML MDRD: >60 ML/MIN/1.73M2
GLUCOSE BLD-MCNC: 118 MG/DL (ref 70–99)
INR BLD: 1.1
MAGNESIUM: 2 MG/DL (ref 1.6–2.6)
POTASSIUM SERPL-SCNC: 3.8 MMOL/L (ref 3.7–5.3)
PRO-BNP: 282 PG/ML
PROTHROMBIN TIME: 11.8 SEC (ref 9.4–12.6)
SODIUM BLD-SCNC: 138 MMOL/L (ref 135–144)
TOTAL PROTEIN: 5.9 G/DL (ref 6.4–8.3)
TROPONIN, HIGH SENSITIVITY: 14 NG/L (ref 0–22)
TSH SERPL DL<=0.05 MIU/L-ACNC: 2.27 UIU/ML (ref 0.3–5)

## 2022-11-15 PROCEDURE — 6370000000 HC RX 637 (ALT 250 FOR IP): Performed by: SPECIALIST

## 2022-11-15 PROCEDURE — 83880 ASSAY OF NATRIURETIC PEPTIDE: CPT

## 2022-11-15 PROCEDURE — 6370000000 HC RX 637 (ALT 250 FOR IP): Performed by: NURSE PRACTITIONER

## 2022-11-15 PROCEDURE — G0378 HOSPITAL OBSERVATION PER HR: HCPCS

## 2022-11-15 PROCEDURE — 85610 PROTHROMBIN TIME: CPT

## 2022-11-15 PROCEDURE — 80053 COMPREHEN METABOLIC PANEL: CPT

## 2022-11-15 PROCEDURE — 84484 ASSAY OF TROPONIN QUANT: CPT

## 2022-11-15 PROCEDURE — 99235 HOSP IP/OBS SAME DATE MOD 70: CPT | Performed by: HOSPITALIST

## 2022-11-15 PROCEDURE — 83735 ASSAY OF MAGNESIUM: CPT

## 2022-11-15 PROCEDURE — 84443 ASSAY THYROID STIM HORMONE: CPT

## 2022-11-15 PROCEDURE — 96366 THER/PROPH/DIAG IV INF ADDON: CPT

## 2022-11-15 RX ORDER — TRAZODONE HYDROCHLORIDE 50 MG/1
50 TABLET ORAL NIGHTLY
Status: DISCONTINUED | OUTPATIENT
Start: 2022-11-15 | End: 2022-11-15 | Stop reason: HOSPADM

## 2022-11-15 RX ORDER — ACETAMINOPHEN 650 MG/1
650 SUPPOSITORY RECTAL EVERY 6 HOURS PRN
Status: DISCONTINUED | OUTPATIENT
Start: 2022-11-15 | End: 2022-11-15 | Stop reason: HOSPADM

## 2022-11-15 RX ORDER — ASCORBIC ACID 500 MG
500 TABLET ORAL DAILY
Status: DISCONTINUED | OUTPATIENT
Start: 2022-11-15 | End: 2022-11-15 | Stop reason: HOSPADM

## 2022-11-15 RX ORDER — ZINC SULFATE 50(220)MG
50 CAPSULE ORAL DAILY
Status: DISCONTINUED | OUTPATIENT
Start: 2022-11-15 | End: 2022-11-15 | Stop reason: HOSPADM

## 2022-11-15 RX ORDER — DILTIAZEM HYDROCHLORIDE 120 MG/1
120 CAPSULE, COATED, EXTENDED RELEASE ORAL DAILY
Qty: 30 CAPSULE | Refills: 3 | Status: SHIPPED | OUTPATIENT
Start: 2022-11-15

## 2022-11-15 RX ORDER — HYDROCODONE BITARTRATE AND ACETAMINOPHEN 5; 325 MG/1; MG/1
1 TABLET ORAL ONCE
Status: COMPLETED | OUTPATIENT
Start: 2022-11-15 | End: 2022-11-15

## 2022-11-15 RX ORDER — SODIUM CHLORIDE 9 MG/ML
INJECTION, SOLUTION INTRAVENOUS PRN
Status: DISCONTINUED | OUTPATIENT
Start: 2022-11-15 | End: 2022-11-15 | Stop reason: HOSPADM

## 2022-11-15 RX ORDER — LOSARTAN POTASSIUM 50 MG/1
50 TABLET ORAL DAILY
Status: DISCONTINUED | OUTPATIENT
Start: 2022-11-16 | End: 2022-11-15 | Stop reason: HOSPADM

## 2022-11-15 RX ORDER — ACETAMINOPHEN 325 MG/1
650 TABLET ORAL EVERY 6 HOURS PRN
Status: DISCONTINUED | OUTPATIENT
Start: 2022-11-15 | End: 2022-11-15 | Stop reason: HOSPADM

## 2022-11-15 RX ORDER — METOPROLOL SUCCINATE 50 MG/1
50 TABLET, EXTENDED RELEASE ORAL DAILY
Qty: 30 TABLET | Refills: 3 | Status: SHIPPED | OUTPATIENT
Start: 2022-11-16

## 2022-11-15 RX ORDER — METOPROLOL SUCCINATE 50 MG/1
50 TABLET, EXTENDED RELEASE ORAL DAILY
Status: DISCONTINUED | OUTPATIENT
Start: 2022-11-16 | End: 2022-11-15 | Stop reason: HOSPADM

## 2022-11-15 RX ORDER — ONDANSETRON 2 MG/ML
4 INJECTION INTRAMUSCULAR; INTRAVENOUS EVERY 6 HOURS PRN
Status: DISCONTINUED | OUTPATIENT
Start: 2022-11-15 | End: 2022-11-15 | Stop reason: HOSPADM

## 2022-11-15 RX ORDER — LOSARTAN POTASSIUM 100 MG/1
100 TABLET ORAL DAILY
Status: DISCONTINUED | OUTPATIENT
Start: 2022-11-15 | End: 2022-11-15

## 2022-11-15 RX ORDER — DILTIAZEM HYDROCHLORIDE 120 MG/1
120 CAPSULE, COATED, EXTENDED RELEASE ORAL DAILY
Status: DISCONTINUED | OUTPATIENT
Start: 2022-11-15 | End: 2022-11-15 | Stop reason: HOSPADM

## 2022-11-15 RX ORDER — SODIUM CHLORIDE 0.9 % (FLUSH) 0.9 %
5-40 SYRINGE (ML) INJECTION EVERY 12 HOURS SCHEDULED
Status: DISCONTINUED | OUTPATIENT
Start: 2022-11-15 | End: 2022-11-15 | Stop reason: HOSPADM

## 2022-11-15 RX ORDER — METOPROLOL SUCCINATE 25 MG/1
25 TABLET, EXTENDED RELEASE ORAL DAILY
Status: DISCONTINUED | OUTPATIENT
Start: 2022-11-15 | End: 2022-11-15

## 2022-11-15 RX ORDER — CITALOPRAM 20 MG/1
20 TABLET ORAL DAILY
Status: DISCONTINUED | OUTPATIENT
Start: 2022-11-15 | End: 2022-11-15 | Stop reason: HOSPADM

## 2022-11-15 RX ORDER — LOSARTAN POTASSIUM 50 MG/1
100 TABLET ORAL DAILY
Qty: 30 TABLET | Refills: 3 | Status: SHIPPED | OUTPATIENT
Start: 2022-11-16

## 2022-11-15 RX ORDER — SODIUM CHLORIDE 0.9 % (FLUSH) 0.9 %
10 SYRINGE (ML) INJECTION PRN
Status: DISCONTINUED | OUTPATIENT
Start: 2022-11-15 | End: 2022-11-15 | Stop reason: HOSPADM

## 2022-11-15 RX ORDER — POLYETHYLENE GLYCOL 3350 17 G/17G
17 POWDER, FOR SOLUTION ORAL DAILY PRN
Status: DISCONTINUED | OUTPATIENT
Start: 2022-11-15 | End: 2022-11-15 | Stop reason: HOSPADM

## 2022-11-15 RX ORDER — ONDANSETRON 4 MG/1
4 TABLET, ORALLY DISINTEGRATING ORAL EVERY 8 HOURS PRN
Status: DISCONTINUED | OUTPATIENT
Start: 2022-11-15 | End: 2022-11-15 | Stop reason: HOSPADM

## 2022-11-15 RX ORDER — SPIRONOLACTONE 25 MG/1
50 TABLET ORAL DAILY
Status: DISCONTINUED | OUTPATIENT
Start: 2022-11-15 | End: 2022-11-15

## 2022-11-15 RX ORDER — PANTOPRAZOLE SODIUM 40 MG/1
40 TABLET, DELAYED RELEASE ORAL
Status: DISCONTINUED | OUTPATIENT
Start: 2022-11-15 | End: 2022-11-15 | Stop reason: HOSPADM

## 2022-11-15 RX ADMIN — CITALOPRAM HYDROBROMIDE 20 MG: 20 TABLET ORAL at 11:43

## 2022-11-15 RX ADMIN — ZINC SULFATE 220 MG (50 MG) CAPSULE 50 MG: CAPSULE at 11:43

## 2022-11-15 RX ADMIN — OXYCODONE HYDROCHLORIDE AND ACETAMINOPHEN 500 MG: 500 TABLET ORAL at 11:43

## 2022-11-15 RX ADMIN — HYDROCODONE BITARTRATE AND ACETAMINOPHEN 1 TABLET: 5; 325 TABLET ORAL at 02:05

## 2022-11-15 RX ADMIN — APIXABAN 5 MG: 5 TABLET, FILM COATED ORAL at 11:43

## 2022-11-15 RX ADMIN — DILTIAZEM HYDROCHLORIDE 120 MG: 120 CAPSULE, COATED, EXTENDED RELEASE ORAL at 11:43

## 2022-11-15 ASSESSMENT — ENCOUNTER SYMPTOMS
SHORTNESS OF BREATH: 1
SORE THROAT: 0
TROUBLE SWALLOWING: 0
DIARRHEA: 0
VOMITING: 0
STRIDOR: 0
ABDOMINAL PAIN: 0
WHEEZING: 0

## 2022-11-15 ASSESSMENT — PAIN - FUNCTIONAL ASSESSMENT: PAIN_FUNCTIONAL_ASSESSMENT: 0-10

## 2022-11-15 ASSESSMENT — PAIN SCALES - GENERAL: PAINLEVEL_OUTOF10: 0

## 2022-11-15 NOTE — PROGRESS NOTES
Patient back into [facility] upon admission and is expecting to be discharged today. Patient persistent about leaving today. RN notified Brianda. Cardizem gtt at 2.5. /78(89). HR 68. Brianda said he will round on patient soon. Also, PO cardizem due. Patient's cardizem gtt is at 2.5 currently. RN asked Brianda if they would like me to turn off gtt and give PO cardizem. RN instructed to turn off gtt and give PO.

## 2022-11-15 NOTE — ED PROVIDER NOTES
[facility] ED  [facility]  Phone: [phone]      Pt Name: Christelle George  MRN: [id number]  [place] 1960  Date of evaluation: 11/14/2022      CHIEF COMPLAINT       Chief Complaint   Patient presents with    Chest Pain         HISTORY OF PRESENT ILLNESS    Christelle George is a 65 y.o. male who presents   Chief Complaint   Patient presents with    Chest Pain   . 79-year-old male patient with past medical history of atrial fibrillation, sleep apnea comes to the emergency department complaining of palpitations since 10 AM on the morning of presentation with occasional shortness of breath. The palpitations were intermittent initially and became constant at about 6:45 PM prior to coming to the emergency department. He denies any chest pain, nausea or vomiting but has had occasional shortness of breath and slight lightheadedness and dizziness. He denies any swelling in the legs or calf pain. No history of fever, cough, chills, abdominal pain, vomiting or diarrhea. Patient states he did not take his Eliquis today but he took his metoprolol at 7 PM along with Celexa prior to coming to the emergency department. Patient states he felt like he was in atrial fibrillation and did not convert to normal sinus rhythm on his own while resting at home. He smokes 2 to 3 cigars/day and drinks alcoholic beverages in the form of rum daily as well as smokes marijuana daily. There are no exacerbating or relieving factors. REVIEW OF SYSTEMS       Review of Systems   Constitutional:  Negative for diaphoresis, fatigue and fever. HENT:  Negative for congestion, sore throat and trouble swallowing. Respiratory:  Positive for shortness of breath. Negative for wheezing and stridor. Cardiovascular:  Positive for palpitations. Negative for chest pain and leg swelling. Gastrointestinal:  Negative for abdominal pain, diarrhea and vomiting.    Neurological:  Positive for dizziness and light-headedness. Negative for syncope and headaches. All other systems reviewed and are negative. PAST MEDICAL HISTORY    has a past medical history of Abscess of bursa of right elbow, Arthritis, Atrial fibrillation ([address] BPH (benign prostatic hyperplasia), Heartburn, Hypertension, Panic attack, Polycythemia, PONV (postoperative nausea and vomiting), and Sleep apnea. SURGICAL HISTORY      has a past surgical history that includes shoulder surgery; Spine surgery; Upper gastrointestinal endoscopy (N/A, 12/17/2019); Cardiac catheterization (2017); hernia repair; Colonoscopy; Appendectomy; back surgery; Endoscopy, colon, diagnostic; Arm Surgery (Right, 02/23/2021); Muscle Repair (Right, 2/23/2021); Arm Surgery (Right, 7/13/2021); Upper gastrointestinal endoscopy (N/A, 7/30/2021); and Septoplasty (Bilateral, 12/20/2021).     CURRENT MEDICATIONS       Previous Medications    ALBUTEROL SULFATE HFA (PROVENTIL HFA) 108 (90 BASE) MCG/ACT INHALER    Inhale 2 puffs into the lungs every 6 hours as needed for Wheezing    APIXABAN (ELIQUIS) 5 MG TABS TABLET    Take 5 mg by mouth 2 times daily    CITALOPRAM (CELEXA) 20 MG TABLET    Take 1 tablet by mouth daily    DILTIAZEM (CARDIZEM CD) 120 MG EXTENDED RELEASE CAPSULE    Take 1 capsule by mouth daily    GLUCOSE MONITORING (FREESTYLE FREEDOM) KIT    1 kit by Does not apply route daily    IRBESARTAN (AVAPRO) 300 MG TABLET    Take 1 tablet by mouth nightly    LOSARTAN (COZAAR) 100 MG TABLET    take 1 tablet by mouth once daily    METOCLOPRAMIDE (REGLAN) 10 MG TABLET        METOPROLOL SUCCINATE (TOPROL XL) 25 MG EXTENDED RELEASE TABLET    take 1 tablet by mouth once daily    MULTIPLE VITAMINS-MINERALS (THERAPEUTIC MULTIVITAMIN-MINERALS) TABLET    Take 1 tablet by mouth daily    ONDANSETRON (ZOFRAN ODT) 4 MG DISINTEGRATING TABLET    Take 1 tablet by mouth every 8 hours as needed for Nausea or Vomiting    PANTOPRAZOLE (PROTONIX) 40 MG TABLET    take 1 tablet by mouth twice a day    PILOCARPINE (SALAGEN) 5 MG TABLET    Take 7.5 mg by mouth 3 times daily    SPIRONOLACTONE (ALDACTONE) 50 MG TABLET    Take 1 tablet by mouth daily Sometimes will take two daily- if feeling bloated    TRAZODONE (DESYREL) 50 MG TABLET    Take 1 tablet by mouth nightly    VITAMIN C (ASCORBIC ACID) 500 MG TABLET    Take 500 mg by mouth daily    ZINC GLUCONATE 50 MG TABLET    Take 50 mg by mouth daily       ALLERGIES     is allergic to pcn [penicillins]. FAMILY HISTORY     He indicated that his mother is alive. He indicated that his father is alive. He indicated that the status of his paternal uncle is unknown.     family history includes Cancer in his father and paternal uncle; Diabetes in his father. SOCIAL HISTORY      reports that he has been smoking cigars. He started smoking about 3 years ago. He has never used smokeless tobacco. He reports current alcohol use. He reports current drug use. Drug: Marijuana Jae Garcia). PHYSICAL EXAM     INITIAL VITALS:  height is 5' 10\" (1.778 m) and weight is 90.7 kg (200 lb). His blood pressure is 122/86 and his pulse is 89. His respiration is 20 and oxygen saturation is 94%. Physical Exam  Vitals and nursing note reviewed. Constitutional:       General: He is not in acute distress. Appearance: He is well-developed. HENT:      Head: Normocephalic and atraumatic. Nose: Nose normal.   Eyes:      Extraocular Movements: Extraocular movements intact. Pupils: Pupils are equal, round, and reactive to light. Cardiovascular:      Rate and Rhythm: Tachycardia present. Rhythm irregularly irregular. Heart sounds: Normal heart sounds. No murmur heard. Pulmonary:      Effort: Pulmonary effort is normal. No respiratory distress. Breath sounds: Normal breath sounds. Abdominal:      General: Bowel sounds are normal. There is no distension. Palpations: Abdomen is soft. Tenderness: There is no abdominal tenderness.    Musculoskeletal: Cervical back: Normal range of motion and neck supple. Skin:     General: Skin is warm and dry. Neurological:      General: No focal deficit present. Mental Status: He is alert and oriented to person, place, and time. Psychiatric:         Behavior: Behavior normal.         Thought Content: Thought content normal.         DIFFERENTIAL DIAGNOSIS/ MDM:     Cardiac arrhythmia, acute coronary syndrome, electrolyte imbalance, anemia    DIAGNOSTIC RESULTS     EKG: All EKG's are interpreted by the Emergency Department Physician who either signs or Co-signs this chart in the absence of a cardiologist.    EKG Interpretation    Interpreted by emergency department physician    Rhythm: Irregularly irregular  Rate: Tachycardic  Axis: normal  Conduction: Abnormal-Atrial fibrillation  ST Segments: no acute change  T Waves: no acute change  Q Waves: no acute change    Clinical Impression: Atrial Fibrillation with Rapid Ventricular Response. Laura Driver MD   Attending Emergency Medicine Physician     RADIOLOGY:   I reviewed the radiologist interpretations:  XR CHEST PORTABLE   Final Result   No acute cardiopulmonary abnormality. XR CHEST PORTABLE    Result Date: 11/14/2022  EXAMINATION: ONE XRAY VIEW OF THE CHEST 11/14/2022 8:11 pm COMPARISON: 07/26/2022 HISTORY: ORDERING SYSTEM PROVIDED HISTORY: Chest Pain TECHNOLOGIST PROVIDED HISTORY: Chest Pain Reason for Exam: chest pain Relevant Medical/Surgical History: hx Afib, HTN FINDINGS: Cardiomediastinal silhouette is unchanged in size. There is no pleural effusion or pneumothorax. There is no pulmonary consolidation. There is no acute osseous abnormality. No acute cardiopulmonary abnormality.            LABS:  Labs Reviewed   CBC - Abnormal; Notable for the following components:       Result Value    RBC 5.94 (*)     Hemoglobin 17.9 (*)     Hematocrit 53.9 (*)     RDW 17.2 (*)     All other components within normal limits   COMPREHENSIVE METABOLIC PANEL - Abnormal; Notable for the following components:    Potassium 3.4 (*)     Chloride 97 (*)     All other components within normal limits   TROPONIN   TROPONIN   MAGNESIUM   COMPREHENSIVE METABOLIC PANEL W/ REFLEX TO MG FOR LOW K   MAGNESIUM   TSH WITH REFLEX   PROTIME-INR   BRAIN NATRIURETIC PEPTIDE   TROPONIN   TROPONIN       Patient has hypokalemia, rest of the lab work is unremarkable    EMERGENCY DEPARTMENT COURSE:   Vitals:    Vitals:    11/15/22 0000 11/15/22 0033 11/15/22 0035 11/15/22 0156   BP:   100/72 122/86   Pulse: 74 89     Resp: 19 20     SpO2: 96% 94%     Weight:       Height:         -------------------------  BP: 122/86,  , Heart Rate: 89, Resp: 20    Orders Placed This Encounter   Medications    dilTIAZem injection 10 mg    dilTIAZem 125 mg in dextrose 5 % 125 mL infusion     Order Specific Question:   Titrate Infusion? Answer:   Yes     Order Specific Question:   Initial Infusion Dose: Answer:   5 mg/hr     Order Specific Question:   Goal of Therapy is: Answer:   HR less than 100 bpm     Order Specific Question:   Contact Provider if:     Answer:   SBP less than 90 mmHg     Order Specific Question:   Contact Provider if:     Answer:   HR less than 60 bpm     Order Specific Question:   HOLD for     Answer:   SBP less than 90 mmHg     Order Specific Question:   HOLD for     Answer:   HR less than 60 bpm    potassium chloride (KLOR-CON M) extended release tablet 20 mEq    losartan (COZAAR) tablet 100 mg    metoprolol succinate (TOPROL XL) extended release tablet 25 mg    pantoprazole (PROTONIX) tablet 40 mg    OR Linked Order Group     acetaminophen (TYLENOL) tablet 650 mg     acetaminophen (TYLENOL) suppository 650 mg    zolpidem (AMBIEN) tablet 5 mg    HYDROcodone-acetaminophen (NORCO) 5-325 MG per tablet 1 tablet       During the emergency department course, patient was put on the monitor which reveals atrial fibrillation with rapid ventricular response.   IV normal saline was started at [facility] and patient was given diltiazem 10 mg slow IV push followed by 5 mg/h infusion. Has heart rate has decreased and is currently running around 75-80 range and patient is feeling much better and resting comfortably. He maintained his pulse oximetry well. He continues to be in atrial fibrillation. His last EKG had revealed normal sinus rhythm and patient has had paroxysmal atrial fibrillation before that. He took his Eliquis at 10 PM tonight. He was given K-Dur 20 milliequivalents orally. I discussed the case with Fatoumata Villatoro CNP and plans to admit the patient on stepdown unit at [facility].  We are awaiting bed assignment and then transfer arrangements will be made. I have reviewed the disposition diagnosis with the patient and or their family/guardian. I have answered their questions and given discharge instructions. They voiced understanding of these instructions and did not have any further questions or complaints. Re-evaluation Notes    Patient is feeling much better and resting comfortably but continues to be in atrial fibrillation with controlled ventricular response    CRITICAL CARE:  The high probability of sudden, clinically significant deterioration in the patient's  condition required the highest level of my preparedness to intervene urgently. The services I provided to this patient were to treat and/or prevent clinically significant  deterioration. Services included, but were not necessarily limited to, the following: chart data  review, reviewing nursing notes and/or old charts, documentation time, consultant  collaboration regarding findings and treatment options, medication orders and management,  direct patient care, vital sign assessments and ordering, interpreting and reviewing diagnostic  studies/lab tests.   Aggregate critical care time includes only time during which I was engaged in work directly  related to the patient's care, as described above, whether at the bedside or elsewhere in the  Emergency Department. It did not include time spent performing other reported procedures or  the services of residents, students, nurses, nurse practitioners or physician assistants. Critical Care: 30 minutes       PROCEDURES:  None    FINAL IMPRESSION      1. Atrial fibrillation with RVR ([address]    2. Hypokalemia          DISPOSITION/PLAN   DISPOSITION Admitted 11/15/2022 01:54:13 AM      Condition on Disposition    Stable    PATIENT REFERRED TO:  No follow-up provider specified. DISCHARGE MEDICATIONS:  New Prescriptions    No medications on file       (Please note that portions of this note were completed with a voice recognition program.  Efforts were made to edit the dictations but occasionally words are mis-transcribed.)    Lexi Stout MD,, MD, F.A.C.E.P.   Attending Emergency Physician      Lexi Stout MD  11/15/22 9873

## 2022-11-15 NOTE — ED NOTES
Pt given coffee, pt updated on plan of care, Pt states \" Im calling my cardiologist to see if I have to stay, I have a lot going on and don't want to go sit over there all day\", pt denies SOB, denies chest pan, no distress noted     Teresa Roy, RN  11/15/22 4053

## 2022-11-15 NOTE — DISCHARGE SUMMARY
[facility]  Office: [address], DO, Essence Guillermo, DO, Carlota Marcum, DO, Ferny Nj Blood, DO, Adalgisa Samayoa MD, Juve Castillo MD, Dayo Alejandro MD, Francy Sahni MD,  Vilma Webb MD, Bernice Kenyon MD, Esequiel Escobedo DO, Mary Kate Lyman MD,  Fredo Casarez, DO, Dario Rosa MD, Reyna Parker MD, Magdaleno Izquierdo DO, Sim Bowman MD, Alize Stratton MD, Susanne Marmolejo, DO, Sina Levin MD, Amy Oneill MD, Karena Elias MD, Chrissy Alfaro MD, Caren Izquierdo, DO, Anjum Hyde MD, Clemencia Cerda MD, Diego Recinos, CNP,  Roxanne Madrigal, CNP, Gloria Miranda, CNP, Félix Salomon, CNP,  Jayde Sifuentes, DNP, Nora De Anda, CNP, Leo Luis, CNP, Solo Ball, CNP, Chelsea Dominguez, CNP, Ena Messina, CNP, Benita Bernard PARosaC, Gris Aviles, CNS, Vikash Johnson, DNP, Luc Medina, CNP, Pamela Artis, CNP, Lacey Ortiz, CNP         [address] [facility]    Discharge Summary     Patient ID: Calvin Boston  :  1960   MRN: [id number]     ACCOUNT:  [de-identified]   Patient's PCP: Roselyn Riley MD  Admit Date: 2022   Discharge Date: 11/15/2022     Length of Stay: 1  Code Status:  Full Code  Admitting Physician: Hiren Canales DO  Discharge Physician: Hiren Canales DO     Active Discharge Diagnoses:     Hospital Problem Lists:  Principal Problem:    Atrial fibrillation with RVR [facility])  Active Problems:    A-fib [facility])  Resolved Problems:    * No resolved hospital problems. *      Admission Condition:  fair     Discharged Condition: good    Hospital Stay:     Hospital Course:  Calvin Boston is a 65 y.o. male who was admitted for the management of  Atrial fibrillation with RVR [facility] , presented to ER with Chest Pain    Is a very pleasant 59-year-old male presented to outlying facility due to palpitations. He had atrial fibrillation with rapid ventricular response.   The patient has been confused regarding which medications he should take and has not been taking his Toprol nor Cardizem as prescribed. The patient was admitted and initiated on a Cardizem drip. While transferred between [facility] emergency room to [facility] he converted into a normal sinus rhythm. I had a very long discussion with him regarding medication compliance and what each medication does. He is to be discharged home on 50 mg of Toprol, 120 mg of Cardizem, losartan 50 mg along with Eliquis. The patient is to follow-up with his primary care physician. At the time of discharge she was in normal sinus rhythm. Significant therapeutic interventions: As above    Significant Diagnostic Studies:   Labs / Micro:  CBC:   Lab Results   Component Value Date/Time    WBC 8.6 11/14/2022 07:45 PM    RBC 5.94 11/14/2022 07:45 PM    HGB 17.9 11/14/2022 07:45 PM    HCT 53.9 11/14/2022 07:45 PM    MCV 90.6 11/14/2022 07:45 PM    MCH 30.1 11/14/2022 07:45 PM    MCHC 33.2 11/14/2022 07:45 PM    RDW 17.2 11/14/2022 07:45 PM     11/14/2022 07:45 PM     BMP:    Lab Results   Component Value Date/Time    GLUCOSE 118 11/15/2022 06:00 AM     11/15/2022 06:00 AM    K 3.8 11/15/2022 06:00 AM     11/15/2022 06:00 AM    CO2 31 11/15/2022 06:00 AM    ANIONGAP 7 11/15/2022 06:00 AM    BUN 16 11/15/2022 06:00 AM    CREATININE 1.20 11/15/2022 06:00 AM    BUNCRER 23 06/13/2022 02:07 PM    CALCIUM 8.6 11/15/2022 06:00 AM    LABGLOM >60 11/15/2022 06:00 AM    GFRAA >60 07/26/2022 11:14 PM    GFR      07/26/2022 11:14 PM        Radiology:  XR CHEST PORTABLE    Result Date: 11/14/2022  No acute cardiopulmonary abnormality. Consultations:    Consults:     Final Specialist Recommendations/Findings:   IP CONSULT TO CARDIOLOGY      The patient was seen and examined on day of discharge and this discharge summary is in conjunction with any daily progress note from day of discharge.     Discharge plan:     Disposition: Home    Physician Follow Up: Sonia Vizcarra MD  [address]  [address]  [phone]    Schedule an appointment as soon as possible for a visit today  call office and make appointment       Requiring Further Evaluation/Follow Up POST HOSPITALIZATION/Incidental Findings: None    Diet: regular diet    Activity: As tolerated    Instructions to Patient: None    Discharge Medications:      Medication List        START taking these medications      losartan 50 MG tablet  Commonly known as: COZAAR  Take 2 tablets by mouth daily  Start taking on: November 16, 2022  Replaces: irbesartan 300 MG tablet            CHANGE how you take these medications      metoprolol succinate 50 MG extended release tablet  Commonly known as: TOPROL XL  Take 1 tablet by mouth daily  Start taking on: November 16, 2022  What changed:   medication strength  See the new instructions.             CONTINUE taking these medications      albuterol sulfate  (90 Base) MCG/ACT inhaler  Commonly known as: Proventil HFA  Inhale 2 puffs into the lungs every 6 hours as needed for Wheezing     apixaban 5 MG Tabs tablet  Commonly known as: ELIQUIS  Take 1 tablet by mouth 2 times daily     citalopram 20 MG tablet  Commonly known as: CeleXA  Take 1 tablet by mouth daily     dilTIAZem 120 MG extended release capsule  Commonly known as: CARDIZEM CD  Take 1 capsule by mouth daily     glucose monitoring kit  1 kit by Does not apply route daily     metoclopramide 10 MG tablet  Commonly known as: REGLAN     pantoprazole 40 MG tablet  Commonly known as: PROTONIX  take 1 tablet by mouth twice a day     pilocarpine 5 MG tablet  Commonly known as: SALAGEN     traZODone 50 MG tablet  Commonly known as: DESYREL  Take 1 tablet by mouth nightly            STOP taking these medications      irbesartan 300 MG tablet  Commonly known as: AVAPRO  Replaced by: losartan 50 MG tablet     losartan 100 MG tablet  Commonly known as: COZAAR     ondansetron 4 MG disintegrating tablet  Commonly known as: Zofran ODT     spironolactone 50 MG tablet  Commonly known as: ALDACTONE     therapeutic multivitamin-minerals tablet     vitamin C 500 MG tablet  Commonly known as: ASCORBIC ACID     zinc gluconate 50 MG tablet               Where to Get Your Medications        These medications were sent to [facility] [address], [facility] Irene Herring [phone]  [address] [place] [id number]      Phone: [phone]   apixaban 5 MG Tabs tablet  dilTIAZem 120 MG extended release capsule  losartan 50 MG tablet  metoprolol succinate 50 MG extended release tablet         No discharge procedures on file. Time Spent on discharge is  20 mins in patient examination, evaluation, counseling as well as medication reconciliation, prescriptions for required medications, discharge plan and follow up. Electronically signed by   Nicole Enciso DO  11/15/2022  11:46 AM      Thank you Dr. Crissy Yusuf MD for the opportunity to be involved in this patient's care.

## 2022-11-15 NOTE — PROGRESS NOTES
Pt admitted to ICU room 1106, & converted back to NSR on admission, HR 72 /78. A&O x4. Call light and bedside table within reach, bed in lowest position.

## 2022-11-15 NOTE — PROGRESS NOTES
[facility] is at bedside going through patient's home medications with the patient. [facility] decided to change doses on some medications and sent scripts to patient's preferred pharmacy. [facility] also decided to discharge patient and said patient can follow up with cardiology outpatient. RN notified cardiology who agreed and have patient call cardiologist's office to schedule follow up appointment. RN will give patient's medications and proceed with discharge.

## 2022-11-15 NOTE — ED TRIAGE NOTES
Patient states today he had a stressful day. Patient states he didn't take his eliquis today. Patient states he took his metoprolol at 7pm along with celexa  Patient states he felt like he was in a fib. Patient being treated for a-fib.

## 2022-11-15 NOTE — PROGRESS NOTES
RN sees there is a cardiology consult for Dr. Flash Adams. Patient sees Dr. Char Paget outpatient so RN notified [facility] to see if he would like patient's personal cardiologist consulted instead. RN consulted the correct group. Dr. Belia Berrios is covering today.

## 2022-11-15 NOTE — PROGRESS NOTES
Patient discharged. All VSS. All IV access removed. RN went through AVS with patient and answered all questions/concerns to patient's satisfaction. AVS form signed and copy given to patient.

## 2022-11-15 NOTE — ED NOTES
Notified Dr. Arce Batch of HR and BP. Patient at therapeutic level for Diltiazem patient will remain at 5mg/hr.  Patient remains in [place] [id number] [facility]  11/14/22 9909

## 2022-11-15 NOTE — ED NOTES
Pharmacy called [facility]- informed the\" meds that aren't verified are the ones we dont carry\".  Pt resting, no distress noted     Fatmata Medel, RN  11/15/22 3553

## 2022-11-15 NOTE — H&P
[facility]  Office: [address], DO, Radha Vizcaino, DO, João Nazario, DO, Martin Tobias, DO, Roxana Hagan MD, Bishnu Xavier MD, Thuy Sterling MD, Mayela Bautista MD,  Miri Puri MD, Pina Hedrick MD, Marsha Larios DO, Dagoberto Diehl MD,  Shira Fonseca MD, Dread Fernandez MD, Felecia Fajardo, DO, Esperanza Adorno MD, Yasmani Torres MD, Saad Anderson, DO, Linda Delcid MD, Russ Cook MD, Elsa Bishop MD, Marni Lenz MD, Ratna De Jesus DO, Allen Tafoya MD, Breanne Morales MD, Robinson Okeefe, CNP,  Duglas Santana, CNP, Carolyn Rehman, CNP, Meg Malin, CNP,  Segun Estrada, DNP, Sulma Gorman, CNP, Evelyn Nieto, CNP, Kellie Cardoza, CNP, Anisha Carrasquillo, CNP, Joe Warner, CNP, Humble Fine PA-C, Sonido Levine, CNS, Kathy Pantoja, DNP, Wang Garza, CNP, Tammy Jennings, CNP, Devin Zamarripa, CNP         [facility]    HISTORY AND PHYSICAL EXAMINATION            Date:   11/15/2022  Patient name:  Belia Cr  Date of admission:  11/14/2022  7:38 PM  MRN:   [id number]  Account:  [de-identified]  YOB: 1960  PCP:    Belinda Galindo MD  Room:   1106/1106-01  Code Status:    Full Code    Chief Complaint:     Chief Complaint   Patient presents with    Chest Pain     [facility]chest pain secondary to atrial fibrillation with rapid ventricular response, patient states \"I feel fine now, I want to go home\"    History Obtained From:     patient, electronic medical record    History of Present Illness:     Belia Cr is a 65 y.o. Non- / non  male who presents with Chest Pain   and is admitted to the hospital for the management of Atrial fibrillation with RVR ([facility] [address]     Is very pleasant 61-year-old male with a known history of paroxysmal atrial fibrillation presented to [facility] emergency room due to chest pressure and palpitations. He has been found to have atrial fibrillation with rapid ventricular response. Patient has been admitted and started on a Cardizem drip. While in transit to our facility the patient has developed a normal sinus rhythm. The patient is requesting disposition home. I did have a long discussion with him regarding his medications. He has his medications in a bag which we reviewed. He has 3 bottles of metoprolol each with different doses. He has a 25 mg bottle, 50 mg bottle, 100 mg bottle. He has 2 bottles of losartan and 100 mg. He has a bottle of clonidine which he states he does not take. He also has a bottle of chlorthalidone that he is not taking. He does have a bottle of Cardizem and states that he has not been taking it because he does not know what it does. I had a long discussion with him regarding compliance with his medications. I did review what each medication did with him. At this point in time given the fact that he is spontaneously converted into the normal sinus rhythm is reasonable to transition him to oral Cardizem. I am going to prescribe him 50 mg of Toprol, 120 mg of Cardizem, 50 mg of losartan to take. I did provide him a refill on his Eliquis. At this point in time he can be discharged home with outpatient follow-up with his primary cardiologist.  He understands the importance of compliance with his medications.     Past Medical History:     Past Medical History:   Diagnosis Date    Abscess of bursa of right elbow     Arthritis     Atrial fibrillation (HCC)     BPH (benign prostatic hyperplasia)     Heartburn     Hypertension     Panic attack 11/24/2021    Polycythemia     PONV (postoperative nausea and vomiting)     Sleep apnea     no machine        Past Surgical History:     Past Surgical History:   Procedure Laterality Date    APPENDECTOMY      ARM SURGERY Right 02/23/2021    TRICEPS TENDON REPAIR - RIGHT ELBOW WITH ARTHREX AND BIOMET ACHILLES ALLOGRAFT    ARM SURGERY Right 7/13/2021    RIGHT ELBOW IRRIGATION AND DEBRIDEMENT performed by Gideon Pérez MD at [address] [facility].      cervical/ lumbar    [facility]  [facility]    COLONOSCOPY      ENDOSCOPY, COLON, DIAGNOSTIC      HERNIA REPAIR      MUSCLE REPAIR Right 2/23/2021    TRICEPS TENDON REPAIR - RIGHT ELBOW performed by Gideon Pérez MD at [address] Bilateral 12/20/2021    SEPTOPLASTY BILATERAL TURBINATE REDUCTION performed by Shanika Rice MD at [address]      cervical and lumbar    UPPER GASTROINTESTINAL ENDOSCOPY N/A 12/17/2019    EGD BIOPSY performed by Colton Hall MD at [address] [facility] 7/30/2021    EGD BIOPSY OF STOMACH AND ESOPHAGUS performed by Neris Tao MD at [facility]        Medications Prior to Admission:     Prior to Admission medications    Medication Sig Start Date End Date Taking?  Authorizing Provider   dilTIAZem (CARDIZEM CD) 120 MG extended release capsule Take 1 capsule by mouth daily 11/15/22  Yes Smooth Proud, DO   losartan (COZAAR) 50 MG tablet Take 2 tablets by mouth daily 11/16/22  Yes Smooth Proud, DO   metoprolol succinate (TOPROL XL) 50 MG extended release tablet Take 1 tablet by mouth daily 11/16/22  Yes Smooth Proud, DO   apixaban (ELIQUIS) 5 MG TABS tablet Take 1 tablet by mouth 2 times daily 11/15/22  Yes Smooth Steenud, DO   traZODone (DESYREL) 50 MG tablet Take 1 tablet by mouth nightly 10/24/22   Hernan Jacob MD   pantoprazole (PROTONIX) 40 MG tablet take 1 tablet by mouth twice a day 9/28/22   Kenneth Payne MD   citalopram (CELEXA) 20 MG tablet Take 1 tablet by mouth daily 9/27/22   Hernan Jacob MD   pilocarpine (SALAGEN) 5 MG tablet Take 7.5 mg by mouth 3 times daily    Historical Provider, MD   metoclopramide (REGLAN) 10 MG tablet  6/22/22   Historical Provider, MD   albuterol sulfate HFA (PROVENTIL HFA) 108 (90 Base) MCG/ACT inhaler Inhale 2 puffs into the lungs every 6 hours as needed for Wheezing 3/25/22   Arvid Kid, APRN - CNP   glucose monitoring (FREESTYLE FREEDOM) kit 1 kit by Does not apply route daily 1/19/22   Stephanie Loss, DO        Allergies:     Pcn [penicillins]    Social History:     Tobacco:    reports that he has been smoking cigars. He started smoking about 3 years ago. He has never used smokeless tobacco.  Alcohol:      reports current alcohol use. Drug Use:  reports current drug use. Drug: Marijuana Carol Gar). Family History:     Family History   Problem Relation Age of Onset    Cancer Father         prostrate    Diabetes Father     Cancer Paternal Uncle         colon       Review of Systems:     Positive and Negative as described in HPI.     CONSTITUTIONAL:  negative for fevers, chills, sweats, fatigue, weight loss  HEENT:  negative for vision, hearing changes, runny nose, throat pain  RESPIRATORY:  negative for shortness of breath, cough, congestion, wheezing  CARDIOVASCULAR: Patient originally presented with palpitations which has resolved, patient denies any chest pain at this point in time  GASTROINTESTINAL:  negative for nausea, vomiting, diarrhea, constipation, change in bowel habits, abdominal pain   GENITOURINARY:  negative for difficulty of urination, burning with urination, frequency   INTEGUMENT:  negative for rash, skin lesions, easy bruising   HEMATOLOGIC/LYMPHATIC:  negative for swelling/edema   ALLERGIC/IMMUNOLOGIC:  negative for urticaria , itching  ENDOCRINE:  negative increase in drinking, increase in urination, hot or cold intolerance  MUSCULOSKELETAL:  negative joint pains, muscle aches, swelling of joints  NEUROLOGICAL:  negative for headaches, dizziness, lightheadedness, numbness, pain, tingling extremities  BEHAVIOR/PSYCH:  negative for depression, anxiety    Physical Exam:   /81   Pulse 75   Temp 97.5 °F (36.4 °C) (Temporal)   Resp 21   Ht 5' 10\" (1.778 m) Wt 200 lb (90.7 kg)   SpO2 96%   BMI 28.70 kg/m²   Temp (24hrs), Av.4 °F (36.9 °C), Min:97.5 °F (36.4 °C), Max:99.2 °F (37.3 °C)    No results for input(s): POCGLU in the last 72 hours.     Intake/Output Summary (Last 24 hours) at 11/15/2022 1132  Last data filed at 11/15/2022 0359  Gross per 24 hour   Intake --   Output 1000 ml   Net -1000 ml       General Appearance:  alert, well appearing, and in no acute distress  Mental status: oriented to person, place, and time  Head:  normocephalic, atraumatic  Eye: no icterus, redness, pupils equal and reactive, extraocular eye movements intact, conjunctiva clear  Ear: normal external ear, no discharge, hearing intact  Nose:  no drainage noted  Mouth: mucous membranes moist  Neck: supple, no carotid bruits, thyroid not palpable  Lungs: Bilateral equal air entry, clear to ausculation, no wheezing, rales or rhonchi, normal effort  Cardiovascular: normal rate, regular rhythm, no murmur, gallop, rub  Abdomen: Soft, nontender, nondistended, normal bowel sounds, no hepatomegaly or splenomegaly  Neurologic: There are no new focal motor or sensory deficits, normal muscle tone and bulk, no abnormal sensation, normal speech, cranial nerves II through XII grossly intact  Skin: No gross lesions, rashes, bruising or bleeding on exposed skin area  Extremities:  peripheral pulses palpable, no pedal edema or calf pain with palpation  Psych: normal affect     Investigations:      Laboratory Testing:  Recent Results (from the past 24 hour(s))   EKG 12 Lead    Collection Time: 22  7:44 PM   Result Value Ref Range    Ventricular Rate 116 BPM    Atrial Rate 227 BPM    QRS Duration 92 ms    Q-T Interval 324 ms    QTc Calculation (Bazett) 450 ms    R Axis -20 degrees    T Axis 61 degrees   CBC    Collection Time: 22  7:45 PM   Result Value Ref Range    WBC 8.6 3.5 - 11.0 k/uL    RBC 5.94 (H) 4.5 - 5.9 m/uL    Hemoglobin 17.9 (H) 13.5 - 17.5 g/dL    Hematocrit 53.9 (H) 41 - 53 % MCV 90.6 80 - 100 fL    MCH 30.1 26 - 34 pg    MCHC 33.2 31 - 37 g/dL    RDW 17.2 (H) 12.5 - 15.4 %    Platelets 807 434 - 186 k/uL    MPV 6.8 6.0 - 12.0 fL   Comprehensive Metabolic Panel    Collection Time: 11/14/22  7:45 PM   Result Value Ref Range    Glucose 78 70 - 99 mg/dL    BUN 12 8 - 23 mg/dL    Creatinine 0.90 0.70 - 1.20 mg/dL    Est, Glom Filt Rate >60 >60 mL/min/1.73m2    Calcium 9.2 8.6 - 10.4 mg/dL    Sodium 137 135 - 144 mmol/L    Potassium 3.4 (L) 3.7 - 5.3 mmol/L    Chloride 97 (L) 98 - 107 mmol/L    CO2 27 20 - 31 mmol/L    Anion Gap 13 9 - 17 mmol/L    Alkaline Phosphatase 48 40 - 129 U/L    ALT 26 5 - 41 U/L    AST 36 <40 U/L    Total Bilirubin 0.5 0.3 - 1.2 mg/dL    Total Protein 6.9 6.4 - 8.3 g/dL    Albumin 4.5 3.5 - 5.2 g/dL    Albumin/Globulin Ratio 1.9 1.0 - 2.5   Troponin    Collection Time: 11/14/22  7:45 PM   Result Value Ref Range    Troponin, High Sensitivity 14 0 - 22 ng/L   Magnesium    Collection Time: 11/14/22  7:45 PM   Result Value Ref Range    Magnesium 2.0 1.6 - 2.6 mg/dL   Troponin    Collection Time: 11/14/22 10:27 PM   Result Value Ref Range    Troponin, High Sensitivity 15 0 - 22 ng/L   Comprehensive Metabolic Panel w/ Reflex to MG    Collection Time: 11/15/22  6:00 AM   Result Value Ref Range    Glucose 118 (H) 70 - 99 mg/dL    BUN 16 8 - 23 mg/dL    Creatinine 1.20 0.70 - 1.20 mg/dL    Est, Glom Filt Rate >60 >60 mL/min/1.73m2    Calcium 8.6 8.6 - 10.4 mg/dL    Sodium 138 135 - 144 mmol/L    Potassium 3.8 3.7 - 5.3 mmol/L    Chloride 100 98 - 107 mmol/L    CO2 31 20 - 31 mmol/L    Anion Gap 7 (L) 9 - 17 mmol/L    Alkaline Phosphatase 44 40 - 129 U/L    ALT 22 5 - 41 U/L    AST 29 <40 U/L    Total Bilirubin 0.5 0.3 - 1.2 mg/dL    Total Protein 5.9 (L) 6.4 - 8.3 g/dL    Albumin 3.7 3.5 - 5.2 g/dL    Albumin/Globulin Ratio 1.7 1.0 - 2.5   Magnesium    Collection Time: 11/15/22  6:00 AM   Result Value Ref Range    Magnesium 2.0 1.6 - 2.6 mg/dL   Protime-INR    Collection Time: 11/15/22  6:00 AM   Result Value Ref Range    Protime 11.8 9.4 - 12.6 sec    INR 1.1    Brain natriuretic peptide    Collection Time: 11/15/22  6:00 AM   Result Value Ref Range    Pro- <300 pg/mL   Troponin    Collection Time: 11/15/22  6:00 AM   Result Value Ref Range    Troponin, High Sensitivity 14 0 - 22 ng/L       Imaging/Diagnostics:  XR CHEST PORTABLE    Result Date: 11/14/2022  No acute cardiopulmonary abnormality. Assessment :      Hospital Problems             Last Modified POA    * (Principal) Atrial fibrillation with RVR ([facility] [address] 11/14/2022 Yes    A-fib ([facility] [address] 11/15/2022 Yes       Plan:     Patient status observation in the Med/Surge    Atrial fibrillation with rapid ventricular response  Patient's irregular heartbeat and recurrent A. fib with RVR secondary to noncompliance with his medications. Noncompliance hallmarked due to the fact that the patient does not understand what each medication was providing for him. I spent a significant amount of time reviewing each medication with him and how each one helps to maintain rate control. At this point in time he understands that he needs to take his metoprolol at 50 mg daily, Cardizem at 120 mg daily, losartan 50 mg daily. He understands that losartan does not play a direct role regarding his atrial fibrillation but helps with his blood pressure. He is to continue Eliquis at 5 mg twice daily and follow-up with his primary cardiologist for further interventions.     Discharge home    Patient is admitted as observation status    Jl Santiago DO  11/15/2022  11:35 AM    Copy sent to Dr. Tammy Mcgarry MD

## 2022-11-15 NOTE — ED NOTES
[facility] called for transport to [facility] \[facility]\"" ETA 09:00     [facility]  11/15/22 8902

## 2022-11-15 NOTE — ED NOTES
Blood drawn. Sent to lab. Patient tolerated. Call light in reach. Denies needs at this time.       Zonia Aj 139, RN  11/15/22 8084

## 2022-11-15 NOTE — ED NOTES
Patient sleeping. No distress noted. Respirations unlabored. Call light in reach.       Moshe. Madai Hatch, RN  11/15/22 [id number]

## 2022-11-16 ENCOUNTER — CARE COORDINATION (OUTPATIENT)
Dept: CARE COORDINATION | Age: 62
End: 2022-11-16

## 2022-11-16 NOTE — CARE COORDINATION
Ambulatory Care Coordination  ED Follow up Call    Reason for ED visit:    Hospital Problem Lists:  Principal Problem:    Atrial fibrillation with RVR ([facility] [address]  Active Problems:    A-fib ([facility] [address]  Status:     improved    Did you call your PCP prior to going to the ED? Not Applicable      Did you receive a discharge instructions from the Emergency Room? Yes, patient stated that he left them in his friends car. He wanted ACM to email them to him, we decided on using my chart. DC instructions were sent to patient and reviewed during this call. Review of Instructions:     Understands what to report/when to return?:  Yes   Understands discharge instructions?:  Yes   Following discharge instructions?:  Yes       Are there any new complaints of pain? No  New Pain Meds? No    Constipation prophylaxis needed? N/A    If you have a wound is the dressing clean, dry, and intact? N/A  Understands wound care regimen? N/A    Are there any other complaints/concerns that you wish to tell your provider? Not at this time. FU appts/Provider:    No future appointments. New Medications?:   Yes      Medication Reconciliation by phone - Yes  Understands Medications? Yes  Taking Medications? Yes  Can you swallow your pills? Yes    Any further needs in the home i.e. Equipment?   Not Applicable    Link to services in community?:  N/A

## 2022-11-22 RX ORDER — TRAZODONE HYDROCHLORIDE 50 MG/1
50 TABLET ORAL NIGHTLY
Qty: 30 TABLET | Refills: 0 | Status: SHIPPED | OUTPATIENT
Start: 2022-11-22

## 2022-11-23 ENCOUNTER — APPOINTMENT (OUTPATIENT)
Dept: GENERAL RADIOLOGY | Facility: CLINIC | Age: 62
End: 2022-11-23
Payer: COMMERCIAL

## 2022-11-23 ENCOUNTER — CARE COORDINATION (OUTPATIENT)
Dept: CARE COORDINATION | Age: 62
End: 2022-11-23

## 2022-11-23 ENCOUNTER — HOSPITAL ENCOUNTER (EMERGENCY)
Facility: CLINIC | Age: 62
Discharge: HOME | End: 2022-11-23
Attending: EMERGENCY MEDICINE
Payer: COMMERCIAL

## 2022-11-23 VITALS
SYSTOLIC BLOOD PRESSURE: 148 MMHG | BODY MASS INDEX: 29.35 KG/M2 | RESPIRATION RATE: 16 BRPM | HEART RATE: 68 BPM | HEIGHT: 70 IN | DIASTOLIC BLOOD PRESSURE: 97 MMHG | OXYGEN SATURATION: 96 % | TEMPERATURE: 98.2 F | WEIGHT: 205 LBS

## 2022-11-23 DIAGNOSIS — J20.9 ACUTE BRONCHITIS, UNSPECIFIED ORGANISM: Primary | ICD-10-CM

## 2022-11-23 PROCEDURE — 99283 EMERGENCY DEPT VISIT LOW MDM: CPT

## 2022-11-23 PROCEDURE — 71046 X-RAY EXAM CHEST 2 VIEWS: CPT

## 2022-11-23 RX ORDER — ALBUTEROL SULFATE 90 UG/1
2 AEROSOL, METERED RESPIRATORY (INHALATION) EVERY 6 HOURS PRN
Qty: 18 G | Refills: 1 | Status: SHIPPED | OUTPATIENT
Start: 2022-11-23

## 2022-11-23 RX ORDER — DOXYCYCLINE 100 MG/1
100 TABLET ORAL 2 TIMES DAILY
Qty: 20 TABLET | Refills: 0 | Status: SHIPPED | OUTPATIENT
Start: 2022-11-23 | End: 2022-12-03

## 2022-11-23 RX ORDER — GUAIFENESIN AND DEXTROMETHORPHAN HYDROBROMIDE 1200; 60 MG/1; MG/1
1 TABLET, EXTENDED RELEASE ORAL 2 TIMES DAILY
Qty: 28 TABLET | Refills: 0 | Status: SHIPPED | OUTPATIENT
Start: 2022-11-23

## 2022-11-23 RX ORDER — PREDNISONE 20 MG/1
TABLET ORAL
Qty: 10 TABLET | Refills: 0 | Status: SHIPPED | OUTPATIENT
Start: 2022-11-23

## 2022-11-23 NOTE — ED PROVIDER NOTES
[address] ED  EMERGENCY DEPARTMENT ENCOUNTER      Pt Name: Svetlana Kim  MRN: [id number]  [place] 1960  Date of evaluation: 11/23/2022  Provider: Bhargav Barajas MD    [address] [place] [address]     Chief Complaint   Patient presents with    Nasal Congestion    Headache    Cough    Generalized Body Aches         HISTORY OF PRESENT ILLNESS   (Location/Symptom, Timing/Onset, Context/Setting,Quality, Duration, Modifying Factors, Severity)  Note limiting factors. Svetlana Kim is a 65 y.o. male who presents to the emergency department with a 4-day history of harsh cough with some sputum production and occasional wheezing. He reports chills and body aches. Patient is a cigar smoker. He has a history of atrial fibrillation and is on diltiazem and apixaban. The history is provided by the patient and medical records. Nursing Notes werereviewed. REVIEW OF SYSTEMS    (2-9 systems for level 4, 10 or more for level 5)     Review of Systems   All other systems reviewed and are negative. Except as noted above the remainder of the review of systems was reviewed and negative.        PAST MEDICAL HISTORY     Past Medical History:   Diagnosis Date    Abscess of bursa of right elbow     Arthritis     Atrial fibrillation (HCC)     BPH (benign prostatic hyperplasia)     Heartburn     Hypertension     Panic attack 11/24/2021    Polycythemia     PONV (postoperative nausea and vomiting)     Sleep apnea     no machine         SURGICALHISTORY       Past Surgical History:   Procedure Laterality Date    APPENDECTOMY      ARM SURGERY Right 02/23/2021    TRICEPS TENDON REPAIR - RIGHT ELBOW WITH ARTHREX AND BIOMET ACHILLES ALLOGRAFT    ARM SURGERY Right 7/13/2021    RIGHT ELBOW IRRIGATION AND DEBRIDEMENT performed by Anuj Wood MD at [facility]      cervical/ lumbar    CARDIAC CATHETERIZATION  2017    [facility]/ no stents    COLONOSCOPY      ENDOSCOPY, COLON, DIAGNOSTIC      HERNIA REPAIR      MUSCLE REPAIR Right 2/23/2021    TRICEPS TENDON REPAIR - RIGHT ELBOW performed by Anuj Wood MD at [address] Bilateral 12/20/2021    SEPTOPLASTY BILATERAL TURBINATE REDUCTION performed by Trace Gerber MD at [address]      cervical and lumbar    UPPER GASTROINTESTINAL ENDOSCOPY N/A 12/17/2019    EGD BIOPSY performed by Aide Murrieta MD at [facility] N/A 7/30/2021    EGD BIOPSY OF STOMACH AND ESOPHAGUS performed by Niesha Prather MD at [facility]       Previous Medications    APIXABAN (ELIQUIS) 5 MG TABS TABLET    Take 1 tablet by mouth 2 times daily    CITALOPRAM (CELEXA) 20 MG TABLET    Take 1 tablet by mouth daily    DILTIAZEM (CARDIZEM CD) 120 MG EXTENDED RELEASE CAPSULE    Take 1 capsule by mouth daily    GLUCOSE MONITORING (FREESTYLE FREEDOM) KIT    1 kit by Does not apply route daily    LOSARTAN (COZAAR) 50 MG TABLET    Take 2 tablets by mouth daily    METOCLOPRAMIDE (REGLAN) 10 MG TABLET        METOPROLOL SUCCINATE (TOPROL XL) 50 MG EXTENDED RELEASE TABLET    Take 1 tablet by mouth daily    PANTOPRAZOLE (PROTONIX) 40 MG TABLET    take 1 tablet by mouth twice a day    PILOCARPINE (SALAGEN) 5 MG TABLET    Take 7.5 mg by mouth 3 times daily    TRAZODONE (DESYREL) 50 MG TABLET    take 1 tablet by mouth nightly       ALLERGIES     Pcn [penicillins]    FAMILY HISTORY       Family History   Problem Relation Age of Onset    Cancer Father         prostrate    Diabetes Father     Cancer Paternal Uncle         colon          SOCIAL HISTORY       Social History     Socioeconomic History    Marital status: Single   Tobacco Use    Smoking status: Every Day     Types: Cigars     Start date: 2019    Smokeless tobacco: Never    Tobacco comments:     no cigarettes for 20 years- still smokes cigars and marijuana   Vaping Use    Vaping Use: Never used   Substance and Sexual Activity    Alcohol use:  Yes Comment: socially    Drug use: Yes     Types: Marijuana Trung Spina)     Comment: sometimes nightly/ oral or smokes     Social Determinants of Health     Food Insecurity: No Food Insecurity    Worried About Running Out of Food in the Last Year: Never true    [address] in the Last Year: Never true   Transportation Needs: No Transportation Needs    Lack of Transportation (Medical): No    Lack of Transportation (Non-Medical): No   Housing Stability: Low Risk     Unable to Pay for Housing in the Last Year: No    Number of [place] in the Last Year: 1    Unstable Housing in the Last Year: No       SCREENINGS    Albert Coma Scale  Eye Opening: Spontaneous  Best Verbal Response: Oriented  Best Motor Response: Obeys commands  [place] Coma Scale Score: 15        PHYSICAL EXAM    (up to 7 for level 4, 8 or more for level 5)     ED Triage Vitals [11/23/22 1315]   BP Temp Temp Source Heart Rate Resp SpO2 Height Weight   (!) 148/97 98.2 °F (36.8 °C) Oral 68 16 96 % 5' 10\" (1.778 m) 205 lb (93 kg)       Physical Exam  Vitals reviewed. Constitutional:       General: He is not in acute distress. Appearance: He is not ill-appearing. HENT:      Head: Normocephalic. Right Ear: External ear normal.      Left Ear: External ear normal.      Nose: Nose normal.      Mouth/Throat:      Mouth: Mucous membranes are moist.      Pharynx: No posterior oropharyngeal erythema. Eyes:      Extraocular Movements: Extraocular movements intact. Cardiovascular:      Rate and Rhythm: Normal rate and regular rhythm. Heart sounds: Normal heart sounds. Pulmonary:      Effort: Prolonged expiration present. No accessory muscle usage. Breath sounds: Rhonchi present. Abdominal:      Palpations: Abdomen is soft. Tenderness: There is no abdominal tenderness. Musculoskeletal:      Cervical back: Neck supple. Right lower leg: No edema. Left lower leg: No edema. Skin:     General: Skin is warm and dry. Coloration: Skin is not pale. Neurological:      General: No focal deficit present. Mental Status: He is alert and oriented to person, place, and time. DIAGNOSTIC RESULTS     EKG: All EKG's are interpreted by the Emergency Department Physician who either signs orCo-signs this chart in the absence of a cardiologist.    RADIOLOGY:     Interpretation per the Radiologist below, ifavailable at the time of this note:    XR CHEST (2 VW)   Final Result   No acute abnormality. ED BEDSIDE ULTRASOUND:   Performed by ED Physician - none    LABS:  Labs Reviewed - No data to display    All other labs were within normal range ornot returned as of this dictation. EMERGENCY DEPARTMENT COURSE and DIFFERENTIAL DIAGNOSIS/MDM:   Vitals:    Vitals:    11/23/22 1315   BP: (!) 148/97   Pulse: 68   Resp: 16   Temp: 98.2 °F (36.8 °C)   TempSrc: Oral   SpO2: 96%   Weight: 93 kg (205 lb)   Height: 5' 10\" (1.778 m)            Chest x-ray does not report infiltrate. Patient presents with cough, congestion and sputum production with wheezing I am placing him on doxycycline for his bronchitis as well as albuterol inhaler, short course of prednisone and Mucinex DM. Follow-up instructions are provided based on his clinical presentation I do not suspect influenza or COVID at this time. MDM    CONSULTS:  None    PROCEDURES:  Unlessotherwise noted below, none     Procedures    FINAL IMPRESSION      1.  Acute bronchitis, unspecified organism          DISPOSITION/PLAN   DISPOSITION Decision To Discharge 11/23/2022 02:40:03 PM      PATIENT REFERRED TO:  Ruby Lopez MD  [address] [place] [phone]          DISCHARGE MEDICATIONS:         Problem List:  Patient Active Problem List   Diagnosis Code    Rupture of right triceps tendon S46.311A    Abscess of right elbow L02.413    Chest pain R07.9    Essential hypertension I10    Epigastric pain R10.13    Gastritis K29.70    Esophagitis K20.90    New onset a-fib ([facility]) I48.91    Atrial fibrillation with RVR ([facility]) I48.91    Polycythemia D75.1    Hypoproteinemia ([facility]) E77.8    Acute bronchitis J20.9    Abnormal thallium stress test R94.39    Acute appendicitis with localized peritonitis, without perforation, abscess, or gangrene K35.30    Acute medial meniscus tear of right knee S83.241A    Cardiomyopathy ([facility]) I42.9    Chondromalacia of right knee M94.261    Erectile dysfunction N52.9    Heartburn R12    Primary localized osteoarthrosis of shoulder region M19.019    Renal insufficiency syndrome N28.9    Spondylolisthesis of lumbar region M43.16    SEBASTIAN (obstructive sleep apnea) G47.33    Primary insomnia F51.01    Generalized anxiety disorder F41.1    Cramps of right lower extremity R25.2    Chronic atrial fibrillation ([facility]) I48.20    A-fib ([facility]) I48.91           Summation      Patient Course: Discharged    ED Medicationsadministered this visit:  Medications - No data to display    New Prescriptions from this visit:    New Prescriptions    DEXTROMETHORPHAN-GUAIFENESIN (MUCINEX DM MAXIMUM STRENGTH)  MG TB12    Take 1 tablet by mouth 2 times daily    DOXYCYCLINE MONOHYDRATE (ADOXA) 100 MG TABLET    Take 1 tablet by mouth 2 times daily for 10 days    PREDNISONE (DELTASONE) 20 MG TABLET    Take 2 tablets by mouth every morning till gone. Follow-up:  MD Alex Gibbons [address] [facility]  [phone]            Final Impression:   1.  Acute bronchitis, unspecified organism               (Please note that portions of this note were completed with a voice recognitionprogram.  Efforts were made to edit the dictations but occasionally words are mis-transcribed.)    Kendal Self MD (electronically signed)  Attending Emergency Physician            Kendal Self MD  11/23/22 9116

## 2022-11-23 NOTE — ED TRIAGE NOTES
Pt ambulated to 10 from home with co cough, sore throat, chills x 4 days. Pt is treating with OTC sinus meds. Cough is productive  Nasal and resp sputum green in color. Pain is 7/10, sharp, aching, worse when coughing. Pt respirations are even and unlabored, pt is oriented X 4, speaking in complete sentences, bed is in the lowest position, call light is within reach. Will continue to monitor.

## 2022-11-23 NOTE — CARE COORDINATION
Ambulatory Care Coordination Note  11/23/2022    ACC: Aishwarya Moran, RN  Outreach calland spoke to patient who states he is currently busy and requested to be called back in about an hour. Reviewed chart and noted pt is currently at [facility] ED with c/o cough,sore throat, chills, and nasal congestion for 4 days. ACM will follow up with patient again in couple days. No future appointments.

## 2022-11-25 ENCOUNTER — CARE COORDINATION (OUTPATIENT)
Dept: CARE COORDINATION | Age: 62
End: 2022-11-25

## 2022-11-28 RX ORDER — PANTOPRAZOLE SODIUM 40 MG/1
TABLET, DELAYED RELEASE ORAL
Qty: 30 TABLET | Refills: 0 | Status: SHIPPED | OUTPATIENT
Start: 2022-11-28

## 2022-12-02 ENCOUNTER — CARE COORDINATION (OUTPATIENT)
Dept: CARE COORDINATION | Age: 62
End: 2022-12-02

## 2022-12-02 NOTE — CARE COORDINATION
ACM left message for patient with PCP recommendations as follows:    Keep taking that and steam inhalation , tylenol prn, give few days      ACM unsure if patient has nebulizer so suggested stem from a shower if not. ACM encouraged patient to call if no improvement early next week. Encouraged patient to complete his medications that he was prescribed at the ED as well. Increasing fluids and rest, take tylenol for body aches or fever unless contraindicated. Remember to eat healthy to maintain energy. Return to ED with any increased SOB, chest pain.

## 2022-12-02 NOTE — CARE COORDINATION
Attempting to reach patient for a follow up care coordination call regarding any needs, questions or concerns. Arlette Day, [facility]  Mailbox is full, unable to leave a VM.

## 2022-12-02 NOTE — CARE COORDINATION
Patient called ACM stating he is not feeling better since his ED visit on 11.23.22 for bronchitis. Per patient COVID was negative  He has a non productive cough, feels like he needs to cough something up. Audibly winded at times during call  Denied a fever  He is extremely fatigued, had to come home from work early today. Using inhaler 2 x daily  Meds ordered at ED, still taking them:      Dextromethorphan-guaiFENesin  MG 1 tablet Oral 2 TIMES DAILY    Doxycycline Monohydrate 100 mg Oral 2 TIMES DAILY    predniSONE 20 MG Take 2 tablets by mouth every morning till gone. Will route concerns to PCP.

## 2022-12-07 ENCOUNTER — CARE COORDINATION (OUTPATIENT)
Dept: CARE COORDINATION | Age: 62
End: 2022-12-07

## 2022-12-07 NOTE — CARE COORDINATION
ACM spoke with patient briefly who was on the other line for work. He was able to state that he has completely recovered from his bronchitis and has no concerns today.

## 2022-12-12 RX ORDER — PANTOPRAZOLE SODIUM 40 MG/1
TABLET, DELAYED RELEASE ORAL
Qty: 60 TABLET | Refills: 2 | Status: SHIPPED | OUTPATIENT
Start: 2022-12-12

## 2022-12-19 RX ORDER — TRAZODONE HYDROCHLORIDE 50 MG/1
50 TABLET ORAL NIGHTLY
Qty: 30 TABLET | Refills: 0 | Status: SHIPPED | OUTPATIENT
Start: 2022-12-19

## 2022-12-21 ENCOUNTER — CARE COORDINATION (OUTPATIENT)
Dept: CARE COORDINATION | Age: 62
End: 2022-12-21

## 2023-01-04 ENCOUNTER — CARE COORDINATION (OUTPATIENT)
Dept: CARE COORDINATION | Age: 63
End: 2023-01-04

## 2023-01-04 NOTE — CARE COORDINATION
ACM spoke with patient who denied any needs from [facility]note 2] or PCP. Patient stated he is too busy to talk. ACM encouraged him to call in the future with any needs. ACM will remove self from the care team at this time.

## 2023-01-13 ENCOUNTER — HOSPITAL ENCOUNTER (EMERGENCY)
Facility: CLINIC | Age: 63
Discharge: HOME | End: 2023-01-13
Attending: EMERGENCY MEDICINE
Payer: COMMERCIAL

## 2023-01-13 ENCOUNTER — APPOINTMENT (OUTPATIENT)
Dept: GENERAL RADIOLOGY | Facility: CLINIC | Age: 63
End: 2023-01-13
Payer: COMMERCIAL

## 2023-01-13 VITALS
SYSTOLIC BLOOD PRESSURE: 149 MMHG | TEMPERATURE: 98.2 F | HEIGHT: 70 IN | HEART RATE: 62 BPM | DIASTOLIC BLOOD PRESSURE: 87 MMHG | WEIGHT: 205 LBS | BODY MASS INDEX: 29.35 KG/M2 | OXYGEN SATURATION: 96 % | RESPIRATION RATE: 19 BRPM

## 2023-01-13 DIAGNOSIS — R07.9 CHEST PAIN, UNSPECIFIED TYPE: Primary | ICD-10-CM

## 2023-01-13 LAB
ALBUMIN SERPL-MCNC: 4.5 G/DL (ref 3.5–5.2)
ALBUMIN/GLOBULIN RATIO: 1.9 (ref 1–2.5)
ALP BLD-CCNC: 51 U/L (ref 40–129)
ALT SERPL-CCNC: 26 U/L (ref 5–41)
ANION GAP SERPL CALCULATED.3IONS-SCNC: 9 MMOL/L (ref 9–17)
AST SERPL-CCNC: 24 U/L
BILIRUB SERPL-MCNC: 0.4 MG/DL (ref 0.3–1.2)
BUN BLDV-MCNC: 18 MG/DL (ref 8–23)
CALCIUM SERPL-MCNC: 9.2 MG/DL (ref 8.6–10.4)
CHLORIDE BLD-SCNC: 102 MMOL/L (ref 98–107)
CO2: 26 MMOL/L (ref 20–31)
CREAT SERPL-MCNC: 0.8 MG/DL (ref 0.7–1.2)
D-DIMER QUANTITATIVE: 0.45 MG/L FEU
GFR SERPL CREATININE-BSD FRML MDRD: >60 ML/MIN/1.73M2
GLUCOSE BLD-MCNC: 82 MG/DL (ref 70–99)
HCT VFR BLD CALC: 49.7 % (ref 41–53)
HEMOGLOBIN: 16.9 G/DL (ref 13.5–17.5)
MCH RBC QN AUTO: 31 PG (ref 26–34)
MCHC RBC AUTO-ENTMCNC: 34 G/DL (ref 31–37)
MCV RBC AUTO: 91 FL (ref 80–100)
PDW BLD-RTO: 16.4 % (ref 12.5–15.4)
PLATELET # BLD: 249 K/UL (ref 140–450)
PMV BLD AUTO: 6.8 FL (ref 6–12)
POTASSIUM SERPL-SCNC: 4.4 MMOL/L (ref 3.7–5.3)
RBC # BLD: 5.46 M/UL (ref 4.5–5.9)
SODIUM BLD-SCNC: 137 MMOL/L (ref 135–144)
TOTAL PROTEIN: 6.9 G/DL (ref 6.4–8.3)
TROPONIN, HIGH SENSITIVITY: 12 NG/L (ref 0–22)
TROPONIN, HIGH SENSITIVITY: 13 NG/L (ref 0–22)
WBC # BLD: 6 K/UL (ref 3.5–11)

## 2023-01-13 PROCEDURE — 6370000000 HC RX 637 (ALT 250 FOR IP): Performed by: EMERGENCY MEDICINE

## 2023-01-13 PROCEDURE — 85027 COMPLETE CBC AUTOMATED: CPT

## 2023-01-13 PROCEDURE — 71045 X-RAY EXAM CHEST 1 VIEW: CPT

## 2023-01-13 PROCEDURE — 96374 THER/PROPH/DIAG INJ IV PUSH: CPT

## 2023-01-13 PROCEDURE — 99285 EMERGENCY DEPT VISIT HI MDM: CPT

## 2023-01-13 PROCEDURE — 6360000002 HC RX W HCPCS: Performed by: EMERGENCY MEDICINE

## 2023-01-13 PROCEDURE — 36415 COLL VENOUS BLD VENIPUNCTURE: CPT

## 2023-01-13 PROCEDURE — 85379 FIBRIN DEGRADATION QUANT: CPT

## 2023-01-13 PROCEDURE — 80053 COMPREHEN METABOLIC PANEL: CPT

## 2023-01-13 PROCEDURE — 84484 ASSAY OF TROPONIN QUANT: CPT

## 2023-01-13 RX ORDER — LORAZEPAM 2 MG/ML
1 INJECTION INTRAMUSCULAR ONCE
Status: COMPLETED | OUTPATIENT
Start: 2023-01-13 | End: 2023-01-13

## 2023-01-13 RX ORDER — ASPIRIN 81 MG/1
324 TABLET, CHEWABLE ORAL ONCE
Status: COMPLETED | OUTPATIENT
Start: 2023-01-13 | End: 2023-01-13

## 2023-01-13 RX ADMIN — LORAZEPAM 1 MG: 2 INJECTION INTRAMUSCULAR; INTRAVENOUS at 18:26

## 2023-01-13 RX ADMIN — ASPIRIN 324 MG: 81 TABLET, CHEWABLE ORAL at 18:26

## 2023-01-13 ASSESSMENT — PAIN - FUNCTIONAL ASSESSMENT: PAIN_FUNCTIONAL_ASSESSMENT: 0-10

## 2023-01-13 ASSESSMENT — PAIN SCALES - GENERAL: PAINLEVEL_OUTOF10: 6

## 2023-01-13 ASSESSMENT — PAIN DESCRIPTION - ONSET: ONSET: ON-GOING

## 2023-01-13 ASSESSMENT — PAIN DESCRIPTION - DESCRIPTORS: DESCRIPTORS: ACHING

## 2023-01-13 ASSESSMENT — PAIN DESCRIPTION - LOCATION: LOCATION: CHEST

## 2023-01-13 ASSESSMENT — PAIN DESCRIPTION - FREQUENCY: FREQUENCY: CONTINUOUS

## 2023-01-13 ASSESSMENT — PAIN DESCRIPTION - PAIN TYPE: TYPE: ACUTE PAIN

## 2023-01-13 NOTE — ED NOTES
Patient to ED via self and wife to room 12  Here for complaint of chest pain, shortness of breath, and possible a-fib  Patient states he has a hx of a-fib and goes in and out of it  Patient states he has had an enormous amount of stress as of late due to his occupation and believes this could possibly related.  The pain started two hours PTA while he was at the office  Denies abdominal pain, N/V    Vitals obtained, EKG obtained, placed on monitor  IV started and blood work obtained  Respirations even and non-labored  Provider at bedside to evaluate patient     Hellen Garza RN  01/13/23 [id number]

## 2023-01-13 NOTE — ED NOTES
Blood work collected, labeled, and sent to lab  X-ray at bedside     Savanna Camejo, [address] [place] [address]  01/13/23 8744

## 2023-01-13 NOTE — ED PROVIDER NOTES
[facility]  [facility]  Phone: [address]      Pt Name: Radha Kowalski  MRN: [id number]  [place] 1960  Date of evaluation: 1/13/2023    CHIEF COMPLAINT       Chief Complaint   Patient presents with    Chest Pain    Shortness of Breath       HISTORY OF PRESENT ILLNESS    Radha Kowalski is a 65 y.o. male who presents with chest heaviness and tightness with shortness of breath. Patient states that he is under a lot of stress due to financial reasons and has felt stressed out and today while in the office started having chest pain and pressure as noted above. Have a history of hypertension and panic issues in addition to atrial fib    REVIEW OF SYSTEMS     Constitutional: No fevers or chills   HEENT: No sore throat, rhinorrhea, or earache   Eyes: No blurry vision or double vision no drainage   Cardiovascular: See above  Respiratory: No wheezing or shortness of breath no cough   Gastrointestinal: No nausea, vomiting, diarrhea, constipation, or abdominal pain   : No hematuria or dysuria   Musculoskeletal: No swelling or pain   Skin: No rash   Neurological: No focal neurologic complaints, paresthesias, weakness, or headache    PAST MEDICAL HISTORY    has a past medical history of Abscess of bursa of right elbow, Arthritis, Atrial fibrillation ([address] BPH (benign prostatic hyperplasia), Heartburn, Hypertension, Panic attack, Polycythemia, PONV (postoperative nausea and vomiting), and Sleep apnea. SURGICAL HISTORY      has a past surgical history that includes shoulder surgery; Spine surgery; Upper gastrointestinal endoscopy (N/A, 12/17/2019); Cardiac catheterization (2017); hernia repair; Colonoscopy; Appendectomy; back surgery; Endoscopy, colon, diagnostic; Arm Surgery (Right, 02/23/2021); Muscle Repair (Right, 2/23/2021); Arm Surgery (Right, 7/13/2021);  Upper gastrointestinal endoscopy (N/A, 7/30/2021); and Septoplasty (Bilateral, 12/20/2021). CURRENT MEDICATIONS       Previous Medications    ALBUTEROL SULFATE HFA (PROVENTIL HFA) 108 (90 BASE) MCG/ACT INHALER    Inhale 2 puffs into the lungs every 6 hours as needed for Wheezing    APIXABAN (ELIQUIS) 5 MG TABS TABLET    Take 1 tablet by mouth 2 times daily    CITALOPRAM (CELEXA) 20 MG TABLET    Take 1 tablet by mouth daily    DEXTROMETHORPHAN-GUAIFENESIN (MUCINEX DM MAXIMUM STRENGTH)  MG TB12    Take 1 tablet by mouth 2 times daily    DILTIAZEM (CARDIZEM CD) 120 MG EXTENDED RELEASE CAPSULE    Take 1 capsule by mouth daily    GLUCOSE MONITORING (FREESTYLE FREEDOM) KIT    1 kit by Does not apply route daily    LOSARTAN (COZAAR) 50 MG TABLET    Take 2 tablets by mouth daily    METOCLOPRAMIDE (REGLAN) 10 MG TABLET        METOPROLOL SUCCINATE (TOPROL XL) 50 MG EXTENDED RELEASE TABLET    Take 1 tablet by mouth daily    PANTOPRAZOLE (PROTONIX) 40 MG TABLET    take 1 tablet by mouth twice a day    PILOCARPINE (SALAGEN) 5 MG TABLET    Take 7.5 mg by mouth 3 times daily    PREDNISONE (DELTASONE) 20 MG TABLET    Take 2 tablets by mouth every morning till gone. TRAZODONE (DESYREL) 50 MG TABLET    take 1 tablet by mouth nightly       ALLERGIES     is allergic to pcn [penicillins]. FAMILY HISTORY     He indicated that his mother is alive. He indicated that his father is alive. He indicated that the status of his paternal uncle is unknown.     family history includes Cancer in his father and paternal uncle; Diabetes in his father. SOCIAL HISTORY      reports that he has been smoking cigars. He started smoking about 4 years ago. He has never used smokeless tobacco. He reports current alcohol use. He reports current drug use. Drug: Marijuana Waldemar Crabtree).     PHYSICAL EXAM       ED Triage Vitals   BP Temp Temp Source Heart Rate Resp SpO2 Height Weight   01/13/23 1813 01/13/23 1810 01/13/23 1810 01/13/23 1810 01/13/23 1810 01/13/23 1810 01/13/23 1810 01/13/23 1810   (!) 159/102 98.2 °F (36.8 °C) Oral 64 16 97 % 5' 10\" (1.778 m) 205 lb (93 kg)   Constitutional: Alert, oriented x3, nontoxic, answering questions appropriately, acting properly for age, in no acute distress   HEENT: Extraocular muscles intact, mucus membranes moist, TMs clear bilaterally, no posterior pharyngeal erythema or exudates, Pupils equal, round, reactive to light,   Neck: Trachea midline   Cardiovascular: Regular rhythm and rate no murmurs   Respiratory: Clear to auscultation bilaterally no wheezes, rhonchi, rales, no respiratory distress no tachypnea no retractions no hypoxia  Gastrointestinal: Soft, nontender, nondistended, positive bowel sounds. No rebound, rigidity, or guarding. Musculoskeletal: No extremity pain or swelling   Neurologic: Moving all 4 extremities without difficulty there are no gross focal neurologic deficits   Skin: Warm and dry        DIFFERENTIAL DIAGNOSIS/ MDM:     We will do a cardiac work-up to be sure he is not in A. fib and be sure there is no myocardial ischemia. In addition we will give him some Ativan and aspirin. DIAGNOSTIC RESULTS     EKG: All EKG's are interpreted by the Emergency Department Physician who either signs or Co-signs this chart in the absence of a cardiologist.    EKG shows a normal sinus rhythm with a ventricular rate of 63. The HI intervals 202, QRS is 100 ms axis is to the left,there is no acute ischemic pattern identified    Not indicated unless otherwise documented above    LABS:  No results found for this visit on 01/13/23.     Not indicated unless otherwise documented above    RADIOLOGY:   I reviewed the radiologist interpretations:    XR CHEST PORTABLE    (Results Pending)       Not indicated unless otherwise documented above    EMERGENCY DEPARTMENT COURSE:     The patient was given the following medications:  Orders Placed This Encounter   Medications    aspirin chewable tablet 324 mg    LORazepam (ATIVAN) injection 1 mg        Vitals:   -------------------------  BP (!) 159/102   Pulse 64   Temp 98.2 °F (36.8 °C) (Oral)   Resp 16   Ht 5' 10\" (1.778 m)   Wt 93 kg (205 lb)   SpO2 97%   BMI 29.41 kg/m²         I have reviewed the disposition diagnosis with the patient and or their family/guardian. I have answered their questions and given discharge instructions. They voiced understanding of these instructions and did not have any furtherquestions or complaints. CRITICAL CARE:    None    CONSULTS:    None    PROCEDURES:    None      OARRS Report if indicated             FINAL IMPRESSION    No diagnosis found. DISPOSITION/PLAN   DISPOSITION          CONDITION ON DISPOSITION: STABLE       PATIENT REFERRED TO:  No follow-up provider specified.     DISCHARGE MEDICATIONS:  New Prescriptions    No medications on file       (Please note that portions of thisnote were completed with a voice recognition program.  Efforts were made to edit the dictations but occasionally words are mis-transcribed.)    Dena Duggan MD,, MD  Attending Emergency Physician        Dena Duggan MD  01/13/23 4424

## 2023-01-14 LAB
EKG ATRIAL RATE: 63 BPM
EKG P AXIS: 6 DEGREES
EKG P-R INTERVAL: 202 MS
EKG Q-T INTERVAL: 378 MS
EKG QRS DURATION: 100 MS
EKG QTC CALCULATION (BAZETT): 386 MS
EKG R AXIS: -19 DEGREES
EKG T AXIS: 45 DEGREES
EKG VENTRICULAR RATE: 63 BPM

## 2023-01-14 NOTE — DISCHARGE INSTRUCTIONS
Please understand that at this time there is no evidence for a more serious underlying process, but that early in the process of an illness or injury, an emergency department workup can be falsely reassuring. You should contact your family doctor within the next 48 hours for a follow up appointment    Cari Jamil!!!    From [facility] and [facility]    On behalf of the Emergency Department staff at [facility]), I would like to thank you for giving us the opportunity to address your health care needs and concerns. We hope that during your visit, our service was delivered in a professional and caring manner. Please keep [facility] in mind as we walk with you down the path to your own personal wellness. Please expect an automated text message or email from us so we can ask a few questions about your health and progress. Based on your answers, a clinician may call you back to offer help and instructions. Please understand that early in the process of an illness or injury, an emergency department workup can be falsely reassuring. If you notice any worsening, changing or persistent symptoms please call your family doctor or return to the ER immediately. Tell us how we did during your visit at [email or web address]   and let us know about your experience

## 2023-01-14 NOTE — ED PROVIDER NOTES
ADDENDUM:      Care of this patient was assumed from Dr. Quiñones Officer. The patient was seen for Chest Pain and Shortness of Breath  . The patient's initial evaluation and plan have been discussed with the prior provider who initially evaluated the patient. Nursing Notes, Past Medical Hx, Past Surgical Hx, Social Hx, Allergies, and Family Hx were all reviewed. Diagnostic Results     EKG   EKG Interpretation    Interpreted by me    Rhythm: normal sinus   Rate: normal  Axis: left  Ectopy: none  Conduction: normal  ST Segments: no acute change  T Waves: no acute change  Q Waves: none    Clinical Impression: no acute changes and normal EKG    RADIOLOGY:   Non-plain film images such as CT, Ultrasound and MRI are read by the radiologist. Plain radiographic images are visualized and the radiologist interpretations are reviewed as follows:     XR CHEST PORTABLE    Result Date: 1/13/2023  EXAMINATION: ONE XRAY VIEW OF THE CHEST 1/13/2023 3:37 pm COMPARISON: 11/23/2022 HISTORY: ORDERING SYSTEM PROVIDED HISTORY: Chest Pain TECHNOLOGIST PROVIDED HISTORY: Chest Pain Reason for Exam: chest pain FINDINGS: . The cardiac size is normal. No acute infiltrates or pleural effusions are seen. Pulmonary vascularity appears normal. There is mild ectasia of the thoracic aorta. There are degenerative changes in the spine . No acute bony abnormalities.  The hilar structures are normal.     No acute cardiopulmonary disease      LABS:   Results for orders placed or performed during the hospital encounter of 01/13/23   CBC   Result Value Ref Range    WBC 6.0 3.5 - 11.0 k/uL    RBC 5.46 4.5 - 5.9 m/uL    Hemoglobin 16.9 13.5 - 17.5 g/dL    Hematocrit 49.7 41 - 53 %    MCV 91.0 80 - 100 fL    MCH 31.0 26 - 34 pg    MCHC 34.0 31 - 37 g/dL    RDW 16.4 (H) 12.5 - 15.4 %    Platelets 616 361 - 703 k/uL    MPV 6.8 6.0 - 12.0 fL   Comprehensive Metabolic Panel   Result Value Ref Range    Glucose 82 70 - 99 mg/dL    BUN 18 8 - 23 mg/dL    Creatinine 0. 80 0.70 - 1.20 mg/dL    Est, Glom Filt Rate >60 >60 mL/min/1.73m2    Calcium 9.2 8.6 - 10.4 mg/dL    Sodium 137 135 - 144 mmol/L    Potassium 4.4 3.7 - 5.3 mmol/L    Chloride 102 98 - 107 mmol/L    CO2 26 20 - 31 mmol/L    Anion Gap 9 9 - 17 mmol/L    Alkaline Phosphatase 51 40 - 129 U/L    ALT 26 5 - 41 U/L    AST 24 <40 U/L    Total Bilirubin 0.4 0.3 - 1.2 mg/dL    Total Protein 6.9 6.4 - 8.3 g/dL    Albumin 4.5 3.5 - 5.2 g/dL    Albumin/Globulin Ratio 1.9 1.0 - 2.5   Troponin   Result Value Ref Range    Troponin, High Sensitivity 13 0 - 22 ng/L   Troponin   Result Value Ref Range    Troponin, High Sensitivity 12 0 - 22 ng/L   D-Dimer, Quantitative   Result Value Ref Range    D-Dimer, Quant 0.45 mg/L FEU       RECENT VITALS:  BP: (!) 159/100, Temp: 98.2 °F (36.8 °C), Heart Rate: 62, Resp: 17     ED Course     The patient was given the following medications:  Orders Placed This Encounter   Medications    aspirin chewable tablet 324 mg    LORazepam (ATIVAN) injection 1 mg     During the emergency department course, patient was put on the monitor which reveals normal sinus rhythm. He was given aspirin 324 mg orally and lorazepam 1 mg injection. Patient is feeling much better and resting comfortably. Medical Decision Making      72-year-old male patient presents to the emergency department for evaluation of chest heaviness, tightness and shortness of breath. Upon reevaluation, patient is resting comfortably and does not appear to be in any pain or distress. I reviewed all his lab results, EKG and chest x-ray and these are unremarkable. Second set of cardiac markers are normal.  Patient prefers to go home. He is advised to continue current medications, plenty of oral fluids, follow-up with PCP and your cardiologist for further evaluation and possibly outpatient stress test, return if symptoms recur or if you develop any new symptoms.        The patient presents with chest pain that is not suggesting in nature of pulmonary emnbolus, aortic dissection, cardiac ischemia, aortic dissection, or other serious etiology. Given the extremely low risk of these diagnoses further testing and evaluation for these possibilites are not indicated at is time. The patient has been instructed to return if the symptpoms change or worsen in any way. I have reviewed the disposition diagnosis with the patient and or their family/guardian. I have answered their questions and given discharge instructions. They voiced understanding of these instructions and did not have any further questions or complaints. Disposition     FINAL IMPRESSION      1. Chest pain, unspecified type          DISPOSITION/PLAN   DISPOSITION Decision To Discharge 01/13/2023 08:58:53 PM      PATIENT REFERRED TO:  Isidoro Montano MD  [facility] [id number] [phone]    Call in 1 day  For reevaluation of current symptoms    [facility]  / [place] [address]  [phone]    If symptoms worsen, If chest pain recurs or any new symptoms appear      DISCHARGE MEDICATIONS:  New Prescriptions    No medications on file             (Please note that portions of this note were completed with a voice recognition program.  Efforts were made to edit the dictations but occasionally words are mis-transcribed.)    Sruthi Short MD,, MD, F.A.C.E.P.   Attending Emergency Medicine Physician                 Sruthi Short MD  01/14/23 [id number]

## 2023-01-14 NOTE — ED NOTES
Patient up and ambulatory to use restroom; tolerated well  Placed back on monitor  No other needs at this time  NAD  Call light remains within reach     Mei Negron RN  01/13/23 [id number]

## 2023-02-20 DIAGNOSIS — F41.1 GAD (GENERALIZED ANXIETY DISORDER): ICD-10-CM

## 2023-02-21 RX ORDER — CITALOPRAM 20 MG/1
TABLET ORAL
Qty: 30 TABLET | Refills: 3 | Status: SHIPPED | OUTPATIENT
Start: 2023-02-21

## 2023-03-10 RX ORDER — PANTOPRAZOLE SODIUM 40 MG/1
TABLET, DELAYED RELEASE ORAL
Qty: 60 TABLET | Refills: 2 | Status: SHIPPED | OUTPATIENT
Start: 2023-03-10

## 2023-03-21 ENCOUNTER — APPOINTMENT (OUTPATIENT)
Dept: GENERAL RADIOLOGY | Facility: CLINIC | Age: 63
End: 2023-03-21
Payer: COMMERCIAL

## 2023-03-21 ENCOUNTER — HOSPITAL ENCOUNTER (EMERGENCY)
Facility: CLINIC | Age: 63
Discharge: HOME | End: 2023-03-21
Attending: EMERGENCY MEDICINE
Payer: COMMERCIAL

## 2023-03-21 VITALS
RESPIRATION RATE: 14 BRPM | TEMPERATURE: 98.2 F | WEIGHT: 200 LBS | SYSTOLIC BLOOD PRESSURE: 130 MMHG | BODY MASS INDEX: 28.63 KG/M2 | DIASTOLIC BLOOD PRESSURE: 73 MMHG | HEIGHT: 70 IN | HEART RATE: 85 BPM | OXYGEN SATURATION: 97 %

## 2023-03-21 DIAGNOSIS — I48.0 PAROXYSMAL ATRIAL FIBRILLATION (HCC): Primary | ICD-10-CM

## 2023-03-21 DIAGNOSIS — F41.1 ANXIETY STATE: ICD-10-CM

## 2023-03-21 LAB
ABSOLUTE EOS #: 0.2 K/UL (ref 0–0.4)
ABSOLUTE LYMPH #: 0.9 K/UL (ref 1–4.8)
ABSOLUTE MONO #: 0.5 K/UL (ref 0.1–1.2)
ALBUMIN SERPL-MCNC: 4.1 G/DL (ref 3.5–5.2)
ALBUMIN/GLOBULIN RATIO: 1.9 (ref 1–2.5)
ALP SERPL-CCNC: 49 U/L (ref 40–129)
ALT SERPL-CCNC: 21 U/L (ref 5–41)
ANION GAP SERPL CALCULATED.3IONS-SCNC: 8 MMOL/L (ref 9–17)
AST SERPL-CCNC: 19 U/L
BASOPHILS # BLD: 1 % (ref 0–2)
BASOPHILS ABSOLUTE: 0.1 K/UL (ref 0–0.2)
BILIRUB SERPL-MCNC: 0.4 MG/DL (ref 0.3–1.2)
BUN SERPL-MCNC: 12 MG/DL (ref 8–23)
CALCIUM SERPL-MCNC: 8.7 MG/DL (ref 8.6–10.4)
CHLORIDE SERPL-SCNC: 101 MMOL/L (ref 98–107)
CO2 SERPL-SCNC: 27 MMOL/L (ref 20–31)
CREAT SERPL-MCNC: 0.9 MG/DL (ref 0.7–1.2)
EOSINOPHILS RELATIVE PERCENT: 3 % (ref 1–4)
GFR SERPL CREATININE-BSD FRML MDRD: >60 ML/MIN/1.73M2
GLUCOSE SERPL-MCNC: 118 MG/DL (ref 70–99)
HCT VFR BLD AUTO: 48.9 % (ref 41–53)
HGB BLD-MCNC: 16.8 G/DL (ref 13.5–17.5)
LYMPHOCYTES # BLD: 14 % (ref 24–44)
MCH RBC QN AUTO: 31 PG (ref 26–34)
MCHC RBC AUTO-ENTMCNC: 34.3 G/DL (ref 31–37)
MCV RBC AUTO: 90.5 FL (ref 80–100)
MONOCYTES # BLD: 8 % (ref 2–11)
PDW BLD-RTO: 15 % (ref 12.5–15.4)
PLATELET # BLD AUTO: 218 K/UL (ref 140–450)
PMV BLD AUTO: 7 FL (ref 6–12)
POTASSIUM SERPL-SCNC: 3.3 MMOL/L (ref 3.7–5.3)
PROT SERPL-MCNC: 6.3 G/DL (ref 6.4–8.3)
RBC # BLD: 5.4 M/UL (ref 4.5–5.9)
SEG NEUTROPHILS: 74 % (ref 36–66)
SEGMENTED NEUTROPHILS ABSOLUTE COUNT: 5 K/UL (ref 1.8–7.7)
SODIUM SERPL-SCNC: 136 MMOL/L (ref 135–144)
TROPONIN I SERPL DL<=0.01 NG/ML-MCNC: 11 NG/L (ref 0–22)
WBC # BLD AUTO: 6.7 K/UL (ref 3.5–11)

## 2023-03-21 PROCEDURE — 71045 X-RAY EXAM CHEST 1 VIEW: CPT

## 2023-03-21 PROCEDURE — 80053 COMPREHEN METABOLIC PANEL: CPT

## 2023-03-21 PROCEDURE — 36415 COLL VENOUS BLD VENIPUNCTURE: CPT

## 2023-03-21 PROCEDURE — 84484 ASSAY OF TROPONIN QUANT: CPT

## 2023-03-21 PROCEDURE — 99285 EMERGENCY DEPT VISIT HI MDM: CPT

## 2023-03-21 PROCEDURE — 93005 ELECTROCARDIOGRAM TRACING: CPT | Performed by: EMERGENCY MEDICINE

## 2023-03-21 PROCEDURE — 85025 COMPLETE CBC W/AUTO DIFF WBC: CPT

## 2023-03-21 RX ORDER — ALPRAZOLAM 0.5 MG/1
1 TABLET ORAL 3 TIMES DAILY PRN
Qty: 5 TABLET | Refills: 0 | Status: SHIPPED | OUTPATIENT
Start: 2023-03-21 | End: 2023-04-20

## 2023-03-21 ASSESSMENT — PAIN - FUNCTIONAL ASSESSMENT: PAIN_FUNCTIONAL_ASSESSMENT: NONE - DENIES PAIN

## 2023-03-21 NOTE — ED PROVIDER NOTES
daily, Disp-60 tablet, R-3Normal      pilocarpine (SALAGEN) 5 MG tablet Take 7.5 mg by mouth 3 times dailyHistorical Med      metoclopramide (REGLAN) 10 MG tablet Historical Med      glucose monitoring (FREESTYLE FREEDOM) kit DAILY Starting Wed 1/19/2022, Disp-1 kit, R-0, Normal             ALLERGIES     is allergic to pcn [penicillins]. FAMILY HISTORY     He indicated that his mother is alive. He indicated that his father is alive. He indicated that the status of his paternal uncle is unknown.     family history includes Cancer in his father and paternal uncle; Diabetes in his father. SOCIAL HISTORY      reports that he has been smoking cigars. He started smoking about 4 years ago. He has never used smokeless tobacco. He reports current alcohol use. He reports current drug use. Drug: Marijuana [employer] [facility]). PHYSICAL EXAM     INITIAL VITALS:  height is 5' 10\" (1.778 m) and weight is 90.7 kg (200 lb). His oral temperature is 98.2 °F (36.8 °C). His blood pressure is 130/73 and his pulse is 85. His respiration is 14 and oxygen saturation is 97%. CONSTITUTIONAL: no apparent distress, well appearing  SKIN: warm, dry, no jaundice, hives or petechiae  EYES: clear conjunctiva, non-icteric sclera, pupils 3 mm, equal, round reactive to light, extraocular movements intact  HENT: normocephalic, atraumatic, moist mucus membranes  NECK: Nontender and supple with no nuchal rigidity, full range of motion  PULMONARY: clear to auscultation without wheezes, rhonchi, or rales, normal excursion, no accessory muscle use and no stridor  CARDIOVASCULAR: regular rate, rhythm. Strong radial pulses with intact distal perfusion. Capillary refill <2 seconds. GASTROINTESTINAL: soft, non-tender, non-distended, no palpable masses, no rebound or guarding   GENITOURINARY: No costovertebral angle tenderness to palpation  MUSCULOSKELETAL: No midline spinal tenderness, step off or deformity.  Extremities are otherwise nontender to palpation and

## 2023-03-22 LAB
EKG ATRIAL RATE: 79 BPM
EKG P AXIS: 0 DEGREES
EKG P-R INTERVAL: 204 MS
EKG Q-T INTERVAL: 372 MS
EKG QRS DURATION: 104 MS
EKG QTC CALCULATION (BAZETT): 426 MS
EKG R AXIS: 7 DEGREES
EKG T AXIS: 44 DEGREES
EKG VENTRICULAR RATE: 79 BPM

## 2023-03-28 ENCOUNTER — OFFICE VISIT (OUTPATIENT)
Dept: INTERNAL MEDICINE CLINIC | Age: 63
End: 2023-03-28
Payer: COMMERCIAL

## 2023-03-28 VITALS
BODY MASS INDEX: 30.35 KG/M2 | DIASTOLIC BLOOD PRESSURE: 88 MMHG | HEIGHT: 70 IN | SYSTOLIC BLOOD PRESSURE: 138 MMHG | HEART RATE: 79 BPM | WEIGHT: 212 LBS | OXYGEN SATURATION: 98 %

## 2023-03-28 DIAGNOSIS — I48.20 CHRONIC ATRIAL FIBRILLATION (HCC): Primary | ICD-10-CM

## 2023-03-28 DIAGNOSIS — K20.90 ESOPHAGITIS: ICD-10-CM

## 2023-03-28 DIAGNOSIS — I10 ESSENTIAL HYPERTENSION: ICD-10-CM

## 2023-03-28 DIAGNOSIS — K30 NON-ULCER DYSPEPSIA: ICD-10-CM

## 2023-03-28 DIAGNOSIS — F41.9 ANXIETY: ICD-10-CM

## 2023-03-28 DIAGNOSIS — I48.0 PAROXYSMAL ATRIAL FIBRILLATION (HCC): ICD-10-CM

## 2023-03-28 DIAGNOSIS — G47.33 OSA (OBSTRUCTIVE SLEEP APNEA): ICD-10-CM

## 2023-03-28 PROCEDURE — 3075F SYST BP GE 130 - 139MM HG: CPT | Performed by: INTERNAL MEDICINE

## 2023-03-28 PROCEDURE — 3079F DIAST BP 80-89 MM HG: CPT | Performed by: INTERNAL MEDICINE

## 2023-03-28 PROCEDURE — 99214 OFFICE O/P EST MOD 30 MIN: CPT | Performed by: INTERNAL MEDICINE

## 2023-03-28 RX ORDER — IBUPROFEN 800 MG/1
TABLET ORAL
COMMUNITY
Start: 2023-01-17

## 2023-03-28 RX ORDER — LANSOPRAZOLE 30 MG/1
30 CAPSULE, DELAYED RELEASE ORAL DAILY
Qty: 30 CAPSULE | Refills: 3 | Status: SHIPPED | OUTPATIENT
Start: 2023-03-28 | End: 2023-03-31

## 2023-03-28 RX ORDER — TADALAFIL 10 MG/1
10 TABLET ORAL DAILY PRN
Qty: 10 TABLET | Refills: 5 | Status: SHIPPED | OUTPATIENT
Start: 2023-03-28

## 2023-03-28 RX ORDER — DILTIAZEM HYDROCHLORIDE 120 MG/1
120 CAPSULE, COATED, EXTENDED RELEASE ORAL DAILY
Qty: 30 CAPSULE | Refills: 3 | Status: SHIPPED | OUTPATIENT
Start: 2023-03-28

## 2023-03-28 RX ORDER — ALPRAZOLAM 0.25 MG/1
TABLET ORAL
COMMUNITY
Start: 2023-03-09 | End: 2023-03-28 | Stop reason: SDUPTHER

## 2023-03-28 RX ORDER — METOPROLOL SUCCINATE 100 MG/1
100 TABLET, EXTENDED RELEASE ORAL DAILY
COMMUNITY
Start: 2023-03-06

## 2023-03-28 RX ORDER — TRAZODONE HYDROCHLORIDE 150 MG/1
TABLET ORAL
COMMUNITY
Start: 2023-03-27

## 2023-03-28 RX ORDER — ALPRAZOLAM 0.25 MG/1
TABLET ORAL
Qty: 30 TABLET | Refills: 0 | Status: SHIPPED | OUTPATIENT
Start: 2023-03-28 | End: 2023-04-28

## 2023-03-28 RX ORDER — ONDANSETRON 4 MG/1
4 TABLET, FILM COATED ORAL DAILY PRN
Qty: 30 TABLET | Refills: 0 | Status: SHIPPED | OUTPATIENT
Start: 2023-03-28

## 2023-03-28 SDOH — ECONOMIC STABILITY: FOOD INSECURITY: WITHIN THE PAST 12 MONTHS, YOU WORRIED THAT YOUR FOOD WOULD RUN OUT BEFORE YOU GOT MONEY TO BUY MORE.: NEVER TRUE

## 2023-03-28 SDOH — ECONOMIC STABILITY: FOOD INSECURITY: WITHIN THE PAST 12 MONTHS, THE FOOD YOU BOUGHT JUST DIDN'T LAST AND YOU DIDN'T HAVE MONEY TO GET MORE.: NEVER TRUE

## 2023-03-28 SDOH — ECONOMIC STABILITY: INCOME INSECURITY: HOW HARD IS IT FOR YOU TO PAY FOR THE VERY BASICS LIKE FOOD, HOUSING, MEDICAL CARE, AND HEATING?: NOT HARD AT ALL

## 2023-03-28 ASSESSMENT — PATIENT HEALTH QUESTIONNAIRE - PHQ9
2. FEELING DOWN, DEPRESSED OR HOPELESS: 0
SUM OF ALL RESPONSES TO PHQ9 QUESTIONS 1 & 2: 0
SUM OF ALL RESPONSES TO PHQ QUESTIONS 1-9: 0
SUM OF ALL RESPONSES TO PHQ QUESTIONS 1-9: 0
1. LITTLE INTEREST OR PLEASURE IN DOING THINGS: 0
SUM OF ALL RESPONSES TO PHQ QUESTIONS 1-9: 0
SUM OF ALL RESPONSES TO PHQ QUESTIONS 1-9: 0

## 2023-03-28 ASSESSMENT — ENCOUNTER SYMPTOMS
ABDOMINAL PAIN: 1
RESPIRATORY NEGATIVE: 1
EYES NEGATIVE: 1
VOMITING: 1
ALLERGIC/IMMUNOLOGIC NEGATIVE: 1
NAUSEA: 1

## 2023-03-28 NOTE — PROGRESS NOTES
Visit Information    Have you changed or started any medications since your last visit including any over-the-counter medicines, vitamins, or herbal medicines? no   Are you having any side effects from any of your medications? -  no  Have you stopped taking any of your medications? Is so, why? -  no    Have you seen any other physician or provider since your last visit? No  Have you had any other diagnostic tests since your last visit? No  Have you been seen in the emergency room and/or had an admission to a hospital since we last saw you? No  Have you had your routine dental cleaning in the past 6 months? no    Have you activated your [employer] account? If not, what are your barriers?  Yes     Patient Care Team:  Pedro Pablo Zamudio MD as PCP - General (Internal Medicine)  Pedro Pablo Zamudio MD as PCP - Empaneled Provider  Christianne Henriquez MD as Consulting Physician (Gastroenterology)    Medical History Review  Past Medical, Family, and Social History reviewed and does contribute to the patient presenting condition    Health Maintenance   Topic Date Due    COVID-19 Vaccine (1) Never done    Pneumococcal 0-64 years Vaccine (1 - PCV) Never done    HIV screen  Never done    Hepatitis C screen  Never done    DTaP/Tdap/Td vaccine (1 - Tdap) Never done    Shingles vaccine (1 of 2) Never done    Depression Screen  07/01/2023    Colorectal Cancer Screen  11/23/2024    Diabetes screen  03/18/2025    Lipids  03/18/2027    Flu vaccine  Completed    Hepatitis A vaccine  Aged Out    Hib vaccine  Aged Out    Meningococcal (ACWY) vaccine  Aged Out

## 2023-03-28 NOTE — PROGRESS NOTES
Subjective:      Patient ID: Tanner Escudero is a 65 y.o. male. This is a 73-year-old white male gives a history that he has been having persistent nausea stated that he tries to throw up but is unable to vomit. History of chronic marijuana use for the past 3 to 4 years also states that he has 1-2 drinks in the evenings that is alcohol. Denied any hematemesis or melena. Atrial fibrillation and essential hypertension and obstructive sleep apnea does have a previous history of GERD he is a non-smoker but he does smoke cigars      Review of Systems   Constitutional: Negative. HENT: Negative. Eyes: Negative. Respiratory: Negative. Cardiovascular: Negative. Gastrointestinal:  Positive for abdominal pain, nausea and vomiting. Endocrine: Negative. Genitourinary: Negative. Musculoskeletal: Negative. Skin: Negative. Allergic/Immunologic: Negative. Neurological: Negative. Hematological: Negative. Psychiatric/Behavioral:  The patient is nervous/anxious. Objective:   Physical Exam  Constitutional:       Appearance: Normal appearance. Cardiovascular:      Rate and Rhythm: Regular rhythm. Heart sounds: No murmur heard. No gallop. Pulmonary:      Breath sounds: No wheezing or rhonchi. Abdominal:      General: Abdomen is flat. There is no distension. Palpations: Abdomen is soft. There is no mass. Tenderness: There is no abdominal tenderness. Hernia: No hernia is present. Lymphadenopathy:      Cervical: No cervical adenopathy. Skin:     General: Skin is warm and dry. Neurological:      General: No focal deficit present. Mental Status: He is alert and oriented to person, place, and time. Psychiatric:         Mood and Affect: Mood normal.         Behavior: Behavior normal.         Thought Content: Thought content normal.         Judgment: Judgment normal.       Assessment / Plan:      Diagnosis Orders   1.  Chronic atrial fibrillation (HCC)

## 2023-03-31 ENCOUNTER — HOSPITAL ENCOUNTER (EMERGENCY)
Facility: CLINIC | Age: 63
Discharge: HOME | End: 2023-03-31
Attending: EMERGENCY MEDICINE
Payer: COMMERCIAL

## 2023-03-31 VITALS
TEMPERATURE: 98 F | HEIGHT: 69 IN | WEIGHT: 200 LBS | RESPIRATION RATE: 15 BRPM | DIASTOLIC BLOOD PRESSURE: 70 MMHG | SYSTOLIC BLOOD PRESSURE: 132 MMHG | BODY MASS INDEX: 29.62 KG/M2 | OXYGEN SATURATION: 96 % | HEART RATE: 61 BPM

## 2023-03-31 DIAGNOSIS — K29.00 ACUTE GASTRITIS, PRESENCE OF BLEEDING UNSPECIFIED, UNSPECIFIED GASTRITIS TYPE: Primary | ICD-10-CM

## 2023-03-31 LAB
ABSOLUTE EOS #: 0.3 K/UL (ref 0–0.4)
ABSOLUTE LYMPH #: 1.3 K/UL (ref 1–4.8)
ABSOLUTE MONO #: 0.5 K/UL (ref 0.1–1.2)
ALBUMIN SERPL-MCNC: 3.6 G/DL (ref 3.5–5.2)
ALBUMIN/GLOBULIN RATIO: 1.6 (ref 1–2.5)
ALP SERPL-CCNC: 47 U/L (ref 40–129)
ALT SERPL-CCNC: 21 U/L (ref 5–41)
ANION GAP SERPL CALCULATED.3IONS-SCNC: 10 MMOL/L (ref 9–17)
AST SERPL-CCNC: 21 U/L
BASOPHILS # BLD: 2 % (ref 0–2)
BASOPHILS ABSOLUTE: 0.1 K/UL (ref 0–0.2)
BILIRUB SERPL-MCNC: 0.3 MG/DL (ref 0.3–1.2)
BUN SERPL-MCNC: 11 MG/DL (ref 8–23)
CALCIUM SERPL-MCNC: 8.3 MG/DL (ref 8.6–10.4)
CHLORIDE SERPL-SCNC: 100 MMOL/L (ref 98–107)
CO2 SERPL-SCNC: 32 MMOL/L (ref 20–31)
CREAT SERPL-MCNC: 0.9 MG/DL (ref 0.7–1.2)
EOSINOPHILS RELATIVE PERCENT: 5 % (ref 1–4)
GFR SERPL CREATININE-BSD FRML MDRD: >60 ML/MIN/1.73M2
GLUCOSE SERPL-MCNC: 83 MG/DL (ref 70–99)
HCT VFR BLD AUTO: 46.7 % (ref 41–53)
HGB BLD-MCNC: 16 G/DL (ref 13.5–17.5)
LIPASE SERPL-CCNC: 93 U/L (ref 13–60)
LYMPHOCYTES # BLD: 23 % (ref 24–44)
MCH RBC QN AUTO: 30.8 PG (ref 26–34)
MCHC RBC AUTO-ENTMCNC: 34.2 G/DL (ref 31–37)
MCV RBC AUTO: 90.1 FL (ref 80–100)
MONOCYTES # BLD: 10 % (ref 2–11)
PDW BLD-RTO: 14.9 % (ref 12.5–15.4)
PLATELET # BLD AUTO: 256 K/UL (ref 140–450)
PMV BLD AUTO: 6.7 FL (ref 6–12)
POTASSIUM SERPL-SCNC: 3.6 MMOL/L (ref 3.7–5.3)
PROT SERPL-MCNC: 5.8 G/DL (ref 6.4–8.3)
RBC # BLD: 5.18 M/UL (ref 4.5–5.9)
SEG NEUTROPHILS: 60 % (ref 36–66)
SEGMENTED NEUTROPHILS ABSOLUTE COUNT: 3.4 K/UL (ref 1.8–7.7)
SODIUM SERPL-SCNC: 142 MMOL/L (ref 135–144)
TROPONIN I SERPL DL<=0.01 NG/ML-MCNC: 11 NG/L (ref 0–22)
WBC # BLD AUTO: 5.7 K/UL (ref 3.5–11)

## 2023-03-31 PROCEDURE — 96361 HYDRATE IV INFUSION ADD-ON: CPT

## 2023-03-31 PROCEDURE — 85025 COMPLETE CBC W/AUTO DIFF WBC: CPT

## 2023-03-31 PROCEDURE — 6360000002 HC RX W HCPCS: Performed by: STUDENT IN AN ORGANIZED HEALTH CARE EDUCATION/TRAINING PROGRAM

## 2023-03-31 PROCEDURE — C9113 INJ PANTOPRAZOLE SODIUM, VIA: HCPCS | Performed by: STUDENT IN AN ORGANIZED HEALTH CARE EDUCATION/TRAINING PROGRAM

## 2023-03-31 PROCEDURE — 96374 THER/PROPH/DIAG INJ IV PUSH: CPT

## 2023-03-31 PROCEDURE — 36415 COLL VENOUS BLD VENIPUNCTURE: CPT

## 2023-03-31 PROCEDURE — 6370000000 HC RX 637 (ALT 250 FOR IP): Performed by: STUDENT IN AN ORGANIZED HEALTH CARE EDUCATION/TRAINING PROGRAM

## 2023-03-31 PROCEDURE — 84484 ASSAY OF TROPONIN QUANT: CPT

## 2023-03-31 PROCEDURE — 93005 ELECTROCARDIOGRAM TRACING: CPT | Performed by: STUDENT IN AN ORGANIZED HEALTH CARE EDUCATION/TRAINING PROGRAM

## 2023-03-31 PROCEDURE — 99284 EMERGENCY DEPT VISIT MOD MDM: CPT

## 2023-03-31 PROCEDURE — 83690 ASSAY OF LIPASE: CPT

## 2023-03-31 PROCEDURE — 80053 COMPREHEN METABOLIC PANEL: CPT

## 2023-03-31 PROCEDURE — 2580000003 HC RX 258: Performed by: STUDENT IN AN ORGANIZED HEALTH CARE EDUCATION/TRAINING PROGRAM

## 2023-03-31 RX ORDER — 0.9 % SODIUM CHLORIDE 0.9 %
1000 INTRAVENOUS SOLUTION INTRAVENOUS ONCE
Status: COMPLETED | OUTPATIENT
Start: 2023-03-31 | End: 2023-03-31

## 2023-03-31 RX ORDER — PANTOPRAZOLE SODIUM 40 MG/1
40 TABLET, DELAYED RELEASE ORAL
Qty: 60 TABLET | Refills: 2 | Status: SHIPPED | OUTPATIENT
Start: 2023-03-31

## 2023-03-31 RX ORDER — PANTOPRAZOLE SODIUM 40 MG/10ML
40 INJECTION, POWDER, LYOPHILIZED, FOR SOLUTION INTRAVENOUS ONCE
Status: COMPLETED | OUTPATIENT
Start: 2023-03-31 | End: 2023-03-31

## 2023-03-31 RX ADMIN — PANTOPRAZOLE SODIUM 40 MG: 40 INJECTION, POWDER, FOR SOLUTION INTRAVENOUS at 19:42

## 2023-03-31 RX ADMIN — SODIUM CHLORIDE 1000 ML: 9 INJECTION, SOLUTION INTRAVENOUS at 19:38

## 2023-03-31 RX ADMIN — LIDOCAINE HYDROCHLORIDE: 20 SOLUTION ORAL; TOPICAL at 19:39

## 2023-03-31 ASSESSMENT — ENCOUNTER SYMPTOMS
DIARRHEA: 0
BLOOD IN STOOL: 0
COUGH: 0
ABDOMINAL DISTENTION: 1
WHEEZING: 0
SHORTNESS OF BREATH: 0
ABDOMINAL PAIN: 1
FACIAL SWELLING: 0
VOMITING: 1
SORE THROAT: 0
NAUSEA: 1
BACK PAIN: 0

## 2023-03-31 ASSESSMENT — PAIN SCALES - GENERAL: PAINLEVEL_OUTOF10: 8

## 2023-03-31 ASSESSMENT — PAIN DESCRIPTION - LOCATION: LOCATION: ABDOMEN

## 2023-03-31 ASSESSMENT — PAIN DESCRIPTION - FREQUENCY: FREQUENCY: CONTINUOUS

## 2023-03-31 ASSESSMENT — PAIN DESCRIPTION - PAIN TYPE: TYPE: ACUTE PAIN

## 2023-03-31 ASSESSMENT — PAIN - FUNCTIONAL ASSESSMENT: PAIN_FUNCTIONAL_ASSESSMENT: 0-10

## 2023-03-31 ASSESSMENT — PAIN DESCRIPTION - DESCRIPTORS: DESCRIPTORS: CRAMPING
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
## 2023-03-31 NOTE — ED NOTES
Pt presents to the ED via private auto accompanied by his wife. Pt states he has been experiencing abdominal pain and bloating with constipation x2 weeks.  Pt now has diarrhea after taking laxatives, bloating has resolved and nausea and vomiting are the recent issue      Armin Llanos RN  03/31/23 [id number]

## 2023-04-01 LAB
EKG ATRIAL RATE: 62 BPM
EKG P AXIS: -3 DEGREES
EKG P-R INTERVAL: 222 MS
EKG Q-T INTERVAL: 386 MS
EKG QRS DURATION: 106 MS
EKG QTC CALCULATION (BAZETT): 391 MS
EKG R AXIS: 9 DEGREES
EKG T AXIS: 33 DEGREES
EKG VENTRICULAR RATE: 62 BPM

## 2023-04-01 NOTE — DISCHARGE INSTRUCTIONS
Please take medication every day as prescribed and try to stick to a bland diet. Avoid any foods that exacerbate your symptoms such as acidic foods. Try to eat small meals to avoid distention of your stomach. Please follow-up with your gastroenterologist as scheduled.

## 2023-04-26 ENCOUNTER — APPOINTMENT (OUTPATIENT)
Dept: CT IMAGING | Facility: CLINIC | Age: 63
End: 2023-04-26
Payer: COMMERCIAL

## 2023-04-26 ENCOUNTER — HOSPITAL ENCOUNTER (EMERGENCY)
Facility: CLINIC | Age: 63
Discharge: HOME | End: 2023-04-26
Attending: EMERGENCY MEDICINE
Payer: COMMERCIAL

## 2023-04-26 VITALS
OXYGEN SATURATION: 96 % | HEIGHT: 70 IN | RESPIRATION RATE: 18 BRPM | HEART RATE: 70 BPM | BODY MASS INDEX: 29.35 KG/M2 | DIASTOLIC BLOOD PRESSURE: 92 MMHG | TEMPERATURE: 98.2 F | WEIGHT: 205 LBS | SYSTOLIC BLOOD PRESSURE: 136 MMHG

## 2023-04-26 DIAGNOSIS — F10.10 ALCOHOL ABUSE: ICD-10-CM

## 2023-04-26 DIAGNOSIS — R11.2 NAUSEA AND VOMITING, UNSPECIFIED VOMITING TYPE: ICD-10-CM

## 2023-04-26 DIAGNOSIS — R14.0 ABDOMINAL DISTENTION: Primary | ICD-10-CM

## 2023-04-26 LAB
ABSOLUTE EOS #: 0.2 K/UL (ref 0–0.4)
ABSOLUTE LYMPH #: 1.3 K/UL (ref 1–4.8)
ABSOLUTE MONO #: 0.7 K/UL (ref 0.1–1.2)
ALBUMIN SERPL-MCNC: 4 G/DL (ref 3.5–5.2)
ALBUMIN/GLOBULIN RATIO: 1.8 (ref 1–2.5)
ALP SERPL-CCNC: 42 U/L (ref 40–129)
ALT SERPL-CCNC: 20 U/L (ref 5–41)
AMORPHOUS: ABNORMAL
ANION GAP SERPL CALCULATED.3IONS-SCNC: 5 MMOL/L (ref 9–17)
AST SERPL-CCNC: 25 U/L
BACTERIA: ABNORMAL
BASOPHILS # BLD: 2 % (ref 0–2)
BASOPHILS ABSOLUTE: 0.1 K/UL (ref 0–0.2)
BILIRUB DIRECT SERPL-MCNC: 0.1 MG/DL
BILIRUB INDIRECT SERPL-MCNC: 0.3 MG/DL (ref 0–1)
BILIRUB SERPL-MCNC: 0.4 MG/DL (ref 0.3–1.2)
BILIRUBIN URINE: NEGATIVE
BUN SERPL-MCNC: 12 MG/DL (ref 8–23)
CALCIUM SERPL-MCNC: 9.3 MG/DL (ref 8.6–10.4)
CHLORIDE SERPL-SCNC: 99 MMOL/L (ref 98–107)
CO2 SERPL-SCNC: 37 MMOL/L (ref 20–31)
COLOR: YELLOW
CREAT SERPL-MCNC: 0.9 MG/DL (ref 0.7–1.2)
EOSINOPHILS RELATIVE PERCENT: 4 % (ref 1–4)
EPITHELIAL CELLS UA: ABNORMAL /HPF (ref 0–5)
GFR SERPL CREATININE-BSD FRML MDRD: >60 ML/MIN/1.73M2
GLUCOSE SERPL-MCNC: 95 MG/DL (ref 70–99)
GLUCOSE UR STRIP.AUTO-MCNC: NEGATIVE MG/DL
HCT VFR BLD AUTO: 49.1 % (ref 41–53)
HGB BLD-MCNC: 16.6 G/DL (ref 13.5–17.5)
INR PPP: 1.1
KETONES UR STRIP.AUTO-MCNC: NEGATIVE MG/DL
LEUKOCYTE ESTERASE UR QL STRIP.AUTO: NEGATIVE
LIPASE SERPL-CCNC: 70 U/L (ref 13–60)
LYMPHOCYTES # BLD: 20 % (ref 24–44)
MAGNESIUM SERPL-MCNC: 2.2 MG/DL (ref 1.6–2.6)
MCH RBC QN AUTO: 29.9 PG (ref 26–34)
MCHC RBC AUTO-ENTMCNC: 33.8 G/DL (ref 31–37)
MCV RBC AUTO: 88.7 FL (ref 80–100)
MONOCYTES # BLD: 11 % (ref 2–11)
NITRITE UR QL STRIP.AUTO: NEGATIVE
OTHER OBSERVATIONS UA: ABNORMAL
PDW BLD-RTO: 15.3 % (ref 12.5–15.4)
PLATELET # BLD AUTO: 246 K/UL (ref 140–450)
PMV BLD AUTO: 6.8 FL (ref 6–12)
POTASSIUM SERPL-SCNC: 3.8 MMOL/L (ref 3.7–5.3)
PROT SERPL-MCNC: 6.2 G/DL (ref 6.4–8.3)
PROT UR STRIP.AUTO-MCNC: 8.5 MG/DL (ref 5–8)
PROT UR STRIP.AUTO-MCNC: ABNORMAL MG/DL
PROTHROMBIN TIME: 10.9 SEC (ref 9.4–12.6)
RBC # BLD: 5.54 M/UL (ref 4.5–5.9)
RBC CLUMPS #/AREA URNS AUTO: ABNORMAL /HPF (ref 0–2)
SEG NEUTROPHILS: 63 % (ref 36–66)
SEGMENTED NEUTROPHILS ABSOLUTE COUNT: 4.1 K/UL (ref 1.8–7.7)
SODIUM SERPL-SCNC: 141 MMOL/L (ref 135–144)
SPECIFIC GRAVITY UA: 1.01 (ref 1–1.03)
TURBIDITY: CLEAR
URINE HGB: NEGATIVE
UROBILINOGEN, URINE: NORMAL
WBC # BLD AUTO: 6.5 K/UL (ref 3.5–11)
WBC UA: ABNORMAL /HPF (ref 0–5)

## 2023-04-26 PROCEDURE — 83735 ASSAY OF MAGNESIUM: CPT

## 2023-04-26 PROCEDURE — 80048 BASIC METABOLIC PNL TOTAL CA: CPT

## 2023-04-26 PROCEDURE — 99284 EMERGENCY DEPT VISIT MOD MDM: CPT

## 2023-04-26 PROCEDURE — 85610 PROTHROMBIN TIME: CPT

## 2023-04-26 PROCEDURE — 85025 COMPLETE CBC W/AUTO DIFF WBC: CPT

## 2023-04-26 PROCEDURE — 83690 ASSAY OF LIPASE: CPT

## 2023-04-26 PROCEDURE — 81001 URINALYSIS AUTO W/SCOPE: CPT

## 2023-04-26 PROCEDURE — 80076 HEPATIC FUNCTION PANEL: CPT

## 2023-04-26 PROCEDURE — 6370000000 HC RX 637 (ALT 250 FOR IP): Performed by: NURSE PRACTITIONER

## 2023-04-26 PROCEDURE — 74176 CT ABD & PELVIS W/O CONTRAST: CPT

## 2023-04-26 PROCEDURE — 36415 COLL VENOUS BLD VENIPUNCTURE: CPT

## 2023-04-26 RX ORDER — ONDANSETRON 4 MG/1
4 TABLET, FILM COATED ORAL EVERY 8 HOURS PRN
Qty: 20 TABLET | Refills: 0 | Status: SHIPPED | OUTPATIENT
Start: 2023-04-26

## 2023-04-26 RX ORDER — ONDANSETRON 4 MG/1
4 TABLET, ORALLY DISINTEGRATING ORAL EVERY 8 HOURS PRN
Status: DISCONTINUED | OUTPATIENT
Start: 2023-04-26 | End: 2023-04-26 | Stop reason: HOSPADM

## 2023-04-26 RX ADMIN — ONDANSETRON 4 MG: 4 TABLET, ORALLY DISINTEGRATING ORAL at 18:49

## 2023-04-26 ASSESSMENT — PAIN - FUNCTIONAL ASSESSMENT
PAIN_FUNCTIONAL_ASSESSMENT: 0-10
PAIN_FUNCTIONAL_ASSESSMENT: ACTIVITIES ARE NOT PREVENTED

## 2023-04-26 ASSESSMENT — PAIN DESCRIPTION - ONSET: ONSET: ON-GOING

## 2023-04-26 ASSESSMENT — ENCOUNTER SYMPTOMS
ABDOMINAL PAIN: 1
COUGH: 0
NAUSEA: 1
DIARRHEA: 0
SHORTNESS OF BREATH: 0
VOMITING: 1

## 2023-04-26 ASSESSMENT — PAIN DESCRIPTION - FREQUENCY: FREQUENCY: CONTINUOUS

## 2023-04-26 ASSESSMENT — PAIN SCALES - GENERAL: PAINLEVEL_OUTOF10: 8

## 2023-04-26 ASSESSMENT — PAIN DESCRIPTION - DESCRIPTORS: DESCRIPTORS: CRAMPING

## 2023-04-26 ASSESSMENT — PAIN DESCRIPTION - PAIN TYPE: TYPE: ACUTE PAIN

## 2023-04-26 ASSESSMENT — PAIN DESCRIPTION - LOCATION: LOCATION: ABDOMEN

## 2023-04-26 NOTE — ED PROVIDER NOTES
Emergency Department           COMPLAINT       Chief Complaint   Patient presents with    Abdominal Pain      PHYSICAL EXAM      ED Triage Vitals [04/26/23 1835]   BP Temp Temp Source Heart Rate Resp SpO2 Height Weight   (!) 136/92 98.2 °F (36.8 °C) Oral 70 18 96 % 5' 10\" (1.778 m) 205 lb (93 kg)       Constitutional: Alert, oriented x3, nontoxic, answering questions appropriately, acting properly for age, in no acute distress   HEENT: Extraocular muscles intact,  Neck: Trachea midline   Cardiovascular: Regular rhythm and rate no murmurs   Respiratory: Clear to auscultation bilaterally no wheezes, rhonchi, rales, no respiratory distress no tachypnea no retractions no hypoxia  Gastrointestinal: Soft, mild tenderness upper abdomen, distended, diminished bowel sounds. No rebound, rigidity, or guarding.    Musculoskeletal: No extremity pain or swelling no tenderness or asymmetry  Neurologic: Moving all 4 extremities without difficulty there are no gross focal neurologic deficits   Skin: Warm and dry         Physical Exam  DIAGNOSTIC RESULTS     EKG: All EKG's are interpreted by the Emergency Department Physician who either signs or Co-signs this chart in the absence of a cardiologist.    Not indicated unless otherwise documented above or in the midlevel documentation    LABS:  Results for orders placed or performed during the hospital encounter of 04/26/23   CBC with Auto Differential   Result Value Ref Range    WBC 6.5 3.5 - 11.0 k/uL    RBC 5.54 4.5 - 5.9 m/uL    Hemoglobin 16.6 13.5 - 17.5 g/dL    Hematocrit 49.1 41 - 53 %    MCV 88.7 80 - 100 fL    MCH 29.9 26 - 34 pg    MCHC 33.8 31 - 37 g/dL    RDW 15.3 12.5 - 15.4 %    Platelets 470 051 - 944 k/uL    MPV 6.8 6.0 - 12.0 fL    Seg Neutrophils 63 36 - 66 %    Lymphocytes 20 (L) 24 - 44 %    Monocytes 11 2 - 11 %    Eosinophils % 4 1 - 4 %    Basophils 2 0 - 2 %    Segs Absolute 4.10 1.8 - 7.7 k/uL    Absolute Lymph # 1.30 1.0 - 4.8 k/uL    Absolute Mono #

## 2023-04-26 NOTE — ED NOTES
Pt presents to ED c/o abdominal pain/swelling. Pt states this is ongoing and has gotten worse over the past few months. Pt also reports nausea and vomiting mostly after eating. Pt reports he drinks 2-3 alcoholic drinks daily and that he takes Protonix for GERD. Pt arrives A/Ox4, PWD, PMS intact. Pt resting on stretcher with call light in reach.      Paula Barreto RN  04/26/23 [id number]

## 2023-04-26 NOTE — ED PROVIDER NOTES
[address] ED  Emergency Department Encounter  Mid Level Provider     Pt Name: Arielle Ching  MRN: [id number]  [place] 1960  Date of evaluation: 4/26/23  PCP:  Kenneth Payne MD    CHIEF COMPLAINT       Chief Complaint   Patient presents with    Abdominal Pain       HISTORY OF PRESENT ILLNESS  (Location/Symptom, Timing/Onset,Context/Setting, Quality, Duration, Modifying Factors, Severity.)      Arielle Ching is a 65 y.o. male who presents with abdominal bloating. He states this is been getting worse over the last week. No fever or chills. No history abdominal surgery. Patient was here March 31 and had a work-up at that time. He actually has an appoint with GI doctor tomorrow. He has chronic nausea vomiting in the morning. He admits to excess alcohol use daily. No known liver disease. PAST MEDICAL /SURGICAL / SOCIAL / FAMILY HISTORY      has a past medical history of Abscess of bursa of right elbow, Arthritis, Atrial fibrillation ([address] BPH (benign prostatic hyperplasia), Heartburn, Hypertension, Panic attack, Polycythemia, PONV (postoperative nausea and vomiting), and Sleep apnea. has a past surgical history that includes shoulder surgery; Spine surgery; Upper gastrointestinal endoscopy (N/A, 12/17/2019); Cardiac catheterization (2017); hernia repair; Colonoscopy; Appendectomy; back surgery; Endoscopy, colon, diagnostic; Arm Surgery (Right, 02/23/2021); Muscle Repair (Right, 2/23/2021); Arm Surgery (Right, 7/13/2021); Upper gastrointestinal endoscopy (N/A, 7/30/2021); and Septoplasty (Bilateral, 12/20/2021).     Social History     Socioeconomic History    Marital status: Single     Spouse name: Not on file    Number of children: Not on file    Years of education: Not on file    Highest education level: Not on file   Occupational History    Not on file   Tobacco Use    Smoking status: Every Day     Types: Cigars     Start date: 2019    Smokeless tobacco: Never    Tobacco comments:

## 2023-04-27 NOTE — DISCHARGE INSTRUCTIONS
Do your best to stop drinking all alcohol. Follow-up tomorrow as scheduled with GI services. I have provided a prescription for Zofran to use as needed for nausea.   If symptoms worsen or new concerns develop return to the emergency room

## 2023-05-05 ENCOUNTER — TELEPHONE (OUTPATIENT)
Dept: INTERNAL MEDICINE CLINIC | Age: 63
End: 2023-05-05

## 2023-05-05 DIAGNOSIS — K20.90 ESOPHAGITIS: Primary | ICD-10-CM

## 2023-05-08 RX ORDER — ONDANSETRON 4 MG/1
4 TABLET, FILM COATED ORAL EVERY 8 HOURS PRN
Qty: 30 TABLET | Refills: 1 | Status: SHIPPED | OUTPATIENT
Start: 2023-05-08

## 2023-05-31 RX ORDER — TADALAFIL 10 MG/1
10 TABLET ORAL DAILY PRN
Qty: 10 TABLET | Refills: 5 | Status: SHIPPED | OUTPATIENT
Start: 2023-05-31

## 2023-06-22 DIAGNOSIS — F41.1 GAD (GENERALIZED ANXIETY DISORDER): ICD-10-CM

## 2023-06-22 RX ORDER — CITALOPRAM 20 MG/1
TABLET ORAL
Qty: 30 TABLET | Refills: 3 | Status: SHIPPED | OUTPATIENT
Start: 2023-06-22

## 2023-07-26 ENCOUNTER — HOSPITAL ENCOUNTER (OUTPATIENT)
Facility: CLINIC | Age: 63
Discharge: HOME | End: 2023-07-26
Payer: COMMERCIAL

## 2023-07-26 LAB
PSA SERPL-MCNC: 4.14 NG/ML
TESTOST SERPL-MCNC: >1500 NG/DL (ref 220–1000)

## 2023-07-26 PROCEDURE — 84403 ASSAY OF TOTAL TESTOSTERONE: CPT

## 2023-07-26 PROCEDURE — 36415 COLL VENOUS BLD VENIPUNCTURE: CPT

## 2023-07-26 PROCEDURE — G0103 PSA SCREENING: HCPCS

## 2023-08-11 ENCOUNTER — HOSPITAL ENCOUNTER (OUTPATIENT)
Facility: CLINIC | Age: 63
Discharge: HOME | End: 2023-08-11
Payer: COMMERCIAL

## 2023-08-11 LAB
PSA SERPL-MCNC: 3.47 NG/ML
TESTOST SERPL-MCNC: 865 NG/DL (ref 220–1000)

## 2023-08-11 PROCEDURE — 84154 ASSAY OF PSA FREE: CPT

## 2023-08-11 PROCEDURE — 84153 ASSAY OF PSA TOTAL: CPT

## 2023-08-11 PROCEDURE — 36415 COLL VENOUS BLD VENIPUNCTURE: CPT

## 2023-08-11 PROCEDURE — 84403 ASSAY OF TOTAL TESTOSTERONE: CPT

## 2023-08-17 LAB
PSA FREE MFR SERPL: 20 %
PSA FREE SERPL-MCNC: 0.5 UG/L
PSA SERPL-MCNC: 2.5 UG/L (ref 0–4)

## 2023-08-28 ENCOUNTER — OFFICE VISIT (OUTPATIENT)
Dept: INTERNAL MEDICINE CLINIC | Age: 63
End: 2023-08-28
Payer: COMMERCIAL

## 2023-08-28 VITALS
OXYGEN SATURATION: 96 % | WEIGHT: 199 LBS | DIASTOLIC BLOOD PRESSURE: 84 MMHG | HEART RATE: 73 BPM | SYSTOLIC BLOOD PRESSURE: 136 MMHG | BODY MASS INDEX: 28.55 KG/M2

## 2023-08-28 DIAGNOSIS — T50.905A ADVERSE EFFECT OF DRUG, INITIAL ENCOUNTER: ICD-10-CM

## 2023-08-28 DIAGNOSIS — R25.1 TREMOR OF BOTH HANDS: Primary | ICD-10-CM

## 2023-08-28 DIAGNOSIS — F41.1 ANXIETY STATE: ICD-10-CM

## 2023-08-28 PROCEDURE — 3075F SYST BP GE 130 - 139MM HG: CPT | Performed by: INTERNAL MEDICINE

## 2023-08-28 PROCEDURE — 99213 OFFICE O/P EST LOW 20 MIN: CPT | Performed by: INTERNAL MEDICINE

## 2023-08-28 PROCEDURE — 3079F DIAST BP 80-89 MM HG: CPT | Performed by: INTERNAL MEDICINE

## 2023-08-28 RX ORDER — VARENICLINE TARTRATE 0.5 MG/1
.5-1 TABLET, FILM COATED ORAL SEE ADMIN INSTRUCTIONS
Qty: 57 TABLET | Refills: 0 | Status: SHIPPED | OUTPATIENT
Start: 2023-08-28

## 2023-08-28 RX ORDER — ALPRAZOLAM 0.25 MG/1
0.25 TABLET ORAL 2 TIMES DAILY
Qty: 16 TABLET | Refills: 0 | Status: SHIPPED | OUTPATIENT
Start: 2023-08-28 | End: 2023-09-05

## 2023-08-28 NOTE — PROGRESS NOTES
sulfate HFA (PROVENTIL HFA) 108 (90 Base) MCG/ACT inhaler Inhale 2 puffs into the lungs every 6 hours as needed for Wheezing 18 g 1    apixaban (ELIQUIS) 5 MG TABS tablet Take 1 tablet by mouth 2 times daily 60 tablet 3    ALPRAZolam (XANAX) 0.25 MG tablet Take 1 tablet by mouth nightly as needed for Anxiety for up to 10 days. 10 tablet 0    glucose monitoring (FREESTYLE FREEDOM) kit 1 kit by Does not apply route daily 1 kit 0    ALPRAZolam (XANAX) 0.25 MG tablet Take 1 tablet by mouth nightly as needed for Anxiety for up to 30 days. 30 tablet 0     No current facility-administered medications for this visit. SOCIAL HISTORY    Reviewed and no change from previous record. Yvonne Ortiz  reports that he has been smoking cigars. He started smoking about 4 years ago. He has never used smokeless tobacco.    FAMILY HISTORY:    Reviewed and No change from previous visit  family history includes Cancer in his father and paternal uncle; Diabetes in his father.     REVIEW OF SYSTEMS:    General : Negative for fatigue, weight loss, appetite change  HEENT : Negative for nasal congestion, sneezing, runny nose, sinus pain  Respiratory : Negative for shortness of breath cough, congestion, wheezing  Cardiovascular: Negative for chest pain, palpitations, shortness of breath  GI: Negative for abdominal pain, nausea, vomiting, diarrhea, constipation  Genitourinary : negative for dysuria, urinary frequency, urinary urgency, hematuria  Neurological : Negative for headache, dizziness, gait change,   Psych : Reports anxiety and stress from financial losses  Skin: Negative rash and skin lesions  Musculoskeletal : Tremors as reported above      PHYSICAL EXAM:      Vitals:    08/28/23 1339   BP: 136/84   Site: Right Upper Arm   Position: Sitting   Pulse: 73   SpO2: 96%   Weight: 199 lb (90.3 kg)     BP Readings from Last 3 Encounters:   08/28/23 136/84   04/26/23 (!) 136/92   03/31/23 132/70        Constitutional: Normal appearance for

## 2023-09-21 DIAGNOSIS — F41.9 ANXIETY: ICD-10-CM

## 2023-09-21 NOTE — TELEPHONE ENCOUNTER
----- Message from Sushant Gomez sent at 9/21/2023  2:56 PM EDT -----  Subject: Refill Request    QUESTIONS  Name of Medication? ALPRAZolam (XANAX) 0.25 MG tablet  Patient-reported dosage and instructions? take one twice daily (as needed)     How many days do you have left? 0  Preferred Pharmacy? [address] [place] [address] [id number]  Pharmacy phone number (if available)? [phone]  Additional Information for Provider? pt takes as needed and is currently   out and will appreciate refill please   ---------------------------------------------------------------------------  --------------  [address]  What is the best way for the office to contact you? OK to leave message on   voicemail  Preferred Call Back Phone Number? [phone]  ---------------------------------------------------------------------------  --------------  SCRIPT ANSWERS  Relationship to Patient?  Self

## 2023-09-22 RX ORDER — ALPRAZOLAM 0.25 MG/1
0.25 TABLET ORAL NIGHTLY PRN
Qty: 30 TABLET | Refills: 0 | OUTPATIENT
Start: 2023-09-22 | End: 2023-10-22

## 2023-10-27 RX ORDER — VARENICLINE TARTRATE 0.5 MG/1
.5-1 TABLET, FILM COATED ORAL SEE ADMIN INSTRUCTIONS
Qty: 57 TABLET | Refills: 0 | Status: SHIPPED | OUTPATIENT
Start: 2023-10-27

## 2023-10-27 NOTE — TELEPHONE ENCOUNTER
----- Message from Eileen Carol sent at 10/27/2023 12:11 PM EDT -----  Subject: Refill Request    QUESTIONS  Name of Medication? varenicline (CHANTIX) 0.5 MG tablet  Patient-reported dosage and instructions? Unsure of dosage, Gradual   instructions   How many days do you have left? 2  Preferred Pharmacy? [id number]  Pharmacy phone number (if available)? [phone]  Additional Information for Provider? Pt is requesting a urgent fill if   possible.   ---------------------------------------------------------------------------  --------------  CALL BACK INFO  What is the best way for the office to contact you? OK to leave message on   voicemail  Preferred Call Back Phone Number? [phone]  ---------------------------------------------------------------------------  --------------  SCRIPT ANSWERS  Relationship to Patient?  Self

## 2023-11-21 ENCOUNTER — HOSPITAL ENCOUNTER (OUTPATIENT)
Dept: MRI IMAGING | Age: 63
Discharge: HOME | End: 2023-11-23
Payer: COMMERCIAL

## 2023-11-21 DIAGNOSIS — R97.20 ELEVATED PROSTATE SPECIFIC ANTIGEN (PSA): ICD-10-CM

## 2023-11-21 LAB
BUN BLD-MCNC: 14 MG/DL (ref 8–26)
EGFR, POC: >60 ML/MIN/1.73M2
POC CREATININE: 1 MG/DL (ref 0.51–1.19)

## 2023-11-21 PROCEDURE — 82565 ASSAY OF CREATININE: CPT

## 2023-11-21 PROCEDURE — 6360000004 HC RX CONTRAST MEDICATION

## 2023-11-21 PROCEDURE — A9579 GAD-BASE MR CONTRAST NOS,1ML: HCPCS

## 2023-11-21 PROCEDURE — 84520 ASSAY OF UREA NITROGEN: CPT

## 2023-11-21 PROCEDURE — 72197 MRI PELVIS W/O & W/DYE: CPT

## 2023-11-21 PROCEDURE — 2580000003 HC RX 258

## 2023-11-21 RX ORDER — 0.9 % SODIUM CHLORIDE 0.9 %
50 INTRAVENOUS SOLUTION INTRAVENOUS ONCE
Status: COMPLETED | OUTPATIENT
Start: 2023-11-21 | End: 2023-11-21

## 2023-11-21 RX ADMIN — SODIUM CHLORIDE 50 ML: 9 INJECTION, SOLUTION INTRAVENOUS at 10:48

## 2023-11-21 RX ADMIN — GADOTERIDOL 20 ML: 279.3 INJECTION, SOLUTION INTRAVENOUS at 10:48

## 2023-12-03 ENCOUNTER — HOSPITAL ENCOUNTER (EMERGENCY)
Facility: CLINIC | Age: 63
Discharge: HOME | End: 2023-12-03
Attending: EMERGENCY MEDICINE
Payer: COMMERCIAL

## 2023-12-03 ENCOUNTER — APPOINTMENT (OUTPATIENT)
Dept: GENERAL RADIOLOGY | Facility: CLINIC | Age: 63
End: 2023-12-03
Payer: COMMERCIAL

## 2023-12-03 VITALS
DIASTOLIC BLOOD PRESSURE: 108 MMHG | HEIGHT: 70 IN | RESPIRATION RATE: 16 BRPM | BODY MASS INDEX: 28.2 KG/M2 | OXYGEN SATURATION: 98 % | WEIGHT: 197 LBS | SYSTOLIC BLOOD PRESSURE: 165 MMHG | HEART RATE: 67 BPM | TEMPERATURE: 97.7 F

## 2023-12-03 DIAGNOSIS — S92.401A CLOSED FRACTURE OF PHALANX OF RIGHT GREAT TOE, PHYSEAL INVOLVEMENT UNSPECIFIED, UNSPECIFIED PHALANX, INITIAL ENCOUNTER: ICD-10-CM

## 2023-12-03 DIAGNOSIS — J06.9 ACUTE UPPER RESPIRATORY INFECTION: Primary | ICD-10-CM

## 2023-12-03 LAB
SARS-COV-2 RDRP RESP QL NAA+PROBE: NOT DETECTED
SPECIMEN DESCRIPTION: NORMAL

## 2023-12-03 PROCEDURE — 71046 X-RAY EXAM CHEST 2 VIEWS: CPT

## 2023-12-03 PROCEDURE — 73630 X-RAY EXAM OF FOOT: CPT

## 2023-12-03 PROCEDURE — 99284 EMERGENCY DEPT VISIT MOD MDM: CPT

## 2023-12-03 PROCEDURE — 87635 SARS-COV-2 COVID-19 AMP PRB: CPT

## 2023-12-03 PROCEDURE — 6370000000 HC RX 637 (ALT 250 FOR IP): Performed by: EMERGENCY MEDICINE

## 2023-12-03 RX ORDER — BENZONATATE 100 MG/1
100 CAPSULE ORAL 2 TIMES DAILY PRN
Qty: 20 CAPSULE | Refills: 0 | Status: SHIPPED | OUTPATIENT
Start: 2023-12-03 | End: 2023-12-10

## 2023-12-03 RX ORDER — GUAIFENESIN 600 MG/1
600 TABLET, EXTENDED RELEASE ORAL 2 TIMES DAILY
Qty: 30 TABLET | Refills: 0 | Status: SHIPPED | OUTPATIENT
Start: 2023-12-03 | End: 2023-12-18

## 2023-12-03 RX ORDER — ALBUTEROL SULFATE 90 UG/1
2 AEROSOL, METERED RESPIRATORY (INHALATION) 4 TIMES DAILY PRN
Qty: 18 G | Refills: 0 | Status: SHIPPED | OUTPATIENT
Start: 2023-12-03

## 2023-12-03 RX ORDER — FLUTICASONE PROPIONATE 50 MCG
1 SPRAY, SUSPENSION (ML) NASAL DAILY
Qty: 16 G | Refills: 0 | Status: SHIPPED | OUTPATIENT
Start: 2023-12-03

## 2023-12-03 RX ORDER — BENZONATATE 100 MG/1
100 CAPSULE ORAL ONCE
Status: COMPLETED | OUTPATIENT
Start: 2023-12-03 | End: 2023-12-03

## 2023-12-03 RX ORDER — METHYLPREDNISOLONE 4 MG/1
TABLET ORAL
Qty: 1 KIT | Refills: 0 | Status: SHIPPED | OUTPATIENT
Start: 2023-12-03 | End: 2023-12-09

## 2023-12-03 RX ORDER — METOPROLOL TARTRATE 50 MG/1
100 TABLET, FILM COATED ORAL ONCE
Status: COMPLETED | OUTPATIENT
Start: 2023-12-03 | End: 2023-12-03

## 2023-12-03 RX ADMIN — METOPROLOL TARTRATE 100 MG: 50 TABLET, FILM COATED ORAL at 09:57

## 2023-12-03 RX ADMIN — BENZONATATE 100 MG: 100 CAPSULE ORAL at 09:57

## 2023-12-03 ASSESSMENT — ENCOUNTER SYMPTOMS
RHINORRHEA: 1
SHORTNESS OF BREATH: 0
VOMITING: 0
DIARRHEA: 0
SINUS PRESSURE: 1
NAUSEA: 0
FACIAL SWELLING: 0
SINUS PAIN: 1
ABDOMINAL PAIN: 0
COUGH: 1
WHEEZING: 1

## 2023-12-03 ASSESSMENT — PAIN - FUNCTIONAL ASSESSMENT: PAIN_FUNCTIONAL_ASSESSMENT: NONE - DENIES PAIN

## 2023-12-03 NOTE — DISCHARGE INSTRUCTIONS
Take the medication as instructed. Use the inhaler every 4 hours and as needed. Follow-up with your primary care doctor in the morning for reevaluation. Return to the emergency department with any problems or concerns as discussed.

## 2023-12-03 NOTE — ED PROVIDER NOTES
[facility]  [address]  Phone: [address] [id number] [place]      Pt Name: Rodolfo Gates  MRN: [id number]  [address] [facility] 1960  Date of evaluation: 12/3/2023    CHIEF COMPLAINT       Chief Complaint   Patient presents with    URI       HISTORY OF PRESENT ILLNESS    Rodolfo Gates is a 59 y.o. male who presents planing of runny nose nasal congestion. He has a cough which is intermittently productive of yellow sputum. He has green snot when he blows his nose. He states symptoms started 3 days ago and they are moving down to his chest.  He states he has had intermittent wheezing. He has not used his inhaler at home. He has no known exposure to COVID-19. Patient also states he has a history of hypertension but has not taken his metoprolol today. He has atrial fibrillation denies any chest pain or palpitations. He has chronic varicosities and on a second complaint he states that 4 weeks ago his drop to weight onto his right great toe causing some swelling but he has not had a checked out and wants me to look at it. He denies any paresthesias, weakness he has been able to put weight on it. REVIEW OF SYSTEMS     Review of Systems   HENT:  Positive for congestion, rhinorrhea, sinus pressure and sinus pain. Negative for ear pain and facial swelling. Respiratory:  Positive for cough and wheezing. Negative for shortness of breath. Cardiovascular:  Negative for chest pain, palpitations and leg swelling. Gastrointestinal:  Negative for abdominal pain, diarrhea, nausea and vomiting. Genitourinary:  Negative for dysuria and hematuria. Neurological:  Negative for numbness and headaches. All other systems reviewed and are negative.       PAST MEDICAL HISTORY    has a past medical history of Abscess of bursa of right elbow, Arthritis, Atrial fibrillation ([address]), BPH (benign prostatic hyperplasia), Heartburn, Hypertension, Panic attack, Polycythemia, PONV

## 2024-01-15 ENCOUNTER — APPOINTMENT (OUTPATIENT)
Dept: GENERAL RADIOLOGY | Facility: CLINIC | Age: 64
End: 2024-01-15
Payer: COMMERCIAL

## 2024-01-15 ENCOUNTER — HOSPITAL ENCOUNTER (EMERGENCY)
Facility: CLINIC | Age: 64
Discharge: HOME | End: 2024-01-15
Attending: EMERGENCY MEDICINE
Payer: COMMERCIAL

## 2024-01-15 VITALS
WEIGHT: 197 LBS | BODY MASS INDEX: 28.2 KG/M2 | RESPIRATION RATE: 16 BRPM | SYSTOLIC BLOOD PRESSURE: 146 MMHG | DIASTOLIC BLOOD PRESSURE: 84 MMHG | HEIGHT: 70 IN | TEMPERATURE: 97.7 F | HEART RATE: 57 BPM | OXYGEN SATURATION: 96 %

## 2024-01-15 DIAGNOSIS — R55 NEAR SYNCOPE: ICD-10-CM

## 2024-01-15 DIAGNOSIS — R42 LIGHTHEADEDNESS: Primary | ICD-10-CM

## 2024-01-15 LAB
ANION GAP SERPL CALCULATED.3IONS-SCNC: 7 MMOL/L (ref 9–17)
BASOPHILS # BLD: 0 K/UL (ref 0–0.2)
BASOPHILS NFR BLD: 1 % (ref 0–2)
BUN SERPL-MCNC: 18 MG/DL (ref 8–23)
CALCIUM SERPL-MCNC: 9.3 MG/DL (ref 8.6–10.4)
CHLORIDE SERPL-SCNC: 103 MMOL/L (ref 98–107)
CO2 SERPL-SCNC: 27 MMOL/L (ref 20–31)
CREAT SERPL-MCNC: 0.9 MG/DL (ref 0.7–1.2)
D DIMER PPP FEU-MCNC: 0.37 UG/ML FEU
EOSINOPHIL # BLD: 0.2 K/UL (ref 0–0.4)
EOSINOPHILS RELATIVE PERCENT: 4 % (ref 1–4)
ERYTHROCYTE [DISTWIDTH] IN BLOOD BY AUTOMATED COUNT: 14.2 % (ref 12.5–15.4)
GFR SERPL CREATININE-BSD FRML MDRD: >60 ML/MIN/1.73M2
GLUCOSE SERPL-MCNC: 138 MG/DL (ref 70–99)
HCT VFR BLD AUTO: 51.5 % (ref 41–53)
HGB BLD-MCNC: 17.5 G/DL (ref 13.5–17.5)
LYMPHOCYTES NFR BLD: 1 K/UL (ref 1–4.8)
LYMPHOCYTES RELATIVE PERCENT: 24 % (ref 24–44)
MCH RBC QN AUTO: 30.8 PG (ref 26–34)
MCHC RBC AUTO-ENTMCNC: 34.1 G/DL (ref 31–37)
MCV RBC AUTO: 90.2 FL (ref 80–100)
MONOCYTES NFR BLD: 0.4 K/UL (ref 0.1–1.2)
MONOCYTES NFR BLD: 11 % (ref 2–11)
NEUTROPHILS NFR BLD: 60 % (ref 36–66)
NEUTS SEG NFR BLD: 2.4 K/UL (ref 1.8–7.7)
PLATELET # BLD AUTO: 226 K/UL (ref 140–450)
PMV BLD AUTO: 7.3 FL (ref 6–12)
POTASSIUM SERPL-SCNC: 4.9 MMOL/L (ref 3.7–5.3)
RBC # BLD AUTO: 5.7 M/UL (ref 4.5–5.9)
SODIUM SERPL-SCNC: 137 MMOL/L (ref 135–144)
T4 FREE SERPL-MCNC: 0.9 NG/DL (ref 0.9–1.7)
TROPONIN I SERPL HS-MCNC: 13 NG/L (ref 0–22)
WBC OTHER # BLD: 4.1 K/UL (ref 3.5–11)

## 2024-01-15 PROCEDURE — 71045 X-RAY EXAM CHEST 1 VIEW: CPT

## 2024-01-15 PROCEDURE — 85379 FIBRIN DEGRADATION QUANT: CPT

## 2024-01-15 PROCEDURE — 99285 EMERGENCY DEPT VISIT HI MDM: CPT

## 2024-01-15 PROCEDURE — 84484 ASSAY OF TROPONIN QUANT: CPT

## 2024-01-15 PROCEDURE — 93005 ELECTROCARDIOGRAM TRACING: CPT | Performed by: EMERGENCY MEDICINE

## 2024-01-15 PROCEDURE — 85025 COMPLETE CBC W/AUTO DIFF WBC: CPT

## 2024-01-15 PROCEDURE — 36415 COLL VENOUS BLD VENIPUNCTURE: CPT

## 2024-01-15 PROCEDURE — 80048 BASIC METABOLIC PNL TOTAL CA: CPT

## 2024-01-15 PROCEDURE — 84439 ASSAY OF FREE THYROXINE: CPT

## 2024-01-15 NOTE — DISCHARGE INSTRUCTIONS
Follow-up with your cardiologist as scheduled today.  Return to the ER for any worsening of condition, chest pain, palpitations, fainting or any other concerns.   O-Z Flap Text: The defect edges were debeveled with a #15 scalpel blade.  Given the location of the defect, shape of the defect and the proximity to free margins an O-Z flap was deemed most appropriate.  Using a sterile surgical marker, an appropriate transposition flap was drawn incorporating the defect and placing the expected incisions within the relaxed skin tension lines where possible. The area thus outlined was incised deep to adipose tissue with a #15 scalpel blade.  The skin margins were undermined to an appropriate distance in all directions utilizing iris scissors.

## 2024-01-15 NOTE — ED PROVIDER NOTES
MD,(electronically signed)  Board Certified Emergency Physician            Lorie Marquez MD  01/15/24 [id number]

## 2024-01-16 LAB
EKG ATRIAL RATE: 53 BPM
EKG P AXIS: 14 DEGREES
EKG P-R INTERVAL: 202 MS
EKG Q-T INTERVAL: 414 MS
EKG QRS DURATION: 108 MS
EKG QTC CALCULATION (BAZETT): 388 MS
EKG R AXIS: -13 DEGREES
EKG T AXIS: 66 DEGREES
EKG VENTRICULAR RATE: 53 BPM

## 2024-01-16 NOTE — H&P
Pre-op History and Physical      Patient:  Joel Hardin  MRN: [id number]  YOB: 1960    HISTORY OF PRESENT ILLNESS:     The patient is a 63 y.o. male who presents with elevated PSA, abnormal prostate MRI - 1.2cm PIRADS 3 left mid gland posterolateral peripheral zone. Here for Fusion prostate biopsy with US.    Patient's old records, notes and chart reviewed and summarized above.         Past Medical History:    Past Medical History:   Diagnosis Date    Abscess of bursa of right elbow     Acute tear medial meniscus     Right    Arthritis     Atrial fibrillation (HCC) 10/15/2021    Chronic    BPH (benign prostatic hyperplasia) 11/30/2023    Cardiomyopathy (HCC)     COVID-19 09/2021    Diverticulitis     Elevated PSA 11/30/2023    Erectile dysfunction     Esophagitis     Gastroparesis     Generalized anxiety disorder     GERD (gastroesophageal reflux disease)     Hypertension     Nodular prostate 11/30/2023    Panic attack 11/24/2021    Polycythemia 03/2021    States that bloodwork has been normal and that he no longer has this. (1/2024)    PONV (postoperative nausea and vomiting)     Renal insufficiency     Rupture of triceps tendon 02/2021    Sleep apnea     Has CPAP.  Uses but not every night. (1/2024)    Spondylolisthesis of lumbar region     Under care of team     PCP - Dr. Leigh - last visit 8/28/2023    Under care of team     Cardiology - Dr. Serrato - [facility] - last visit 1/15/2024 - F/U 3 mos    Under care of team     GI - Dr. Quiroz - last visit 7/7/2023    Under care of team     Urology - Dr. Adkins - last visit 11/30/2023    Under care of team     Hem/Onc - Dr. Ríos - last visit 4/2021 - F/U 6mos    Wears glasses        Past Surgical History:    Past Surgical History:   Procedure Laterality Date    APPENDECTOMY      ARM SURGERY Right 07/13/2021    RIGHT ELBOW IRRIGATION AND DEBRIDEMENT performed by Karli Desai MD at [facility] OR    CARDIAC CATHETERIZATION  2017    [facility]/ no stents

## 2024-01-17 ENCOUNTER — ANESTHESIA (OUTPATIENT)
Dept: OPERATING ROOM | Age: 64
End: 2024-01-17
Payer: COMMERCIAL

## 2024-01-17 ENCOUNTER — HOSPITAL ENCOUNTER (OUTPATIENT)
Dept: ULTRASOUND IMAGING | Age: 64
Setting detail: OUTPATIENT SURGERY
Discharge: HOME | End: 2024-01-19
Attending: UROLOGY
Payer: COMMERCIAL

## 2024-01-17 ENCOUNTER — HOSPITAL ENCOUNTER (OUTPATIENT)
Age: 64
Setting detail: OUTPATIENT SURGERY
Discharge: HOME | End: 2024-01-17
Attending: UROLOGY | Admitting: UROLOGY
Payer: COMMERCIAL

## 2024-01-17 ENCOUNTER — ANESTHESIA EVENT (OUTPATIENT)
Dept: OPERATING ROOM | Age: 64
End: 2024-01-17
Payer: COMMERCIAL

## 2024-01-17 VITALS
DIASTOLIC BLOOD PRESSURE: 85 MMHG | BODY MASS INDEX: 29.35 KG/M2 | RESPIRATION RATE: 16 BRPM | OXYGEN SATURATION: 95 % | HEART RATE: 59 BPM | TEMPERATURE: 97.2 F | WEIGHT: 205 LBS | SYSTOLIC BLOOD PRESSURE: 125 MMHG | HEIGHT: 70 IN

## 2024-01-17 DIAGNOSIS — R97.20 ELEVATED PSA: ICD-10-CM

## 2024-01-17 PROCEDURE — 2500000003 HC RX 250 WO HCPCS: Performed by: NURSE ANESTHETIST, CERTIFIED REGISTERED

## 2024-01-17 PROCEDURE — 7100000011 HC PHASE II RECOVERY - ADDTL 15 MIN: Performed by: UROLOGY

## 2024-01-17 PROCEDURE — 3600000012 HC SURGERY LEVEL 2 ADDTL 15MIN: Performed by: UROLOGY

## 2024-01-17 PROCEDURE — 88344 IMHCHEM/IMCYTCHM EA MLT ANTB: CPT

## 2024-01-17 PROCEDURE — 2500000003 HC RX 250 WO HCPCS: Performed by: UROLOGY

## 2024-01-17 PROCEDURE — 3700000001 HC ADD 15 MINUTES (ANESTHESIA): Performed by: UROLOGY

## 2024-01-17 PROCEDURE — 2580000003 HC RX 258: Performed by: UROLOGY

## 2024-01-17 PROCEDURE — 2709999900 HC NON-CHARGEABLE SUPPLY: Performed by: UROLOGY

## 2024-01-17 PROCEDURE — 76942 ECHO GUIDE FOR BIOPSY: CPT

## 2024-01-17 PROCEDURE — 6360000002 HC RX W HCPCS: Performed by: ANESTHESIOLOGY

## 2024-01-17 PROCEDURE — 7100000010 HC PHASE II RECOVERY - FIRST 15 MIN: Performed by: UROLOGY

## 2024-01-17 PROCEDURE — 6360000002 HC RX W HCPCS: Performed by: NURSE ANESTHETIST, CERTIFIED REGISTERED

## 2024-01-17 PROCEDURE — 88305 TISSUE EXAM BY PATHOLOGIST: CPT

## 2024-01-17 PROCEDURE — 3700000000 HC ANESTHESIA ATTENDED CARE: Performed by: UROLOGY

## 2024-01-17 PROCEDURE — 3600000002 HC SURGERY LEVEL 2 BASE: Performed by: UROLOGY

## 2024-01-17 RX ORDER — MEPERIDINE HYDROCHLORIDE 50 MG/ML
12.5 INJECTION INTRAMUSCULAR; INTRAVENOUS; SUBCUTANEOUS EVERY 5 MIN PRN
Status: DISCONTINUED | OUTPATIENT
Start: 2024-01-17 | End: 2024-01-17 | Stop reason: HOSPADM

## 2024-01-17 RX ORDER — DEXAMETHASONE SODIUM PHOSPHATE 10 MG/ML
INJECTION INTRAMUSCULAR; INTRAVENOUS PRN
Status: DISCONTINUED | OUTPATIENT
Start: 2024-01-17 | End: 2024-01-17 | Stop reason: SDUPTHER

## 2024-01-17 RX ORDER — SODIUM CHLORIDE 0.9 % (FLUSH) 0.9 %
5-40 SYRINGE (ML) INJECTION PRN
Status: DISCONTINUED | OUTPATIENT
Start: 2024-01-17 | End: 2024-01-17 | Stop reason: HOSPADM

## 2024-01-17 RX ORDER — PROPOFOL 10 MG/ML
INJECTION, EMULSION INTRAVENOUS CONTINUOUS PRN
Status: DISCONTINUED | OUTPATIENT
Start: 2024-01-17 | End: 2024-01-17 | Stop reason: SDUPTHER

## 2024-01-17 RX ORDER — FENTANYL CITRATE 50 UG/ML
INJECTION, SOLUTION INTRAMUSCULAR; INTRAVENOUS PRN
Status: DISCONTINUED | OUTPATIENT
Start: 2024-01-17 | End: 2024-01-17 | Stop reason: SDUPTHER

## 2024-01-17 RX ORDER — LIDOCAINE HYDROCHLORIDE 10 MG/ML
INJECTION, SOLUTION INFILTRATION; PERINEURAL PRN
Status: DISCONTINUED | OUTPATIENT
Start: 2024-01-17 | End: 2024-01-17 | Stop reason: HOSPADM

## 2024-01-17 RX ORDER — ONDANSETRON 2 MG/ML
INJECTION INTRAMUSCULAR; INTRAVENOUS PRN
Status: DISCONTINUED | OUTPATIENT
Start: 2024-01-17 | End: 2024-01-17 | Stop reason: SDUPTHER

## 2024-01-17 RX ORDER — ONDANSETRON 2 MG/ML
4 INJECTION INTRAMUSCULAR; INTRAVENOUS
Status: DISCONTINUED | OUTPATIENT
Start: 2024-01-17 | End: 2024-01-17 | Stop reason: HOSPADM

## 2024-01-17 RX ORDER — SODIUM CHLORIDE 0.9 % (FLUSH) 0.9 %
5-40 SYRINGE (ML) INJECTION EVERY 12 HOURS SCHEDULED
Status: DISCONTINUED | OUTPATIENT
Start: 2024-01-17 | End: 2024-01-17 | Stop reason: HOSPADM

## 2024-01-17 RX ORDER — LIDOCAINE HYDROCHLORIDE 10 MG/ML
1 INJECTION, SOLUTION INFILTRATION; PERINEURAL
Status: DISCONTINUED | OUTPATIENT
Start: 2024-01-17 | End: 2024-01-17 | Stop reason: HOSPADM

## 2024-01-17 RX ORDER — SODIUM CHLORIDE, SODIUM LACTATE, POTASSIUM CHLORIDE, CALCIUM CHLORIDE 600; 310; 30; 20 MG/100ML; MG/100ML; MG/100ML; MG/100ML
INJECTION, SOLUTION INTRAVENOUS CONTINUOUS
Status: DISCONTINUED | OUTPATIENT
Start: 2024-01-17 | End: 2024-01-17 | Stop reason: HOSPADM

## 2024-01-17 RX ORDER — SODIUM CHLORIDE 9 MG/ML
INJECTION, SOLUTION INTRAVENOUS PRN
Status: DISCONTINUED | OUTPATIENT
Start: 2024-01-17 | End: 2024-01-17 | Stop reason: HOSPADM

## 2024-01-17 RX ORDER — LIDOCAINE HYDROCHLORIDE 10 MG/ML
INJECTION, SOLUTION INFILTRATION; PERINEURAL PRN
Status: DISCONTINUED | OUTPATIENT
Start: 2024-01-17 | End: 2024-01-17 | Stop reason: SDUPTHER

## 2024-01-17 RX ORDER — FENTANYL CITRATE 50 UG/ML
25 INJECTION, SOLUTION INTRAMUSCULAR; INTRAVENOUS EVERY 5 MIN PRN
Status: COMPLETED | OUTPATIENT
Start: 2024-01-17 | End: 2024-01-17

## 2024-01-17 RX ORDER — MIDAZOLAM HYDROCHLORIDE 2 MG/2ML
1 INJECTION, SOLUTION INTRAMUSCULAR; INTRAVENOUS EVERY 10 MIN PRN
Status: COMPLETED | OUTPATIENT
Start: 2024-01-17 | End: 2024-01-17

## 2024-01-17 RX ADMIN — FENTANYL CITRATE 25 MCG: 50 INJECTION, SOLUTION INTRAMUSCULAR; INTRAVENOUS at 10:13

## 2024-01-17 RX ADMIN — PROPOFOL 100 MCG/KG/MIN: 10 INJECTION, EMULSION INTRAVENOUS at 09:08

## 2024-01-17 RX ADMIN — MIDAZOLAM HYDROCHLORIDE 1 MG: 1 INJECTION, SOLUTION INTRAMUSCULAR; INTRAVENOUS at 08:38

## 2024-01-17 RX ADMIN — ONDANSETRON 4 MG: 2 INJECTION INTRAMUSCULAR; INTRAVENOUS at 09:09

## 2024-01-17 RX ADMIN — MIDAZOLAM HYDROCHLORIDE 1 MG: 1 INJECTION, SOLUTION INTRAMUSCULAR; INTRAVENOUS at 08:27

## 2024-01-17 RX ADMIN — SODIUM CHLORIDE, POTASSIUM CHLORIDE, SODIUM LACTATE AND CALCIUM CHLORIDE: 600; 310; 30; 20 INJECTION, SOLUTION INTRAVENOUS at 08:00

## 2024-01-17 RX ADMIN — FENTANYL CITRATE 50 MCG: 50 INJECTION, SOLUTION INTRAMUSCULAR; INTRAVENOUS at 09:04

## 2024-01-17 RX ADMIN — FENTANYL CITRATE 50 MCG: 50 INJECTION, SOLUTION INTRAMUSCULAR; INTRAVENOUS at 09:21

## 2024-01-17 RX ADMIN — DEXAMETHASONE SODIUM PHOSPHATE 10 MG: 10 INJECTION INTRAMUSCULAR; INTRAVENOUS at 09:09

## 2024-01-17 RX ADMIN — LIDOCAINE HYDROCHLORIDE 50 MG: 10 INJECTION, SOLUTION EPIDURAL; INFILTRATION; INTRACAUDAL; PERINEURAL at 09:08

## 2024-01-17 RX ADMIN — FENTANYL CITRATE 25 MCG: 50 INJECTION, SOLUTION INTRAMUSCULAR; INTRAVENOUS at 10:04

## 2024-01-17 ASSESSMENT — PAIN DESCRIPTION - DESCRIPTORS
DESCRIPTORS: ACHING
DESCRIPTORS: ACHING

## 2024-01-17 ASSESSMENT — PAIN SCALES - GENERAL
PAINLEVEL_OUTOF10: 7
PAINLEVEL_OUTOF10: 4
PAINLEVEL_OUTOF10: 7

## 2024-01-17 ASSESSMENT — PAIN DESCRIPTION - LOCATION
LOCATION: SHOULDER

## 2024-01-17 ASSESSMENT — PAIN DESCRIPTION - ORIENTATION
ORIENTATION: LEFT;RIGHT
ORIENTATION: RIGHT;LEFT

## 2024-01-17 NOTE — OP NOTE
Operative Note      Patient: Joel Hardin  YOB: 1960  MRN: [id number]    Date of Procedure: 1/17/2024    Pre-Op Diagnosis Codes:     * Elevated PSA [R97.20]    Post-Op Diagnosis: Same       Procedure(s):  FUSION PROSTATE BIOPSY, ULTRASOUND    Surgeon(s):  Timothy Adkins MD    Assistant:   Resident: Kvng Herring MD    Anesthesia: Monitor Anesthesia Care    Estimated Blood Loss (mL): Minimal    Complications: None    Specimens:   ID Type Source Tests Collected by Time Destination   A : PROSTATE BIOPSY X12 Tissue Prostate SURGICAL PATHOLOGY Timothy Adkins MD 1/17/2024 0725    B : LEFT TARGET LESION Tissue Prostate SURGICAL PATHOLOGY Timothy Adkins MD 1/17/2024 0726        Implants:  * No implants in log *      Drains: * No LDAs found *    Indication: This is 63 y.o. gentleman with PSA of:  Lab Results   Component Value Date    PSA 3.47 08/11/2023    PSA 2.5 08/11/2023      He is here today for biopsy of the prostate.  He had an MRI of prostate which showed PI-RADS 3 with the concerning lesion at left mid gland posterolateral peripheral zone mapped for the fusion biopsy.  Risks benefits and alternatives goals and possible complications of the procedure were explained to the patient for consent was obtained.  He has elected to proceed.    Procedure in Detail: Patient was brought back to the operating room table.  He was laid in the left lateral position.  EPC cuffs were placed on and functioning prior to induction of anesthesia.  Anesthesia was inducted. A timeout performed.  The ultrasound probe was placed per rectum using the popchips biopsy system.  The prostate was brought into view.  The prostate was then mapped and fused to the MRI using the FusionOne technology. His prostate is approximately 52 cc by ultrasound.  Seminal vesicles appeared normal.      Using the fused MRI/ultrasound technology, targeted lesions were identified.  8 biopsies were taken from the target area in left mid gland

## 2024-01-17 NOTE — ANESTHESIA PRE PROCEDURE
1/16/2024) 60 tablet 3   • glucose monitoring (FREESTYLE FREEDOM) kit 1 kit by Does not apply route daily 1 kit 0       Allergies:    Allergies   Allergen Reactions   • Pcn [Penicillins] Hives       Problem List:    Patient Active Problem List   Diagnosis Code   • Rupture of right triceps tendon S46.311A   • Abscess of right elbow L02.413   • Chest pain R07.9   • Essential hypertension I10   • Epigastric pain R10.13   • Gastritis K29.70   • Esophagitis K20.90   • New onset a-fib ([facility]) I48.91   • Atrial fibrillation with RVR ([facility]) I48.91   • Polycythemia D75.1   • Hypoproteinemia ([facility]) E77.8   • Acute bronchitis J20.9   • Abnormal thallium stress test R94.39   • Acute medial meniscus tear of right knee S83.241A   • Cardiomyopathy ([facility]) I42.9   • Chondromalacia of right knee M94.261   • Erectile dysfunction N52.9   • Heartburn R12   • Primary localized osteoarthrosis of shoulder region M19.019   • Renal insufficiency syndrome N28.9   • Spondylolisthesis of lumbar region M43.16   • SEBASTIAN (obstructive sleep apnea) G47.33   • Primary insomnia F51.01   • Generalized anxiety disorder F41.1   • Cramps of right lower extremity R25.2   • Chronic atrial fibrillation ([facility]) I48.20   • A-fib ([facility]) I48.91   • Non-ulcer dyspepsia K30   • Anxiety F41.9       Past Medical History:        Diagnosis Date   • Abscess of bursa of right elbow    • Acute tear medial meniscus     Right   • Arthritis    • Atrial fibrillation ([facility]) 10/15/2021    Chronic   • BPH (benign prostatic hyperplasia) 11/30/2023   • Cardiomyopathy ([facility])    • COVID-19 09/2021   • Diverticulitis    • Elevated PSA 11/30/2023   • Erectile dysfunction    • Esophagitis    • Gastroparesis    • Generalized anxiety disorder    • GERD (gastroesophageal reflux disease)    • Hypertension    • Nodular prostate 11/30/2023   • Panic attack 11/24/2021   • Polycythemia 03/2021    States that bloodwork has been normal and that he no longer has this. (1/2024)   • PONV (postoperative nausea

## 2024-01-17 NOTE — ANESTHESIA POSTPROCEDURE EVALUATION
Department of Anesthesiology  Postprocedure Note    Patient: Joel Hardin  MRN: [id number]  YOB: 1960  Date of evaluation: 1/17/2024    Procedure Summary     Date: 01/17/24 Room / Location: [address]    Anesthesia Start: 0900 Anesthesia Stop: 0933    Procedure: FUSION PROSTATE BIOPSY, ULTRASOUND Diagnosis:       Elevated PSA      (Elevated PSA [R97.20])    Surgeons: Timothy Adkins MD Responsible Provider: Otis Patel MD    Anesthesia Type: MAC ASA Status: 2          Anesthesia Type: No value filed.    Vivien Phase I:      Vivien Phase II: Vivien Score: 8    Anesthesia Post Evaluation    Patient location during evaluation: PACU  Patient participation: complete - patient participated  Level of consciousness: awake and alert  Pain score: 2  Airway patency: patent  Nausea & Vomiting: no vomiting and no nausea  Cardiovascular status: hemodynamically stable  Respiratory status: acceptable  Hydration status: stable  Pain management: adequate        No notable events documented.

## 2024-01-17 NOTE — DISCHARGE INSTRUCTIONS
Prostate Biopsy Discharge Instructions:    Complete entire course of antibiotics as prescribed.   Take prescriptions as directed.  No driving while taking narcotic pain medication.  Hold blood thinners after biopsy. May resume 5 days after procedure. If minimal or no blood in urine, OK to resume 3 days after procedure  Please call attending physician or hospital  with questions.  Please call or present to ED for fever >101 F, shaking, chills, intractable nausea and vomiting, or uncontrolled pain.  OK to shower after discharge.  You may see blood in your urine, stools, and semen for up to 6 weeks. This is normal.   Follow up with Dr. Adkins in 1-2 weeks to discuss biopsy results. Call office to confirm appointment.    No alcoholic beverages, no driving or operating machinery, no making important decisions for 24 hours.   You may have a normal diet but should eat lightly day of surgery.  Drink plenty of fluids.  Urinate within 8 hours after surgery, if unable to urinate call your doctor

## 2024-01-22 LAB — SURGICAL PATHOLOGY REPORT: NORMAL

## 2024-01-29 RX ORDER — DILTIAZEM HYDROCHLORIDE 120 MG/1
120 CAPSULE, EXTENDED RELEASE ORAL DAILY
Qty: 30 CAPSULE | Refills: 3 | OUTPATIENT
Start: 2024-01-29

## 2024-02-03 ENCOUNTER — HOSPITAL ENCOUNTER (EMERGENCY)
Facility: CLINIC | Age: 64
Discharge: HOME | End: 2024-02-03
Attending: SPECIALIST
Payer: COMMERCIAL

## 2024-02-03 ENCOUNTER — APPOINTMENT (OUTPATIENT)
Dept: GENERAL RADIOLOGY | Facility: CLINIC | Age: 64
End: 2024-02-03
Attending: SPECIALIST
Payer: COMMERCIAL

## 2024-02-03 VITALS
WEIGHT: 190 LBS | DIASTOLIC BLOOD PRESSURE: 63 MMHG | OXYGEN SATURATION: 97 % | SYSTOLIC BLOOD PRESSURE: 117 MMHG | HEART RATE: 67 BPM | BODY MASS INDEX: 27.2 KG/M2 | RESPIRATION RATE: 15 BRPM | HEIGHT: 70 IN

## 2024-02-03 DIAGNOSIS — R07.9 CHEST PAIN, UNSPECIFIED TYPE: ICD-10-CM

## 2024-02-03 DIAGNOSIS — R55 NEAR SYNCOPE: Primary | ICD-10-CM

## 2024-02-03 LAB
ANION GAP SERPL CALCULATED.3IONS-SCNC: 10 MMOL/L (ref 9–17)
BASOPHILS # BLD: 0.1 K/UL (ref 0–0.2)
BASOPHILS NFR BLD: 1 % (ref 0–2)
BUN SERPL-MCNC: 13 MG/DL (ref 8–23)
CALCIUM SERPL-MCNC: 9.9 MG/DL (ref 8.6–10.4)
CHLORIDE SERPL-SCNC: 101 MMOL/L (ref 98–107)
CO2 SERPL-SCNC: 25 MMOL/L (ref 20–31)
CREAT SERPL-MCNC: 0.8 MG/DL (ref 0.7–1.2)
D DIMER PPP FEU-MCNC: 0.49 UG/ML FEU
EOSINOPHIL # BLD: 0.2 K/UL (ref 0–0.4)
EOSINOPHILS RELATIVE PERCENT: 2 % (ref 1–4)
ERYTHROCYTE [DISTWIDTH] IN BLOOD BY AUTOMATED COUNT: 14.9 % (ref 12.5–15.4)
GFR SERPL CREATININE-BSD FRML MDRD: >60 ML/MIN/1.73M2
GLUCOSE SERPL-MCNC: 102 MG/DL (ref 70–99)
HCT VFR BLD AUTO: 47.1 % (ref 41–53)
HGB BLD-MCNC: 16.6 G/DL (ref 13.5–17.5)
LYMPHOCYTES NFR BLD: 1.4 K/UL (ref 1–4.8)
LYMPHOCYTES RELATIVE PERCENT: 18 % (ref 24–44)
MAGNESIUM SERPL-MCNC: 2 MG/DL (ref 1.6–2.6)
MCH RBC QN AUTO: 31.3 PG (ref 26–34)
MCHC RBC AUTO-ENTMCNC: 35.2 G/DL (ref 31–37)
MCV RBC AUTO: 89 FL (ref 80–100)
MONOCYTES NFR BLD: 0.7 K/UL (ref 0.1–1.2)
MONOCYTES NFR BLD: 9 % (ref 2–11)
NEUTROPHILS NFR BLD: 70 % (ref 36–66)
NEUTS SEG NFR BLD: 5.4 K/UL (ref 1.8–7.7)
PLATELET # BLD AUTO: 271 K/UL (ref 140–450)
PMV BLD AUTO: 7.1 FL (ref 6–12)
POTASSIUM SERPL-SCNC: 3.8 MMOL/L (ref 3.7–5.3)
RBC # BLD AUTO: 5.29 M/UL (ref 4.5–5.9)
SODIUM SERPL-SCNC: 136 MMOL/L (ref 135–144)
TROPONIN I SERPL HS-MCNC: 12 NG/L (ref 0–22)
WBC OTHER # BLD: 7.6 K/UL (ref 3.5–11)

## 2024-02-03 PROCEDURE — 71046 X-RAY EXAM CHEST 2 VIEWS: CPT

## 2024-02-03 PROCEDURE — 93005 ELECTROCARDIOGRAM TRACING: CPT | Performed by: SPECIALIST

## 2024-02-03 PROCEDURE — 83735 ASSAY OF MAGNESIUM: CPT

## 2024-02-03 PROCEDURE — 85025 COMPLETE CBC W/AUTO DIFF WBC: CPT

## 2024-02-03 PROCEDURE — 85379 FIBRIN DEGRADATION QUANT: CPT

## 2024-02-03 PROCEDURE — 99285 EMERGENCY DEPT VISIT HI MDM: CPT

## 2024-02-03 PROCEDURE — 84484 ASSAY OF TROPONIN QUANT: CPT

## 2024-02-03 PROCEDURE — 36415 COLL VENOUS BLD VENIPUNCTURE: CPT

## 2024-02-03 PROCEDURE — 80048 BASIC METABOLIC PNL TOTAL CA: CPT

## 2024-02-03 PROCEDURE — 6370000000 HC RX 637 (ALT 250 FOR IP): Performed by: SPECIALIST

## 2024-02-03 RX ORDER — ASPIRIN 81 MG/1
324 TABLET, CHEWABLE ORAL ONCE
Status: COMPLETED | OUTPATIENT
Start: 2024-02-03 | End: 2024-02-03

## 2024-02-03 RX ADMIN — ASPIRIN 81 MG CHEWABLE TABLET 324 MG: 81 TABLET CHEWABLE at 21:24

## 2024-02-03 ASSESSMENT — PAIN - FUNCTIONAL ASSESSMENT: PAIN_FUNCTIONAL_ASSESSMENT: NONE - DENIES PAIN

## 2024-02-03 ASSESSMENT — ENCOUNTER SYMPTOMS
COUGH: 0
WHEEZING: 0
ABDOMINAL PAIN: 0
BACK PAIN: 0
VOMITING: 0
SORE THROAT: 0
DIARRHEA: 0
SHORTNESS OF BREATH: 1

## 2024-02-04 NOTE — ED NOTES
Pt presents to the ED via private auto accompanied by his wife. Pt states he was watching television when he suddenly developed lightheadedness and felt like he was going to pass out. Pt checked his heart monitoring watch and found his heart rate to be 40. /100. Pt got up and started to walk around to increase his heart rate which was successful. Pt also c/o palpitations and developed left sided chest pain just as he arrived here.

## 2024-02-04 NOTE — ED NOTES
Pt presents to the ED via private auto accompanied by his wife. Pt states he was sitting watching TV and suddenly felt lightheaded. Pt checked his heart monitoring watch to find his heart rate low at a rate of 40. Checked his /100. Pt got up and walked around which increased his heart rate.

## 2024-02-04 NOTE — DISCHARGE INSTRUCTIONS
Please understand that at this time there is no evidence for a more serious underlying process, but that early in the process of an illness or injury, an emergency department workup can be falsely reassuring.  You should contact your family doctor within the next 48 hours for a follow up appointment    THANK YOU!!!    From [facility] and [facility]    On behalf of the Emergency Department staff at [facility], I would like to thank you for giving us the opportunity to address your health care needs and concerns.    We hope that during your visit, our service was delivered in a professional and caring manner. Please keep [facility] in mind as we walk with you down the path to your own personal wellness.     Please expect an automated text message or email from us so we can ask a few questions about your health and progress. Based on your answers, a clinician may call you back to offer help and instructions.    Please understand that early in the process of an illness or injury, an emergency department workup can be falsely reassuring.  If you notice any worsening, changing or persistent symptoms please call your family doctor or return to the ER immediately.     Tell us how we did during your visit at [email or web address]   and let us know about your experience

## 2024-02-04 NOTE — ED PROVIDER NOTES
[facility]  [address] [facility] [address]  Phone: [phone]      Pt Name: Joel Hardin  MRN: [id number]  Birthdate 1960  Date of evaluation: 2/3/2024      CHIEF COMPLAINT       Chief Complaint   Patient presents with    Chest Pain    Loss of Consciousness         HISTORY OF PRESENT ILLNESS    Joel Hardin is a 63 y.o. male who presents   Chief Complaint   Patient presents with    Chest Pain    Loss of Consciousness   .    63-year-old male patient presents to the emergency department accompanied by his significant other for evaluation of sudden onset of feeling lightheaded, dizzy and feeling like he may pass out while resting watching movie at home associated with slight sharp substernal chest pain which lasted only few seconds and then resolved.  Patient noticed bradycardia with heart rate around 40 on his cell phone with irregular heart rhythm.  His blood pressure initially was around 135 systolic and then went up to around 200 systolic when he decided to come to the ED.  He has history of atrial fibrillation and takes Eliquis and cardia.  He also has history of hypertension and is on metoprolol.  He admits to having shortness of breath in addition to lightheadedness and dizziness.  He denies any nausea, vomiting or diaphoresis.  He denies any swelling in the legs or calf pain.  He has history of similar episodes in the past but never had syncopal episode.  Patient was seen in our emergency department about 3 weeks ago and has seen his cardiologist the next day and they discussed about ablation therapy.  He denies any history of known coronary artery disease and has had stress test as well as cardiac catheterization about 5 years ago which revealed normal coronaries.  There are no exacerbating or relieving factors and his symptoms are resolved at this time.    REVIEW OF SYSTEMS     Review of Systems   Constitutional:  Negative for diaphoresis and fatigue.   HENT:  Negative for congestion

## 2024-02-05 LAB
EKG ATRIAL RATE: 79 BPM
EKG P AXIS: 69 DEGREES
EKG Q-T INTERVAL: 376 MS
EKG QRS DURATION: 104 MS
EKG QTC CALCULATION (BAZETT): 431 MS
EKG R AXIS: 34 DEGREES
EKG T AXIS: 69 DEGREES
EKG VENTRICULAR RATE: 79 BPM

## 2024-02-06 ENCOUNTER — OFFICE VISIT (OUTPATIENT)
Dept: INTERNAL MEDICINE CLINIC | Age: 64
End: 2024-02-06
Payer: COMMERCIAL

## 2024-02-06 VITALS
WEIGHT: 205 LBS | OXYGEN SATURATION: 97 % | HEIGHT: 70 IN | DIASTOLIC BLOOD PRESSURE: 90 MMHG | BODY MASS INDEX: 29.35 KG/M2 | SYSTOLIC BLOOD PRESSURE: 144 MMHG | HEART RATE: 69 BPM

## 2024-02-06 DIAGNOSIS — F41.9 ANXIETY: Primary | ICD-10-CM

## 2024-02-06 DIAGNOSIS — I48.91 ATRIAL FIBRILLATION, UNSPECIFIED TYPE (HCC): ICD-10-CM

## 2024-02-06 DIAGNOSIS — F43.10 PTSD (POST-TRAUMATIC STRESS DISORDER): ICD-10-CM

## 2024-02-06 PROCEDURE — G8417 CALC BMI ABV UP PARAM F/U: HCPCS | Performed by: NURSE PRACTITIONER

## 2024-02-06 PROCEDURE — G8427 DOCREV CUR MEDS BY ELIG CLIN: HCPCS | Performed by: NURSE PRACTITIONER

## 2024-02-06 PROCEDURE — 3080F DIAST BP >= 90 MM HG: CPT | Performed by: NURSE PRACTITIONER

## 2024-02-06 PROCEDURE — 4004F PT TOBACCO SCREEN RCVD TLK: CPT | Performed by: NURSE PRACTITIONER

## 2024-02-06 PROCEDURE — 99214 OFFICE O/P EST MOD 30 MIN: CPT | Performed by: NURSE PRACTITIONER

## 2024-02-06 PROCEDURE — 3077F SYST BP >= 140 MM HG: CPT | Performed by: NURSE PRACTITIONER

## 2024-02-06 PROCEDURE — 3017F COLORECTAL CA SCREEN DOC REV: CPT | Performed by: NURSE PRACTITIONER

## 2024-02-06 PROCEDURE — G8484 FLU IMMUNIZE NO ADMIN: HCPCS | Performed by: NURSE PRACTITIONER

## 2024-02-06 RX ORDER — ALPRAZOLAM 0.25 MG/1
0.25 TABLET ORAL 3 TIMES DAILY PRN
Qty: 42 TABLET | Refills: 0 | Status: SHIPPED | OUTPATIENT
Start: 2024-02-06 | End: 2024-02-20

## 2024-02-06 RX ORDER — DILTIAZEM HYDROCHLORIDE 120 MG/1
120 CAPSULE, EXTENDED RELEASE ORAL DAILY
COMMUNITY
Start: 2024-01-15

## 2024-02-06 NOTE — PROGRESS NOTES
CLINIC  [address] [place] [id number]  Dept: [phone]  Dept Fax: [phone]    Office Progress/Follow Up Note  Date of patient's visit: 2/6/2024   Patient's Name:  Joel Hardin  YOB: 1960            Patient Care Team:  Katlyn Leigh MD as PCP - General (Internal Medicine)  Katlyn Leigh MD as PCP - Empaneled Provider  Fidencio Ramirez MD as Consulting Physician (Gastroenterology)    REASON FOR VISIT: Anxiety (Has been going to the ED multiple times in the last year fr panic attacks, these panic attacks send him into AFIB and causes him to go to the hospital, has not taken anything and just would like some help. )        HISTORY OF PRESENT ILLNESS:      History was obtained from the patient. Joel Hardin is a 63 y.o. is here for Anxiety (Has been going to the ED multiple times in the last year fr panic attacks, these panic attacks send him into AFIB and causes him to go to the hospital, has not taken anything and just would like some help. )    HPI  Patient is here for panic attacks/Anxiety, also with PTSD. Not new, has been seen previously by Dr Owens, Dr Loya, Dr Rangel, Dr Leigh for [facility] and has been taking Xanax prn since at least 2021. Was on Citalopram at one point. 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 He reports that when the anxiety starts building, it will affect his heart rate and then it escalates, he panics and goes to ER. He has never tried counseling. He was referred to psych previously but is not currently following with them. He eats healthy, exercises every day (or tries to). Lengthy discussion was had with him regarding best longterm management of anxiety, risks vs benefit of benzos. I feel he would benefit from cognitive behavioral therapy to develop better coping mechanisms. He is open to this and so will rx short term supply only of Xanax while he is establishing with behavioral health.

## 2024-03-06 ENCOUNTER — OFFICE VISIT (OUTPATIENT)
Dept: INTERNAL MEDICINE CLINIC | Age: 64
End: 2024-03-06
Payer: COMMERCIAL

## 2024-03-06 VITALS
DIASTOLIC BLOOD PRESSURE: 80 MMHG | WEIGHT: 211 LBS | SYSTOLIC BLOOD PRESSURE: 128 MMHG | HEIGHT: 70 IN | BODY MASS INDEX: 30.21 KG/M2 | HEART RATE: 74 BPM | OXYGEN SATURATION: 97 %

## 2024-03-06 DIAGNOSIS — F41.9 ANXIETY: ICD-10-CM

## 2024-03-06 DIAGNOSIS — G47.33 OSA (OBSTRUCTIVE SLEEP APNEA): ICD-10-CM

## 2024-03-06 DIAGNOSIS — I10 ESSENTIAL HYPERTENSION: ICD-10-CM

## 2024-03-06 DIAGNOSIS — F51.01 PRIMARY INSOMNIA: ICD-10-CM

## 2024-03-06 DIAGNOSIS — F41.1 GENERALIZED ANXIETY DISORDER: ICD-10-CM

## 2024-03-06 DIAGNOSIS — E77.8 HYPOPROTEINEMIA (HCC): ICD-10-CM

## 2024-03-06 DIAGNOSIS — I48.91 NEW ONSET A-FIB (HCC): ICD-10-CM

## 2024-03-06 DIAGNOSIS — I42.9 CARDIOMYOPATHY, UNSPECIFIED TYPE (HCC): ICD-10-CM

## 2024-03-06 DIAGNOSIS — I48.20 CHRONIC ATRIAL FIBRILLATION (HCC): Primary | ICD-10-CM

## 2024-03-06 DIAGNOSIS — F43.10 PTSD (POST-TRAUMATIC STRESS DISORDER): ICD-10-CM

## 2024-03-06 PROCEDURE — 3079F DIAST BP 80-89 MM HG: CPT | Performed by: INTERNAL MEDICINE

## 2024-03-06 PROCEDURE — 3074F SYST BP LT 130 MM HG: CPT | Performed by: INTERNAL MEDICINE

## 2024-03-06 PROCEDURE — 4004F PT TOBACCO SCREEN RCVD TLK: CPT | Performed by: INTERNAL MEDICINE

## 2024-03-06 PROCEDURE — G8427 DOCREV CUR MEDS BY ELIG CLIN: HCPCS | Performed by: INTERNAL MEDICINE

## 2024-03-06 PROCEDURE — G8417 CALC BMI ABV UP PARAM F/U: HCPCS | Performed by: INTERNAL MEDICINE

## 2024-03-06 PROCEDURE — G8484 FLU IMMUNIZE NO ADMIN: HCPCS | Performed by: INTERNAL MEDICINE

## 2024-03-06 PROCEDURE — 3017F COLORECTAL CA SCREEN DOC REV: CPT | Performed by: INTERNAL MEDICINE

## 2024-03-06 PROCEDURE — 99214 OFFICE O/P EST MOD 30 MIN: CPT | Performed by: INTERNAL MEDICINE

## 2024-03-06 RX ORDER — ALPRAZOLAM 0.25 MG/1
0.25 TABLET ORAL 3 TIMES DAILY PRN
Qty: 30 TABLET | Refills: 0 | Status: SHIPPED | OUTPATIENT
Start: 2024-03-06 | End: 2024-03-21

## 2024-03-06 RX ORDER — TADALAFIL 20 MG/1
20 TABLET ORAL DAILY
COMMUNITY
Start: 2024-01-31

## 2024-03-06 NOTE — PROGRESS NOTES
[facility]  [address] [address]  Dept: [phone]  Dept Fax: [phone]     Name: Joel Hardin  : 1960           Chief Complaint   Patient presents with    Insomnia     Patient cannot sleep- has been trying different medication and has quit most of them and does not think they are helping him. Cannot remember the last time he has a full night of sleep.        History of Present Illness:    HPI  Here for follow up   Having trouble with sleep ,   Tried multiple meds   Have been on xanax off and on   Seen psych services       Afib , seeing cardiologist and as per Joel , cardiologist took him off of Eliquis, only taking asa,   [id number] score is 1,    Will let cardiology plan on afib     Past Medical History:    Past Medical History:   Diagnosis Date    Abscess of bursa of right elbow     Acute tear medial meniscus     Right    Arthritis     Atrial fibrillation (HCC) 10/15/2021    Chronic    BPH (benign prostatic hyperplasia) 2023    Cardiomyopathy (HCC)     COVID-19 2021    Diverticulitis     Elevated PSA 2023    Erectile dysfunction     Esophagitis     Gastroparesis     Generalized anxiety disorder     GERD (gastroesophageal reflux disease)     Hypertension     Nodular prostate 2023    Panic attack 2021    Polycythemia 2021    States that bloodwork has been normal and that he no longer has this. (2024)    PONV (postoperative nausea and vomiting)     Renal insufficiency     Rupture of triceps tendon 2021    Sleep apnea     Has CPAP.  Uses but not every night. (2024)    Spondylolisthesis of lumbar region     Under care of team     PCP - Dr. Leigh - last visit 2023    Under care of team     Cardiology - Dr. Serrato - [facility] - last visit 1/15/2024 - F/U 3 mos    Under care of team     GI - Dr. Quiroz - last visit 2023    Under care of team     Urology - Dr. Adkins - last visit 2023    Under care of

## 2024-03-06 NOTE — PROGRESS NOTES
Visit Information    Have you changed or started any medications since your last visit including any over-the-counter medicines, vitamins, or herbal medicines? no   Are you having any side effects from any of your medications? -  no  Have you stopped taking any of your medications? Is so, why? -  no    Have you seen any other physician or provider since your last visit? No  Have you had any other diagnostic tests since your last visit? No  Have you been seen in the emergency room and/or had an admission to a hospital since we last saw you? No  Have you had your routine dental cleaning in the past 6 months? no    Have you activated your [employer] account? If not, what are your barriers? Yes     Patient Care Team:  Katlyn Leigh MD as PCP - General (Internal Medicine)  Katlyn Leigh MD as PCP - Empaneled Provider  Fidencio Ramirez MD as Consulting Physician (Gastroenterology)    Medical History Review  Past Medical, Family, and Social History reviewed and does contribute to the patient presenting condition    Health Maintenance   Topic Date Due    COVID-19 Vaccine (1) Never done    Pneumococcal 0-64 years Vaccine (1 - PCV) Never done    HIV screen  Never done    Hepatitis C screen  Never done    DTaP/Tdap/Td vaccine (1 - Tdap) Never done    Shingles vaccine (1 of 2) Never done    Respiratory Syncytial Virus (RSV) Pregnant or age 60 yrs+ (1 - 1-dose 60+ series) Never done    Flu vaccine (1) 08/01/2023    Colorectal Cancer Screen  11/23/2024    Depression Screen  02/06/2025    Diabetes screen  03/18/2025    Lipids  03/18/2027    Hepatitis A vaccine  Aged Out    Hepatitis B vaccine  Aged Out    Hib vaccine  Aged Out    Polio vaccine  Aged Out    Meningococcal (ACWY) vaccine  Aged Out    A1C test (Diabetic or Prediabetic)  Discontinued    Prostate Specific Antigen (PSA) Screening or Monitoring  Discontinued

## 2024-03-28 DIAGNOSIS — F43.10 PTSD (POST-TRAUMATIC STRESS DISORDER): ICD-10-CM

## 2024-03-28 DIAGNOSIS — F41.9 ANXIETY: ICD-10-CM

## 2024-03-28 RX ORDER — ALPRAZOLAM 0.25 MG/1
0.25 TABLET ORAL 3 TIMES DAILY PRN
Qty: 30 TABLET | Refills: 0 | Status: SHIPPED | OUTPATIENT
Start: 2024-03-28 | End: 2024-04-12

## 2024-03-28 NOTE — TELEPHONE ENCOUNTER
----- Message from Sakina Ochoa sent at 3/28/2024 12:54 PM EDT -----  Subject: Refill Request    QUESTIONS  Name of Medication? ALPRAZolam (XANAX) 0.25 MG tablet  Patient-reported dosage and instructions? .25mh t  How many days do you have left? 0  Preferred Pharmacy? [id number]  Pharmacy phone number (if available)? [phone]  ---------------------------------------------------------------------------  --------------  CALL BACK INFO  What is the best way for the office to contact you? OK to leave message on   voicemail  Preferred Call Back Phone Number? [phone]  ---------------------------------------------------------------------------  --------------  SCRIPT ANSWERS  Relationship to Patient? Self

## 2024-04-05 ENCOUNTER — OFFICE VISIT (OUTPATIENT)
Dept: FAMILY MEDICINE CLINIC | Age: 64
End: 2024-04-05
Payer: COMMERCIAL

## 2024-04-05 ENCOUNTER — TELEPHONE (OUTPATIENT)
Dept: FAMILY MEDICINE CLINIC | Age: 64
End: 2024-04-05

## 2024-04-05 VITALS
HEIGHT: 70 IN | DIASTOLIC BLOOD PRESSURE: 87 MMHG | TEMPERATURE: 98.5 F | HEART RATE: 75 BPM | SYSTOLIC BLOOD PRESSURE: 150 MMHG | OXYGEN SATURATION: 98 % | BODY MASS INDEX: 28.49 KG/M2 | WEIGHT: 199 LBS

## 2024-04-05 DIAGNOSIS — R05.9 COUGH IN ADULT: ICD-10-CM

## 2024-04-05 DIAGNOSIS — U07.1 COVID-19: Primary | ICD-10-CM

## 2024-04-05 LAB
INFLUENZA A ANTIGEN, POC: NEGATIVE
INFLUENZA B ANTIGEN, POC: NEGATIVE
LOT EXPIRE DATE: 0
LOT KIT NUMBER: 0
SARS-COV-2, POC: DETECTED
VALID INTERNAL CONTROL: ABNORMAL
VENDOR AND KIT NAME POC: ABNORMAL

## 2024-04-05 PROCEDURE — 4004F PT TOBACCO SCREEN RCVD TLK: CPT | Performed by: NURSE PRACTITIONER

## 2024-04-05 PROCEDURE — 3079F DIAST BP 80-89 MM HG: CPT | Performed by: NURSE PRACTITIONER

## 2024-04-05 PROCEDURE — G8417 CALC BMI ABV UP PARAM F/U: HCPCS | Performed by: NURSE PRACTITIONER

## 2024-04-05 PROCEDURE — 3077F SYST BP >= 140 MM HG: CPT | Performed by: NURSE PRACTITIONER

## 2024-04-05 PROCEDURE — 87428 SARSCOV & INF VIR A&B AG IA: CPT | Performed by: NURSE PRACTITIONER

## 2024-04-05 PROCEDURE — 99213 OFFICE O/P EST LOW 20 MIN: CPT | Performed by: NURSE PRACTITIONER

## 2024-04-05 PROCEDURE — G8427 DOCREV CUR MEDS BY ELIG CLIN: HCPCS | Performed by: NURSE PRACTITIONER

## 2024-04-05 PROCEDURE — 3017F COLORECTAL CA SCREEN DOC REV: CPT | Performed by: NURSE PRACTITIONER

## 2024-04-05 RX ORDER — ESZOPICLONE 3 MG
TABLET ORAL
COMMUNITY
Start: 2024-04-01

## 2024-04-05 RX ORDER — FLUTICASONE PROPIONATE 50 MCG
2 SPRAY, SUSPENSION (ML) NASAL DAILY
Qty: 16 G | Refills: 0 | Status: SHIPPED | OUTPATIENT
Start: 2024-04-05 | End: 2024-04-05

## 2024-04-05 RX ORDER — FLUTICASONE PROPIONATE 50 MCG
2 SPRAY, SUSPENSION (ML) NASAL DAILY
Qty: 16 G | Refills: 0 | Status: SHIPPED | OUTPATIENT
Start: 2024-04-05

## 2024-04-05 ASSESSMENT — ENCOUNTER SYMPTOMS
DIARRHEA: 0
WHEEZING: 0
RHINORRHEA: 1
VOMITING: 0
APNEA: 0
SORE THROAT: 0
COUGH: 1
STRIDOR: 0
SHORTNESS OF BREATH: 0
ABDOMINAL PAIN: 0
SWOLLEN GLANDS: 0
CHEST TIGHTNESS: 0
NAUSEA: 0
CHOKING: 0
SINUS PAIN: 0

## 2024-04-05 NOTE — PROGRESS NOTES
headaches (last night, mild).   All other systems reviewed and are negative.    14 systems reviewed and negative except as listed in HPI.    Objective:     Physical Exam  Vitals and nursing note reviewed.   Constitutional:       General: He is not in acute distress.     Appearance: Normal appearance. He is well-developed. He is not ill-appearing, toxic-appearing or diaphoretic.      Comments: nontoxic   HENT:      Head: Normocephalic and atraumatic.      Right Ear: Tympanic membrane, ear canal and external ear normal.      Left Ear: Tympanic membrane, ear canal and external ear normal.      Nose: Congestion and rhinorrhea present.      Mouth/Throat:      Mouth: Mucous membranes are moist.      Pharynx: Oropharynx is clear. No oropharyngeal exudate or posterior oropharyngeal erythema.      Comments: Swallows without difficulty  Thin white pnd  Eyes:      General: No scleral icterus.        Right eye: No discharge.         Left eye: No discharge.      Conjunctiva/sclera: Conjunctivae normal.      Pupils: Pupils are equal, round, and reactive to light.   Cardiovascular:      Rate and Rhythm: Normal rate and regular rhythm.      Pulses: Normal pulses.      Heart sounds: Normal heart sounds.   Pulmonary:      Effort: Pulmonary effort is normal. No respiratory distress.      Breath sounds: Normal breath sounds. No stridor. No wheezing, rhonchi or rales.   Chest:      Chest wall: No tenderness.   Abdominal:      General: Bowel sounds are normal. There is no distension.      Palpations: Abdomen is soft.      Tenderness: There is no abdominal tenderness.   Musculoskeletal:         General: No tenderness or deformity. Normal range of motion.      Cervical back: Normal range of motion and neck supple. No tenderness.   Lymphadenopathy:      Cervical: No cervical adenopathy.   Skin:     General: Skin is warm and dry.      Capillary Refill: Capillary refill takes less than 2 seconds.      Findings: No rash ( no rash to visible

## 2024-04-05 NOTE — TELEPHONE ENCOUNTER
Patient called would like molnupiravir to be sent to [facility] on [address]. Pharmacy added to chart .

## 2024-04-15 ENCOUNTER — TELEPHONE (OUTPATIENT)
Dept: INTERNAL MEDICINE CLINIC | Age: 64
End: 2024-04-15

## 2024-04-15 NOTE — TELEPHONE ENCOUNTER
Patient called in with concerns of increased fatigue, patient is scheduled to see you on Wednesday, requesting labs to be ordered prior to appointment.   Please advise if you wish to do so.

## 2024-04-17 ENCOUNTER — OFFICE VISIT (OUTPATIENT)
Dept: INTERNAL MEDICINE CLINIC | Age: 64
End: 2024-04-17
Payer: COMMERCIAL

## 2024-04-17 ENCOUNTER — HOSPITAL ENCOUNTER (OUTPATIENT)
Facility: CLINIC | Age: 64
Discharge: HOME | End: 2024-04-17
Payer: COMMERCIAL

## 2024-04-17 VITALS
HEIGHT: 70 IN | BODY MASS INDEX: 30.06 KG/M2 | SYSTOLIC BLOOD PRESSURE: 122 MMHG | DIASTOLIC BLOOD PRESSURE: 72 MMHG | HEART RATE: 69 BPM | OXYGEN SATURATION: 98 % | WEIGHT: 210 LBS

## 2024-04-17 DIAGNOSIS — F41.9 ANXIETY: ICD-10-CM

## 2024-04-17 DIAGNOSIS — I10 ESSENTIAL HYPERTENSION: ICD-10-CM

## 2024-04-17 DIAGNOSIS — E55.9 VITAMIN D DEFICIENCY: ICD-10-CM

## 2024-04-17 DIAGNOSIS — E78.2 MIXED HYPERLIPIDEMIA: ICD-10-CM

## 2024-04-17 DIAGNOSIS — Z12.5 PROSTATE CANCER SCREENING: ICD-10-CM

## 2024-04-17 DIAGNOSIS — I10 ESSENTIAL HYPERTENSION: Primary | ICD-10-CM

## 2024-04-17 DIAGNOSIS — F43.10 PTSD (POST-TRAUMATIC STRESS DISORDER): ICD-10-CM

## 2024-04-17 LAB
25(OH)D3 SERPL-MCNC: 37.6 NG/ML (ref 30–100)
ALBUMIN SERPL-MCNC: 4.3 G/DL (ref 3.5–5.2)
ALBUMIN/GLOB SERPL: 2 {RATIO} (ref 1–2.5)
ALP SERPL-CCNC: 41 U/L (ref 40–129)
ALT SERPL-CCNC: 37 U/L (ref 10–50)
ANION GAP SERPL CALCULATED.3IONS-SCNC: 11 MMOL/L (ref 9–16)
AST SERPL-CCNC: 60 U/L (ref 10–50)
BASOPHILS # BLD: 0.07 K/UL (ref 0–0.2)
BASOPHILS NFR BLD: 1 % (ref 0–2)
BILIRUB SERPL-MCNC: 0.4 MG/DL (ref 0–1.2)
BILIRUB UR QL STRIP: NEGATIVE
BUN SERPL-MCNC: 15 MG/DL (ref 8–23)
CALCIUM SERPL-MCNC: 8.8 MG/DL (ref 8.6–10.4)
CHLORIDE SERPL-SCNC: 101 MMOL/L (ref 98–107)
CHOLEST SERPL-MCNC: 154 MG/DL (ref 0–199)
CHOLESTEROL/HDL RATIO: 4
CLARITY UR: CLEAR
CO2 SERPL-SCNC: 24 MMOL/L (ref 20–31)
COLOR UR: YELLOW
COMMENT: NORMAL
CREAT SERPL-MCNC: 1 MG/DL (ref 0.7–1.2)
EOSINOPHIL # BLD: 0.07 K/UL (ref 0–0.44)
EOSINOPHILS RELATIVE PERCENT: 1 % (ref 1–4)
ERYTHROCYTE [DISTWIDTH] IN BLOOD BY AUTOMATED COUNT: 12.5 % (ref 11.8–14.4)
EST. AVERAGE GLUCOSE BLD GHB EST-MCNC: 91 MG/DL
GFR SERPL CREATININE-BSD FRML MDRD: 83 ML/MIN/1.73M2
GLUCOSE SERPL-MCNC: 95 MG/DL (ref 74–99)
GLUCOSE UR STRIP-MCNC: NEGATIVE MG/DL
HBA1C MFR BLD: 4.8 % (ref 4–6)
HCT VFR BLD AUTO: 51.1 % (ref 40.7–50.3)
HDLC SERPL-MCNC: 37 MG/DL
HGB BLD-MCNC: 17.1 G/DL (ref 13–17)
HGB UR QL STRIP.AUTO: NEGATIVE
IMM GRANULOCYTES # BLD AUTO: <0.03 K/UL (ref 0–0.3)
IMM GRANULOCYTES NFR BLD: 0 %
KETONES UR STRIP-MCNC: NEGATIVE MG/DL
LDLC SERPL CALC-MCNC: 106 MG/DL (ref 0–100)
LEUKOCYTE ESTERASE UR QL STRIP: NEGATIVE
LYMPHOCYTES NFR BLD: 0.97 K/UL (ref 1.1–3.7)
LYMPHOCYTES RELATIVE PERCENT: 19 % (ref 24–43)
MCH RBC QN AUTO: 32.3 PG (ref 25.2–33.5)
MCHC RBC AUTO-ENTMCNC: 33.5 G/DL (ref 28.4–34.8)
MCV RBC AUTO: 96.4 FL (ref 82.6–102.9)
MONOCYTES NFR BLD: 0.45 K/UL (ref 0.1–1.2)
MONOCYTES NFR BLD: 9 % (ref 3–12)
NEUTROPHILS NFR BLD: 70 % (ref 36–65)
NEUTS SEG NFR BLD: 3.66 K/UL (ref 1.5–8.1)
NITRITE UR QL STRIP: NEGATIVE
NRBC BLD-RTO: 0 PER 100 WBC
PH UR STRIP: 7.5 [PH] (ref 5–8)
PLATELET # BLD AUTO: 242 K/UL (ref 138–453)
PMV BLD AUTO: 9.9 FL (ref 8.1–13.5)
POTASSIUM SERPL-SCNC: 4.7 MMOL/L (ref 3.7–5.3)
PROT SERPL-MCNC: 6.4 G/DL (ref 6.6–8.7)
PROT UR STRIP-MCNC: NEGATIVE MG/DL
RBC # BLD AUTO: 5.3 M/UL (ref 4.21–5.77)
SODIUM SERPL-SCNC: 136 MMOL/L (ref 136–145)
SP GR UR STRIP: 1.01 (ref 1–1.03)
TRIGL SERPL-MCNC: 57 MG/DL
TSH SERPL DL<=0.05 MIU/L-ACNC: 1.98 UIU/ML (ref 0.27–4.2)
UROBILINOGEN UR STRIP-ACNC: NORMAL EU/DL (ref 0–1)
VLDLC SERPL CALC-MCNC: 11 MG/DL
WBC OTHER # BLD: 5.2 K/UL (ref 3.5–11.3)

## 2024-04-17 PROCEDURE — G8427 DOCREV CUR MEDS BY ELIG CLIN: HCPCS | Performed by: INTERNAL MEDICINE

## 2024-04-17 PROCEDURE — 80061 LIPID PANEL: CPT

## 2024-04-17 PROCEDURE — 99214 OFFICE O/P EST MOD 30 MIN: CPT | Performed by: INTERNAL MEDICINE

## 2024-04-17 PROCEDURE — 3074F SYST BP LT 130 MM HG: CPT | Performed by: INTERNAL MEDICINE

## 2024-04-17 PROCEDURE — 80053 COMPREHEN METABOLIC PANEL: CPT

## 2024-04-17 PROCEDURE — 4004F PT TOBACCO SCREEN RCVD TLK: CPT | Performed by: INTERNAL MEDICINE

## 2024-04-17 PROCEDURE — G8417 CALC BMI ABV UP PARAM F/U: HCPCS | Performed by: INTERNAL MEDICINE

## 2024-04-17 PROCEDURE — 82306 VITAMIN D 25 HYDROXY: CPT

## 2024-04-17 PROCEDURE — 81003 URINALYSIS AUTO W/O SCOPE: CPT

## 2024-04-17 PROCEDURE — G0103 PSA SCREENING: HCPCS

## 2024-04-17 PROCEDURE — 85025 COMPLETE CBC W/AUTO DIFF WBC: CPT

## 2024-04-17 PROCEDURE — 3017F COLORECTAL CA SCREEN DOC REV: CPT | Performed by: INTERNAL MEDICINE

## 2024-04-17 PROCEDURE — 84443 ASSAY THYROID STIM HORMONE: CPT

## 2024-04-17 PROCEDURE — 83036 HEMOGLOBIN GLYCOSYLATED A1C: CPT

## 2024-04-17 PROCEDURE — 36415 COLL VENOUS BLD VENIPUNCTURE: CPT

## 2024-04-17 PROCEDURE — 3078F DIAST BP <80 MM HG: CPT | Performed by: INTERNAL MEDICINE

## 2024-04-17 RX ORDER — LANOLIN ALCOHOL/MO/W.PET/CERES
3 CREAM (GRAM) TOPICAL NIGHTLY PRN
COMMUNITY

## 2024-04-17 RX ORDER — ALPRAZOLAM 0.25 MG/1
0.25 TABLET ORAL 3 TIMES DAILY PRN
Qty: 30 TABLET | Refills: 0 | Status: SHIPPED | OUTPATIENT
Start: 2024-04-17 | End: 2024-05-02

## 2024-04-17 RX ORDER — METOPROLOL SUCCINATE 100 MG/1
100 TABLET, EXTENDED RELEASE ORAL DAILY
COMMUNITY
Start: 2024-03-31

## 2024-04-17 SDOH — ECONOMIC STABILITY: INCOME INSECURITY: HOW HARD IS IT FOR YOU TO PAY FOR THE VERY BASICS LIKE FOOD, HOUSING, MEDICAL CARE, AND HEATING?: NOT HARD AT ALL

## 2024-04-17 SDOH — ECONOMIC STABILITY: FOOD INSECURITY: WITHIN THE PAST 12 MONTHS, THE FOOD YOU BOUGHT JUST DIDN'T LAST AND YOU DIDN'T HAVE MONEY TO GET MORE.: NEVER TRUE

## 2024-04-17 SDOH — ECONOMIC STABILITY: FOOD INSECURITY: WITHIN THE PAST 12 MONTHS, YOU WORRIED THAT YOUR FOOD WOULD RUN OUT BEFORE YOU GOT MONEY TO BUY MORE.: NEVER TRUE

## 2024-04-17 NOTE — PROGRESS NOTES
Visit Information    Have you changed or started any medications since your last visit including any over-the-counter medicines, vitamins, or herbal medicines? no   Are you having any side effects from any of your medications? -  no  Have you stopped taking any of your medications? Is so, why? -  no    Have you seen any other physician or provider since your last visit? No  Have you had any other diagnostic tests since your last visit? No  Have you been seen in the emergency room and/or had an admission to a hospital since we last saw you? No  Have you had your routine dental cleaning in the past 6 months? no    Have you activated your [employer] account? If not, what are your barriers? Yes     Patient Care Team:  Katlyn Leigh MD as PCP - General (Internal Medicine)  Katlyn Leigh MD as PCP - Empaneled Provider  Fidencio Ramirez MD as Consulting Physician (Gastroenterology)    Medical History Review  Past Medical, Family, and Social History reviewed and does contribute to the patient presenting condition    Health Maintenance   Topic Date Due    COVID-19 Vaccine (1) Never done    Pneumococcal 0-64 years Vaccine (1 of 2 - PCV) Never done    HIV screen  Never done    Hepatitis C screen  Never done    DTaP/Tdap/Td vaccine (1 - Tdap) Never done    Shingles vaccine (1 of 2) Never done    Respiratory Syncytial Virus (RSV) Pregnant or age 60 yrs+ (1 - 1-dose 60+ series) Never done    Flu vaccine (Season Ended) 08/01/2024    Colorectal Cancer Screen  11/23/2024    Depression Screen  02/06/2025    Diabetes screen  03/18/2025    Lipids  03/18/2027    Hepatitis A vaccine  Aged Out    Hepatitis B vaccine  Aged Out    Hib vaccine  Aged Out    Polio vaccine  Aged Out    Meningococcal (ACWY) vaccine  Aged Out    A1C test (Diabetic or Prediabetic)  Discontinued    Prostate Specific Antigen (PSA) Screening or Monitoring  Discontinued

## 2024-04-17 NOTE — PROGRESS NOTES
[facility]  [address] [address]  Dept: [phone]  Dept Fax: [phone]     Name: Joel Hardin  : 1960           Chief Complaint   Patient presents with    Fatigue    Sleep Apnea     Seeing sleep specialist          History of Present Illness:    HPI  Here for follow up     Afib , seeing cardiologist and as per Joel , cardiologist took him off of Eliquis, only taking asa,   [id number] score is 1,    Will let cardiology plan on afib     Fatigue   SEBASTIAN/ CPAP q night     Anxiety   Takes xanax, which can make him tired , he understands it   Working very hard on life style     Reviewed   PSA/TSH   Labs   No abnormality found       Past Medical History:    Past Medical History:   Diagnosis Date    Abscess of bursa of right elbow     Acute tear medial meniscus     Right    Arthritis     Atrial fibrillation (HCC) 10/15/2021    Chronic    BPH (benign prostatic hyperplasia) 2023    Cardiomyopathy (HCC)     COVID-19 2021    Diverticulitis     Elevated PSA 2023    Erectile dysfunction     Esophagitis     Gastroparesis     Generalized anxiety disorder     GERD (gastroesophageal reflux disease)     Hypertension     Nodular prostate 2023    Panic attack 2021    Polycythemia 2021    States that bloodwork has been normal and that he no longer has this. (2024)    PONV (postoperative nausea and vomiting)     Renal insufficiency     Rupture of triceps tendon 2021    Sleep apnea     Has CPAP.  Uses but not every night. (2024)    Spondylolisthesis of lumbar region     Under care of team     PCP - Dr. Leigh - last visit 2023    Under care of team     Cardiology - Dr. Serrato - [facility] - last visit 1/15/2024 - F/U 3 mos    Under care of team     GI - Dr. Quiroz - last visit 2023    Under care of team     Urology - Dr. Adkins - last visit 2023    Under care of team     Hem/Onc - Dr. Ríos - last visit 2021 - F/U 6mos

## 2024-04-18 LAB — PSA SERPL-MCNC: 2.8 NG/ML (ref 0–4)

## 2024-04-25 ENCOUNTER — OFFICE VISIT (OUTPATIENT)
Dept: INTERNAL MEDICINE CLINIC | Age: 64
End: 2024-04-25
Payer: COMMERCIAL

## 2024-04-25 VITALS
HEART RATE: 79 BPM | DIASTOLIC BLOOD PRESSURE: 78 MMHG | HEIGHT: 70 IN | SYSTOLIC BLOOD PRESSURE: 126 MMHG | OXYGEN SATURATION: 98 % | WEIGHT: 210 LBS | BODY MASS INDEX: 30.06 KG/M2

## 2024-04-25 DIAGNOSIS — I10 ESSENTIAL HYPERTENSION: ICD-10-CM

## 2024-04-25 DIAGNOSIS — R74.8 ABNORMAL LIVER ENZYMES: Primary | ICD-10-CM

## 2024-04-25 DIAGNOSIS — G47.33 OSA ON CPAP: ICD-10-CM

## 2024-04-25 DIAGNOSIS — D75.1 POLYCYTHEMIA: ICD-10-CM

## 2024-04-25 DIAGNOSIS — I48.0 PAROXYSMAL ATRIAL FIBRILLATION (HCC): ICD-10-CM

## 2024-04-25 PROBLEM — I48.91 NEW ONSET A-FIB (HCC): Status: RESOLVED | Noted: 2021-10-16 | Resolved: 2024-04-25

## 2024-04-25 PROCEDURE — G8417 CALC BMI ABV UP PARAM F/U: HCPCS | Performed by: INTERNAL MEDICINE

## 2024-04-25 PROCEDURE — 3078F DIAST BP <80 MM HG: CPT | Performed by: INTERNAL MEDICINE

## 2024-04-25 PROCEDURE — G8427 DOCREV CUR MEDS BY ELIG CLIN: HCPCS | Performed by: INTERNAL MEDICINE

## 2024-04-25 PROCEDURE — 3074F SYST BP LT 130 MM HG: CPT | Performed by: INTERNAL MEDICINE

## 2024-04-25 PROCEDURE — 99214 OFFICE O/P EST MOD 30 MIN: CPT | Performed by: INTERNAL MEDICINE

## 2024-04-25 PROCEDURE — 4004F PT TOBACCO SCREEN RCVD TLK: CPT | Performed by: INTERNAL MEDICINE

## 2024-04-25 PROCEDURE — 3017F COLORECTAL CA SCREEN DOC REV: CPT | Performed by: INTERNAL MEDICINE

## 2024-04-25 RX ORDER — TRAZODONE HYDROCHLORIDE 150 MG/1
TABLET ORAL
COMMUNITY
Start: 2024-04-17

## 2024-04-26 NOTE — PROGRESS NOTES
Visit Information    Have you changed or started any medications since your last visit including any over-the-counter medicines, vitamins, or herbal medicines? no   Are you having any side effects from any of your medications? -  no  Have you stopped taking any of your medications? Is so, why? -  no    Have you seen any other physician or provider since your last visit? No  Have you had any other diagnostic tests since your last visit? Yes - Records Obtained  Have you been seen in the emergency room and/or had an admission to a hospital since we last saw you? No  Have you had your routine dental cleaning in the past 6 months? no    Have you activated your [employer] account? If not, what are your barriers? Yes     Patient Care Team:  Katlyn Leigh MD as PCP - General (Internal Medicine)  Katlyn Leigh MD as PCP - Empaneled Provider  Fidencio Ramirez MD as Consulting Physician (Gastroenterology)    Medical History Review  Past Medical, Family, and Social History reviewed and does contribute to the patient presenting condition    Health Maintenance   Topic Date Due    COVID-19 Vaccine (1) Never done    Pneumococcal 0-64 years Vaccine (1 of 2 - PCV) Never done    HIV screen  Never done    Hepatitis C screen  Never done    DTaP/Tdap/Td vaccine (1 - Tdap) Never done    Shingles vaccine (1 of 2) Never done    Respiratory Syncytial Virus (RSV) Pregnant or age 60 yrs+ (1 - 1-dose 60+ series) Never done    Flu vaccine (Season Ended) 08/01/2024    Colorectal Cancer Screen  11/23/2024    Depression Screen  02/06/2025    Lipids  04/17/2029    Hepatitis A vaccine  Aged Out    Hepatitis B vaccine  Aged Out    Hib vaccine  Aged Out    Polio vaccine  Aged Out    Meningococcal (ACWY) vaccine  Aged Out    A1C test (Diabetic or Prediabetic)  Discontinued    Diabetes screen  Discontinued    Prostate Specific Antigen (PSA) Screening or Monitoring  Discontinued       
[] yes         There are no discontinued medications.     Orders Placed This Encounter   Procedures    Hepatic Function Panel     Standing Status:   Future     Standing Expiration Date:   4/25/2025        There are no Patient Instructions on file for this visit.          Medication List            Accurate as of April 25, 2024 11:59 PM. If you have any questions, ask your nurse or doctor.                CONTINUE taking these medications      ALPRAZolam 0.25 MG tablet  Commonly known as: Xanax  Take 1 tablet by mouth 3 times daily as needed for Anxiety for up to 15 days. Max Daily Amount: 0.75 mg     Dilt- MG extended release capsule  Generic drug: dilTIAZem     melatonin 3 MG Tabs tablet     metoprolol succinate 100 MG extended release tablet  Commonly known as: TOPROL XL     tadalafil 20 MG tablet  Commonly known as: CIALIS     traZODone 150 MG tablet  Commonly known as: DESYREL                  FOLLOW UP  PLANS     No follow-ups on file.    [place] [address].   Phone [phone]   Fax: [phone]  Answering Service: [phone]

## 2024-05-15 DIAGNOSIS — F43.10 PTSD (POST-TRAUMATIC STRESS DISORDER): ICD-10-CM

## 2024-05-15 DIAGNOSIS — F41.9 ANXIETY: ICD-10-CM

## 2024-05-15 NOTE — TELEPHONE ENCOUNTER
Medication Requested: Xanax    Last visit: 2024  Next visit: 10/30/2024  Last refill: 2024    Med contract on file:  [] yes   [x] no    Last urine drug screen: N/A  Consistent with medication(s):    [x] yes   [] no    Last OARRS ran: Unknown    Quantity of medication remainin    Who will be picking rx up: Patient    Pharmacy if escribed: [facility] [address]

## 2024-05-16 RX ORDER — ALPRAZOLAM 0.25 MG/1
0.25 TABLET ORAL 3 TIMES DAILY PRN
Qty: 30 TABLET | Refills: 0 | Status: SHIPPED | OUTPATIENT
Start: 2024-05-16 | End: 2024-05-31

## 2024-06-02 SDOH — HEALTH STABILITY: PHYSICAL HEALTH: ON AVERAGE, HOW MANY DAYS PER WEEK DO YOU ENGAGE IN MODERATE TO STRENUOUS EXERCISE (LIKE A BRISK WALK)?: 4 DAYS

## 2024-06-05 ENCOUNTER — OFFICE VISIT (OUTPATIENT)
Dept: ORTHOPEDIC SURGERY | Age: 64
End: 2024-06-05
Payer: COMMERCIAL

## 2024-06-05 VITALS — HEIGHT: 70 IN | WEIGHT: 210 LBS | BODY MASS INDEX: 30.06 KG/M2

## 2024-06-05 DIAGNOSIS — M19.011 ARTHRITIS OF BOTH GLENOHUMERAL JOINTS: Primary | ICD-10-CM

## 2024-06-05 DIAGNOSIS — M25.512 LEFT SHOULDER PAIN, UNSPECIFIED CHRONICITY: ICD-10-CM

## 2024-06-05 DIAGNOSIS — M19.012 ARTHRITIS OF BOTH GLENOHUMERAL JOINTS: Primary | ICD-10-CM

## 2024-06-05 DIAGNOSIS — M75.82 TENDINITIS OF BOTH ROTATOR CUFFS: ICD-10-CM

## 2024-06-05 DIAGNOSIS — M25.511 RIGHT SHOULDER PAIN, UNSPECIFIED CHRONICITY: ICD-10-CM

## 2024-06-05 DIAGNOSIS — M75.81 TENDINITIS OF BOTH ROTATOR CUFFS: ICD-10-CM

## 2024-06-05 PROCEDURE — 3017F COLORECTAL CA SCREEN DOC REV: CPT

## 2024-06-05 PROCEDURE — 20610 DRAIN/INJ JOINT/BURSA W/O US: CPT

## 2024-06-05 PROCEDURE — G8417 CALC BMI ABV UP PARAM F/U: HCPCS

## 2024-06-05 PROCEDURE — 4004F PT TOBACCO SCREEN RCVD TLK: CPT

## 2024-06-05 PROCEDURE — 99203 OFFICE O/P NEW LOW 30 MIN: CPT

## 2024-06-05 PROCEDURE — G8427 DOCREV CUR MEDS BY ELIG CLIN: HCPCS

## 2024-06-05 RX ORDER — LIDOCAINE HYDROCHLORIDE 10 MG/ML
5 INJECTION, SOLUTION INFILTRATION; PERINEURAL ONCE
Status: COMPLETED | OUTPATIENT
Start: 2024-06-05 | End: 2024-06-05

## 2024-06-05 RX ORDER — TRIAMCINOLONE ACETONIDE 40 MG/ML
40 INJECTION, SUSPENSION INTRA-ARTICULAR; INTRAMUSCULAR ONCE
Status: COMPLETED | OUTPATIENT
Start: 2024-06-05 | End: 2024-06-05

## 2024-06-05 RX ADMIN — TRIAMCINOLONE ACETONIDE 40 MG: 40 INJECTION, SUSPENSION INTRA-ARTICULAR; INTRAMUSCULAR at 10:58

## 2024-06-05 RX ADMIN — TRIAMCINOLONE ACETONIDE 40 MG: 40 INJECTION, SUSPENSION INTRA-ARTICULAR; INTRAMUSCULAR at 10:57

## 2024-06-05 RX ADMIN — LIDOCAINE HYDROCHLORIDE 5 ML: 10 INJECTION, SOLUTION INFILTRATION; PERINEURAL at 10:56

## 2024-06-05 RX ADMIN — LIDOCAINE HYDROCHLORIDE 5 ML: 10 INJECTION, SOLUTION INFILTRATION; PERINEURAL at 10:57

## 2024-06-05 NOTE — PROGRESS NOTES
Orthopedic NEW shoulder Encounter Note     Chief complaint: Bilateral shoulder pain    HPI: Joel Hardin is a 63 y.o.  right-hand dominant male who presents for evaluation of his bilateral shoulder.  Patient presents to clinic today complaining of bilateral shoulder pain.  Patient states that he has had pain in the shoulder for couple years at this point.  Denies any mechanism of injury or trauma to the shoulders.  Patient states that he was an avid heavy  for some time and still is pretty active and weightlifting.  States that the pain mostly keeps him up at night and that makes it difficult for him to have a good night of sleep.  States that he did take a Norco that was around the house and it did help him sleep last night.  States that that medication did help him with the pain.  He does state that the left shoulder does bother him more than the right shoulder and endorses the pain in bilateral shoulders over the anterior aspect of the arm.  States that he feels cracking and crunching in the arm with movements and it is more painful for him to bring his arms down from a raised position.    Previous treatment:    NSAIDs: Ibuprofen as needed    Physical Therapy: Yes in the past    Injections: None    Surgeries: Surgery of some sort unsure what shoulder.    Review of Systems:   Constitutional: Negative for fever, chills, sweats.   Neurological: Negative for headache, numbness, or weakness.   Musculoskeletal: As noted in HPI     Past Medical History  Joel RUIZ  has a past medical history of Abscess of bursa of right elbow, Acute tear medial meniscus, Arthritis, Atrial fibrillation (HCC), BPH (benign prostatic hyperplasia), Cardiomyopathy (HCC), COVID-19, Diverticulitis, Elevated PSA, Erectile dysfunction, Esophagitis, Gastroparesis, Generalized anxiety disorder, GERD (gastroesophageal reflux disease), Hypertension, Nodular prostate, Panic attack, Polycythemia, PONV (postoperative nausea and vomiting), Renal

## 2024-06-14 ENCOUNTER — HOSPITAL ENCOUNTER (EMERGENCY)
Facility: CLINIC | Age: 64
Discharge: HOME | End: 2024-06-14
Attending: EMERGENCY MEDICINE
Payer: COMMERCIAL

## 2024-06-14 ENCOUNTER — APPOINTMENT (OUTPATIENT)
Dept: GENERAL RADIOLOGY | Facility: CLINIC | Age: 64
End: 2024-06-14
Attending: EMERGENCY MEDICINE
Payer: COMMERCIAL

## 2024-06-14 VITALS
BODY MASS INDEX: 27.06 KG/M2 | TEMPERATURE: 98.2 F | RESPIRATION RATE: 18 BRPM | HEIGHT: 70 IN | WEIGHT: 189 LBS | HEART RATE: 89 BPM | DIASTOLIC BLOOD PRESSURE: 75 MMHG | OXYGEN SATURATION: 97 % | SYSTOLIC BLOOD PRESSURE: 102 MMHG

## 2024-06-14 DIAGNOSIS — I48.91 ATRIAL FIBRILLATION, UNSPECIFIED TYPE (HCC): ICD-10-CM

## 2024-06-14 DIAGNOSIS — R07.9 CHEST PAIN, UNSPECIFIED TYPE: Primary | ICD-10-CM

## 2024-06-14 LAB
ANION GAP SERPL CALCULATED.3IONS-SCNC: 7 MMOL/L (ref 9–17)
BASOPHILS # BLD: 0 K/UL (ref 0–0.2)
BASOPHILS NFR BLD: 0 % (ref 0–2)
BUN SERPL-MCNC: 22 MG/DL (ref 8–23)
CALCIUM SERPL-MCNC: 9.5 MG/DL (ref 8.6–10.4)
CHLORIDE SERPL-SCNC: 106 MMOL/L (ref 98–107)
CO2 SERPL-SCNC: 25 MMOL/L (ref 20–31)
CREAT SERPL-MCNC: 1 MG/DL (ref 0.7–1.2)
EKG ATRIAL RATE: 113 BPM
EKG Q-T INTERVAL: 316 MS
EKG QRS DURATION: 88 MS
EKG QTC CALCULATION (BAZETT): 407 MS
EKG R AXIS: 3 DEGREES
EKG T AXIS: 69 DEGREES
EKG VENTRICULAR RATE: 100 BPM
EOSINOPHIL # BLD: 0.1 K/UL (ref 0–0.4)
EOSINOPHILS RELATIVE PERCENT: 1 % (ref 1–4)
ERYTHROCYTE [DISTWIDTH] IN BLOOD BY AUTOMATED COUNT: 15 % (ref 12.5–15.4)
GFR, ESTIMATED: 85 ML/MIN/1.73M2
GLUCOSE SERPL-MCNC: 122 MG/DL (ref 70–99)
HCT VFR BLD AUTO: 54.2 % (ref 41–53)
HGB BLD-MCNC: 18.5 G/DL (ref 13.5–17.5)
LYMPHOCYTES NFR BLD: 1.1 K/UL (ref 1–4.8)
LYMPHOCYTES RELATIVE PERCENT: 14 % (ref 24–44)
MAGNESIUM SERPL-MCNC: 2.3 MG/DL (ref 1.6–2.6)
MCH RBC QN AUTO: 31.2 PG (ref 26–34)
MCHC RBC AUTO-ENTMCNC: 34.2 G/DL (ref 31–37)
MCV RBC AUTO: 91.2 FL (ref 80–100)
MONOCYTES NFR BLD: 0.8 K/UL (ref 0.1–1.2)
MONOCYTES NFR BLD: 11 % (ref 2–11)
NEUTROPHILS NFR BLD: 74 % (ref 36–66)
NEUTS SEG NFR BLD: 5.8 K/UL (ref 1.8–7.7)
PLATELET # BLD AUTO: 260 K/UL (ref 140–450)
PMV BLD AUTO: 7.4 FL (ref 6–12)
POTASSIUM SERPL-SCNC: 4.3 MMOL/L (ref 3.7–5.3)
RBC # BLD AUTO: 5.94 M/UL (ref 4.5–5.9)
SODIUM SERPL-SCNC: 138 MMOL/L (ref 135–144)
TROPONIN I SERPL HS-MCNC: 13 NG/L (ref 0–22)
TROPONIN I SERPL HS-MCNC: 17 NG/L (ref 0–22)
WBC OTHER # BLD: 7.8 K/UL (ref 3.5–11)

## 2024-06-14 PROCEDURE — 84484 ASSAY OF TROPONIN QUANT: CPT

## 2024-06-14 PROCEDURE — 71046 X-RAY EXAM CHEST 2 VIEWS: CPT

## 2024-06-14 PROCEDURE — 99285 EMERGENCY DEPT VISIT HI MDM: CPT

## 2024-06-14 PROCEDURE — 83735 ASSAY OF MAGNESIUM: CPT

## 2024-06-14 PROCEDURE — 85025 COMPLETE CBC W/AUTO DIFF WBC: CPT

## 2024-06-14 PROCEDURE — 80048 BASIC METABOLIC PNL TOTAL CA: CPT

## 2024-06-14 RX ORDER — DILTIAZEM HYDROCHLORIDE 5 MG/ML
20 INJECTION INTRAVENOUS ONCE
Status: DISCONTINUED | OUTPATIENT
Start: 2024-06-14 | End: 2024-06-14 | Stop reason: HOSPADM

## 2024-06-14 RX ORDER — DILTIAZEM HYDROCHLORIDE 120 MG/1
120 CAPSULE, EXTENDED RELEASE ORAL DAILY
Qty: 30 CAPSULE | Refills: 0 | Status: SHIPPED | OUTPATIENT
Start: 2024-06-14

## 2024-06-14 ASSESSMENT — PAIN - FUNCTIONAL ASSESSMENT: PAIN_FUNCTIONAL_ASSESSMENT: NONE - DENIES PAIN

## 2024-06-14 NOTE — ED PROVIDER NOTES
22 ng/L   Troponin   Result Value Ref Range    Troponin, High Sensitivity 13 0 - 22 ng/L       Not indicated unless otherwise documented above    RADIOLOGY:   I reviewed the radiologist interpretations:    XR CHEST (2 VW)   Final Result   There is no evidence of acute chest disease.             Not indicated unless otherwise documented above    EMERGENCY DEPARTMENT COURSE:     The patient was given the following medications:  Orders Placed This Encounter   Medications    dilTIAZem injection 20 mg    apixaban (ELIQUIS) 5 MG TABS tablet     Sig: Take 1 tablet by mouth 2 times daily Pt resumed taking per self aprox 2 weeks ago     Dispense:  60 tablet     Refill:  0    DILT- MG extended release capsule     Sig: Take 1 capsule by mouth daily     Dispense:  30 capsule     Refill:  0        Vitals:   -------------------------  /75   Pulse 89   Temp 98.2 °F (36.8 °C)   Resp 18   Ht 1.778 m (5' 10\")   Wt 85.7 kg (189 lb)   SpO2 97%   BMI 27.12 kg/m²       CRITICAL CARE:    None    PROCEDURES:    None      OARRS Report if indicated       Patient was monitored.  He had thyroid test done 2 months ago.  Rest of his lab studies were unremarkable.  Troponin without any significant change.  His electrolytes are unremarkable.  The patient was monitored and his heart rates remain between .  So he was not given the Cardizem while in the emergency department.  All results discussed with patient.  He is to follow-up with his primary care doctor and cardiologist today.  He is advised to continue taking Eliquis and Cardizem and metoprolol.    The patient and wife understands that at this time there is no evidence for a more malignant underlying process, but also understands that early in the process of an illness or injury, an emergency department workup can be falsely reassuring.  Routine discharge counseling was given, and it is understood that worsening, changing or persistent symptoms should prompt an

## 2024-06-14 NOTE — DISCHARGE INSTRUCTIONS
Take the medication as instructed.  Follow-up with your primary care doctor and the cardiologist in the morning for reevaluation.  Return to the emergency department with any problems or concerns as discussed.

## 2024-06-17 LAB
EKG ATRIAL RATE: 113 BPM
EKG Q-T INTERVAL: 316 MS
EKG QRS DURATION: 88 MS
EKG QTC CALCULATION (BAZETT): 407 MS
EKG R AXIS: 3 DEGREES
EKG T AXIS: 69 DEGREES
EKG VENTRICULAR RATE: 100 BPM

## 2024-06-19 ENCOUNTER — TELEPHONE (OUTPATIENT)
Dept: INTERNAL MEDICINE CLINIC | Age: 64
End: 2024-06-19

## 2024-06-19 RX ORDER — BUSPIRONE HYDROCHLORIDE 10 MG/1
10 TABLET ORAL 2 TIMES DAILY
Qty: 60 TABLET | Refills: 0 | Status: SHIPPED | OUTPATIENT
Start: 2024-06-19 | End: 2024-07-19

## 2024-06-19 NOTE — TELEPHONE ENCOUNTER
Patient called and wanted to update Dr Leigh that he decided to stop taking the Alprazolam and start back on the Buspar that he had left at home from the previous provider prescriber . Patient feels that  Buspar  is working and he really is  benefiting from it rather then taking Alprazolam. Requesting if Dr Leigh could re prescribe Buspar since he is almost out, please advise    Note: med buspar is listed on current med list as historical med      [facility] [place]

## 2024-07-15 RX ORDER — BUSPIRONE HYDROCHLORIDE 10 MG/1
10 TABLET ORAL 2 TIMES DAILY
Qty: 60 TABLET | Refills: 0 | Status: SHIPPED | OUTPATIENT
Start: 2024-07-15

## 2024-08-12 RX ORDER — BUSPIRONE HYDROCHLORIDE 10 MG/1
10 TABLET ORAL 2 TIMES DAILY
Qty: 60 TABLET | Refills: 3 | Status: ON HOLD | OUTPATIENT
Start: 2024-08-12

## 2024-08-14 ENCOUNTER — HOSPITAL ENCOUNTER (OUTPATIENT)
Facility: CLINIC | Age: 64
Discharge: HOME | End: 2024-08-14
Payer: COMMERCIAL

## 2024-08-14 LAB
ANION GAP SERPL CALCULATED.3IONS-SCNC: 10 MMOL/L (ref 9–16)
BUN SERPL-MCNC: 14 MG/DL (ref 8–23)
CALCIUM SERPL-MCNC: 9.5 MG/DL (ref 8.6–10.4)
CHLORIDE SERPL-SCNC: 103 MMOL/L (ref 98–107)
CO2 SERPL-SCNC: 24 MMOL/L (ref 20–31)
CREAT SERPL-MCNC: 1.2 MG/DL (ref 0.7–1.2)
ERYTHROCYTE [DISTWIDTH] IN BLOOD BY AUTOMATED COUNT: 14.5 % (ref 11.8–14.4)
GFR, ESTIMATED: 71 ML/MIN/1.73M2
GLUCOSE SERPL-MCNC: 89 MG/DL (ref 74–99)
HCT VFR BLD AUTO: 52 % (ref 40.7–50.3)
HGB BLD-MCNC: 17.6 G/DL (ref 13–17)
MCH RBC QN AUTO: 30.9 PG (ref 25.2–33.5)
MCHC RBC AUTO-ENTMCNC: 33.8 G/DL (ref 28.4–34.8)
MCV RBC AUTO: 91.2 FL (ref 82.6–102.9)
NRBC BLD-RTO: 0 PER 100 WBC
PLATELET # BLD AUTO: 242 K/UL (ref 138–453)
PMV BLD AUTO: 9.9 FL (ref 8.1–13.5)
POTASSIUM SERPL-SCNC: 4.4 MMOL/L (ref 3.7–5.3)
RBC # BLD AUTO: 5.7 M/UL (ref 4.21–5.77)
SODIUM SERPL-SCNC: 137 MMOL/L (ref 136–145)
WBC OTHER # BLD: 7.1 K/UL (ref 3.5–11.3)

## 2024-08-14 PROCEDURE — 85027 COMPLETE CBC AUTOMATED: CPT

## 2024-08-14 PROCEDURE — 80048 BASIC METABOLIC PNL TOTAL CA: CPT

## 2024-08-14 PROCEDURE — 36415 COLL VENOUS BLD VENIPUNCTURE: CPT

## 2024-08-18 ENCOUNTER — HOSPITAL ENCOUNTER (INPATIENT)
Age: 64
LOS: 1 days | Discharge: HOME | DRG: 310 | End: 2024-08-19
Attending: EMERGENCY MEDICINE | Admitting: INTERNAL MEDICINE
Payer: COMMERCIAL

## 2024-08-18 ENCOUNTER — APPOINTMENT (OUTPATIENT)
Dept: GENERAL RADIOLOGY | Facility: CLINIC | Age: 64
DRG: 310 | End: 2024-08-18
Payer: COMMERCIAL

## 2024-08-18 DIAGNOSIS — I95.89 OTHER SPECIFIED HYPOTENSION: ICD-10-CM

## 2024-08-18 DIAGNOSIS — I48.91 ATRIAL FIBRILLATION WITH RAPID VENTRICULAR RESPONSE (HCC): Primary | ICD-10-CM

## 2024-08-18 LAB
ANION GAP SERPL CALCULATED.3IONS-SCNC: 11 MMOL/L (ref 9–17)
BNP SERPL-MCNC: 426 PG/ML
BUN SERPL-MCNC: 18 MG/DL (ref 8–23)
CALCIUM SERPL-MCNC: 9.5 MG/DL (ref 8.6–10.4)
CHLORIDE SERPL-SCNC: 102 MMOL/L (ref 98–107)
CO2 SERPL-SCNC: 24 MMOL/L (ref 20–31)
CREAT SERPL-MCNC: 1.1 MG/DL (ref 0.7–1.2)
ERYTHROCYTE [DISTWIDTH] IN BLOOD BY AUTOMATED COUNT: 15.9 % (ref 12.5–15.4)
GFR, ESTIMATED: 75 ML/MIN/1.73M2
GLUCOSE SERPL-MCNC: 120 MG/DL (ref 70–99)
HCT VFR BLD AUTO: 57.8 % (ref 41–53)
HGB BLD-MCNC: 19.8 G/DL (ref 13.5–17.5)
MAGNESIUM SERPL-MCNC: 2.1 MG/DL (ref 1.6–2.6)
MCH RBC QN AUTO: 31.6 PG (ref 26–34)
MCHC RBC AUTO-ENTMCNC: 34.3 G/DL (ref 31–37)
MCV RBC AUTO: 92.1 FL (ref 80–100)
PLATELET # BLD AUTO: 272 K/UL (ref 140–450)
PMV BLD AUTO: 8.1 FL (ref 6–12)
POTASSIUM SERPL-SCNC: 4.4 MMOL/L (ref 3.7–5.3)
RBC # BLD AUTO: 6.28 M/UL (ref 4.5–5.9)
SODIUM SERPL-SCNC: 137 MMOL/L (ref 135–144)
TROPONIN I SERPL HS-MCNC: 17 NG/L (ref 0–22)
TROPONIN I SERPL HS-MCNC: 18 NG/L (ref 0–22)
TSH SERPL DL<=0.05 MIU/L-ACNC: 3.39 UIU/ML (ref 0.27–4.2)
WBC OTHER # BLD: 7.6 K/UL (ref 3.5–11)

## 2024-08-18 PROCEDURE — 6370000000 HC RX 637 (ALT 250 FOR IP): Performed by: NURSE PRACTITIONER

## 2024-08-18 PROCEDURE — 83880 ASSAY OF NATRIURETIC PEPTIDE: CPT

## 2024-08-18 PROCEDURE — 80048 BASIC METABOLIC PNL TOTAL CA: CPT

## 2024-08-18 PROCEDURE — 2580000003 HC RX 258: Performed by: NURSE PRACTITIONER

## 2024-08-18 PROCEDURE — 93005 ELECTROCARDIOGRAM TRACING: CPT | Performed by: EMERGENCY MEDICINE

## 2024-08-18 PROCEDURE — 84443 ASSAY THYROID STIM HORMONE: CPT

## 2024-08-18 PROCEDURE — 96375 TX/PRO/DX INJ NEW DRUG ADDON: CPT

## 2024-08-18 PROCEDURE — 85027 COMPLETE CBC AUTOMATED: CPT

## 2024-08-18 PROCEDURE — 96374 THER/PROPH/DIAG INJ IV PUSH: CPT

## 2024-08-18 PROCEDURE — 71045 X-RAY EXAM CHEST 1 VIEW: CPT

## 2024-08-18 PROCEDURE — 84484 ASSAY OF TROPONIN QUANT: CPT

## 2024-08-18 PROCEDURE — 2500000003 HC RX 250 WO HCPCS: Performed by: NURSE PRACTITIONER

## 2024-08-18 PROCEDURE — 99222 1ST HOSP IP/OBS MODERATE 55: CPT | Performed by: NURSE PRACTITIONER

## 2024-08-18 PROCEDURE — 2580000003 HC RX 258: Performed by: EMERGENCY MEDICINE

## 2024-08-18 PROCEDURE — 2500000003 HC RX 250 WO HCPCS: Performed by: EMERGENCY MEDICINE

## 2024-08-18 PROCEDURE — 36415 COLL VENOUS BLD VENIPUNCTURE: CPT

## 2024-08-18 PROCEDURE — 99285 EMERGENCY DEPT VISIT HI MDM: CPT

## 2024-08-18 PROCEDURE — 2060000000 HC ICU INTERMEDIATE R&B

## 2024-08-18 PROCEDURE — 83735 ASSAY OF MAGNESIUM: CPT

## 2024-08-18 PROCEDURE — 6360000002 HC RX W HCPCS: Performed by: EMERGENCY MEDICINE

## 2024-08-18 PROCEDURE — 96361 HYDRATE IV INFUSION ADD-ON: CPT

## 2024-08-18 RX ORDER — ONDANSETRON 4 MG/1
4 TABLET, ORALLY DISINTEGRATING ORAL EVERY 8 HOURS PRN
Status: DISCONTINUED | OUTPATIENT
Start: 2024-08-18 | End: 2024-08-19 | Stop reason: HOSPADM

## 2024-08-18 RX ORDER — POLYETHYLENE GLYCOL 3350 17 G/17G
17 POWDER, FOR SOLUTION ORAL DAILY PRN
Status: DISCONTINUED | OUTPATIENT
Start: 2024-08-18 | End: 2024-08-19 | Stop reason: HOSPADM

## 2024-08-18 RX ORDER — METOPROLOL SUCCINATE 50 MG/1
100 TABLET, EXTENDED RELEASE ORAL DAILY
Status: DISCONTINUED | OUTPATIENT
Start: 2024-08-19 | End: 2024-08-19 | Stop reason: HOSPADM

## 2024-08-18 RX ORDER — SODIUM CHLORIDE 9 MG/ML
INJECTION, SOLUTION INTRAVENOUS PRN
Status: DISCONTINUED | OUTPATIENT
Start: 2024-08-18 | End: 2024-08-19 | Stop reason: HOSPADM

## 2024-08-18 RX ORDER — BUSPIRONE HYDROCHLORIDE 10 MG/1
10 TABLET ORAL 2 TIMES DAILY
Status: DISCONTINUED | OUTPATIENT
Start: 2024-08-18 | End: 2024-08-19 | Stop reason: HOSPADM

## 2024-08-18 RX ORDER — SODIUM CHLORIDE 0.9 % (FLUSH) 0.9 %
10 SYRINGE (ML) INJECTION PRN
Status: DISCONTINUED | OUTPATIENT
Start: 2024-08-18 | End: 2024-08-19 | Stop reason: HOSPADM

## 2024-08-18 RX ORDER — METOPROLOL TARTRATE 1 MG/ML
5 INJECTION, SOLUTION INTRAVENOUS ONCE
Status: COMPLETED | OUTPATIENT
Start: 2024-08-18 | End: 2024-08-18

## 2024-08-18 RX ORDER — ONDANSETRON 2 MG/ML
4 INJECTION INTRAMUSCULAR; INTRAVENOUS ONCE
Status: COMPLETED | OUTPATIENT
Start: 2024-08-18 | End: 2024-08-18

## 2024-08-18 RX ORDER — METOPROLOL SUCCINATE 50 MG/1
100 TABLET, EXTENDED RELEASE ORAL DAILY
Status: DISCONTINUED | OUTPATIENT
Start: 2024-08-18 | End: 2024-08-18

## 2024-08-18 RX ORDER — ACETAMINOPHEN 650 MG/1
650 SUPPOSITORY RECTAL EVERY 6 HOURS PRN
Status: DISCONTINUED | OUTPATIENT
Start: 2024-08-18 | End: 2024-08-19 | Stop reason: HOSPADM

## 2024-08-18 RX ORDER — DILTIAZEM HYDROCHLORIDE 5 MG/ML
12 INJECTION INTRAVENOUS ONCE
Status: COMPLETED | OUTPATIENT
Start: 2024-08-18 | End: 2024-08-18

## 2024-08-18 RX ORDER — SODIUM CHLORIDE 9 MG/ML
INJECTION, SOLUTION INTRAVENOUS CONTINUOUS
Status: DISCONTINUED | OUTPATIENT
Start: 2024-08-18 | End: 2024-08-18

## 2024-08-18 RX ORDER — SODIUM CHLORIDE 0.9 % (FLUSH) 0.9 %
5-40 SYRINGE (ML) INJECTION EVERY 12 HOURS SCHEDULED
Status: DISCONTINUED | OUTPATIENT
Start: 2024-08-18 | End: 2024-08-19 | Stop reason: HOSPADM

## 2024-08-18 RX ORDER — DILTIAZEM HYDROCHLORIDE 60 MG/1
120 CAPSULE, EXTENDED RELEASE ORAL DAILY
Status: DISCONTINUED | OUTPATIENT
Start: 2024-08-18 | End: 2024-08-19 | Stop reason: HOSPADM

## 2024-08-18 RX ORDER — LANOLIN ALCOHOL/MO/W.PET/CERES
3 CREAM (GRAM) TOPICAL NIGHTLY PRN
Status: DISCONTINUED | OUTPATIENT
Start: 2024-08-18 | End: 2024-08-19 | Stop reason: HOSPADM

## 2024-08-18 RX ORDER — 0.9 % SODIUM CHLORIDE 0.9 %
500 INTRAVENOUS SOLUTION INTRAVENOUS ONCE
Status: COMPLETED | OUTPATIENT
Start: 2024-08-18 | End: 2024-08-18

## 2024-08-18 RX ORDER — DILTIAZEM HCL IN NACL,ISO-OSM 125 MG/125
5-15 PLASTIC BAG, INJECTION (ML) INTRAVENOUS CONTINUOUS
Status: DISCONTINUED | OUTPATIENT
Start: 2024-08-18 | End: 2024-08-18

## 2024-08-18 RX ORDER — ONDANSETRON 2 MG/ML
4 INJECTION INTRAMUSCULAR; INTRAVENOUS EVERY 6 HOURS PRN
Status: DISCONTINUED | OUTPATIENT
Start: 2024-08-18 | End: 2024-08-19 | Stop reason: HOSPADM

## 2024-08-18 RX ORDER — ACETAMINOPHEN 325 MG/1
650 TABLET ORAL EVERY 6 HOURS PRN
Status: DISCONTINUED | OUTPATIENT
Start: 2024-08-18 | End: 2024-08-19 | Stop reason: HOSPADM

## 2024-08-18 RX ORDER — CALCIUM CARBONATE 500 MG/1
500 TABLET, CHEWABLE ORAL 3 TIMES DAILY PRN
Status: DISCONTINUED | OUTPATIENT
Start: 2024-08-18 | End: 2024-08-19

## 2024-08-18 RX ADMIN — METOPROLOL TARTRATE 5 MG: 5 INJECTION INTRAVENOUS at 08:42

## 2024-08-18 RX ADMIN — ONDANSETRON 4 MG: 4 TABLET, ORALLY DISINTEGRATING ORAL at 15:33

## 2024-08-18 RX ADMIN — FAMOTIDINE 20 MG: 10 INJECTION, SOLUTION INTRAVENOUS at 16:03

## 2024-08-18 RX ADMIN — SODIUM CHLORIDE: 9 INJECTION, SOLUTION INTRAVENOUS at 16:03

## 2024-08-18 RX ADMIN — ONDANSETRON 4 MG: 2 INJECTION, SOLUTION INTRAMUSCULAR; INTRAVENOUS at 09:32

## 2024-08-18 RX ADMIN — BUSPIRONE HYDROCHLORIDE 10 MG: 10 TABLET ORAL at 20:31

## 2024-08-18 RX ADMIN — SODIUM CHLORIDE, PRESERVATIVE FREE 10 ML: 5 INJECTION INTRAVENOUS at 20:32

## 2024-08-18 RX ADMIN — SODIUM CHLORIDE: 9 INJECTION, SOLUTION INTRAVENOUS at 09:52

## 2024-08-18 RX ADMIN — Medication 500 MG: at 18:15

## 2024-08-18 RX ADMIN — TRAZODONE HYDROCHLORIDE 150 MG: 50 TABLET ORAL at 20:31

## 2024-08-18 RX ADMIN — DILTIAZEM HYDROCHLORIDE 12 MG: 5 INJECTION, SOLUTION INTRAVENOUS at 09:32

## 2024-08-18 RX ADMIN — SODIUM CHLORIDE 500 ML: 9 INJECTION, SOLUTION INTRAVENOUS at 08:41

## 2024-08-18 RX ADMIN — METOPROLOL SUCCINATE 100 MG: 50 TABLET, EXTENDED RELEASE ORAL at 19:30

## 2024-08-18 NOTE — ED TRIAGE NOTES
Patient arrives to the ED via front door with wife with c/o dizziness, hypotension, and chest pain. Pt reports the chest pain started early this morning, is midsternal, non-radiating, with some nausea. Patient reports he has a hx fo Afib, takes Eliquis, and has ablation schedules for the 29th of this month. Patient reports she feels like he is going to pass out. Pt is alert and oriented, NAD, RR even and unlabored. Pt placed on cardiac monitor, continuous pulse ox, and BP cuff.  IV placed, EKG complete. Upon assessment, patient HR is in 150s at rest, Afib RVR. Provider at bedside

## 2024-08-18 NOTE — H&P
[facility]  Office: [phone]  Jude Oconnor DO, Kashmir Gordon DO, Yann Napoles DO, Barry Tobias DO, José Luis Crowe MD, Meryl Blue MD, Oz Jensen MD, Dominga Uriostegui MD,  Pritesh Thacker MD, Frances Chamberlain MD, Duane Allison MD,  Chucho Hawkins DO, Sagar Bone MD, Deni Moncada MD, Prabhu Oconnor DO, Zari Anderson MD,  rAnold Vela DO, Yue Goldman MD, Anne Marie Griffin MD, Lorie Mahajan MD, Juarez Villa MD,  Manuelito Hoyt MD, Sebastián Boone MD, Kimani Martinez MD, Vanna Covarrubias MD, Rick Parikh MD, Rhonda Vidales MD, Gaurav Burgess DO, Pankaj Nolasco DO, Annie Owens MD,  Chucky Acuna MD, Shirley Waterhouse, CNP,  Marly Herrera, CNP, Claudio Heller, CNP,  Heidy Fowler, DNP, Socorro Watson, CNP, Angeli Infante, CNP, Pat Hearn CNP, Ceci Velasquez, CNP, Lidya Felder, PA-C, Mariana Reynolds PA-C, Roselyn Guzman, CNP, Raheem Swift, CNP, Millie Camargo, CNP, Tania Winchester, CNP, Shantal Stoddard, CNS, Simran Wilcox, CNP, Josefa Perdomo CNP, Tracy Schwab, CNP         [facility]   IN-PATIENT SERVICE   [facility]    HISTORY AND PHYSICAL EXAMINATION            Date:   8/18/2024  Patient name:  Joel Hardin  Date of admission:  8/18/2024  8:25 AM  MRN:   [id number]  Account:  [id number]  YOB: 1960  PCP:    Katlyn Leigh MD  Room:   [address] [place] [address]  Code Status:    Full Code    Chief Complaint:     Chief Complaint   Patient presents with    Chest Pain     Started early this, mid sternal non radiating, denies nausea, having ablation on 29th of this month for afib     Dizziness    Hypotension       History Obtained From:     patient, electronic medical record    History of Present Illness:     Joel Hardin is a 64 y.o. Non- / non  male who presents with Chest Pain (Started early this, mid sternal non radiating, denies nausea, having ablation on 29th of this month for afib ), Dizziness, and Hypotension   and is

## 2024-08-18 NOTE — ED NOTES
Pt up to ambulate to restroom, patient reports still feeling dizzy, SOB with exertion, and HR went into 130s with ambulation. Patient advised to stay in stretcher and to use urinal if needing to void.

## 2024-08-18 NOTE — ED PROVIDER NOTES
[facility]  [address] [place] [address]  Phone: [phone]        [facility] ED  EMERGENCY DEPARTMENT ENCOUNTER      Pt Name: Joel Hardin  MRN: [id number]  Birthdate 1960  Date of evaluation: 8/18/2024  Provider: Abdulaziz Matthews DO    CHIEF COMPLAINT       Chief Complaint   Patient presents with    Chest Pain     Started early this, mid sternal non radiating, denies nausea, having ablation on 29th of this month for afib     Dizziness    Hypotension         HISTORY OF PRESENT ILLNESS   (Location/Symptom, Timing/Onset,Context/Setting, Quality, Duration, Modifying Factors, Severity)  Note limiting factors.   Joel Hardin is a 64 y.o. male who presents to the emergency department for the evaluation of chest pain.  Patient states it started this morning, it is in the middle of his chest and it does not radiate.  He does not have any nausea but he had some chills and he is sweaty.  He has atrial fibrillation and he takes Eliquis, metoprolol and Cardizem.  He has an ablation scheduled on the 29th.  He says his blood pressure dropped to 90/60 at home and overall he just does not feel well.  No recent illness.  No recent travel.    Nursing Notes were reviewed.    REVIEW OF SYSTEMS    (2-9systems for level 4, 10 or more for level 5)     Review of Systems   Constitutional:  Negative for fever.   HENT:  Negative for sore throat.    Respiratory:  Positive for chest tightness. Negative for shortness of breath.    Cardiovascular:  Positive for chest pain.   Gastrointestinal:  Negative for diarrhea and vomiting.   Genitourinary:  Negative for dysuria.   Skin:  Negative for rash.   Neurological:  Positive for light-headedness. Negative for weakness.   All other systems reviewed and are negative.      Except asnoted above the remainder of the review of systems was reviewed and negative.       PAST MEDICAL HISTORY     Past Medical History:   Diagnosis Date    Abscess of bursa of right elbow

## 2024-08-18 NOTE — PLAN OF CARE
Pt is A/Ox4 in RA and ambulates standby assist.   Pt admitted from [facility].   Currently in Normal Sinus.   Cardiology consulted.   Plan of care reviewed, questions answered, bed alarm is on, bed in lowest position, call light within reach.   Problem: Discharge Planning  Goal: Discharge to home or other facility with appropriate resources  8/18/2024 1945 by Glenna Valerio RN  Outcome: Progressing     Problem: ABCDS Injury Assessment  Goal: Absence of physical injury  8/18/2024 1945 by Glenna Valerio RN  Outcome: Progressing     Problem: Cardiovascular - Adult  Goal: Maintains optimal cardiac output and hemodynamic stability  8/18/2024 1945 by Glenna Valerio RN  Outcome: Progressing     Problem: Gastrointestinal - Adult  Goal: Minimal or absence of nausea and vomiting  8/18/2024 1945 by Glenna Valerio RN  Outcome: Progressing     Problem: Pain  Goal: Verbalizes/displays adequate comfort level or baseline comfort level  Outcome: Progressing     Problem: Safety - Adult  Goal: Free from fall injury  Outcome: Progressing

## 2024-08-19 VITALS
TEMPERATURE: 98.2 F | RESPIRATION RATE: 18 BRPM | DIASTOLIC BLOOD PRESSURE: 82 MMHG | SYSTOLIC BLOOD PRESSURE: 132 MMHG | WEIGHT: 203.3 LBS | BODY MASS INDEX: 29.17 KG/M2 | OXYGEN SATURATION: 96 % | HEART RATE: 64 BPM

## 2024-08-19 LAB
ANION GAP SERPL CALCULATED.3IONS-SCNC: 8 MMOL/L (ref 9–17)
BNP SERPL-MCNC: 238 PG/ML
BUN SERPL-MCNC: 15 MG/DL (ref 8–23)
BUN/CREAT SERPL: 15 (ref 9–20)
CALCIUM SERPL-MCNC: 9.3 MG/DL (ref 8.6–10.4)
CHLORIDE SERPL-SCNC: 106 MMOL/L (ref 98–107)
CO2 SERPL-SCNC: 26 MMOL/L (ref 20–31)
CREAT SERPL-MCNC: 1 MG/DL (ref 0.7–1.2)
GFR, ESTIMATED: 85 ML/MIN/1.73M2
GLUCOSE SERPL-MCNC: 114 MG/DL (ref 70–99)
INR PPP: 1
MAGNESIUM SERPL-MCNC: 1.9 MG/DL (ref 1.6–2.6)
POTASSIUM SERPL-SCNC: 4.1 MMOL/L (ref 3.7–5.3)
PROTHROMBIN TIME: 13.6 SEC (ref 11.5–14.2)
SODIUM SERPL-SCNC: 140 MMOL/L (ref 135–144)

## 2024-08-19 PROCEDURE — 2500000003 HC RX 250 WO HCPCS: Performed by: NURSE PRACTITIONER

## 2024-08-19 PROCEDURE — 6370000000 HC RX 637 (ALT 250 FOR IP): Performed by: NURSE PRACTITIONER

## 2024-08-19 PROCEDURE — 6370000000 HC RX 637 (ALT 250 FOR IP): Performed by: INTERNAL MEDICINE

## 2024-08-19 PROCEDURE — 83735 ASSAY OF MAGNESIUM: CPT

## 2024-08-19 PROCEDURE — 99232 SBSQ HOSP IP/OBS MODERATE 35: CPT | Performed by: INTERNAL MEDICINE

## 2024-08-19 PROCEDURE — 2580000003 HC RX 258: Performed by: NURSE PRACTITIONER

## 2024-08-19 PROCEDURE — 80048 BASIC METABOLIC PNL TOTAL CA: CPT

## 2024-08-19 PROCEDURE — 36415 COLL VENOUS BLD VENIPUNCTURE: CPT

## 2024-08-19 PROCEDURE — 93005 ELECTROCARDIOGRAM TRACING: CPT | Performed by: INTERNAL MEDICINE

## 2024-08-19 PROCEDURE — 83880 ASSAY OF NATRIURETIC PEPTIDE: CPT

## 2024-08-19 PROCEDURE — 85610 PROTHROMBIN TIME: CPT

## 2024-08-19 RX ORDER — DILTIAZEM HYDROCHLORIDE 120 MG/1
120 CAPSULE, EXTENDED RELEASE ORAL DAILY
Qty: 30 CAPSULE | Refills: 0 | Status: SHIPPED | OUTPATIENT
Start: 2024-08-20 | End: 2024-09-19

## 2024-08-19 RX ORDER — FAMOTIDINE 20 MG/1
20 TABLET, FILM COATED ORAL 2 TIMES DAILY
Status: DISCONTINUED | OUTPATIENT
Start: 2024-08-19 | End: 2024-08-19 | Stop reason: HOSPADM

## 2024-08-19 RX ORDER — CALCIUM CARBONATE 500 MG/1
1000 TABLET, CHEWABLE ORAL 3 TIMES DAILY PRN
Status: DISCONTINUED | OUTPATIENT
Start: 2024-08-19 | End: 2024-08-19 | Stop reason: HOSPADM

## 2024-08-19 RX ORDER — CALCIUM CARBONATE 500 MG/1
1000 TABLET, CHEWABLE ORAL 3 TIMES DAILY PRN
Qty: 30 TABLET | Refills: 0 | Status: SHIPPED | OUTPATIENT
Start: 2024-08-19 | End: 2024-09-18

## 2024-08-19 RX ORDER — FAMOTIDINE 20 MG/1
20 TABLET, FILM COATED ORAL 2 TIMES DAILY
Qty: 60 TABLET | Refills: 0 | Status: SHIPPED | OUTPATIENT
Start: 2024-08-19

## 2024-08-19 RX ADMIN — BUSPIRONE HYDROCHLORIDE 10 MG: 10 TABLET ORAL at 09:41

## 2024-08-19 RX ADMIN — SODIUM CHLORIDE, PRESERVATIVE FREE 10 ML: 5 INJECTION INTRAVENOUS at 09:42

## 2024-08-19 RX ADMIN — DILTIAZEM HYDROCHLORIDE 120 MG: 60 CAPSULE, EXTENDED RELEASE ORAL at 09:41

## 2024-08-19 RX ADMIN — Medication 1000 MG: at 10:35

## 2024-08-19 RX ADMIN — METOPROLOL SUCCINATE 100 MG: 50 TABLET, EXTENDED RELEASE ORAL at 09:41

## 2024-08-19 RX ADMIN — FAMOTIDINE 20 MG: 10 INJECTION, SOLUTION INTRAVENOUS at 09:41

## 2024-08-19 RX ADMIN — ACETAMINOPHEN 650 MG: 325 TABLET ORAL at 11:19

## 2024-08-19 ASSESSMENT — PAIN DESCRIPTION - PAIN TYPE: TYPE: ACUTE PAIN

## 2024-08-19 ASSESSMENT — PAIN - FUNCTIONAL ASSESSMENT: PAIN_FUNCTIONAL_ASSESSMENT: ACTIVITIES ARE NOT PREVENTED

## 2024-08-19 ASSESSMENT — PAIN SCALES - GENERAL
PAINLEVEL_OUTOF10: 0
PAINLEVEL_OUTOF10: 0
PAINLEVEL_OUTOF10: 3

## 2024-08-19 ASSESSMENT — PAIN DESCRIPTION - DESCRIPTORS: DESCRIPTORS: ACHING

## 2024-08-19 ASSESSMENT — PAIN DESCRIPTION - ONSET: ONSET: GRADUAL

## 2024-08-19 ASSESSMENT — PAIN DESCRIPTION - LOCATION: LOCATION: BACK

## 2024-08-19 ASSESSMENT — PAIN DESCRIPTION - FREQUENCY: FREQUENCY: INTERMITTENT

## 2024-08-19 NOTE — CONSULTS
edema.  NEUROLOGIC:  Awake, alert, oriented to name, place and time.  SKIN:  no bruising or bleeding, normal skin color, texture, turgor and no jaundice    DATA:   ECG:  Afib with RVR      Cardiology Labs:  Recent Labs     08/18/24  0843 08/18/24  1049   TROPHS 18 17     Warfarin PT/INR:  Lab Results   Component Value Date/Time    PROTIME 13.6 08/19/2024 05:14 AM    INR 1.0 08/19/2024 05:14 AM     CBC:  Lab Results   Component Value Date/Time    WBC 7.6 08/18/2024 08:43 AM    RBC 6.28 08/18/2024 08:43 AM    HGB 19.8 08/18/2024 08:43 AM    HCT 57.8 08/18/2024 08:43 AM    MCV 92.1 08/18/2024 08:43 AM    MCH 31.6 08/18/2024 08:43 AM    MCHC 34.3 08/18/2024 08:43 AM    RDW 15.9 08/18/2024 08:43 AM     08/18/2024 08:43 AM    MPV 8.1 08/18/2024 08:43 AM     CMP:  Lab Results   Component Value Date/Time     08/19/2024 05:14 AM    K 4.1 08/19/2024 05:14 AM     08/19/2024 05:14 AM    CO2 26 08/19/2024 05:14 AM    BUN 15 08/19/2024 05:14 AM    CREATININE 1.0 08/19/2024 05:14 AM    GFRAA >60 07/26/2022 11:14 PM    LABGLOM 85 08/19/2024 05:14 AM    LABGLOM 83 04/17/2024 01:44 PM    GLUCOSE 114 08/19/2024 05:14 AM    CALCIUM 9.3 08/19/2024 05:14 AM     Magnesium:    Lab Results   Component Value Date/Time    MG 1.9 08/19/2024 05:14 AM     PTT:    Lab Results   Component Value Date/Time    APTT 27.1 03/30/2022 01:05 PM     TSH:    Lab Results   Component Value Date/Time    TSH 3.39 08/18/2024 08:43 AM     BNP:   Recent Labs     08/18/24  0843 08/19/24  0514   PROBNP 426* 238     BMP:  Lab Results   Component Value Date/Time     08/19/2024 05:14 AM    K 4.1 08/19/2024 05:14 AM     08/19/2024 05:14 AM    CO2 26 08/19/2024 05:14 AM    BUN 15 08/19/2024 05:14 AM    CREATININE 1.0 08/19/2024 05:14 AM    CALCIUM 9.3 08/19/2024 05:14 AM    GFRAA >60 07/26/2022 11:14 PM    LABGLOM 85 08/19/2024 05:14 AM    LABGLOM 83 04/17/2024 01:44 PM    GLUCOSE 114 08/19/2024 05:14 AM     LIVER PROFILE:No results for

## 2024-08-19 NOTE — DISCHARGE SUMMARY
[facility]  Office: [phone]  Jude Oconnor DO, Kashmir Gordon DO, Yann Napoles DO, Barry Tobias DO, José Luis Crowe MD, Meryl Blue MD, Oz Jensen MD, Dominga Uriostegui MD,  Pritesh Thacker MD, Frances Chamberlain MD, Duane Allison MD,  Chucho Hawkins DO, Sagar Bone MD, Deni Moncada MD, Prabhu Oconnor DO, Zari Anderson MD,  Arnold Vela DO, Yue Goldman MD, Anne Marie Griffin MD, Lorie Mahajan MD, Juarez Villa MD,  Manuelito Hoyt MD, Sebastián Boone MD, Kimani Martinez MD, Vanna Covarrubias MD, Rick Parikh MD, Rhonda Vidales MD, Gaurav Burgess DO, Pankaj Nolasco DO, Annie Owens MD,  Chucky Acuna MD, Shirley Waterhouse, CNP,  Marly Herrera, CNP, Claudio Heller, CNP,  Heidy Fowler, DNP, Socorro Watson, CNP, Angeil Infante, CNP, Pat Hearn CNP, Ceci Velasquez, CNP, Lidya Felder, PA-C, Mariana Reynolds PA-C, Roselyn Guzman, CNP, Raheem Swift, CNP, Millie Camargo, CNP, Tania Winchester, CNP, Shantal Stoddard, CNS, Simran Wilcox, CNP, Josefa Perdomo CNP, Tracy Schwab, CNP         [facility]   IN-PATIENT SERVICE   [facility]    Discharge Summary     Patient ID: Joel Hardin  :  1960   MRN: [id number]     ACCOUNT:  [id number]   Patient's PCP: Katlyn Leigh MD  Admit Date: 2024   Discharge Date: 2024     Length of Stay: 1  Code Status:  Full Code  Admitting Physician: José Luis Crowe MD  Discharge Physician: José Luis Crowe MD     Active Discharge Diagnoses:     Hospital Problem Lists:  Principal Problem:    Atrial fibrillation with RVR (HCC)  Active Problems:    Chronic atrial fibrillation (HCC)    Chest pain    Essential hypertension    Polycythemia    Heartburn    SEBASTIAN on CPAP  Resolved Problems:    * No resolved hospital problems. *      Admission Condition:  poor     Discharged Condition: stable    Hospital Stay:   Admitting history:    Joel Hardin is a 64 y.o. Non- / non  male who presents with Chest Pain

## 2024-08-19 NOTE — PROGRESS NOTES
Patient having pain on their back and rates it a 3. Pain interventions includefrequent position changes. Patients goal for pain relief is  0 . The need for pain and symptom management will be considered in the discharge planning process to ensure patients comfort.   
Pt discharged home with wife. IV removed, discharge instructions given to patient and pt v/u. Belongings gathered and sent with pt.  
Transitions of Care Pharmacy Service   Medication Review    The patient's list of current home medications has been reviewed.     Source(s) of information: patient, Care Everywhere, Surescripts refill report    Other Notes Diltiazem: last filled in June 2024, pt reports his physician didn't refill it so he thought he didn't have to take it anymore  Toprol: his pharmacy is filling both 100mg and 50mg tabs but he states only taking the 100mg tab once daily at home  He also filled Nuvigil 150mg and Lunesta 3mg tabs this month but he states not taking either b/c he didn't like how they made him feel         Please feel free to call me with any questions about this encounter. Thank you.    Karyn Hernandez RPH   Transitions of Care Pharmacy Service  Phone:  [phone]  Fax: [phone]      Electronically signed by Karyn Hernandez RP on 8/19/2024 at 3:10 PM           Medications Prior to Admission:   apixaban (ELIQUIS) 5 MG TABS tablet, Take 1 tablet by mouth 2 times daily  busPIRone (BUSPAR) 10 MG tablet, take 1 tablet by mouth twice a day  traZODone (DESYREL) 150 MG tablet, Take 1 tablet by mouth nightly  metoprolol succinate (TOPROL XL) 100 MG extended release tablet, Take 1 tablet by mouth daily  melatonin 3 MG TABS tablet, Take 1 tablet by mouth nightly as needed (usually takes 2 at a time)  DILT- MG extended release capsule, Take 1 capsule by mouth daily              
Transitions of Care Pharmacy Service   Medication Review - In Progress    Home medication review by this service is currently in progress. Note to follow once the home med list is complete.       Thank you,    Lino Rush   Transitions of Care Pharmacy Service  Phone:  [phone]  Fax: [phone]      Electronically signed by Lino Rush on 8/19/2024 at 11:12 AM    
[] Medication Reconciliation was completed and the patient's home medication list was verified. The Med List Status is \"Complete\". The following sources were used to assist with Medication Reconciliation:    [] Med Rec Pharmacist already completed  [] Patient had a list of medications which was transcribed into the EHR.  [] Patient provided bottles of their medications  [] Home medications reviewed and confirmed with NA  [] Contacted patient's pharmacy to confirm home medications  [] Contacted patient's physician office to confirm home medications  [] Medical Records from another facility and/or Care Everywhere were reviewed  [] MAR from facility requested to be faxed over  [] Unable to complete due to patient condition  [] Unable to validate med reconciliation      [x] There are one or more home medications that need clarification before Medication Reconciliation can be completed. The Med List Status has been marked as In Progress.     To assist with Home Medication Reconciliation the following actions have been taken:    [x] Pharmacy medication reconciliation service requested. (Note: This can be done by sending a Perfect Serve message to The Med Rec Pharmacist or by phoning [phone].)  [] Family requested to bring medications into the hospital  [] Family requested to call hospital with medication list  [] Message left with physician office  [] Request for medical records made to NA  [] Other NA      
Meds: calcium carbonate, melatonin, sodium chloride flush, sodium chloride, ondansetron **OR** ondansetron, polyethylene glycol, acetaminophen **OR** acetaminophen    Data:     Past Medical History:   has a past medical history of Abscess of bursa of right elbow, Acute tear medial meniscus, Arthritis, Atrial fibrillation (HCC), BPH (benign prostatic hyperplasia), Cardiomyopathy (HCC), COVID-19, Diverticulitis, Elevated PSA, Erectile dysfunction, Esophagitis, Gastroparesis, Generalized anxiety disorder, GERD (gastroesophageal reflux disease), Hypertension, Nodular prostate, Panic attack, Polycythemia, PONV (postoperative nausea and vomiting), Renal insufficiency, Rupture of triceps tendon, Sleep apnea, Spondylolisthesis of lumbar region, Under care of team, Under care of team, Under care of team, Under care of team, Under care of team, and Wears glasses.    Social History:   reports that he has been smoking cigars. He started smoking about 5 years ago. He has never used smokeless tobacco. He reports current alcohol use. He reports that he does not currently use drugs after having used the following drugs: Marijuana (Weed).     Family History:   Family History   Problem Relation Age of Onset    Cancer Father         prostrate    Diabetes Father     Irregular Heart Beat Brother     Cancer Paternal Uncle         colon       Vitals:  BP (!) 145/90 Comment: RN Notified; orthostatic BP  Pulse 66   Temp 97.5 °F (36.4 °C) (Oral)   Resp 17   Wt 92.2 kg (203 lb 4.8 oz)   SpO2 96%   BMI 29.17 kg/m²   Temp (24hrs), Av.1 °F (36.7 °C), Min:97.5 °F (36.4 °C), Max:98.6 °F (37 °C)    No results for input(s): \"POCGLU\" in the last 72 hours.    I/O (24Hr):    Intake/Output Summary (Last 24 hours) at 2024 1236  Last data filed at 2024 2322  Gross per 24 hour   Intake --   Output 300 ml   Net -300 ml       Labs:  Hematology:  Recent Labs     24  0843 24  0514   WBC 7.6  --    RBC 6.28*  --    HGB 19.8*  --

## 2024-08-19 NOTE — PLAN OF CARE
Patient alert and oriented. VSS. NSR on tele. SpO2 mid 90s on RA. Afebrile. No c/o pain. Awaiting cardiology eval. Possible ablation. Will d/c home once medically stable. Bed alarm on, call light within reach. Will continue to monitor.       Problem: Discharge Planning  Goal: Discharge to home or other facility with appropriate resources  8/19/2024 0604 by Giselle Segovia RN  Outcome: Progressing  8/18/2024 1945 by Glenna Valerio RN  Outcome: Progressing  8/18/2024 1943 by Glenna Valerio RN  Outcome: Progressing     Problem: ABCDS Injury Assessment  Goal: Absence of physical injury  8/19/2024 0604 by Giselle Segovia RN  Outcome: Progressing  8/18/2024 1945 by Glenna Valerio RN  Outcome: Progressing  8/18/2024 1943 by Glenna Valerio RN  Outcome: Progressing     Problem: Cardiovascular - Adult  Goal: Maintains optimal cardiac output and hemodynamic stability  8/19/2024 0604 by Giselle Segovia RN  Outcome: Progressing  8/18/2024 1945 by Glenna Valerio RN  Outcome: Progressing  8/18/2024 1943 by Glenna Valerio RN  Outcome: Progressing  Goal: Absence of cardiac dysrhythmias or at baseline  8/19/2024 0604 by Giselle Segovia RN  Outcome: Progressing  8/18/2024 1945 by Glenna Valerio RN  Outcome: Progressing  8/18/2024 1943 by Glenna Valerio RN  Outcome: Progressing     Problem: Gastrointestinal - Adult  Goal: Minimal or absence of nausea and vomiting  8/19/2024 0604 by Giselle Segovia RN  Outcome: Progressing  8/18/2024 1945 by Glenna Valerio RN  Outcome: Progressing  8/18/2024 1943 by Glenna Valerio RN  Outcome: Progressing  Goal: Maintains or returns to baseline bowel function  8/19/2024 0604 by Giselle Segovia RN  Outcome: Progressing  8/18/2024 1945 by Glenna Valerio RN  Outcome: Progressing  8/18/2024 1943 by Glenna Valerio RN  Outcome: Progressing  Goal: Maintains adequate nutritional intake  8/19/2024 0604 by Dalheim, Giselle, RN  Outcome: Progressing  8/18/2024 1945 by Glenna Valerio, RN  Outcome:

## 2024-08-19 NOTE — PLAN OF CARE
Pt alert and oriented, ambulates independently. Pt denies dizziness at this time, denies chest pain, N/V. Bed alarm on d/t pt admitted with dizziness. Pt v/u use and need for alarm this morning. In the afternoon pt states he does not want the alarm on. Pt educated on safety but continues to refuse alarm. Release of Liability signed by pt and alarm turned off, pt is wearing gripper socks.  Frequent rounding continues and pt continues to deny dizziness. Safety maintained.       Problem: Discharge Planning  Goal: Discharge to home or other facility with appropriate resources  8/19/2024 1523 by Bernice Epstein RN  Outcome: Progressing     Problem: Cardiovascular - Adult  Goal: Maintains optimal cardiac output and hemodynamic stability  8/19/2024 1523 by Bernice Epstein RN  Outcome: Progressing     Problem: Gastrointestinal - Adult  Goal: Minimal or absence of nausea and vomiting  8/19/2024 1523 by Bernice Epstein RN  Outcome: Progressing     Problem: Pain  Goal: Verbalizes/displays adequate comfort level or baseline comfort level  8/19/2024 1523 by Bernice Epstein RN  Outcome: Progressing

## 2024-08-20 ENCOUNTER — HOSPITAL ENCOUNTER (EMERGENCY)
Facility: CLINIC | Age: 64
Discharge: HOME | End: 2024-08-20
Attending: EMERGENCY MEDICINE
Payer: COMMERCIAL

## 2024-08-20 VITALS
WEIGHT: 198 LBS | RESPIRATION RATE: 18 BRPM | OXYGEN SATURATION: 97 % | SYSTOLIC BLOOD PRESSURE: 172 MMHG | TEMPERATURE: 97.8 F | HEART RATE: 52 BPM | DIASTOLIC BLOOD PRESSURE: 105 MMHG | BODY MASS INDEX: 29.33 KG/M2 | HEIGHT: 69 IN

## 2024-08-20 DIAGNOSIS — I10 HYPERTENSION, UNSPECIFIED TYPE: Primary | ICD-10-CM

## 2024-08-20 LAB
ANION GAP SERPL CALCULATED.3IONS-SCNC: 10 MMOL/L (ref 9–17)
BASOPHILS # BLD: 0 K/UL (ref 0–0.2)
BASOPHILS NFR BLD: 1 % (ref 0–2)
BUN SERPL-MCNC: 13 MG/DL (ref 8–23)
CALCIUM SERPL-MCNC: 9.3 MG/DL (ref 8.6–10.4)
CHLORIDE SERPL-SCNC: 102 MMOL/L (ref 98–107)
CO2 SERPL-SCNC: 27 MMOL/L (ref 20–31)
CREAT SERPL-MCNC: 0.8 MG/DL (ref 0.7–1.2)
EKG ATRIAL RATE: 170 BPM
EKG ATRIAL RATE: 66 BPM
EKG P AXIS: 19 DEGREES
EKG P-R INTERVAL: 194 MS
EKG Q-T INTERVAL: 322 MS
EKG Q-T INTERVAL: 386 MS
EKG QRS DURATION: 104 MS
EKG QRS DURATION: 86 MS
EKG QTC CALCULATION (BAZETT): 404 MS
EKG QTC CALCULATION (BAZETT): 435 MS
EKG R AXIS: 21 DEGREES
EKG R AXIS: 62 DEGREES
EKG T AXIS: 80 DEGREES
EKG VENTRICULAR RATE: 110 BPM
EKG VENTRICULAR RATE: 66 BPM
EOSINOPHIL # BLD: 0.2 K/UL (ref 0–0.4)
EOSINOPHILS RELATIVE PERCENT: 2 % (ref 1–4)
ERYTHROCYTE [DISTWIDTH] IN BLOOD BY AUTOMATED COUNT: 15.5 % (ref 12.5–15.4)
GFR, ESTIMATED: >90 ML/MIN/1.73M2
GLUCOSE SERPL-MCNC: 93 MG/DL (ref 70–99)
HCT VFR BLD AUTO: 48.5 % (ref 41–53)
HGB BLD-MCNC: 16.7 G/DL (ref 13.5–17.5)
LYMPHOCYTES NFR BLD: 1.1 K/UL (ref 1–4.8)
LYMPHOCYTES RELATIVE PERCENT: 16 % (ref 24–44)
MCH RBC QN AUTO: 31.4 PG (ref 26–34)
MCHC RBC AUTO-ENTMCNC: 34.5 G/DL (ref 31–37)
MCV RBC AUTO: 91.1 FL (ref 80–100)
MONOCYTES NFR BLD: 0.6 K/UL (ref 0.1–1.2)
MONOCYTES NFR BLD: 9 % (ref 2–11)
NEUTROPHILS NFR BLD: 72 % (ref 36–66)
NEUTS SEG NFR BLD: 4.8 K/UL (ref 1.8–7.7)
PLATELET # BLD AUTO: 219 K/UL (ref 140–450)
PMV BLD AUTO: 7.4 FL (ref 6–12)
POTASSIUM SERPL-SCNC: 4.3 MMOL/L (ref 3.7–5.3)
RBC # BLD AUTO: 5.32 M/UL (ref 4.5–5.9)
SODIUM SERPL-SCNC: 139 MMOL/L (ref 135–144)
TROPONIN I SERPL HS-MCNC: 15 NG/L (ref 0–22)
WBC OTHER # BLD: 6.6 K/UL (ref 3.5–11)

## 2024-08-20 PROCEDURE — 85025 COMPLETE CBC W/AUTO DIFF WBC: CPT

## 2024-08-20 PROCEDURE — 36415 COLL VENOUS BLD VENIPUNCTURE: CPT

## 2024-08-20 PROCEDURE — 99284 EMERGENCY DEPT VISIT MOD MDM: CPT

## 2024-08-20 PROCEDURE — 80048 BASIC METABOLIC PNL TOTAL CA: CPT

## 2024-08-20 PROCEDURE — 93005 ELECTROCARDIOGRAM TRACING: CPT | Performed by: EMERGENCY MEDICINE

## 2024-08-20 PROCEDURE — 84484 ASSAY OF TROPONIN QUANT: CPT

## 2024-08-20 ASSESSMENT — PAIN - FUNCTIONAL ASSESSMENT: PAIN_FUNCTIONAL_ASSESSMENT: 0-10

## 2024-08-20 NOTE — ED PROVIDER NOTES
[facility]  [address] [address]  Phone: [phone]  EMERGENCY DEPARTMENT ENCOUNTER      Pt Name: Joel Hardin  MRN: [id number]  Birthdate 1960  Date of evaluation: 8/20/2024    CHIEF COMPLAINT       Chief Complaint   Patient presents with    Hypertension     Pt presents to ED with complaints of feeling unwell. Pt reports he came to the hospital because he elevated blood pressure. Pt reports taking his beta blocker twice today once in the morning and once this afternoon.        HISTORY OF PRESENT ILLNESS    Joel Hardin is a 64 y.o. male who presents to the emerged department complaining of high blood pressure.  Discharged yesterday for paroxysmal atrial fibrillation scheduled for an ablation on the 29th.  He says today he was at work dealing with a lot of stressful issues when he just felt generally unwell.  He went home and took his blood pressure and it was 200/115ish so he took an extra dose of his metoprolol.  He then noted while he was sitting in his chair trying to relax he became bradycardic into the 50s and came to the emergency department for evaluation.  He denies any chest pain or shortness of breath no abdominal pain no nausea or vomiting.  He denies any irregular or fast heart rate.  He denies any other relevant symptoms.  Currently on Eliquis.  Oakes unsteady earlier today    REVIEW OF SYSTEMS       Constitutional: No fevers or chills positive unsteadiness  HENT: No sore throat, rhinorrhea, or earache   Eyes: No blurry vision or double vision no drainage   Cardiovascular: No chest pain or tachycardia positive bradycardia  Respiratory: No wheezing or shortness of breath no cough   Gastrointestinal: No nausea, vomiting, diarrhea, constipation, or abdominal pain   : No hematuria or dysuria   Musculoskeletal: No swelling or pain   Skin: No rash   Neurological: No focal neurologic complaints, paresthesias, weakness, or headache     PAST MEDICAL HISTORY    has a past medical

## 2024-08-20 NOTE — DISCHARGE INSTRUCTIONS
Follow-up with cardiology tomorrow call to discuss your blood pressure    Take medications as prescribed    Return immediately if any worsening symptoms or any other concerns    Please understand that early in the process of an illness or injury, an emergency department workup can be falsely reassuring.      Tell us how we did visit: [email or web address]   and let us know about your experience

## 2024-08-22 ENCOUNTER — TELEPHONE (OUTPATIENT)
Dept: INTERNAL MEDICINE CLINIC | Age: 64
End: 2024-08-22

## 2024-08-22 LAB
EKG ATRIAL RATE: 52 BPM
EKG P AXIS: 18 DEGREES
EKG P-R INTERVAL: 204 MS
EKG Q-T INTERVAL: 416 MS
EKG QRS DURATION: 98 MS
EKG QTC CALCULATION (BAZETT): 386 MS
EKG R AXIS: 11 DEGREES
EKG T AXIS: 46 DEGREES
EKG VENTRICULAR RATE: 52 BPM

## 2024-08-22 NOTE — TELEPHONE ENCOUNTER
Spoke with Dr. Leigh about patient condition. Recommends going to the ER.  Spoke with patient, voiced understanding. Will cancel today's appointment and follow up to reschedule.

## 2024-08-22 NOTE — TELEPHONE ENCOUNTER
Care Transitions Initial Follow Up Call    Outreach made within 2 business days of discharge: No    Patient: Joel Hardin Patient : 1960   MRN: [id number]  Reason for Admission: afib  Discharge Date: 24       Spoke with: self    Discharge department/facility: home    TCM Interactive Patient Contact:  Was patient able to fill all prescriptions: Yes  Was patient instructed to bring all medications to the follow-up visit: Yes  Is patient taking all medications as directed in the discharge summary? Yes  Does patient understand their discharge instructions: Yes  Does patient have questions or concerns that need addressed prior to 7-14 day follow up office visit: yes - bp     Additional needs identified to be addressed with provider  160-180/100-110 BP  Saw cardiologist and was given diltiazem   -Lightheaded, dizzy  Low pulse in 40s              Scheduled appointment with PCP within 7-14 days    Follow Up  Future Appointments   Date Time Provider Department Center   2024  3:00 PM Jessica Villasenor, APRN - CNP [facility] DE[facility]   10/30/2024  8:15 AM Katlyn Leigh MD [facility]       Greta Mcqueen MA

## 2024-08-22 NOTE — TELEPHONE ENCOUNTER
Patient was just seen in the hospital for Afib w/ RVR and is scheduled for an ablation on 8/29.    His blood pressure is running high in the 160s-180s/100-110 and his pulse is dropping into the 40s  Patient denies any chest pain or SOB, but is slightly dizzy and lightheaded.     Patient spoke with his cardiologist and was prescribed diltazem.   I scheduled patient a hospital follow up this afternoon to discuss.     Is there anything else patient should do?

## 2024-08-22 NOTE — TELEPHONE ENCOUNTER
Patient called stating that since the cardiologist  changed his medication yesterday his blood pressure has came way down.    Last pressure check was an hour a go 146/98. Patient states his dizziness and light headness has went away for the most part.    Patient will only go to the ER if you call and order what test you would like done.   Patient uses the [facility] on [address] in [facility]    Please advise     Patient states that he knows if his symptoms get really bad he will report to the ER.

## 2024-08-29 ENCOUNTER — HOSPITAL ENCOUNTER (OUTPATIENT)
Age: 64
Discharge: HOME | End: 2024-08-30
Attending: INTERNAL MEDICINE | Admitting: INTERNAL MEDICINE
Payer: COMMERCIAL

## 2024-08-29 ENCOUNTER — ANESTHESIA (OUTPATIENT)
Age: 64
End: 2024-08-29
Payer: COMMERCIAL

## 2024-08-29 ENCOUNTER — APPOINTMENT (OUTPATIENT)
Age: 64
End: 2024-08-29
Attending: INTERNAL MEDICINE
Payer: COMMERCIAL

## 2024-08-29 ENCOUNTER — ANESTHESIA EVENT (OUTPATIENT)
Age: 64
End: 2024-08-29
Payer: COMMERCIAL

## 2024-08-29 DIAGNOSIS — I48.91 ATRIAL FIBRILLATION, UNSPECIFIED TYPE (HCC): ICD-10-CM

## 2024-08-29 DIAGNOSIS — I48.91 UNSPECIFIED ATRIAL FIBRILLATION (HCC): Primary | ICD-10-CM

## 2024-08-29 PROBLEM — Z86.79 S/P ABLATION OF ATRIAL FIBRILLATION: Status: ACTIVE | Noted: 2024-08-29

## 2024-08-29 PROBLEM — Z98.890 S/P ABLATION OF ATRIAL FIBRILLATION: Status: ACTIVE | Noted: 2024-08-29

## 2024-08-29 LAB
ACT BLD: 316 SEC (ref 79–149)
ACT BLD: 371 SEC (ref 79–149)
ECHO BSA: 2.15 M2
ECHO BSA: 2.15 M2

## 2024-08-29 PROCEDURE — 2500000003 HC RX 250 WO HCPCS: Performed by: NURSE ANESTHETIST, CERTIFIED REGISTERED

## 2024-08-29 PROCEDURE — C1894 INTRO/SHEATH, NON-LASER: HCPCS | Performed by: INTERNAL MEDICINE

## 2024-08-29 PROCEDURE — C1731 CATH, EP, 20 OR MORE ELEC: HCPCS | Performed by: INTERNAL MEDICINE

## 2024-08-29 PROCEDURE — 93312 ECHO TRANSESOPHAGEAL: CPT

## 2024-08-29 PROCEDURE — 2709999900 HC NON-CHARGEABLE SUPPLY: Performed by: INTERNAL MEDICINE

## 2024-08-29 PROCEDURE — 6370000000 HC RX 637 (ALT 250 FOR IP): Performed by: STUDENT IN AN ORGANIZED HEALTH CARE EDUCATION/TRAINING PROGRAM

## 2024-08-29 PROCEDURE — 6370000000 HC RX 637 (ALT 250 FOR IP): Performed by: INTERNAL MEDICINE

## 2024-08-29 PROCEDURE — 2580000003 HC RX 258: Performed by: INTERNAL MEDICINE

## 2024-08-29 PROCEDURE — 3700000000 HC ANESTHESIA ATTENDED CARE: Performed by: INTERNAL MEDICINE

## 2024-08-29 PROCEDURE — 6360000002 HC RX W HCPCS: Performed by: NURSE ANESTHETIST, CERTIFIED REGISTERED

## 2024-08-29 PROCEDURE — 7100000011 HC PHASE II RECOVERY - ADDTL 15 MIN: Performed by: INTERNAL MEDICINE

## 2024-08-29 PROCEDURE — 2720000010 HC SURG SUPPLY STERILE: Performed by: INTERNAL MEDICINE

## 2024-08-29 PROCEDURE — C1759 CATH, INTRA ECHOCARDIOGRAPHY: HCPCS | Performed by: INTERNAL MEDICINE

## 2024-08-29 PROCEDURE — 3700000001 HC ADD 15 MINUTES (ANESTHESIA): Performed by: INTERNAL MEDICINE

## 2024-08-29 PROCEDURE — C1893 INTRO/SHEATH, FIXED,NON-PEEL: HCPCS | Performed by: INTERNAL MEDICINE

## 2024-08-29 PROCEDURE — 85347 COAGULATION TIME ACTIVATED: CPT

## 2024-08-29 PROCEDURE — 7100000010 HC PHASE II RECOVERY - FIRST 15 MIN: Performed by: INTERNAL MEDICINE

## 2024-08-29 PROCEDURE — C1732 CATH, EP, DIAG/ABL, 3D/VECT: HCPCS | Performed by: INTERNAL MEDICINE

## 2024-08-29 PROCEDURE — 2580000003 HC RX 258: Performed by: NURSE ANESTHETIST, CERTIFIED REGISTERED

## 2024-08-29 RX ORDER — PROPOFOL 10 MG/ML
INJECTION, EMULSION INTRAVENOUS PRN
Status: DISCONTINUED | OUTPATIENT
Start: 2024-08-29 | End: 2024-08-29 | Stop reason: SDUPTHER

## 2024-08-29 RX ORDER — FENTANYL CITRATE 50 UG/ML
INJECTION, SOLUTION INTRAMUSCULAR; INTRAVENOUS PRN
Status: DISCONTINUED | OUTPATIENT
Start: 2024-08-29 | End: 2024-08-29 | Stop reason: SDUPTHER

## 2024-08-29 RX ORDER — ACETAMINOPHEN 325 MG/1
650 TABLET ORAL EVERY 6 HOURS PRN
Status: DISCONTINUED | OUTPATIENT
Start: 2024-08-29 | End: 2024-08-30 | Stop reason: HOSPADM

## 2024-08-29 RX ORDER — SODIUM CHLORIDE 0.9 % (FLUSH) 0.9 %
5-40 SYRINGE (ML) INJECTION EVERY 12 HOURS SCHEDULED
Status: DISCONTINUED | OUTPATIENT
Start: 2024-08-29 | End: 2024-08-30 | Stop reason: HOSPADM

## 2024-08-29 RX ORDER — PROTAMINE SULFATE 10 MG/ML
INJECTION, SOLUTION INTRAVENOUS PRN
Status: DISCONTINUED | OUTPATIENT
Start: 2024-08-29 | End: 2024-08-29 | Stop reason: SDUPTHER

## 2024-08-29 RX ORDER — ISOPROTERENOL HYDROCHLORIDE 0.2 MG/ML
INJECTION, SOLUTION INTRAVENOUS PRN
Status: DISCONTINUED | OUTPATIENT
Start: 2024-08-29 | End: 2024-08-29 | Stop reason: SDUPTHER

## 2024-08-29 RX ORDER — FAMOTIDINE 20 MG/1
20 TABLET, FILM COATED ORAL 2 TIMES DAILY
Status: DISCONTINUED | OUTPATIENT
Start: 2024-08-29 | End: 2024-08-30 | Stop reason: HOSPADM

## 2024-08-29 RX ORDER — GLYCOPYRROLATE 1 MG/5 ML
SYRINGE (ML) INTRAVENOUS PRN
Status: DISCONTINUED | OUTPATIENT
Start: 2024-08-29 | End: 2024-08-29 | Stop reason: SDUPTHER

## 2024-08-29 RX ORDER — METOPROLOL SUCCINATE 25 MG/1
50 TABLET, EXTENDED RELEASE ORAL DAILY
Status: DISCONTINUED | OUTPATIENT
Start: 2024-08-30 | End: 2024-08-30 | Stop reason: HOSPADM

## 2024-08-29 RX ORDER — ONDANSETRON 2 MG/ML
INJECTION INTRAMUSCULAR; INTRAVENOUS PRN
Status: DISCONTINUED | OUTPATIENT
Start: 2024-08-29 | End: 2024-08-29 | Stop reason: SDUPTHER

## 2024-08-29 RX ORDER — SODIUM CHLORIDE 9 MG/ML
INJECTION, SOLUTION INTRAVENOUS CONTINUOUS
Status: DISCONTINUED | OUTPATIENT
Start: 2024-08-29 | End: 2024-08-29 | Stop reason: HOSPADM

## 2024-08-29 RX ORDER — FUROSEMIDE 10 MG/ML
20 INJECTION INTRAMUSCULAR; INTRAVENOUS ONCE
Status: DISCONTINUED | OUTPATIENT
Start: 2024-08-29 | End: 2024-08-30 | Stop reason: HOSPADM

## 2024-08-29 RX ORDER — MIDAZOLAM HYDROCHLORIDE 1 MG/ML
INJECTION INTRAMUSCULAR; INTRAVENOUS PRN
Status: DISCONTINUED | OUTPATIENT
Start: 2024-08-29 | End: 2024-08-29 | Stop reason: SDUPTHER

## 2024-08-29 RX ORDER — SODIUM CHLORIDE 9 MG/ML
INJECTION, SOLUTION INTRAVENOUS PRN
Status: DISCONTINUED | OUTPATIENT
Start: 2024-08-29 | End: 2024-08-30 | Stop reason: HOSPADM

## 2024-08-29 RX ORDER — SODIUM CHLORIDE 0.9 % (FLUSH) 0.9 %
5-40 SYRINGE (ML) INJECTION PRN
Status: DISCONTINUED | OUTPATIENT
Start: 2024-08-29 | End: 2024-08-30 | Stop reason: HOSPADM

## 2024-08-29 RX ORDER — BUSPIRONE HYDROCHLORIDE 10 MG/1
10 TABLET ORAL 2 TIMES DAILY
Status: DISCONTINUED | OUTPATIENT
Start: 2024-08-29 | End: 2024-08-30 | Stop reason: HOSPADM

## 2024-08-29 RX ORDER — HEPARIN SODIUM 10000 [USP'U]/100ML
INJECTION, SOLUTION INTRAVENOUS CONTINUOUS PRN
Status: DISCONTINUED | OUTPATIENT
Start: 2024-08-29 | End: 2024-08-29 | Stop reason: SDUPTHER

## 2024-08-29 RX ORDER — ROCURONIUM BROMIDE 10 MG/ML
INJECTION, SOLUTION INTRAVENOUS PRN
Status: DISCONTINUED | OUTPATIENT
Start: 2024-08-29 | End: 2024-08-29 | Stop reason: SDUPTHER

## 2024-08-29 RX ORDER — LIDOCAINE HYDROCHLORIDE 10 MG/ML
INJECTION, SOLUTION EPIDURAL; INFILTRATION; INTRACAUDAL; PERINEURAL PRN
Status: DISCONTINUED | OUTPATIENT
Start: 2024-08-29 | End: 2024-08-29 | Stop reason: SDUPTHER

## 2024-08-29 RX ORDER — DEXAMETHASONE SODIUM PHOSPHATE 10 MG/ML
INJECTION, SOLUTION INTRAMUSCULAR; INTRAVENOUS PRN
Status: DISCONTINUED | OUTPATIENT
Start: 2024-08-29 | End: 2024-08-29 | Stop reason: SDUPTHER

## 2024-08-29 RX ORDER — HEPARIN SODIUM 1000 [USP'U]/ML
INJECTION, SOLUTION INTRAVENOUS; SUBCUTANEOUS PRN
Status: DISCONTINUED | OUTPATIENT
Start: 2024-08-29 | End: 2024-08-29 | Stop reason: SDUPTHER

## 2024-08-29 RX ADMIN — ROCURONIUM BROMIDE 50 MG: 10 INJECTION, SOLUTION INTRAVENOUS at 13:32

## 2024-08-29 RX ADMIN — APIXABAN 5 MG: 2.5 TABLET, FILM COATED ORAL at 13:20

## 2024-08-29 RX ADMIN — PROTAMINE SULFATE 20 MG: 10 INJECTION, SOLUTION INTRAVENOUS at 16:13

## 2024-08-29 RX ADMIN — PROTAMINE SULFATE 5 MG: 10 INJECTION, SOLUTION INTRAVENOUS at 16:07

## 2024-08-29 RX ADMIN — PROTAMINE SULFATE 15 MG: 10 INJECTION, SOLUTION INTRAVENOUS at 16:09

## 2024-08-29 RX ADMIN — PROTAMINE SULFATE 20 MG: 10 INJECTION, SOLUTION INTRAVENOUS at 16:11

## 2024-08-29 RX ADMIN — PROPOFOL 150 MG: 10 INJECTION, EMULSION INTRAVENOUS at 13:32

## 2024-08-29 RX ADMIN — MIDAZOLAM HYDROCHLORIDE 2 MG: 1 INJECTION INTRAMUSCULAR; INTRAVENOUS at 13:26

## 2024-08-29 RX ADMIN — HEPARIN SODIUM 4000 UNITS: 1000 INJECTION, SOLUTION INTRAVENOUS; SUBCUTANEOUS at 14:56

## 2024-08-29 RX ADMIN — ONDANSETRON 4 MG: 2 INJECTION INTRAMUSCULAR; INTRAVENOUS at 16:36

## 2024-08-29 RX ADMIN — PROTAMINE SULFATE 20 MG: 10 INJECTION, SOLUTION INTRAVENOUS at 16:18

## 2024-08-29 RX ADMIN — BUSPIRONE HYDROCHLORIDE 10 MG: 10 TABLET ORAL at 21:54

## 2024-08-29 RX ADMIN — Medication 0.2 MG: at 13:59

## 2024-08-29 RX ADMIN — SODIUM CHLORIDE, PRESERVATIVE FREE 10 ML: 5 INJECTION INTRAVENOUS at 21:55

## 2024-08-29 RX ADMIN — FENTANYL CITRATE 100 MCG: 50 INJECTION, SOLUTION INTRAMUSCULAR; INTRAVENOUS at 13:29

## 2024-08-29 RX ADMIN — Medication 5 MG: at 23:18

## 2024-08-29 RX ADMIN — ROCURONIUM BROMIDE 25 MG: 10 INJECTION, SOLUTION INTRAVENOUS at 14:13

## 2024-08-29 RX ADMIN — PROTAMINE SULFATE 20 MG: 10 INJECTION, SOLUTION INTRAVENOUS at 16:15

## 2024-08-29 RX ADMIN — HEPARIN SODIUM 15000 UNITS: 1000 INJECTION, SOLUTION INTRAVENOUS; SUBCUTANEOUS at 14:38

## 2024-08-29 RX ADMIN — SODIUM CHLORIDE: 9 INJECTION, SOLUTION INTRAVENOUS at 12:32

## 2024-08-29 RX ADMIN — LIDOCAINE HYDROCHLORIDE 50 MG: 10 INJECTION, SOLUTION EPIDURAL; INFILTRATION; INTRACAUDAL; PERINEURAL at 13:32

## 2024-08-29 RX ADMIN — ROCURONIUM BROMIDE 50 MG: 10 INJECTION, SOLUTION INTRAVENOUS at 15:49

## 2024-08-29 RX ADMIN — ISOPROTERENOL HYDROCHLORIDE 2 MCG: 0.2 INJECTION, SOLUTION INTRAVENOUS at 15:49

## 2024-08-29 RX ADMIN — FAMOTIDINE 20 MG: 20 TABLET, FILM COATED ORAL at 21:55

## 2024-08-29 RX ADMIN — DEXAMETHASONE SODIUM PHOSPHATE 10 MG: 10 INJECTION, SOLUTION INTRAMUSCULAR; INTRAVENOUS at 13:45

## 2024-08-29 RX ADMIN — HEPARIN SODIUM 1000 UNITS/HR: 10000 INJECTION, SOLUTION INTRAVENOUS at 14:38

## 2024-08-29 RX ADMIN — ROCURONIUM BROMIDE 25 MG: 10 INJECTION, SOLUTION INTRAVENOUS at 14:49

## 2024-08-29 RX ADMIN — APIXABAN 5 MG: 5 TABLET, FILM COATED ORAL at 21:54

## 2024-08-29 RX ADMIN — TRAZODONE HYDROCHLORIDE 150 MG: 100 TABLET ORAL at 21:55

## 2024-08-29 RX ADMIN — Medication 0.2 MG: at 14:01

## 2024-08-29 NOTE — H&P
Surgery (Right, 07/13/2021); Upper gastrointestinal endoscopy (N/A, 07/30/2021); Septoplasty (Bilateral, 12/20/2021); Prostate biopsy (01/17/2024); Prostate surgery (N/A, 01/17/2024); and ep study/ablation (08/29/2024).     SOCIAL HISTORY  Social History     Socioeconomic History    Marital status: Single     Spouse name: Not on file    Number of children: Not on file    Years of education: Not on file    Highest education level: Not on file   Occupational History    Not on file   Tobacco Use    Smoking status: Some Days     Types: Cigars     Start date: 2019    Smokeless tobacco: Never    Tobacco comments:     no cigarettes for 20 years- still smokes cigars occassionally   Vaping Use    Vaping status: Never Used   Substance and Sexual Activity    Alcohol use: Yes     Comment: socially    Drug use: Not Currently     Types: Marijuana (Weed)     Comment: sometimes nightly/ oral or smokes. (Denies 1/2024)    Sexual activity: Not on file   Other Topics Concern    Not on file   Social History Narrative    Not on file     Social Determinants of Health     Financial Resource Strain: Low Risk  (4/17/2024)    Overall Financial Resource Strain (CARDIA)     Difficulty of Paying Living Expenses: Not hard at all   Food Insecurity: No Food Insecurity (8/18/2024)    Hunger Vital Sign     Worried About Running Out of Food in the Last Year: Never true     Ran Out of Food in the Last Year: Never true   Transportation Needs: No Transportation Needs (8/18/2024)    PRAPARE - Transportation     Lack of Transportation (Medical): No     Lack of Transportation (Non-Medical): No   Physical Activity: Unknown (6/2/2024)    Exercise Vital Sign     Days of Exercise per Week: 4 days     Minutes of Exercise per Session: Not on file   Stress: Not on file   Social Connections: Not on file   Intimate Partner Violence: Not on file   Housing Stability: Low Risk  (8/18/2024)    Housing Stability Vital Sign     Unable to Pay for Housing in the Last Year:  No     Number of Times Moved in the Last Year: 1     Homeless in the Last Year: No       FAMILY HISTORY  family history includes Cancer in his father and paternal uncle; Diabetes in his father; Irregular Heart Beat in his brother.     MEDICATIONS  Prior to Admission medications    Medication Sig Start Date End Date Taking? Authorizing Provider   calcium carbonate (TUMS) 500 MG chewable tablet Take 2 tablets by mouth 3 times daily as needed for Heartburn 8/19/24 9/18/24 Yes José Luis Crowe MD   famotidine (PEPCID) 20 MG tablet Take 1 tablet by mouth 2 times daily 8/19/24  Yes José Luis Crowe MD   dilTIAZem (CARDIZEM 12 HR) 120 MG extended release capsule Take 1 capsule by mouth daily for 30 doses 8/20/24 9/19/24 Yes José Luis Crowe MD   busPIRone (BUSPAR) 10 MG tablet take 1 tablet by mouth twice a day 8/12/24  Yes Katlyn Leigh MD   traZODone (DESYREL) 150 MG tablet Take 1 tablet by mouth nightly 4/17/24  Yes ProviderPolo MD   metoprolol succinate (TOPROL XL) 100 MG extended release tablet Take 1 tablet by mouth daily 3/31/24  Yes Provider, MD Polo   melatonin 3 MG TABS tablet Take 1 tablet by mouth nightly as needed (usually takes 2 at a time)   Yes ProviderPolo MD   apixaban (ELIQUIS) 5 MG TABS tablet Take 1 tablet by mouth 2 times daily    Provider, MD Polo       ALLERGIES  Pcn [penicillins]         CURRENT MEDICATIONS:   apixaban  5 mg Oral BID    sugammadex             OBJECTIVE:     PHYSICAL EXAM    Vital Signs: BP (!) 146/89   Pulse 57   Temp 97.8 °F (36.6 °C) (Oral)   Resp 21   Ht 1.803 m (5' 11\")   Wt 92.5 kg (204 lb)   SpO2 98%   BMI 28.45 kg/m²     Lungs: CTAB  Heart: Regular rate  Abdomen: Soft, non-tender. Bowel sounds normal,  no organomegaly  Extremities: + pulses  Neurologic: Grossly normal, A&O x3      ASSESSMENT/PLAN:    Atrial fibrillation ablation  Cardo mapping  General anesthesia        Abdulaziz Davidson MD  Clinical Cardiac  Electrophysiologist  [facility]

## 2024-08-29 NOTE — ANESTHESIA PROCEDURE NOTES
Arterial Line:    An arterial line was placed using ultrasound guidance and surface landmarks, in the OR for the following indication(s): continuous blood pressure monitoring.    A 20 gauge (size), 1 and 3/4 inch (length), Arrow (type) catheter was placed, Seldinger technique used, into the right radial artery, secured by Tegaderm.  Anesthesia type: General    Events:  patient tolerated procedure well with no complications.  Performed: Anesthesiologist   Preanesthetic Checklist  Completed: patient identified, IV checked, site marked, risks and benefits discussed, surgical/procedural consents, equipment checked, pre-op evaluation, timeout performed, anesthesia consent given, oxygen available and monitors applied/VS acknowledged

## 2024-08-29 NOTE — PROGRESS NOTES
Received post afib ablation procedure to PCC room 7. Assessment obtained. Restrictions reviewed with patient. Post procedure pathway initiated.  Right groin site soft , figure 8 stictch dry and intact.  No hematoma noted.  Patient without complaints. Head of bed flat with right leg straight.

## 2024-08-29 NOTE — PROCEDURES
CATHETER ABLATION FOR ATRIAL FIBRILLATION    PROCEDURES PERFORMED:  Catheter ablation of atrial fibrillation  3D electroanatomic mapping  Intracardiac Echocardiography  Transeptal puncture  Transesophageal echocardiogram      PREPROCEDURAL DIAGNOSES:  Paroxysmal atrial fibrillation      POST PROCEDURE DIAGNOSES:  As above.    ATTENDING PHYSICIAN: Abdulaziz Davidson MD      INDICATION FOR PROCEDURE: Briefly, Joel Hardin is a 64 y.o. year old male with a history of remote nonischemic cardiomyopathy in 2018 with LVEF normalization, obstructive sleep apnea, and frequent symptomatic paroxysmal atrial fibrillation.  He presents for radiofrequency ablation.    CONSENT: The risks, benefits, approach and potential complications of the procedure were discussed.  The patient was able to give written informed consent after revisiting the key portions of the risk versus benefit profile of the procedure.  See previous notes for additional details.  This detailed informed consent process utilized mutual and shared decision making process between the physician, patient, and potentially patien's selected family and friends.    ANTICOAGULATION STRATEGY PRIOR TO AND POST PROCEDURE: Apixaban 5 mg twice daily.  The last dose of anticoagulant was confirmed to have been taken 3 days ago.    A transesophageal echocardiogram was performed immediately prior significant for no thrombus visualized in the left atrium/left atrial appendage.  Additional findings were intact atrial septum by color Doppler, no significant valve abnormalities, 1+ atherosclerotic disease of the transverse aortic arch    GENERAL ANESTHESIA FOR PROCEDURE: Provided by anesthesiology service    PT/INR:   Lab Results   Component Value Date/Time    INR 1.0 08/19/2024 05:14 AM     PTT:   Lab Results   Component Value Date/Time    APTT 27.1 03/30/2022 01:05 PM       CBC:   Lab Results   Component Value Date/Time    WBC 6.6 08/20/2024 03:39 PM    RBC 5.32 08/20/2024 03:39  echocardiographic and fluoroscopic guidance using a 8.5 [place] SL0 sheath and a BRK needle.  Following the transseptal access, a Biosense Wong PentaRay D curve multielectrode mapping catheter was positioned in the left superior pulmonary vein and used for mapping of the left atrium.  This was later exchanged for a BiosWindeln.deter SmartTouch SF D-F irrigated ablation catheter for further mapping and ablating.    ABLATION DETAILS:    The presenting rhythm was sinus rhythm.  A 3D electroanatomic voltage map of the left atrium was constructed using the Carto mapping system and was significant for no areas of low voltage (scar) involving the left atrium.  There were PV potentials present in all 4 pulmonary veins.    Radiofrequency ablation for pulmonary vein isolation was performed with bilateral WACA (wide antral circumferential ablation) lesions and carinal lesions.  Adjuvant lesions were applied to the anterolateral mitral valve annulus (Vein of Misbah region), distal coronary sinus, low Kati terminalis, and septal aspect of the superior vena cava.  Pulmonary vein isolation was reconfirmed after a period of 30 minutes.    Post ablation, on isoproterenol 2 mcg/min provocation, rapid atrial pacing down to 220 msec failed to induce any sustained atrial fibrillation/flutter.    Post ablation ICE catheter revealed no pericardial effusion.  Heparin was discontinued, and anticoagulation was reversed with protamine.  All catheters and sheaths were removed, and hemostasis was obtained with manual pressure and figure-of-eight suture.  The patient was extubated in routine fashion and transferred to PACU in stable condition.    COMPLICATIONS: No immediate complication    EBL: minimal    RADIATION EXPOSURE: 29 mGy over 4.0 minutes    KEY PROCEDURAL FINDINGS:  Successful radiofrequency ablation for paroxysmal atrial fibrillation with isolation of all 4 pulmonary veins and adjunctive lesions.  There was no significant low  voltage (scar) involving the left atrium.      POST PROCEDURAL PLAN:  Uninterrupted anticoagulation for not less than 90 days unless specially instructed otherwise by myself or another member of our EP physician team.  Esophageal prophylaxis with proton pump inhibitor (protonix 40 mg daily) for 30 days.  Additional medication changes:   A: Discontinue diltiazem   B: Decrease from Toprol- mg daily to 50 mg daily  Anticipate discharge tomorrow morning following diuresis.  Patient and family instructed to call immediately for fever greater than 101.5 degrees F, hematemesis, increasing or severe chest pain, increasing shortness of breath, bleeding or other concerns.  Patient and family instructed that some chest discomfort is normal and is to be expected and that this should be expected to decrease over the first 3-4 days after the ablation procedure.  The patient will be seen at our office for a brief visit in 7-10 days and then a physician visit in 2-3 months.      The patient and family were asked to specify that the “EP doctor on call” be contacted with any questions about their care or their procedure when calling our call center.  The same request was made for any encounters with any health care facility such as other doctors offices, urgent  care centers, emergency rooms and hospitals.      Abdulaziz Davidson MD  Clinical Cardiac Electrophysiologist  [facility]

## 2024-08-29 NOTE — PROGRESS NOTES
Report called to Zari HERNÁNDEZ.  Instructions given to cut figure 8 suture and give lasix at 20:30 per Dr Davidson.    Patient transported to room 2007.  Right groin and right wrist assessed by Zari and writer.  Both soft, no bleeding or hematoma noted.  All pulses palpable.

## 2024-08-29 NOTE — ANESTHESIA PRE PROCEDURE
Department of Anesthesiology  Preprocedure Note       Name:  Joel Hardin   Age:  64 y.o.  :  1960                                          MRN:  [id number]         Date:  2024      Surgeon: Surgeon(s):  Abdulaziz Davidson MD    Procedure: Procedure(s):  costa / Ablation A-fib w/anes / carto    Medications prior to admission:   Prior to Admission medications    Medication Sig Start Date End Date Taking? Authorizing Provider   apixaban (ELIQUIS) 5 MG TABS tablet Take 1 tablet by mouth 2 times daily    Polo Heath MD   calcium carbonate (TUMS) 500 MG chewable tablet Take 2 tablets by mouth 3 times daily as needed for Heartburn 24  José Luis Crowe MD   famotidine (PEPCID) 20 MG tablet Take 1 tablet by mouth 2 times daily 24   José Luis Crowe MD   dilTIAZem (CARDIZEM 12 HR) 120 MG extended release capsule Take 1 capsule by mouth daily for 30 doses 24  José Luis Crowe MD   busPIRone (BUSPAR) 10 MG tablet take 1 tablet by mouth twice a day 24   Katlyn Leigh MD   traZODone (DESYREL) 150 MG tablet Take 1 tablet by mouth nightly 24   Polo Heath MD   metoprolol succinate (TOPROL XL) 100 MG extended release tablet Take 1 tablet by mouth daily 3/31/24   Polo Heath MD   melatonin 3 MG TABS tablet Take 1 tablet by mouth nightly as needed (usually takes 2 at a time)    Polo Heath MD       Current medications:    No current facility-administered medications for this encounter.       Allergies:    Allergies   Allergen Reactions    Pcn [Penicillins] Hives       Problem List:    Patient Active Problem List   Diagnosis Code    Rupture of right triceps tendon S46.311A    Abscess of right elbow L02.413    Chest pain R07.9    Essential hypertension I10    Epigastric pain R10.13    Gastritis K29.70    Esophagitis K20.90    Atrial fibrillation with RVR (HCC) I48.91    Polycythemia D75.1    Hypoproteinemia (HCC) E77.8    Acute bronchitis    Lab Results   Component Value Date/Time    WBC 6.6 08/20/2024 03:39 PM    RBC 5.32 08/20/2024 03:39 PM    HGB 16.7 08/20/2024 03:39 PM    HCT 48.5 08/20/2024 03:39 PM    MCV 91.1 08/20/2024 03:39 PM    RDW 15.5 08/20/2024 03:39 PM     08/20/2024 03:39 PM       CMP:   Lab Results   Component Value Date/Time     08/20/2024 03:39 PM    K 4.3 08/20/2024 03:39 PM     08/20/2024 03:39 PM    CO2 27 08/20/2024 03:39 PM    BUN 13 08/20/2024 03:39 PM    CREATININE 0.8 08/20/2024 03:39 PM    GFRAA >60 07/26/2022 11:14 PM    LABGLOM >90 08/20/2024 03:39 PM    LABGLOM 83 04/17/2024 01:44 PM    GLUCOSE 93 08/20/2024 03:39 PM    CALCIUM 9.3 08/20/2024 03:39 PM    BILITOT 0.4 04/17/2024 01:44 PM    ALKPHOS 41 04/17/2024 01:44 PM    AST 60 04/17/2024 01:44 PM    ALT 37 04/17/2024 01:44 PM       POC Tests: No results for input(s): \"POCGLU\", \"POCNA\", \"POCK\", \"POCCL\", \"POCBUN\", \"POCHEMO\", \"POCHCT\" in the last 72 hours.    Coags:   Lab Results   Component Value Date/Time    PROTIME 13.6 08/19/2024 05:14 AM    INR 1.0 08/19/2024 05:14 AM    APTT 27.1 03/30/2022 01:05 PM       HCG (If Applicable): No results found for: \"PREGTESTUR\", \"PREGSERUM\", \"HCG\", \"HCGQUANT\"     ABGs: No results found for: \"PHART\", \"PO2ART\", \"OGN9MOI\", \"GPJ8FIA\", \"BEART\", \"E8ZNFDLR\"     Type & Screen (If Applicable):  No results found for: \"LABABO\"    Drug/Infectious Status (If Applicable):  No results found for: \"HIV\", \"HEPCAB\"    COVID-19 Screening (If Applicable):   Lab Results   Component Value Date/Time    COVID19 Detected 04/05/2024 12:14 PM    COVID19 Not Detected 12/03/2023 10:00 AM    COVID19 Not Detected 12/16/2021 02:00 PM           Anesthesia Evaluation     history of anesthetic complications: PONV.  Airway: Mallampati: II  TM distance: >3 FB   Neck ROM: full  Mouth opening: > = 3 FB   Dental:          Pulmonary:normal exam    (+)     sleep apnea: on CPAP,                                  Cardiovascular:    (+) hypertension: no  interval change, dysrhythmias: atrial fibrillation      ECG reviewed  Rhythm: regular  Rate: normal                    Neuro/Psych:   (+) psychiatric history:depression/anxiety             GI/Hepatic/Renal:   (+) GERD: no interval change          Endo/Other: Negative Endo/Other ROS                    Abdominal: normal exam            Vascular: negative vascular ROS.         Other Findings:             Anesthesia Plan      general     ASA 3       Induction: intravenous.    MIPS: Postoperative opioids intended, Prophylactic antiemetics administered and Postoperative trial extubation.  Anesthetic plan and risks discussed with patient.      Plan discussed with CRNA.                    Narrative & Impression    Sinus bradycardia  Possible Anterior infarct , age undetermined  Abnormal ECG      Specimen Collected: 08/20/24 15:10 EDT        Summary  Mild left ventricular hypertrophy  Global left ventricular systolic function is normal  Estimated ejection fraction is 65 % .  No significant valvular regurgitation or stenosis seen.  No pericardial effusion seen.  Normal aortic root dimension.    Mauro Rios MD   8/29/2024

## 2024-08-29 NOTE — PROGRESS NOTES
Patient admitted, consent signed and questions answered. Patient ready for procedure. Call light to reach with side rails up 2 of 2. Bilateral groin areas clipped with writer and Dario HERNÁNDEZ present.  Becky - wife at bedside with patient.  History and physical needs uodated.

## 2024-08-30 VITALS
OXYGEN SATURATION: 96 % | SYSTOLIC BLOOD PRESSURE: 132 MMHG | WEIGHT: 204 LBS | BODY MASS INDEX: 28.56 KG/M2 | HEART RATE: 85 BPM | RESPIRATION RATE: 20 BRPM | DIASTOLIC BLOOD PRESSURE: 89 MMHG | TEMPERATURE: 97.9 F | HEIGHT: 71 IN

## 2024-08-30 PROCEDURE — 2580000003 HC RX 258: Performed by: INTERNAL MEDICINE

## 2024-08-30 PROCEDURE — 6370000000 HC RX 637 (ALT 250 FOR IP): Performed by: INTERNAL MEDICINE

## 2024-08-30 RX ORDER — METOPROLOL SUCCINATE 50 MG/1
50 TABLET, EXTENDED RELEASE ORAL DAILY
Qty: 30 TABLET | Refills: 3 | Status: SHIPPED | OUTPATIENT
Start: 2024-08-31

## 2024-08-30 RX ORDER — DOCUSATE SODIUM 100 MG/1
100 CAPSULE, LIQUID FILLED ORAL DAILY
Status: DISCONTINUED | OUTPATIENT
Start: 2024-08-30 | End: 2024-08-30 | Stop reason: HOSPADM

## 2024-08-30 RX ORDER — PANTOPRAZOLE SODIUM 40 MG/1
40 TABLET, DELAYED RELEASE ORAL
Qty: 30 TABLET | Refills: 0 | Status: SHIPPED | OUTPATIENT
Start: 2024-08-30

## 2024-08-30 RX ADMIN — SODIUM CHLORIDE, PRESERVATIVE FREE 10 ML: 5 INJECTION INTRAVENOUS at 08:54

## 2024-08-30 RX ADMIN — APIXABAN 5 MG: 5 TABLET, FILM COATED ORAL at 08:53

## 2024-08-30 RX ADMIN — DOCUSATE SODIUM 100 MG: 100 CAPSULE, LIQUID FILLED ORAL at 10:36

## 2024-08-30 RX ADMIN — METOPROLOL SUCCINATE 50 MG: 25 TABLET, EXTENDED RELEASE ORAL at 08:53

## 2024-08-30 RX ADMIN — BUSPIRONE HYDROCHLORIDE 10 MG: 10 TABLET ORAL at 08:53

## 2024-08-30 RX ADMIN — FAMOTIDINE 20 MG: 20 TABLET, FILM COATED ORAL at 08:54

## 2024-08-30 NOTE — DISCHARGE INSTRUCTIONS
Post ablation:    No deep bending, vigorous activity or heavy lifting for 1 week post ablation.  No driving for 24 hours post procedure.        1.  Follow-up with [facility] in 3-4 weeks    [facility]  [address] [place] [address] [phone]

## 2024-08-30 NOTE — PLAN OF CARE
Problem: Discharge Planning  Goal: Discharge to home or other facility with appropriate resources  8/30/2024 1139 by Claudette Curran, RN  Outcome: Completed  8/30/2024 0024 by Vaishnavi Thompson, RN  Outcome: Progressing     Problem: ABCDS Injury Assessment  Goal: Absence of physical injury  Outcome: Completed     Problem: Safety - Adult  Goal: Free from fall injury  Outcome: Completed

## 2024-08-30 NOTE — DISCHARGE SUMMARY
DISCHARGE SUMMARY      PATIENT NAME:  Joel Hardin    :  1960 AGE: 64 y.o.     Admission Date: 2024   Discharge Date:      Reason for Admission:   Principal Problem:    S/P ablation of atrial fibrillation  Active Problems:    Atrial fibrillation (HCC)  Resolved Problems:    * No resolved hospital problems. *      PROCEDURES    Radiofrequency ablation for atrial fibrillation 2024    BRIEF SUMMARY OF HOSPITAL COURSE     64-year-old man with remote nonischemic cardiomyopathy in 2018 with subsequent LVEF normalization, obstructive sleep apnea, frequent symptomatic paroxysmal atrial fibrillation.      He underwent radiofrequency ablation on 2024 with pulmonary vein isolation x4 and adjunctive lesions.  He was monitored overnight without issues.  Discharged home in stable condition     Diltiazem was discontinued.  Metoprolol was decreased to 50 mg XL once daily dosing.  He was prescribed 1 month of pantoprazole     He should follow up in clinic in 3-4 weeks.  Continue uninterrupted apixaban for at least 3 months.        DISCHARGE INSTRUCTIONS    Post ablation:    No deep bending, vigorous activity or heavy lifting for 1 week post ablation.  No driving for 24 hours post procedure.      1.  Follow-up with [facility] in 3-4 weeks    [facility]  [address] [place] [address]  [phone]        DISCHARGE MEDICATIONS       Medication List        START taking these medications      pantoprazole 40 MG tablet  Commonly known as: PROTONIX  Take 1 tablet by mouth every morning (before breakfast)            CHANGE how you take these medications      metoprolol succinate 50 MG extended release tablet  Commonly known as: TOPROL XL  Take 1 tablet by mouth daily  Start taking on: 2024  What changed:   medication strength  how much to take            CONTINUE taking these medications      busPIRone 10 MG tablet  Commonly known as: BUSPAR  take 1 tablet by  mouth twice a day     calcium carbonate 500 MG chewable tablet  Commonly known as: TUMS  Take 2 tablets by mouth 3 times daily as needed for Heartburn     Eliquis 5 MG Tabs tablet  Generic drug: apixaban     melatonin 3 MG Tabs tablet     traZODone 150 MG tablet  Commonly known as: DESYREL            STOP taking these medications      dilTIAZem 120 MG extended release capsule  Commonly known as: CARDIZEM 12 HR            ASK your doctor about these medications      famotidine 20 MG tablet  Commonly known as: PEPCID  Take 1 tablet by mouth 2 times daily               Where to Get Your Medications        These medications were sent to [id number] - [place], [place] - [address] [phone] - F [phone] [address] [address]      Phone: [phone]   metoprolol succinate 50 MG extended release tablet  pantoprazole 40 MG tablet         /89   Pulse 85   Temp 97.9 °F (36.6 °C) (Oral)   Resp 20   Ht 1.803 m (5' 11\")   Wt 92.5 kg (204 lb)   SpO2 96%   BMI 28.45 kg/m²   Admit Weight  Weight - Scale: 92.5 kg (204 lb)    PHYSICAL EXAM  General:  no acute distress, alert oriented x3  HEENT:  Anicteric, Fair dentition  Neck:  No JVD  Chest:  Clear to auscultation bilaterally  CV:  S2 normal, regular rate, no murmurs  Abdomen:  Soft, nontender, nondistended  Extremities:  2+ pulses, no edema  Neuro:  Grossly intact         Abdulaziz Davidson MD    [facility]  [address] [place] [address] [phone]    This note was completed using a voice transcription system. Every effort was made to ensure accuracy. However, inadvertent computerized transcription errors may be present.

## 2024-08-30 NOTE — CARE COORDINATION
DISCHARGE PLANNING EVALUATION: OP/OBSERVATION        8/30/24, 8:38 AM EDT    Joel Hardin         Location: 2007/2007-01   Reason for hospitalization: Atrial fibrillation, unspecified type (HCC) [I48.91]  S/P ablation of atrial fibrillation [Z98.890, Z86.79]  Atrial fibrillation (HCC) [I48.91]     CM Services requested for transitional needs.     PCP: Katlyn Leigh MD    Transportation/Food Security/Housing Addressed:  No issues identified.     Equipment needs: none    Case Management Services Information Letter Provided []    Transition plan: home independent

## 2024-09-09 RX ORDER — BUSPIRONE HYDROCHLORIDE 10 MG/1
10 TABLET ORAL 2 TIMES DAILY
Qty: 60 TABLET | Refills: 3 | Status: SHIPPED | OUTPATIENT
Start: 2024-09-09

## 2024-09-09 NOTE — ANESTHESIA POSTPROCEDURE EVALUATION
Department of Anesthesiology  Postprocedure Note    Patient: Joel Hardin  MRN: [id number]  YOB: 1960  Date of evaluation: 9/9/2024    Procedure Summary       Date: 08/29/24 Room / Location: [facility] EP LAB RM 2 / [facility] CARDIAC CATH LAB    Anesthesia Start: 1323 Anesthesia Stop: 1706    Procedure: costa / Ablation A-fib w/anes / carto Diagnosis:       Paroxysmal atrial fibrillation (HCC)      (Paroxysmal atrial fibrillation)    Providers: Abdulaziz Davidson MD Responsible Provider: Mauro Rios MD    Anesthesia Type: general ASA Status: 3            Anesthesia Type: No value filed.    Vivien Phase I: Vivien Score: 10    Vivien Phase II:      Anesthesia Post Evaluation    Patient location during evaluation: PACU  Patient participation: complete - patient participated  Level of consciousness: awake  Pain score: 1  Airway patency: patent  Nausea & Vomiting: no nausea and no vomiting  Cardiovascular status: blood pressure returned to baseline and hemodynamically stable  Respiratory status: acceptable  Hydration status: euvolemic  Pain management: adequate    There were no known notable events for this encounter.

## 2024-11-09 ENCOUNTER — OFFICE VISIT (OUTPATIENT)
Dept: ORTHOPEDIC SURGERY | Age: 64
End: 2024-11-09

## 2024-11-09 VITALS — RESPIRATION RATE: 14 BRPM | BODY MASS INDEX: 27.86 KG/M2 | HEIGHT: 71 IN | WEIGHT: 199 LBS

## 2024-11-09 DIAGNOSIS — M19.011 ARTHRITIS OF BOTH GLENOHUMERAL JOINTS: Primary | ICD-10-CM

## 2024-11-09 DIAGNOSIS — M19.012 ARTHRITIS OF BOTH GLENOHUMERAL JOINTS: Primary | ICD-10-CM

## 2024-11-09 RX ORDER — LIDOCAINE HYDROCHLORIDE 10 MG/ML
2 INJECTION, SOLUTION INFILTRATION; PERINEURAL ONCE
Status: COMPLETED | OUTPATIENT
Start: 2024-11-09 | End: 2024-11-09

## 2024-11-09 RX ORDER — METHYLPREDNISOLONE ACETATE 80 MG/ML
80 INJECTION, SUSPENSION INTRA-ARTICULAR; INTRALESIONAL; INTRAMUSCULAR; SOFT TISSUE ONCE
Status: COMPLETED | OUTPATIENT
Start: 2024-11-09 | End: 2024-11-09

## 2024-11-09 RX ORDER — IBUPROFEN 800 MG/1
800 TABLET, FILM COATED ORAL EVERY 8 HOURS PRN
Qty: 90 TABLET | Refills: 0 | Status: SHIPPED | OUTPATIENT
Start: 2024-11-09

## 2024-11-09 RX ADMIN — LIDOCAINE HYDROCHLORIDE 2 ML: 10 INJECTION, SOLUTION INFILTRATION; PERINEURAL at 09:41

## 2024-11-09 RX ADMIN — LIDOCAINE HYDROCHLORIDE 2 ML: 10 INJECTION, SOLUTION INFILTRATION; PERINEURAL at 09:37

## 2024-11-09 RX ADMIN — METHYLPREDNISOLONE ACETATE 80 MG: 80 INJECTION, SUSPENSION INTRA-ARTICULAR; INTRALESIONAL; INTRAMUSCULAR; SOFT TISSUE at 09:42

## 2024-11-09 RX ADMIN — METHYLPREDNISOLONE ACETATE 80 MG: 80 INJECTION, SUSPENSION INTRA-ARTICULAR; INTRALESIONAL; INTRAMUSCULAR; SOFT TISSUE at 09:41

## 2024-11-09 NOTE — PROGRESS NOTES
[facility] Orthopaedics & Sports Medicine      [facility] ORTHOPEDICS AND SPORTS MEDICINE  [address]  [address]  [place] [id number]  Dept: [phone]  Dept Fax: [phone]    Chief Compliant:  Chief Complaint   Patient presents with    Shoulder Pain        History of Present Illness:  This is a pleasant 64 y.o. male who presents to the clinic today for evaluation / follow up of bilateral shoulder pain.  Patient previously has been diagnosed with glenohumeral arthritis.  He notes that his left shoulder bothers him more than his right.  He occasionally takes Norco as needed for pain.  He states that he has significant pain when he is working and working out at the gym.  He notes that he has 10 out of 10 pain when he is driving as well as in the middle of the night.  He states that he has a lot of difficulty sleeping secondary to pain.  He has tried ibuprofen and Tylenol as needed for pain.  He previously has been seen evaluated by Gael Matthews PA-C where he received intra-articular steroid injections.  He presents today for further evaluation.       Physical Exam:    Right shoulder: Patient can forward elevate his right shoulder to roughly 170 degrees.  He is able to reach behind his head.  He can internally rotate to his right paraspinal muscles at the lumbar level.  He has some tenderness palpation of the deltoid aspect the shoulder as well as over the glenohumeral joint itself.  No AC joint tenderness.  No tenderness of the bicipital groove.  He has a positive speeds test.  He has 4-5 strength resisted forward flexion.  He has a positive Neer impingement test.  He has a positive Aponte Jose David test.    Left shoulder: Patient can forward elevate his left shoulder to 170 degrees.  He can reach behind his head.  He can internally rotate to his lumbar spine.  He does have some discomfort throughout range of

## 2024-12-09 ENCOUNTER — HOSPITAL ENCOUNTER (EMERGENCY)
Age: 64
Discharge: HOME | End: 2024-12-09
Attending: EMERGENCY MEDICINE
Payer: COMMERCIAL

## 2024-12-09 ENCOUNTER — APPOINTMENT (OUTPATIENT)
Dept: GENERAL RADIOLOGY | Age: 64
End: 2024-12-09
Attending: EMERGENCY MEDICINE
Payer: COMMERCIAL

## 2024-12-09 VITALS
TEMPERATURE: 97.9 F | SYSTOLIC BLOOD PRESSURE: 137 MMHG | DIASTOLIC BLOOD PRESSURE: 83 MMHG | BODY MASS INDEX: 27.75 KG/M2 | RESPIRATION RATE: 18 BRPM | HEART RATE: 84 BPM | WEIGHT: 199 LBS | OXYGEN SATURATION: 98 %

## 2024-12-09 DIAGNOSIS — R00.2 PALPITATIONS: Primary | ICD-10-CM

## 2024-12-09 LAB
ANION GAP SERPL CALCULATED.3IONS-SCNC: 8 MMOL/L (ref 9–16)
BASOPHILS # BLD: 0.04 K/UL (ref 0–0.2)
BASOPHILS NFR BLD: 1 % (ref 0–2)
BUN SERPL-MCNC: 10 MG/DL (ref 8–23)
CALCIUM SERPL-MCNC: 9.4 MG/DL (ref 8.8–10.2)
CHLORIDE SERPL-SCNC: 101 MMOL/L (ref 98–107)
CO2 SERPL-SCNC: 29 MMOL/L (ref 20–31)
CREAT SERPL-MCNC: 1 MG/DL (ref 0.7–1.2)
EKG ATRIAL RATE: 83 BPM
EKG P AXIS: 28 DEGREES
EKG P-R INTERVAL: 206 MS
EKG Q-T INTERVAL: 368 MS
EKG QRS DURATION: 92 MS
EKG QTC CALCULATION (BAZETT): 432 MS
EKG R AXIS: 10 DEGREES
EKG T AXIS: 45 DEGREES
EKG VENTRICULAR RATE: 83 BPM
EOSINOPHIL # BLD: 0.22 K/UL (ref 0–0.44)
EOSINOPHILS RELATIVE PERCENT: 5 % (ref 1–4)
ERYTHROCYTE [DISTWIDTH] IN BLOOD BY AUTOMATED COUNT: 13 % (ref 11.8–14.4)
GFR, ESTIMATED: 85 ML/MIN/1.73M2
GLUCOSE SERPL-MCNC: 120 MG/DL (ref 82–115)
HCT VFR BLD AUTO: 46.8 % (ref 40.7–50.3)
HGB BLD-MCNC: 16.3 G/DL (ref 13–17)
IMM GRANULOCYTES # BLD AUTO: 0.03 K/UL (ref 0–0.3)
IMM GRANULOCYTES NFR BLD: 1 %
LYMPHOCYTES NFR BLD: 1.06 K/UL (ref 1.1–3.7)
LYMPHOCYTES RELATIVE PERCENT: 23 % (ref 24–43)
MAGNESIUM SERPL-MCNC: 2 MG/DL (ref 1.6–2.4)
MCH RBC QN AUTO: 33.5 PG (ref 25.2–33.5)
MCHC RBC AUTO-ENTMCNC: 34.8 G/DL (ref 28.4–34.8)
MCV RBC AUTO: 96.3 FL (ref 82.6–102.9)
MONOCYTES NFR BLD: 0.45 K/UL (ref 0.1–1.2)
MONOCYTES NFR BLD: 10 % (ref 3–12)
NEUTROPHILS NFR BLD: 60 % (ref 36–65)
NEUTS SEG NFR BLD: 2.81 K/UL (ref 1.5–8.1)
NRBC BLD-RTO: 0 PER 100 WBC
PLATELET # BLD AUTO: 196 K/UL (ref 138–453)
PMV BLD AUTO: 9.1 FL (ref 8.1–13.5)
POTASSIUM SERPL-SCNC: 4 MMOL/L (ref 3.7–5.3)
RBC # BLD AUTO: 4.86 M/UL (ref 4.21–5.77)
SODIUM SERPL-SCNC: 139 MMOL/L (ref 136–145)
T4 FREE SERPL-MCNC: 1.2 NG/DL (ref 0.93–1.7)
TROPONIN I SERPL HS-MCNC: 14 NG/L (ref 0–22)
TSH SERPL DL<=0.05 MIU/L-ACNC: 2.25 UIU/ML (ref 0.27–4.2)
WBC OTHER # BLD: 4.6 K/UL (ref 3.5–11.3)

## 2024-12-09 PROCEDURE — 71045 X-RAY EXAM CHEST 1 VIEW: CPT

## 2024-12-09 PROCEDURE — 93005 ELECTROCARDIOGRAM TRACING: CPT | Performed by: EMERGENCY MEDICINE

## 2024-12-09 PROCEDURE — 93010 ELECTROCARDIOGRAM REPORT: CPT | Performed by: INTERNAL MEDICINE

## 2024-12-09 PROCEDURE — 83735 ASSAY OF MAGNESIUM: CPT

## 2024-12-09 PROCEDURE — 84484 ASSAY OF TROPONIN QUANT: CPT

## 2024-12-09 PROCEDURE — 85025 COMPLETE CBC W/AUTO DIFF WBC: CPT

## 2024-12-09 PROCEDURE — 84439 ASSAY OF FREE THYROXINE: CPT

## 2024-12-09 PROCEDURE — 80048 BASIC METABOLIC PNL TOTAL CA: CPT

## 2024-12-09 PROCEDURE — 84443 ASSAY THYROID STIM HORMONE: CPT

## 2024-12-09 PROCEDURE — 99285 EMERGENCY DEPT VISIT HI MDM: CPT

## 2024-12-09 ASSESSMENT — ENCOUNTER SYMPTOMS
ABDOMINAL DISTENTION: 0
EYE DISCHARGE: 0
SHORTNESS OF BREATH: 0
EYE PAIN: 0
FACIAL SWELLING: 0
ABDOMINAL PAIN: 0
CHEST TIGHTNESS: 0
BACK PAIN: 0

## 2024-12-09 ASSESSMENT — PAIN - FUNCTIONAL ASSESSMENT: PAIN_FUNCTIONAL_ASSESSMENT: NONE - DENIES PAIN

## 2024-12-09 NOTE — ED PROVIDER NOTES
breakfast)    TRAZODONE (DESYREL) 150 MG TABLET    Take 1 tablet by mouth nightly     ALLERGIES     is allergic to pcn [penicillins].  FAMILY HISTORY     He indicated that his mother is alive. He indicated that his father is . He indicated that his brother is alive. He indicated that the status of his paternal uncle is unknown.     SOCIAL HISTORY       Social History     Tobacco Use    Smoking status: Some Days     Types: Cigars     Start date:     Smokeless tobacco: Never    Tobacco comments:     no cigarettes for 20 years- still smokes cigars occassionally   Vaping Use    Vaping status: Never Used   Substance Use Topics    Alcohol use: Yes     Comment: socially    Drug use: Not Currently     Types: Marijuana (Weed)     Comment: sometimes nightly/ oral or smokes. (Denies 2024)     PHYSICAL EXAM     INITIAL VITALS: /83   Pulse 84   Temp 97.9 °F (36.6 °C)   Resp 18   Wt 90.3 kg (199 lb)   SpO2 98%   BMI 27.75 kg/m²    Physical Exam  Vitals and nursing note reviewed.   Constitutional:       General: He is not in acute distress.     Appearance: He is well-developed. He is not diaphoretic.   HENT:      Head: Normocephalic and atraumatic.   Eyes:      Pupils: Pupils are equal, round, and reactive to light.   Cardiovascular:      Rate and Rhythm: Normal rate and regular rhythm.   Pulmonary:      Effort: Pulmonary effort is normal.      Breath sounds: Normal breath sounds.   Abdominal:      General: Bowel sounds are normal.      Palpations: Abdomen is soft.   Musculoskeletal:         General: Normal range of motion.      Cervical back: Normal range of motion and neck supple.   Skin:     General: Skin is warm.      Capillary Refill: Capillary refill takes less than 2 seconds.   Neurological:      Mental Status: He is alert and oriented to person, place, and time.         MEDICAL DECISION MAKING / ED COURSE:   Summary of Patient Presentation:    The patient is a 64-year-old male with a history of

## 2025-01-07 RX ORDER — BUSPIRONE HYDROCHLORIDE 10 MG/1
10 TABLET ORAL 2 TIMES DAILY
Qty: 60 TABLET | Refills: 3 | Status: SHIPPED | OUTPATIENT
Start: 2025-01-07

## 2025-01-14 ENCOUNTER — OFFICE VISIT (OUTPATIENT)
Dept: INTERNAL MEDICINE CLINIC | Age: 65
End: 2025-01-14
Payer: COMMERCIAL

## 2025-01-14 VITALS
TEMPERATURE: 98.4 F | HEART RATE: 72 BPM | DIASTOLIC BLOOD PRESSURE: 90 MMHG | HEIGHT: 70 IN | BODY MASS INDEX: 30.67 KG/M2 | WEIGHT: 214.2 LBS | OXYGEN SATURATION: 99 % | SYSTOLIC BLOOD PRESSURE: 140 MMHG

## 2025-01-14 DIAGNOSIS — J40 BRONCHITIS: ICD-10-CM

## 2025-01-14 DIAGNOSIS — J06.9 ACUTE UPPER RESPIRATORY INFECTION: Primary | ICD-10-CM

## 2025-01-14 PROCEDURE — G8417 CALC BMI ABV UP PARAM F/U: HCPCS

## 2025-01-14 PROCEDURE — 99213 OFFICE O/P EST LOW 20 MIN: CPT

## 2025-01-14 PROCEDURE — 3080F DIAST BP >= 90 MM HG: CPT

## 2025-01-14 PROCEDURE — 4004F PT TOBACCO SCREEN RCVD TLK: CPT

## 2025-01-14 PROCEDURE — 3077F SYST BP >= 140 MM HG: CPT

## 2025-01-14 PROCEDURE — G8427 DOCREV CUR MEDS BY ELIG CLIN: HCPCS

## 2025-01-14 PROCEDURE — 3017F COLORECTAL CA SCREEN DOC REV: CPT

## 2025-01-14 RX ORDER — ALBUTEROL SULFATE 90 UG/1
2 INHALANT RESPIRATORY (INHALATION) 4 TIMES DAILY PRN
Qty: 18 G | Refills: 5 | Status: SHIPPED | OUTPATIENT
Start: 2025-01-14

## 2025-01-14 RX ORDER — GUAIFENESIN/DEXTROMETHORPHAN 100-10MG/5
5 SYRUP ORAL 3 TIMES DAILY PRN
Qty: 120 ML | Refills: 0 | Status: SHIPPED | OUTPATIENT
Start: 2025-01-14 | End: 2025-01-24

## 2025-01-14 RX ORDER — AZITHROMYCIN 250 MG/1
TABLET, FILM COATED ORAL
Qty: 6 TABLET | Refills: 0 | Status: SHIPPED | OUTPATIENT
Start: 2025-01-14 | End: 2025-01-24

## 2025-01-14 ASSESSMENT — ENCOUNTER SYMPTOMS
ALLERGIC/IMMUNOLOGIC NEGATIVE: 1
TROUBLE SWALLOWING: 0
NAUSEA: 0
DIARRHEA: 0
SHORTNESS OF BREATH: 0
SINUS PRESSURE: 0
COUGH: 1
VOMITING: 0
WHEEZING: 1
BACK PAIN: 0
ABDOMINAL PAIN: 0
SORE THROAT: 0
CONSTIPATION: 0

## 2025-01-14 ASSESSMENT — PATIENT HEALTH QUESTIONNAIRE - PHQ9
2. FEELING DOWN, DEPRESSED OR HOPELESS: NOT AT ALL
SUM OF ALL RESPONSES TO PHQ QUESTIONS 1-9: 0
SUM OF ALL RESPONSES TO PHQ QUESTIONS 1-9: 0
SUM OF ALL RESPONSES TO PHQ9 QUESTIONS 1 & 2: 0
SUM OF ALL RESPONSES TO PHQ QUESTIONS 1-9: 0
1. LITTLE INTEREST OR PLEASURE IN DOING THINGS: NOT AT ALL
SUM OF ALL RESPONSES TO PHQ QUESTIONS 1-9: 0

## 2025-01-14 NOTE — PROGRESS NOTES
[facility]  [address] [address]  Dept: [phone]  Dept Fax: [phone]    OFFICE VISIT NOTE  Date of patient's visit: 1/14/2025  Patient's Name:  Joel Hardin YOB: 1960            Patient Care Team:  Katlyn Leigh MD as PCP - General (Internal Medicine)  Katlyn Leigh MD as PCP - Empaneled Provider  Fidencio Ramirez MD as Consulting Physician (Gastroenterology)  _________________________________________    ________________________________________________  Chief Complaint:   Cough, Congestion, and Chills    _______________________________________________  History of Presenting Illness:  History was obtained from the patient. Joel Hardin is a 64 y.o. male.     Urti symptoms  Started last week   Took mucinex, nyquil  Continues to get worse  Wife is sick at home  Home test for covid was negative, done yesterday  Chills, cold sweats  No fever  Mild SOB from congestion, 99% on room air  Wheezing mild  Occassional cigar smoker        Lab Results   Component Value Date    HGB 16.3 12/09/2024    LABA1C 4.8 04/17/2024    CHOL 154 04/17/2024    HDL 37 (L) 04/17/2024     (H) 04/17/2024    TRIG 57 04/17/2024    VITD25 37.6 04/17/2024    CREATININE 1.0 12/09/2024     _____________________________________________________  Past Medical/Surgical History:        Diagnosis Date    Abscess of bursa of right elbow     Acute tear medial meniscus     Right    Arthritis     Atrial fibrillation (HCC) 10/15/2021    Chronic    BPH (benign prostatic hyperplasia) 11/30/2023    Cardiomyopathy (HCC)     COVID-19 09/2021    Diverticulitis     Elevated PSA 11/30/2023    Erectile dysfunction     Esophagitis     Gastroparesis     Generalized anxiety disorder     GERD (gastroesophageal reflux disease)     Hypertension     Nodular prostate 11/30/2023    Panic attack 11/24/2021    Polycythemia 03/2021    States that bloodwork has been normal and that he no

## 2025-01-17 ENCOUNTER — HOSPITAL ENCOUNTER (OUTPATIENT)
Facility: CLINIC | Age: 65
Discharge: HOME | End: 2025-01-19
Payer: COMMERCIAL

## 2025-01-17 ENCOUNTER — HOSPITAL ENCOUNTER (OUTPATIENT)
Dept: GENERAL RADIOLOGY | Facility: CLINIC | Age: 65
Discharge: HOME | End: 2025-01-19
Payer: COMMERCIAL

## 2025-01-17 DIAGNOSIS — J06.9 ACUTE UPPER RESPIRATORY INFECTION: ICD-10-CM

## 2025-01-17 DIAGNOSIS — J40 BRONCHITIS: ICD-10-CM

## 2025-01-17 PROCEDURE — 71046 X-RAY EXAM CHEST 2 VIEWS: CPT

## 2025-02-10 ENCOUNTER — HOSPITAL ENCOUNTER (OUTPATIENT)
Facility: CLINIC | Age: 65
Discharge: HOME | End: 2025-02-10
Payer: COMMERCIAL

## 2025-02-10 LAB — PSA SERPL-MCNC: 4.49 NG/ML (ref 0–4)

## 2025-02-10 PROCEDURE — 36415 COLL VENOUS BLD VENIPUNCTURE: CPT

## 2025-02-10 PROCEDURE — 84153 ASSAY OF PSA TOTAL: CPT

## 2025-04-22 ENCOUNTER — OFFICE VISIT (OUTPATIENT)
Age: 65
End: 2025-04-22

## 2025-04-22 VITALS — HEIGHT: 70 IN | WEIGHT: 214 LBS | BODY MASS INDEX: 30.64 KG/M2

## 2025-04-22 DIAGNOSIS — M25.512 LEFT SHOULDER PAIN, UNSPECIFIED CHRONICITY: ICD-10-CM

## 2025-04-22 DIAGNOSIS — M19.011 ARTHRITIS OF BOTH GLENOHUMERAL JOINTS: Primary | ICD-10-CM

## 2025-04-22 DIAGNOSIS — M25.511 RIGHT SHOULDER PAIN, UNSPECIFIED CHRONICITY: ICD-10-CM

## 2025-04-22 DIAGNOSIS — M19.012 ARTHRITIS OF BOTH GLENOHUMERAL JOINTS: Primary | ICD-10-CM

## 2025-04-22 RX ADMIN — METHYLPREDNISOLONE ACETATE 80 MG: 80 INJECTION, SUSPENSION INTRA-ARTICULAR; INTRALESIONAL; INTRAMUSCULAR; SOFT TISSUE at 08:41

## 2025-04-22 RX ADMIN — LIDOCAINE HYDROCHLORIDE 2 ML: 10 INJECTION, SOLUTION INFILTRATION; PERINEURAL at 08:40

## 2025-04-22 RX ADMIN — LIDOCAINE HYDROCHLORIDE 2 ML: 10 INJECTION, SOLUTION INFILTRATION; PERINEURAL at 08:39

## 2025-04-22 RX ADMIN — METHYLPREDNISOLONE ACETATE 80 MG: 80 INJECTION, SUSPENSION INTRA-ARTICULAR; INTRALESIONAL; INTRAMUSCULAR; SOFT TISSUE at 08:43

## 2025-04-22 NOTE — PROGRESS NOTES
[facility] Orthopaedics & Sports Medicine      [facility] ORTHOPAEDICS AND SPORTS MEDICINE  [address]  [place] [address]  Dept: [phone]  Dept Fax: [phone]    Chief Compliant:  Chief Complaint   Patient presents with    Shoulder Pain     Bilat shoulders        History of Present Illness:  This is a pleasant 64 y.o. male who presents to the clinic today for evaluation / follow up of bilateral glenohumeral joint arthritis.  Patient notes the last injections did help him a little bit.  He states that he is to the point where the pain is significant enough he wants to consider surgical intervention.  He has been taking anti-inflammatories for pain without significant relief.  He states the left side bothers him more so than the right side.  He presents today for further evaluation and treatment.      Physical Exam:    Left shoulder: Patient is only able to forward elevate his left shoulder to about 160 degrees on exam today.  He can reach behind his head.  Internally rotate to his lumbar spine.  He does have some tenderness to a patient on the glenohumeral joint line.  No tenderness of the AC joint.  He has pain with Aponte Jose David test.  Pain with Neer impingement test as well.  He has pretty good strength resisted forward flexion.  He has good strength resisted external rotation.  He has a positive speeds maneuver.    Right shoulder: Patient is able to forward elevate to about 160 degrees.  He is able to reach behind his head.  He can internally rotate to his lumbar spine.  He has 5-5 strength resisted forward flexion.  He has no significant pain with Aponte Jose David maneuver.  No significant pain with Neer impingement test.  He has pain with speeds maneuver.  No significant tenderness along the bicipital groove over the AC joint.  He has some tenderness along the glenohumeral joint line.      Nursing note and vitals

## 2025-04-23 RX ORDER — METHYLPREDNISOLONE ACETATE 80 MG/ML
80 INJECTION, SUSPENSION INTRA-ARTICULAR; INTRALESIONAL; INTRAMUSCULAR; SOFT TISSUE ONCE
Status: COMPLETED | OUTPATIENT
Start: 2025-04-23 | End: 2025-04-22

## 2025-04-23 RX ORDER — LIDOCAINE HYDROCHLORIDE 10 MG/ML
2 INJECTION, SOLUTION INFILTRATION; PERINEURAL ONCE
Status: COMPLETED | OUTPATIENT
Start: 2025-04-23 | End: 2025-04-22

## 2025-05-05 RX ORDER — BUSPIRONE HYDROCHLORIDE 10 MG/1
10 TABLET ORAL 2 TIMES DAILY
Qty: 60 TABLET | Refills: 3 | Status: SHIPPED | OUTPATIENT
Start: 2025-05-05

## 2025-05-09 ENCOUNTER — HOSPITAL ENCOUNTER (OUTPATIENT)
Dept: MRI IMAGING | Age: 65
Discharge: HOME | End: 2025-05-11
Payer: COMMERCIAL

## 2025-05-09 DIAGNOSIS — M25.511 RIGHT SHOULDER PAIN, UNSPECIFIED CHRONICITY: ICD-10-CM

## 2025-05-09 DIAGNOSIS — M25.512 LEFT SHOULDER PAIN, UNSPECIFIED CHRONICITY: ICD-10-CM

## 2025-05-09 PROCEDURE — 73221 MRI JOINT UPR EXTREM W/O DYE: CPT

## 2025-05-20 ENCOUNTER — OFFICE VISIT (OUTPATIENT)
Age: 65
End: 2025-05-20
Payer: COMMERCIAL

## 2025-05-20 VITALS — BODY MASS INDEX: 30.64 KG/M2 | HEIGHT: 70 IN | WEIGHT: 214 LBS

## 2025-05-20 DIAGNOSIS — M19.012 ARTHRITIS OF LEFT SHOULDER: Primary | ICD-10-CM

## 2025-05-20 PROCEDURE — G8428 CUR MEDS NOT DOCUMENT: HCPCS | Performed by: ORTHOPAEDIC SURGERY

## 2025-05-20 PROCEDURE — 99214 OFFICE O/P EST MOD 30 MIN: CPT | Performed by: ORTHOPAEDIC SURGERY

## 2025-05-20 PROCEDURE — 3017F COLORECTAL CA SCREEN DOC REV: CPT | Performed by: ORTHOPAEDIC SURGERY

## 2025-05-20 PROCEDURE — G8417 CALC BMI ABV UP PARAM F/U: HCPCS | Performed by: ORTHOPAEDIC SURGERY

## 2025-05-20 PROCEDURE — 4004F PT TOBACCO SCREEN RCVD TLK: CPT | Performed by: ORTHOPAEDIC SURGERY

## 2025-05-20 NOTE — PROGRESS NOTES
MD Abdulaziz at [facility] CARDIAC CATH LAB    EP STUDY/ABLATION  08/29/2024    afib ablation dr Davidson    HERNIA REPAIR      MUSCLE REPAIR Right 02/23/2021    TRICEPS TENDON REPAIR - RIGHT ELBOW performed by Karli Desai MD at [facility] OR    PROSTATE BIOPSY  01/17/2024    PROSTATE SURGERY N/A 01/17/2024    FUSION PROSTATE BIOPSY, ULTRASOUND performed by Timothy Adkins MD at [facility] OR    SEPTOPLASTY Bilateral 12/20/2021    SEPTOPLASTY BILATERAL TURBINATE REDUCTION performed by Christopher Jenkins MD at [facility] OR    SHOULDER SURGERY Right     SPINE SURGERY      cervical and lumbar    UPPER GASTROINTESTINAL ENDOSCOPY N/A 12/17/2019    EGD BIOPSY performed by Kelvin Gold MD at [facility] ENDO    UPPER GASTROINTESTINAL ENDOSCOPY N/A 07/30/2021    EGD BIOPSY OF STOMACH AND ESOPHAGUS performed by Fidencio Ramirez MD at [facility] ENDO     Family History   Problem Relation Age of Onset    Cancer Father         prostrate    Diabetes Father     Irregular Heart Beat Brother     Cancer Paternal Uncle         colon          Please note that this chart was generated using voice recognition Dragon dictation software.  Although every effort was made to ensure the accuracy of this automated transcription, some errors in transcription may have occurred.

## 2025-06-18 DIAGNOSIS — J40 BRONCHITIS: ICD-10-CM

## 2025-06-18 DIAGNOSIS — J06.9 ACUTE UPPER RESPIRATORY INFECTION: ICD-10-CM

## 2025-06-18 RX ORDER — ALBUTEROL SULFATE 90 UG/1
2 INHALANT RESPIRATORY (INHALATION) 4 TIMES DAILY PRN
Qty: 18 G | Refills: 5 | Status: SHIPPED | OUTPATIENT
Start: 2025-06-18

## 2025-07-16 ENCOUNTER — TELEPHONE (OUTPATIENT)
Dept: INTERNAL MEDICINE CLINIC | Age: 65
End: 2025-07-16

## 2025-07-16 NOTE — TELEPHONE ENCOUNTER
Patient called and scheduled an appointment with the first opening in the practice. He would like to know if a PSA and other labs can be ordered before that as he feels his prostate is enlarged. He would also like a x-ray of the lower right back as he has been having some pain. Please advise.

## 2025-07-17 NOTE — TELEPHONE ENCOUNTER
These labs can only be ordered after pt rev  Unble to sign orders  As may be denied without eval or decimation for needs for these tests

## 2025-07-21 NOTE — TELEPHONE ENCOUNTER
Patient informed that his labs can be ordered at the time of his appt due to the need of an assessment first. Patient acknowledged and said he will see the provider on 07/31/25. Pended orders deleted.

## 2025-07-31 ENCOUNTER — OFFICE VISIT (OUTPATIENT)
Dept: INTERNAL MEDICINE CLINIC | Age: 65
End: 2025-07-31
Payer: COMMERCIAL

## 2025-07-31 VITALS
SYSTOLIC BLOOD PRESSURE: 124 MMHG | BODY MASS INDEX: 29.29 KG/M2 | DIASTOLIC BLOOD PRESSURE: 86 MMHG | OXYGEN SATURATION: 94 % | HEART RATE: 65 BPM | WEIGHT: 204.6 LBS | HEIGHT: 70 IN

## 2025-07-31 DIAGNOSIS — I48.20 CHRONIC ATRIAL FIBRILLATION (HCC): ICD-10-CM

## 2025-07-31 DIAGNOSIS — G47.00 INSOMNIA, UNSPECIFIED TYPE: Primary | ICD-10-CM

## 2025-07-31 DIAGNOSIS — I10 ESSENTIAL HYPERTENSION: ICD-10-CM

## 2025-07-31 DIAGNOSIS — G47.33 OSA ON CPAP: ICD-10-CM

## 2025-07-31 PROCEDURE — 3017F COLORECTAL CA SCREEN DOC REV: CPT | Performed by: STUDENT IN AN ORGANIZED HEALTH CARE EDUCATION/TRAINING PROGRAM

## 2025-07-31 PROCEDURE — 3079F DIAST BP 80-89 MM HG: CPT | Performed by: STUDENT IN AN ORGANIZED HEALTH CARE EDUCATION/TRAINING PROGRAM

## 2025-07-31 PROCEDURE — G8427 DOCREV CUR MEDS BY ELIG CLIN: HCPCS | Performed by: STUDENT IN AN ORGANIZED HEALTH CARE EDUCATION/TRAINING PROGRAM

## 2025-07-31 PROCEDURE — 3074F SYST BP LT 130 MM HG: CPT | Performed by: STUDENT IN AN ORGANIZED HEALTH CARE EDUCATION/TRAINING PROGRAM

## 2025-07-31 PROCEDURE — 99214 OFFICE O/P EST MOD 30 MIN: CPT | Performed by: STUDENT IN AN ORGANIZED HEALTH CARE EDUCATION/TRAINING PROGRAM

## 2025-07-31 PROCEDURE — 4004F PT TOBACCO SCREEN RCVD TLK: CPT | Performed by: STUDENT IN AN ORGANIZED HEALTH CARE EDUCATION/TRAINING PROGRAM

## 2025-07-31 PROCEDURE — G8417 CALC BMI ABV UP PARAM F/U: HCPCS | Performed by: STUDENT IN AN ORGANIZED HEALTH CARE EDUCATION/TRAINING PROGRAM

## 2025-07-31 RX ORDER — ZOLPIDEM TARTRATE 5 MG/1
5 TABLET ORAL NIGHTLY PRN
Qty: 30 TABLET | Refills: 0 | Status: SHIPPED | OUTPATIENT
Start: 2025-07-31 | End: 2025-08-30

## 2025-07-31 RX ORDER — METOPROLOL SUCCINATE 100 MG/1
100 TABLET, EXTENDED RELEASE ORAL DAILY
COMMUNITY
Start: 2025-07-21

## 2025-07-31 ASSESSMENT — ENCOUNTER SYMPTOMS
COUGH: 0
WHEEZING: 0
ABDOMINAL PAIN: 0
SHORTNESS OF BREATH: 0
DIARRHEA: 0
CONSTIPATION: 0

## 2025-07-31 NOTE — PROGRESS NOTES
[facility]  [address] [address]  Dept: [phone]    [facility]    Progress Note    Date of patient's visit: 7/31/2025    Patient's Name:  Joel Hardin    YOB: 1960            Patient Care Team:  Katlyn Leigh MD as PCP - General (Internal Medicine)  Katlyn Leigh MD as PCP - Empaneled Provider  Fidencio Ramirez MD as Consulting Physician (Gastroenterology)    REASON FOR VISIT:     Chief Complaint   Patient presents with    Back Pain     Lower right side back pain - no injury reported - has had previous back surgery    Hypertension     Follow up    Fatigue     Getting tired more frequently     Orders     Would like all labs possible    Sinus Problem     Every morning patient wakes up and is constantly blowing his nose    Insomnia     Needs sleep         HISTORY OF PRESENT ILLNESS:    History was obtained from the patient. Joel Hardin is a 64 y.o. is here for     Insomnia  Patient has been struggling with insomnia for years  He was diagnosed with CPAP, tried the CPAP however unable to tolerate.  He follows with ENT and plans to discuss Inspira  Educated patient on sleep hygiene.  He does have things he can change to help with his sleep including limiting caffeine, nicotine    Hypertension  Compliant with his blood pressure medications.  Controlled on Toprol 100    GERD  He does take Protonix, Pepcid  Education given on ways to limit GERD symptoms      Past Medical History:   Diagnosis Date    Abscess of bursa of right elbow     Acute tear medial meniscus     Right    Arthritis     Atrial fibrillation (HCC) 10/15/2021    Chronic    BPH (benign prostatic hyperplasia) 11/30/2023    Cardiomyopathy (HCC)     COVID-19 09/2021    Diverticulitis     Elevated PSA 11/30/2023    Erectile dysfunction     Esophagitis     Gastroparesis     Generalized anxiety disorder     GERD (gastroesophageal reflux disease)

## 2025-08-01 ENCOUNTER — TELEPHONE (OUTPATIENT)
Dept: INTERNAL MEDICINE CLINIC | Age: 65
End: 2025-08-01

## 2025-08-01 DIAGNOSIS — G47.33 OSA (OBSTRUCTIVE SLEEP APNEA): Primary | ICD-10-CM

## 2025-08-01 NOTE — TELEPHONE ENCOUNTER
Patient was seen yesterday. Patient is having a sleep study on 8/15 and wants a referral to a ENT Dr. Jenkins so he can coordinate and get the appts around the same time and done.       Patient has seen Dr. Jenkins before but his referral has .      Patient would like this faxed over to Dr. Jenkins. Patient would like to come pick this up as well.

## 2025-08-14 ENCOUNTER — HOSPITAL ENCOUNTER (OUTPATIENT)
Dept: SLEEP CENTER | Age: 65
Discharge: HOME | End: 2025-08-16
Attending: STUDENT IN AN ORGANIZED HEALTH CARE EDUCATION/TRAINING PROGRAM
Payer: COMMERCIAL

## 2025-08-14 DIAGNOSIS — G47.33 OSA ON CPAP: ICD-10-CM

## 2025-08-14 PROCEDURE — 95810 POLYSOM 6/> YRS 4/> PARAM: CPT

## 2025-08-15 ENCOUNTER — OFFICE VISIT (OUTPATIENT)
Age: 65
End: 2025-08-15
Payer: COMMERCIAL

## 2025-08-15 VITALS
WEIGHT: 204 LBS | BODY MASS INDEX: 29.2 KG/M2 | HEART RATE: 65 BPM | HEIGHT: 70 IN | RESPIRATION RATE: 20 BRPM | OXYGEN SATURATION: 94 %

## 2025-08-15 DIAGNOSIS — M19.012 ARTHRITIS OF BOTH GLENOHUMERAL JOINTS: Primary | ICD-10-CM

## 2025-08-15 DIAGNOSIS — M19.011 ARTHRITIS OF BOTH GLENOHUMERAL JOINTS: Primary | ICD-10-CM

## 2025-08-15 PROCEDURE — G8417 CALC BMI ABV UP PARAM F/U: HCPCS

## 2025-08-15 PROCEDURE — G8427 DOCREV CUR MEDS BY ELIG CLIN: HCPCS

## 2025-08-15 PROCEDURE — 99214 OFFICE O/P EST MOD 30 MIN: CPT

## 2025-08-15 PROCEDURE — 3017F COLORECTAL CA SCREEN DOC REV: CPT

## 2025-08-15 PROCEDURE — 4004F PT TOBACCO SCREEN RCVD TLK: CPT

## 2025-08-15 ASSESSMENT — SLEEP AND FATIGUE QUESTIONNAIRES
HOW LIKELY ARE YOU TO NOD OFF OR FALL ASLEEP WHILE SITTING AND READING: SLIGHT CHANCE OF DOZING
HOW LIKELY ARE YOU TO NOD OFF OR FALL ASLEEP WHILE SITTING QUIETLY AFTER LUNCH WITHOUT ALCOHOL: WOULD NEVER DOZE
ESS TOTAL SCORE: 3
HOW LIKELY ARE YOU TO NOD OFF OR FALL ASLEEP WHILE SITTING AND TALKING TO SOMEONE: WOULD NEVER DOZE
HOW LIKELY ARE YOU TO NOD OFF OR FALL ASLEEP WHILE WATCHING TV: SLIGHT CHANCE OF DOZING
HOW LIKELY ARE YOU TO NOD OFF OR FALL ASLEEP WHILE SITTING INACTIVE IN A PUBLIC PLACE: WOULD NEVER DOZE
HOW LIKELY ARE YOU TO NOD OFF OR FALL ASLEEP IN A CAR, WHILE STOPPED FOR A FEW MINUTES IN TRAFFIC: WOULD NEVER DOZE
HOW LIKELY ARE YOU TO NOD OFF OR FALL ASLEEP WHILE LYING DOWN TO REST IN THE AFTERNOON WHEN CIRCUMSTANCES PERMIT: SLIGHT CHANCE OF DOZING
HOW LIKELY ARE YOU TO NOD OFF OR FALL ASLEEP WHEN YOU ARE A PASSENGER IN A CAR FOR AN HOUR WITHOUT A BREAK: WOULD NEVER DOZE

## 2025-08-16 ENCOUNTER — HOSPITAL ENCOUNTER (OUTPATIENT)
Dept: LAB | Facility: CLINIC | Age: 65
Discharge: HOME | End: 2025-08-16
Payer: COMMERCIAL

## 2025-08-16 DIAGNOSIS — I10 ESSENTIAL HYPERTENSION: ICD-10-CM

## 2025-08-16 DIAGNOSIS — I48.20 CHRONIC ATRIAL FIBRILLATION (HCC): ICD-10-CM

## 2025-08-16 LAB
ANION GAP SERPL CALCULATED.3IONS-SCNC: 11 MMOL/L (ref 9–16)
BASOPHILS # BLD: 0.07 K/UL (ref 0–0.2)
BASOPHILS NFR BLD: 1 % (ref 0–2)
BUN SERPL-MCNC: 14 MG/DL (ref 8–23)
CALCIUM SERPL-MCNC: 9.5 MG/DL (ref 8.6–10.4)
CHLORIDE SERPL-SCNC: 101 MMOL/L (ref 98–107)
CO2 SERPL-SCNC: 26 MMOL/L (ref 20–31)
CREAT SERPL-MCNC: 1.2 MG/DL (ref 0.7–1.2)
EOSINOPHIL # BLD: 0.26 K/UL (ref 0–0.44)
EOSINOPHILS RELATIVE PERCENT: 4 % (ref 1–4)
ERYTHROCYTE [DISTWIDTH] IN BLOOD BY AUTOMATED COUNT: 14.5 % (ref 11.8–14.4)
GFR, ESTIMATED: 68 ML/MIN/1.73M2
GLUCOSE SERPL-MCNC: 92 MG/DL (ref 74–99)
HCT VFR BLD AUTO: 53.2 % (ref 40.7–50.3)
HGB BLD-MCNC: 18.3 G/DL (ref 13–17)
IMM GRANULOCYTES # BLD AUTO: 0.03 K/UL (ref 0–0.3)
IMM GRANULOCYTES NFR BLD: 1 %
LYMPHOCYTES NFR BLD: 1.22 K/UL (ref 1.1–3.7)
LYMPHOCYTES RELATIVE PERCENT: 20 % (ref 24–43)
MCH RBC QN AUTO: 32.3 PG (ref 25.2–33.5)
MCHC RBC AUTO-ENTMCNC: 34.4 G/DL (ref 28.4–34.8)
MCV RBC AUTO: 94 FL (ref 82.6–102.9)
MONOCYTES NFR BLD: 0.53 K/UL (ref 0.1–1.2)
MONOCYTES NFR BLD: 9 % (ref 3–12)
NEUTROPHILS NFR BLD: 65 % (ref 36–65)
NEUTS SEG NFR BLD: 3.98 K/UL (ref 1.5–8.1)
NRBC BLD-RTO: 0 PER 100 WBC
PLATELET # BLD AUTO: 267 K/UL (ref 138–453)
PMV BLD AUTO: 9.4 FL (ref 8.1–13.5)
POTASSIUM SERPL-SCNC: 4.5 MMOL/L (ref 3.7–5.3)
RBC # BLD AUTO: 5.66 M/UL (ref 4.21–5.77)
RBC # BLD: ABNORMAL 10*6/UL
SODIUM SERPL-SCNC: 138 MMOL/L (ref 136–145)
WBC OTHER # BLD: 6.1 K/UL (ref 3.5–11.3)

## 2025-08-16 PROCEDURE — 80048 BASIC METABOLIC PNL TOTAL CA: CPT

## 2025-08-16 PROCEDURE — 85025 COMPLETE CBC W/AUTO DIFF WBC: CPT

## 2025-08-16 PROCEDURE — 36415 COLL VENOUS BLD VENIPUNCTURE: CPT

## 2025-08-28 DIAGNOSIS — G47.00 INSOMNIA, UNSPECIFIED TYPE: ICD-10-CM

## 2025-08-29 RX ORDER — ZOLPIDEM TARTRATE 5 MG/1
5 TABLET ORAL NIGHTLY PRN
Qty: 30 TABLET | Refills: 0 | Status: SHIPPED | OUTPATIENT
Start: 2025-08-29 | End: 2025-09-28

## 2025-09-04 ENCOUNTER — HOSPITAL ENCOUNTER (EMERGENCY)
Age: 65
Discharge: HOME | End: 2025-09-04
Attending: EMERGENCY MEDICINE
Payer: COMMERCIAL

## 2025-09-04 ENCOUNTER — APPOINTMENT (OUTPATIENT)
Dept: GENERAL RADIOLOGY | Age: 65
End: 2025-09-04
Payer: COMMERCIAL

## 2025-09-04 VITALS
TEMPERATURE: 98.1 F | HEIGHT: 69 IN | SYSTOLIC BLOOD PRESSURE: 120 MMHG | OXYGEN SATURATION: 96 % | HEART RATE: 69 BPM | RESPIRATION RATE: 19 BRPM | WEIGHT: 194 LBS | DIASTOLIC BLOOD PRESSURE: 77 MMHG | BODY MASS INDEX: 28.73 KG/M2

## 2025-09-04 DIAGNOSIS — R07.9 CHEST PAIN, UNSPECIFIED TYPE: Primary | ICD-10-CM

## 2025-09-04 LAB
ALBUMIN SERPL-MCNC: 4.5 G/DL (ref 3.5–5.2)
ALBUMIN/GLOB SERPL: 2.3 {RATIO} (ref 1–2.5)
ALP SERPL-CCNC: 52 U/L (ref 40–129)
ALT SERPL-CCNC: 29 U/L (ref 10–50)
ANION GAP SERPL CALCULATED.3IONS-SCNC: 11 MMOL/L (ref 9–16)
AST SERPL-CCNC: 24 U/L (ref 10–50)
BASOPHILS # BLD: 0 K/UL (ref 0–0.2)
BASOPHILS NFR BLD: 1 % (ref 0–2)
BILIRUB SERPL-MCNC: 0.7 MG/DL (ref 0–1.2)
BUN SERPL-MCNC: 18 MG/DL (ref 8–23)
CALCIUM SERPL-MCNC: 9.1 MG/DL (ref 8.6–10.4)
CHLORIDE SERPL-SCNC: 104 MMOL/L (ref 98–107)
CO2 SERPL-SCNC: 24 MMOL/L (ref 20–31)
CREAT SERPL-MCNC: 1 MG/DL (ref 0.7–1.2)
EOSINOPHIL # BLD: 0.2 K/UL (ref 0–0.4)
EOSINOPHILS RELATIVE PERCENT: 3 % (ref 1–4)
ERYTHROCYTE [DISTWIDTH] IN BLOOD BY AUTOMATED COUNT: 14.4 % (ref 12.5–15.4)
GFR, ESTIMATED: 84 ML/MIN/1.73M2
GLUCOSE SERPL-MCNC: 84 MG/DL (ref 74–99)
HCT VFR BLD AUTO: 52.2 % (ref 41–53)
HGB BLD-MCNC: 17.8 G/DL (ref 13.5–17.5)
LYMPHOCYTES NFR BLD: 1.1 K/UL (ref 1–4.8)
LYMPHOCYTES RELATIVE PERCENT: 19 % (ref 24–44)
MAGNESIUM SERPL-MCNC: 2.2 MG/DL (ref 1.6–2.4)
MCH RBC QN AUTO: 32.4 PG (ref 26–34)
MCHC RBC AUTO-ENTMCNC: 34.1 G/DL (ref 31–37)
MCV RBC AUTO: 95 FL (ref 80–100)
MONOCYTES NFR BLD: 0.5 K/UL (ref 0.1–1.2)
MONOCYTES NFR BLD: 8 % (ref 2–11)
NEUTROPHILS NFR BLD: 69 % (ref 36–66)
NEUTS SEG NFR BLD: 3.9 K/UL (ref 1.8–7.7)
PLATELET # BLD AUTO: 238 K/UL (ref 140–450)
PMV BLD AUTO: 7.6 FL (ref 6–12)
POTASSIUM SERPL-SCNC: 4.4 MMOL/L (ref 3.7–5.3)
PROT SERPL-MCNC: 6.5 G/DL (ref 6.6–8.7)
RBC # BLD AUTO: 5.49 M/UL (ref 4.5–5.9)
SODIUM SERPL-SCNC: 139 MMOL/L (ref 136–145)
TROPONIN I SERPL HS-MCNC: 13 NG/L (ref 0–22)
WBC OTHER # BLD: 5.6 K/UL (ref 3.5–11)

## 2025-09-04 PROCEDURE — 6370000000 HC RX 637 (ALT 250 FOR IP): Performed by: EMERGENCY MEDICINE

## 2025-09-04 PROCEDURE — 85025 COMPLETE CBC W/AUTO DIFF WBC: CPT

## 2025-09-04 PROCEDURE — 80053 COMPREHEN METABOLIC PANEL: CPT

## 2025-09-04 PROCEDURE — 84484 ASSAY OF TROPONIN QUANT: CPT

## 2025-09-04 PROCEDURE — 71045 X-RAY EXAM CHEST 1 VIEW: CPT

## 2025-09-04 PROCEDURE — 83735 ASSAY OF MAGNESIUM: CPT

## 2025-09-04 PROCEDURE — 93005 ELECTROCARDIOGRAM TRACING: CPT | Performed by: EMERGENCY MEDICINE

## 2025-09-04 RX ORDER — MAGNESIUM HYDROXIDE/ALUMINUM HYDROXICE/SIMETHICONE 120; 1200; 1200 MG/30ML; MG/30ML; MG/30ML
15 SUSPENSION ORAL ONCE
Status: COMPLETED | OUTPATIENT
Start: 2025-09-04 | End: 2025-09-04

## 2025-09-04 RX ORDER — LORAZEPAM 1 MG/1
1 TABLET ORAL EVERY 8 HOURS PRN
Qty: 5 TABLET | Refills: 0 | Status: SHIPPED | OUTPATIENT
Start: 2025-09-04 | End: 2025-09-09

## 2025-09-04 RX ORDER — DIAZEPAM 5 MG/1
5 TABLET ORAL ONCE
Status: COMPLETED | OUTPATIENT
Start: 2025-09-04 | End: 2025-09-04

## 2025-09-04 RX ADMIN — ALUMINUM HYDROXIDE, MAGNESIUM HYDROXIDE, AND SIMETHICONE 15 ML: 200; 200; 20 SUSPENSION ORAL at 17:50

## 2025-09-04 RX ADMIN — DIAZEPAM 5 MG: 5 TABLET ORAL at 17:50

## 2025-09-04 ASSESSMENT — PAIN DESCRIPTION - DESCRIPTORS: DESCRIPTORS: HEAVINESS

## 2025-09-04 ASSESSMENT — PAIN DESCRIPTION - LOCATION: LOCATION: CHEST

## 2025-09-04 ASSESSMENT — PAIN - FUNCTIONAL ASSESSMENT: PAIN_FUNCTIONAL_ASSESSMENT: 0-10

## 2025-09-04 ASSESSMENT — PAIN SCALES - GENERAL: PAINLEVEL_OUTOF10: 7

## 2025-09-06 LAB
EKG ATRIAL RATE: 71 BPM
EKG P AXIS: 18 DEGREES
EKG P-R INTERVAL: 216 MS
EKG Q-T INTERVAL: 374 MS
EKG QRS DURATION: 88 MS
EKG QTC CALCULATION (BAZETT): 406 MS
EKG R AXIS: 31 DEGREES
EKG T AXIS: 67 DEGREES
EKG VENTRICULAR RATE: 71 BPM

## 2025-09-06 PROCEDURE — 93010 ELECTROCARDIOGRAM REPORT: CPT | Performed by: INTERNAL MEDICINE

## (undated) DEVICE — SPLINT 1524055 DOYLE II AIRWAY SET: Brand: DOYLE II ™

## (undated) DEVICE — SYRINGES CONTROL 10ML W/ROTATOR

## (undated) DEVICE — SUTURE FIBERWIRE SZ 5 L38IN NONABSORBABLE BLU L48MM 1/2 AR7211

## (undated) DEVICE — SUTURE PDS + SZ 3 0 L27IN ABSRB VLT L36MM CT 1 1 2 CIR PDP338H

## (undated) DEVICE — SPINAL BIOPSY NEEDLE 22GA X 20CM: Brand: SPINAL BIOPSY NEEDLE

## (undated) DEVICE — SUTURE VCRL + SZ 3-0 L36IN ABSRB UD L36MM CT-1 1/2 CIR VCP944H

## (undated) DEVICE — SINGLE PORT MANIFOLD: Brand: NEPTUNE 2

## (undated) DEVICE — MERCY HEALTH ST CHARLES: Brand: MEDLINE INDUSTRIES, INC.

## (undated) DEVICE — TUBING SUCT 12FR MAL ALUM SHFT FN CAP VENT UNIV CONN W/ OBT

## (undated) DEVICE — DRAPE,U/ SHT,SPLIT,PLAS,STERIL: Brand: MEDLINE

## (undated) DEVICE — INTENDED TO AID IN THE PASSING OF SUTURES THROUGH BONE AND SOFT TISSUE DURING ORTHOPEDIC SURGERY: Brand: HOFFEE SUTURE RETRIEVER

## (undated) DEVICE — DISPOSABLE TOURNIQUET CUFF SINGLE BLADDER, SINGLE PORT AND QUICK CONNECT CONNECTOR: Brand: COLOR CUFF

## (undated) DEVICE — HYPODERMIC SAFETY NEEDLE: Brand: MAGELLAN

## (undated) DEVICE — BANDAGE COBAN 6 IN WND 6INX5YD FOAM

## (undated) DEVICE — SOLUTION IRRIG 500ML 0.9% SOD CHL USP POUR PLAS BTL

## (undated) DEVICE — POUCH INSTR W6.75XL11.5IN FRST 2 PKT ADH FOR ORTH AND

## (undated) DEVICE — GLOVE ORANGE PI 7   MSG9070

## (undated) DEVICE — SOLUTION IV IRRIG POUR BRL 0.9% SODIUM CHL 2F7124

## (undated) DEVICE — DRAPE,ORTHOMAX DUAL,SPLIT SHOULDER: Brand: MEDLINE

## (undated) DEVICE — GLOVE ORANGE PI 7 1/2   MSG9075

## (undated) DEVICE — YANKAUER,OPEN TIP,W/O VENT,STERILE: Brand: MEDLINE INDUSTRIES, INC.

## (undated) DEVICE — 1010 S-DRAPE TOWEL DRAPE 10/BX: Brand: STERI-DRAPE™

## (undated) DEVICE — Device

## (undated) DEVICE — BANDAGE COBAN 4 IN COMPR W4INXL5YD FOAM COHESIVE QUIK STK SELF ADH SFT

## (undated) DEVICE — REFLEX ULTRA 45 WITH INTEGRATED CABLE: Brand: COBLATION

## (undated) DEVICE — STOCKINETTE,IMPERVIOUS,12X48,STERILE: Brand: MEDLINE

## (undated) DEVICE — SUTURE PROL SZ 3-0 L18IN NONABSORBABLE BLU L24MM FS-1 3/8 8684G

## (undated) DEVICE — ZIMMER® STERILE DISPOSABLE TOURNIQUET CUFF WITH PLC, DUAL PORT, SINGLE BLADDER, 18 IN. (46 CM)

## (undated) DEVICE — DRAPE,REIN 53X77,STERILE: Brand: MEDLINE

## (undated) DEVICE — PADDING,UNDERCAST,COTTON, 3X4YD STERILE: Brand: MEDLINE

## (undated) DEVICE — INTENDED FOR TISSUE SEPARATION, AND OTHER PROCEDURES THAT REQUIRE A SHARP SURGICAL BLADE TO PUNCTURE OR CUT.: Brand: BARD-PARKER ® CARBON RIB-BACK BLADES

## (undated) DEVICE — GOWN,AURORA,NONREINFORCED,LARGE: Brand: MEDLINE

## (undated) DEVICE — MINOR BSIN PK

## (undated) DEVICE — ENDO KIT W/SYRINGE: Brand: MEDLINE INDUSTRIES, INC.

## (undated) DEVICE — SYRINGE MED 50ML LUERSLIP TIP

## (undated) DEVICE — GOWN,SIRUS,NONRNF,SETINSLV,XL,20/CS: Brand: MEDLINE

## (undated) DEVICE — DEFENDO AIR WATER SUCTION AND BIOPSY VALVE KIT FOR  OLYMPUS: Brand: DEFENDO AIR/WATER/SUCTION AND BIOPSY VALVE

## (undated) DEVICE — 3M™ STERI-DRAPE™ U-DRAPE 1015: Brand: STERI-DRAPE™

## (undated) DEVICE — DRESSING,GAUZE,XEROFORM,CURAD,1"X8",ST: Brand: CURAD

## (undated) DEVICE — FORCEPS BX L240CM JAW DIA2.4MM ORNG L CAP W/ NDL DISP RAD

## (undated) DEVICE — SPLINT ORTH W4XL30IN LAYERED FBRGLS FOAM PD BRTH BK MOLD

## (undated) DEVICE — GLOVE SURG 9 PF CRM STRL SENSICARE PI MIC LF

## (undated) DEVICE — INTENT TO BE USED WITH SUTURE MATERIAL FOR TISSUE CLOSURE: Brand: RICHARD-ALLAN® NEEDLE 1/2 CIRCLE TAPER

## (undated) DEVICE — NEEDLE SPNL 22GA L7IN SPINOCAN

## (undated) DEVICE — SPONGE LAP W18XL18IN WHT COT 4 PLY FLD STRUNG RADPQ DISP ST

## (undated) DEVICE — CONTAINER,SPECIMEN,4OZ,OR STRL: Brand: MEDLINE

## (undated) DEVICE — SUTURE ETHLN SZ 3-0 L18IN NONABSORBABLE BLK FS-1 L24MM 3/8 663H

## (undated) DEVICE — BLUNTFILL: Brand: MONOJECT

## (undated) DEVICE — STRIP,CLOSURE,WOUND,MEDI-STRIP,1/2X4: Brand: MEDLINE

## (undated) DEVICE — SUTURE CHROMIC GUT SZ 4-0 L27IN ABSRB BRN L17MM RB-1 1/2 U203H

## (undated) DEVICE — CODMAN® SURGICAL PATTIES 1/2" X 3" (1.27CM X 7.62CM): Brand: CODMAN®

## (undated) DEVICE — DRESSING TRNSPAR W5XL4.5IN FLM SHT SEMIPERMEABLE WIND

## (undated) DEVICE — GOWN,POLY REINFORCED,LG: Brand: MEDLINE

## (undated) DEVICE — COVER,MAYO STAND,XL,STERILE: Brand: MEDLINE

## (undated) DEVICE — SHEET, ORTHO, SPLIT, STERILE: Brand: MEDLINE

## (undated) DEVICE — SOLUTION IRRIG 1000ML 0.9% SOD CHL USP POUR PLAS BTL

## (undated) DEVICE — BITEBLOCK 54FR W/ DENT RIM BLOX

## (undated) DEVICE — BLANKET WRM W40.2XL55.9IN IORT LO BODY + MISTRAL AIR

## (undated) DEVICE — SWAB CULT CLR BLU PLAS RAYON LIQ AMIES AERB ANAERB FRIC CAP

## (undated) DEVICE — COVER,TABLE,60X90,STERILE: Brand: MEDLINE

## (undated) DEVICE — KIT ELBW FOAM W/ DRP ARM HLDR DISP TRIMANO

## (undated) DEVICE — JELLY,LUBE,STERILE,FLIP TOP,TUBE,2-OZ: Brand: MEDLINE

## (undated) DEVICE — MARKER,SKIN,WI/RULER AND LABELS: Brand: MEDLINE

## (undated) DEVICE — GOWN,AURORA,NON-REINFORCED,2XL: Brand: MEDLINE

## (undated) DEVICE — SUTURE VCRL SZ 0 L36IN ABSRB UD L36MM CT-1 1/2 CIR J946H

## (undated) DEVICE — MONOPTY® DISPOSABLE CORE BIOPSY INSTRUMENT, 22MM PENETRATION DEPTH, 18G X 20CM: Brand: MONOPTY

## (undated) DEVICE — IMMOBILIZER ORTH CONTACT CLOSURE STRP LG 18X9 IN PROCARE

## (undated) DEVICE — HANDPIECE SET WITH BONE CLEANING TIP AND SUCTION TUBE: Brand: INTERPULSE

## (undated) DEVICE — SYRINGE, LUER LOCK, 10ML: Brand: MEDLINE

## (undated) DEVICE — SUTURE ETHLN SZ 3-0 L18IN NONABSORBABLE BLK PS-2 L19MM 3/8 1669H

## (undated) DEVICE — SYRINGE MED 10ML LUERLOCK TIP W/O SFTY DISP

## (undated) DEVICE — PIN DRL DIA3.2MM DISP FOR TENS SLDE TECH DST BICEPS REP

## (undated) DEVICE — ELECTRODE ES L3IN S STL BLDE INSUL DISP VALLEYLAB EDGE

## (undated) DEVICE — SUTURE FIBERWIRE SZ 2 W/ TAPERED NEEDLE BLUE L38IN NONABSORB BLU L26.5MM 1/2 CIRCLE AR7200

## (undated) DEVICE — COVER,MAYO STAND,STERILE: Brand: MEDLINE

## (undated) DEVICE — MHPB HAND AND FOOT PACK: Brand: MEDLINE INDUSTRIES, INC.

## (undated) DEVICE — 3M™ IOBAN™ 2 ANTIMICROBIAL INCISE DRAPE 6650EZ: Brand: IOBAN™ 2